# Patient Record
Sex: FEMALE | Race: AMERICAN INDIAN OR ALASKA NATIVE | NOT HISPANIC OR LATINO | Employment: UNEMPLOYED | URBAN - METROPOLITAN AREA
[De-identification: names, ages, dates, MRNs, and addresses within clinical notes are randomized per-mention and may not be internally consistent; named-entity substitution may affect disease eponyms.]

---

## 2017-01-01 ENCOUNTER — HOSPITAL ENCOUNTER (INPATIENT)
Facility: MEDICAL CENTER | Age: 37
LOS: 1 days | DRG: 872 | End: 2017-04-17
Attending: EMERGENCY MEDICINE | Admitting: INTERNAL MEDICINE
Payer: MEDICAID

## 2017-01-01 ENCOUNTER — HOSPITAL ENCOUNTER (EMERGENCY)
Facility: MEDICAL CENTER | Age: 37
End: 2017-01-27
Attending: EMERGENCY MEDICINE
Payer: MEDICAID

## 2017-01-01 ENCOUNTER — HOSPITAL ENCOUNTER (INPATIENT)
Facility: MEDICAL CENTER | Age: 37
LOS: 11 days | DRG: 871 | End: 2017-09-08
Attending: INTERNAL MEDICINE | Admitting: INTERNAL MEDICINE
Payer: MEDICAID

## 2017-01-01 ENCOUNTER — HOSPITAL ENCOUNTER (EMERGENCY)
Facility: MEDICAL CENTER | Age: 37
End: 2017-03-17
Attending: EMERGENCY MEDICINE
Payer: MEDICAID

## 2017-01-01 ENCOUNTER — APPOINTMENT (OUTPATIENT)
Dept: RADIOLOGY | Facility: MEDICAL CENTER | Age: 37
DRG: 418 | End: 2017-01-01
Attending: SURGERY
Payer: MEDICAID

## 2017-01-01 ENCOUNTER — APPOINTMENT (OUTPATIENT)
Dept: RADIOLOGY | Facility: MEDICAL CENTER | Age: 37
DRG: 871 | End: 2017-01-01
Attending: INTERNAL MEDICINE
Payer: MEDICAID

## 2017-01-01 ENCOUNTER — HOSPITAL ENCOUNTER (INPATIENT)
Facility: MEDICAL CENTER | Age: 37
LOS: 5 days | DRG: 418 | End: 2017-03-14
Attending: EMERGENCY MEDICINE | Admitting: HOSPITALIST
Payer: MEDICAID

## 2017-01-01 ENCOUNTER — PATIENT OUTREACH (OUTPATIENT)
Dept: HEALTH INFORMATION MANAGEMENT | Facility: OTHER | Age: 37
End: 2017-01-01

## 2017-01-01 ENCOUNTER — APPOINTMENT (OUTPATIENT)
Dept: RADIOLOGY | Facility: MEDICAL CENTER | Age: 37
DRG: 418 | End: 2017-01-01
Attending: EMERGENCY MEDICINE
Payer: MEDICAID

## 2017-01-01 ENCOUNTER — RESOLUTE PROFESSIONAL BILLING HOSPITAL PROF FEE (OUTPATIENT)
Dept: HOSPITALIST | Facility: MEDICAL CENTER | Age: 37
End: 2017-01-01
Payer: MEDICAID

## 2017-01-01 ENCOUNTER — APPOINTMENT (OUTPATIENT)
Dept: RADIOLOGY | Facility: MEDICAL CENTER | Age: 37
DRG: 872 | End: 2017-01-01
Attending: EMERGENCY MEDICINE
Payer: MEDICAID

## 2017-01-01 ENCOUNTER — HOME CARE VISIT (OUTPATIENT)
Dept: HOSPICE | Facility: HOSPICE | Age: 37
End: 2017-01-01
Payer: MEDICAID

## 2017-01-01 ENCOUNTER — APPOINTMENT (OUTPATIENT)
Dept: RADIOLOGY | Facility: MEDICAL CENTER | Age: 37
DRG: 872 | End: 2017-01-01
Attending: INTERNAL MEDICINE
Payer: MEDICAID

## 2017-01-01 ENCOUNTER — HOSPITAL ENCOUNTER (EMERGENCY)
Facility: MEDICAL CENTER | Age: 37
End: 2017-05-03
Attending: EMERGENCY MEDICINE
Payer: MEDICAID

## 2017-01-01 ENCOUNTER — APPOINTMENT (OUTPATIENT)
Dept: RADIOLOGY | Facility: MEDICAL CENTER | Age: 37
End: 2017-01-01
Attending: EMERGENCY MEDICINE
Payer: MEDICAID

## 2017-01-01 ENCOUNTER — HOSPICE ADMISSION (OUTPATIENT)
Dept: HOSPICE | Facility: HOSPICE | Age: 37
End: 2017-01-01
Payer: MEDICAID

## 2017-01-01 ENCOUNTER — APPOINTMENT (OUTPATIENT)
Dept: RADIOLOGY | Facility: MEDICAL CENTER | Age: 37
DRG: 871 | End: 2017-01-01
Attending: EMERGENCY MEDICINE
Payer: MEDICAID

## 2017-01-01 ENCOUNTER — APPOINTMENT (OUTPATIENT)
Dept: RADIOLOGY | Facility: MEDICAL CENTER | Age: 37
DRG: 872 | End: 2017-01-01
Attending: HOSPITALIST
Payer: MEDICAID

## 2017-01-01 ENCOUNTER — HOSPITAL ENCOUNTER (EMERGENCY)
Facility: MEDICAL CENTER | Age: 37
End: 2017-05-18
Attending: EMERGENCY MEDICINE
Payer: MEDICAID

## 2017-01-01 ENCOUNTER — HOSPITAL ENCOUNTER (INPATIENT)
Facility: MEDICAL CENTER | Age: 37
LOS: 4 days | DRG: 872 | End: 2017-03-23
Attending: EMERGENCY MEDICINE | Admitting: HOSPITALIST
Payer: MEDICAID

## 2017-01-01 VITALS
OXYGEN SATURATION: 97 % | SYSTOLIC BLOOD PRESSURE: 120 MMHG | WEIGHT: 196.43 LBS | TEMPERATURE: 98.6 F | HEART RATE: 102 BPM | BODY MASS INDEX: 30.83 KG/M2 | HEIGHT: 67 IN | DIASTOLIC BLOOD PRESSURE: 65 MMHG | RESPIRATION RATE: 16 BRPM

## 2017-01-01 VITALS
SYSTOLIC BLOOD PRESSURE: 120 MMHG | DIASTOLIC BLOOD PRESSURE: 62 MMHG | HEIGHT: 67 IN | BODY MASS INDEX: 28.2 KG/M2 | RESPIRATION RATE: 16 BRPM | HEART RATE: 104 BPM | OXYGEN SATURATION: 98 % | WEIGHT: 179.68 LBS | TEMPERATURE: 98.5 F

## 2017-01-01 VITALS
WEIGHT: 170 LBS | HEART RATE: 94 BPM | TEMPERATURE: 98.5 F | OXYGEN SATURATION: 100 % | BODY MASS INDEX: 26.68 KG/M2 | HEIGHT: 67 IN | SYSTOLIC BLOOD PRESSURE: 88 MMHG | RESPIRATION RATE: 16 BRPM | DIASTOLIC BLOOD PRESSURE: 48 MMHG

## 2017-01-01 VITALS
HEIGHT: 67 IN | HEART RATE: 100 BPM | SYSTOLIC BLOOD PRESSURE: 119 MMHG | RESPIRATION RATE: 20 BRPM | WEIGHT: 178.57 LBS | DIASTOLIC BLOOD PRESSURE: 56 MMHG | OXYGEN SATURATION: 97 % | TEMPERATURE: 98.7 F | BODY MASS INDEX: 28.03 KG/M2

## 2017-01-01 VITALS
BODY MASS INDEX: 26.68 KG/M2 | OXYGEN SATURATION: 95 % | RESPIRATION RATE: 15 BRPM | DIASTOLIC BLOOD PRESSURE: 97 MMHG | TEMPERATURE: 97.5 F | HEART RATE: 110 BPM | HEIGHT: 67 IN | WEIGHT: 170 LBS | SYSTOLIC BLOOD PRESSURE: 152 MMHG

## 2017-01-01 VITALS
SYSTOLIC BLOOD PRESSURE: 114 MMHG | HEART RATE: 99 BPM | DIASTOLIC BLOOD PRESSURE: 58 MMHG | HEIGHT: 67 IN | BODY MASS INDEX: 27.02 KG/M2 | OXYGEN SATURATION: 97 % | TEMPERATURE: 97.7 F | WEIGHT: 172.18 LBS | RESPIRATION RATE: 16 BRPM

## 2017-01-01 VITALS
HEART RATE: 112 BPM | BODY MASS INDEX: 28.51 KG/M2 | RESPIRATION RATE: 20 BRPM | HEIGHT: 67 IN | OXYGEN SATURATION: 82 % | SYSTOLIC BLOOD PRESSURE: 113 MMHG | DIASTOLIC BLOOD PRESSURE: 54 MMHG | WEIGHT: 181.66 LBS | TEMPERATURE: 98.2 F

## 2017-01-01 VITALS
HEIGHT: 67 IN | SYSTOLIC BLOOD PRESSURE: 118 MMHG | OXYGEN SATURATION: 97 % | BODY MASS INDEX: 28.03 KG/M2 | HEART RATE: 77 BPM | TEMPERATURE: 97.8 F | RESPIRATION RATE: 17 BRPM | DIASTOLIC BLOOD PRESSURE: 64 MMHG | WEIGHT: 178.57 LBS

## 2017-01-01 DIAGNOSIS — E80.6 HYPERBILIRUBINEMIA: ICD-10-CM

## 2017-01-01 DIAGNOSIS — K74.60 HEPATIC CIRRHOSIS, UNSPECIFIED HEPATIC CIRRHOSIS TYPE (HCC): ICD-10-CM

## 2017-01-01 DIAGNOSIS — R74.8 ELEVATED ALKALINE PHOSPHATASE LEVEL: ICD-10-CM

## 2017-01-01 DIAGNOSIS — D53.9 MACROCYTIC ANEMIA: ICD-10-CM

## 2017-01-01 DIAGNOSIS — K70.31 ALCOHOLIC CIRRHOSIS OF LIVER WITH ASCITES (HCC): ICD-10-CM

## 2017-01-01 DIAGNOSIS — G89.29 CHRONIC ABDOMINAL PAIN: ICD-10-CM

## 2017-01-01 DIAGNOSIS — R10.9 CHRONIC ABDOMINAL PAIN: ICD-10-CM

## 2017-01-01 DIAGNOSIS — R41.82 ALTERED MENTAL STATUS, UNSPECIFIED ALTERED MENTAL STATUS TYPE: ICD-10-CM

## 2017-01-01 DIAGNOSIS — A41.9 SEPSIS, DUE TO UNSPECIFIED ORGANISM: ICD-10-CM

## 2017-01-01 DIAGNOSIS — N93.9 VAGINAL BLEEDING: ICD-10-CM

## 2017-01-01 DIAGNOSIS — R93.2 ABNORMAL GALLBLADDER ULTRASOUND: ICD-10-CM

## 2017-01-01 DIAGNOSIS — K70.11 ALCOHOLIC HEPATITIS WITH ASCITES: ICD-10-CM

## 2017-01-01 DIAGNOSIS — F10.929 ALCOHOL INTOXICATION, WITH UNSPECIFIED COMPLICATION (HCC): ICD-10-CM

## 2017-01-01 DIAGNOSIS — R10.11 RIGHT UPPER QUADRANT ABDOMINAL PAIN: ICD-10-CM

## 2017-01-01 DIAGNOSIS — D68.4: ICD-10-CM

## 2017-01-01 DIAGNOSIS — E87.6 HYPOKALEMIA: ICD-10-CM

## 2017-01-01 DIAGNOSIS — F10.920 ACUTE ALCOHOL INTOXICATION, UNCOMPLICATED (HCC): ICD-10-CM

## 2017-01-01 DIAGNOSIS — R10.9 FLANK PAIN: ICD-10-CM

## 2017-01-01 DIAGNOSIS — Z51.5 COMFORT MEASURES ONLY STATUS: ICD-10-CM

## 2017-01-01 DIAGNOSIS — F10.10 ALCOHOL ABUSE: ICD-10-CM

## 2017-01-01 DIAGNOSIS — G89.18 POST-OP PAIN: ICD-10-CM

## 2017-01-01 DIAGNOSIS — T81.40XA POST-OPERATIVE INFECTION: ICD-10-CM

## 2017-01-01 LAB
25(OH)D3 SERPL-MCNC: 9 NG/ML (ref 30–100)
ABO GROUP BLD: NORMAL
ABO GROUP BLD: NORMAL
AFP-TM SERPL-MCNC: 2 NG/ML (ref 0–9)
ALBUMIN SERPL BCP-MCNC: 1.9 G/DL (ref 3.2–4.9)
ALBUMIN SERPL BCP-MCNC: 2 G/DL (ref 3.2–4.9)
ALBUMIN SERPL BCP-MCNC: 2 G/DL (ref 3.2–4.9)
ALBUMIN SERPL BCP-MCNC: 2.1 G/DL (ref 3.2–4.9)
ALBUMIN SERPL BCP-MCNC: 2.1 G/DL (ref 3.2–4.9)
ALBUMIN SERPL BCP-MCNC: 2.2 G/DL (ref 3.2–4.9)
ALBUMIN SERPL BCP-MCNC: 2.4 G/DL (ref 3.2–4.9)
ALBUMIN SERPL BCP-MCNC: 2.5 G/DL (ref 3.2–4.9)
ALBUMIN SERPL BCP-MCNC: 2.6 G/DL (ref 3.2–4.9)
ALBUMIN SERPL BCP-MCNC: 2.6 G/DL (ref 3.2–4.9)
ALBUMIN SERPL BCP-MCNC: 2.7 G/DL (ref 3.2–4.9)
ALBUMIN SERPL BCP-MCNC: 2.7 G/DL (ref 3.2–4.9)
ALBUMIN SERPL BCP-MCNC: 2.9 G/DL (ref 3.2–4.9)
ALBUMIN SERPL BCP-MCNC: 2.9 G/DL (ref 3.2–4.9)
ALBUMIN SERPL BCP-MCNC: 3 G/DL (ref 3.2–4.9)
ALBUMIN SERPL BCP-MCNC: 3.1 G/DL (ref 3.2–4.9)
ALBUMIN SERPL BCP-MCNC: 3.2 G/DL (ref 3.2–4.9)
ALBUMIN/GLOB SERPL: 0.4 G/DL
ALBUMIN/GLOB SERPL: 0.4 G/DL
ALBUMIN/GLOB SERPL: 0.5 G/DL
ALBUMIN/GLOB SERPL: 0.6 G/DL
ALBUMIN/GLOB SERPL: 0.7 G/DL
ALBUMIN/GLOB SERPL: 0.7 G/DL
ALP SERPL-CCNC: 104 U/L (ref 30–99)
ALP SERPL-CCNC: 104 U/L (ref 30–99)
ALP SERPL-CCNC: 110 U/L (ref 30–99)
ALP SERPL-CCNC: 117 U/L (ref 30–99)
ALP SERPL-CCNC: 118 U/L (ref 30–99)
ALP SERPL-CCNC: 122 U/L (ref 30–99)
ALP SERPL-CCNC: 123 U/L (ref 30–99)
ALP SERPL-CCNC: 126 U/L (ref 30–99)
ALP SERPL-CCNC: 128 U/L (ref 30–99)
ALP SERPL-CCNC: 134 U/L (ref 30–99)
ALP SERPL-CCNC: 135 U/L (ref 30–99)
ALP SERPL-CCNC: 135 U/L (ref 30–99)
ALP SERPL-CCNC: 138 U/L (ref 30–99)
ALP SERPL-CCNC: 140 U/L (ref 30–99)
ALP SERPL-CCNC: 147 U/L (ref 30–99)
ALP SERPL-CCNC: 160 U/L (ref 30–99)
ALP SERPL-CCNC: 165 U/L (ref 30–99)
ALP SERPL-CCNC: 58 U/L (ref 30–99)
ALP SERPL-CCNC: 59 U/L (ref 30–99)
ALP SERPL-CCNC: 71 U/L (ref 30–99)
ALP SERPL-CCNC: 77 U/L (ref 30–99)
ALT SERPL-CCNC: 12 U/L (ref 2–50)
ALT SERPL-CCNC: 13 U/L (ref 2–50)
ALT SERPL-CCNC: 14 U/L (ref 2–50)
ALT SERPL-CCNC: 16 U/L (ref 2–50)
ALT SERPL-CCNC: 17 U/L (ref 2–50)
ALT SERPL-CCNC: 19 U/L (ref 2–50)
ALT SERPL-CCNC: 20 U/L (ref 2–50)
ALT SERPL-CCNC: 20 U/L (ref 2–50)
ALT SERPL-CCNC: 22 U/L (ref 2–50)
ALT SERPL-CCNC: 23 U/L (ref 2–50)
ALT SERPL-CCNC: 25 U/L (ref 2–50)
ALT SERPL-CCNC: 25 U/L (ref 2–50)
ALT SERPL-CCNC: 30 U/L (ref 2–50)
ALT SERPL-CCNC: 32 U/L (ref 2–50)
AMMONIA PLAS-SCNC: 32 UMOL/L (ref 11–45)
AMMONIA PLAS-SCNC: 37 UMOL/L (ref 11–45)
AMMONIA PLAS-SCNC: 49 UMOL/L (ref 11–45)
AMMONIA PLAS-SCNC: 61 UMOL/L (ref 11–45)
AMMONIA PLAS-SCNC: 78 UMOL/L (ref 11–45)
AMORPH CRY #/AREA URNS HPF: PRESENT /HPF
ANION GAP SERPL CALC-SCNC: 10 MMOL/L (ref 0–11.9)
ANION GAP SERPL CALC-SCNC: 11 MMOL/L (ref 0–11.9)
ANION GAP SERPL CALC-SCNC: 12 MMOL/L (ref 0–11.9)
ANION GAP SERPL CALC-SCNC: 5 MMOL/L (ref 0–11.9)
ANION GAP SERPL CALC-SCNC: 5 MMOL/L (ref 0–11.9)
ANION GAP SERPL CALC-SCNC: 7 MMOL/L (ref 0–11.9)
ANION GAP SERPL CALC-SCNC: 8 MMOL/L (ref 0–11.9)
ANION GAP SERPL CALC-SCNC: 9 MMOL/L (ref 0–11.9)
ANISOCYTOSIS BLD QL SMEAR: ABNORMAL
ANISOCYTOSIS BLD QL SMEAR: NORMAL
ANISOCYTOSIS BLD QL SMEAR: NORMAL
APAP SERPL-MCNC: <10 UG/ML (ref 10–30)
APPEARANCE FLD: NORMAL
APPEARANCE UR: ABNORMAL
APPEARANCE UR: CLEAR
APTT PPP: 44.7 SEC (ref 24.7–36)
APTT PPP: 49.6 SEC (ref 24.7–36)
APTT PPP: 50.1 SEC (ref 24.7–36)
APTT PPP: 51 SEC (ref 24.7–36)
AST SERPL-CCNC: 121 U/L (ref 12–45)
AST SERPL-CCNC: 29 U/L (ref 12–45)
AST SERPL-CCNC: 29 U/L (ref 12–45)
AST SERPL-CCNC: 31 U/L (ref 12–45)
AST SERPL-CCNC: 35 U/L (ref 12–45)
AST SERPL-CCNC: 35 U/L (ref 12–45)
AST SERPL-CCNC: 38 U/L (ref 12–45)
AST SERPL-CCNC: 39 U/L (ref 12–45)
AST SERPL-CCNC: 40 U/L (ref 12–45)
AST SERPL-CCNC: 42 U/L (ref 12–45)
AST SERPL-CCNC: 42 U/L (ref 12–45)
AST SERPL-CCNC: 43 U/L (ref 12–45)
AST SERPL-CCNC: 48 U/L (ref 12–45)
AST SERPL-CCNC: 49 U/L (ref 12–45)
AST SERPL-CCNC: 50 U/L (ref 12–45)
AST SERPL-CCNC: 52 U/L (ref 12–45)
AST SERPL-CCNC: 53 U/L (ref 12–45)
AST SERPL-CCNC: 57 U/L (ref 12–45)
AST SERPL-CCNC: 57 U/L (ref 12–45)
AST SERPL-CCNC: 58 U/L (ref 12–45)
AST SERPL-CCNC: 62 U/L (ref 12–45)
AST SERPL-CCNC: 74 U/L (ref 12–45)
AST SERPL-CCNC: 90 U/L (ref 12–45)
BACTERIA #/AREA URNS HPF: ABNORMAL /HPF
BACTERIA BLD CULT: NORMAL
BACTERIA BLD CULT: NORMAL
BACTERIA FLD AEROBE CULT: ABNORMAL
BACTERIA FLD AEROBE CULT: ABNORMAL
BACTERIA UR CULT: NORMAL
BARCODED ABORH UBTYP: 5100
BARCODED ABORH UBTYP: 600
BARCODED ABORH UBTYP: 6200
BARCODED ABORH UBTYP: 9500
BARCODED PRD CODE UBPRD: NORMAL
BARCODED UNIT NUM UBUNT: NORMAL
BASE EXCESS BLDV CALC-SCNC: -3 MMOL/L
BASOPHILS # BLD AUTO: 0 % (ref 0–1.8)
BASOPHILS # BLD AUTO: 0 % (ref 0–1.8)
BASOPHILS # BLD AUTO: 0.2 % (ref 0–1.8)
BASOPHILS # BLD AUTO: 0.3 % (ref 0–1.8)
BASOPHILS # BLD AUTO: 0.4 % (ref 0–1.8)
BASOPHILS # BLD AUTO: 0.4 % (ref 0–1.8)
BASOPHILS # BLD AUTO: 0.5 % (ref 0–1.8)
BASOPHILS # BLD AUTO: 0.6 % (ref 0–1.8)
BASOPHILS # BLD AUTO: 0.7 % (ref 0–1.8)
BASOPHILS # BLD AUTO: 0.8 % (ref 0–1.8)
BASOPHILS # BLD AUTO: 0.9 % (ref 0–1.8)
BASOPHILS # BLD AUTO: 0.9 % (ref 0–1.8)
BASOPHILS # BLD AUTO: 1.8 % (ref 0–1.8)
BASOPHILS # BLD AUTO: 3.5 % (ref 0–1.8)
BASOPHILS # BLD: 0 K/UL (ref 0–0.12)
BASOPHILS # BLD: 0 K/UL (ref 0–0.12)
BASOPHILS # BLD: 0.02 K/UL (ref 0–0.12)
BASOPHILS # BLD: 0.02 K/UL (ref 0–0.12)
BASOPHILS # BLD: 0.03 K/UL (ref 0–0.12)
BASOPHILS # BLD: 0.04 K/UL (ref 0–0.12)
BASOPHILS # BLD: 0.04 K/UL (ref 0–0.12)
BASOPHILS # BLD: 0.05 K/UL (ref 0–0.12)
BASOPHILS # BLD: 0.06 K/UL (ref 0–0.12)
BASOPHILS # BLD: 0.06 K/UL (ref 0–0.12)
BASOPHILS # BLD: 0.08 K/UL (ref 0–0.12)
BASOPHILS # BLD: 0.09 K/UL (ref 0–0.12)
BASOPHILS # BLD: 0.11 K/UL (ref 0–0.12)
BASOPHILS # BLD: 0.14 K/UL (ref 0–0.12)
BASOPHILS # BLD: 0.18 K/UL (ref 0–0.12)
BASOPHILS # BLD: 0.24 K/UL (ref 0–0.12)
BILIRUB CONJ SERPL-MCNC: 2.2 MG/DL (ref 0.1–0.5)
BILIRUB CONJ SERPL-MCNC: 2.4 MG/DL (ref 0.1–0.5)
BILIRUB CONJ SERPL-MCNC: 2.4 MG/DL (ref 0.1–0.5)
BILIRUB CONJ SERPL-MCNC: 2.5 MG/DL (ref 0.1–0.5)
BILIRUB CONJ SERPL-MCNC: 2.7 MG/DL (ref 0.1–0.5)
BILIRUB CONJ SERPL-MCNC: 2.8 MG/DL (ref 0.1–0.5)
BILIRUB INDIRECT SERPL-MCNC: 2.6 MG/DL (ref 0–1)
BILIRUB INDIRECT SERPL-MCNC: 2.6 MG/DL (ref 0–1)
BILIRUB INDIRECT SERPL-MCNC: 2.8 MG/DL (ref 0–1)
BILIRUB INDIRECT SERPL-MCNC: 3 MG/DL (ref 0–1)
BILIRUB INDIRECT SERPL-MCNC: 3.2 MG/DL (ref 0–1)
BILIRUB SERPL-MCNC: 15 MG/DL (ref 0.1–1.5)
BILIRUB SERPL-MCNC: 28.5 MG/DL (ref 0.1–1.5)
BILIRUB SERPL-MCNC: 28.8 MG/DL (ref 0.1–1.5)
BILIRUB SERPL-MCNC: 29.4 MG/DL (ref 0.1–1.5)
BILIRUB SERPL-MCNC: 3.5 MG/DL (ref 0.1–1.5)
BILIRUB SERPL-MCNC: 3.8 MG/DL (ref 0.1–1.5)
BILIRUB SERPL-MCNC: 3.9 MG/DL (ref 0.1–1.5)
BILIRUB SERPL-MCNC: 30 MG/DL (ref 0.1–1.5)
BILIRUB SERPL-MCNC: 4.1 MG/DL (ref 0.1–1.5)
BILIRUB SERPL-MCNC: 4.2 MG/DL (ref 0.1–1.5)
BILIRUB SERPL-MCNC: 4.2 MG/DL (ref 0.1–1.5)
BILIRUB SERPL-MCNC: 4.3 MG/DL (ref 0.1–1.5)
BILIRUB SERPL-MCNC: 4.5 MG/DL (ref 0.1–1.5)
BILIRUB SERPL-MCNC: 4.8 MG/DL (ref 0.1–1.5)
BILIRUB SERPL-MCNC: 5 MG/DL (ref 0.1–1.5)
BILIRUB SERPL-MCNC: 5.2 MG/DL (ref 0.1–1.5)
BILIRUB SERPL-MCNC: 5.2 MG/DL (ref 0.1–1.5)
BILIRUB SERPL-MCNC: 5.3 MG/DL (ref 0.1–1.5)
BILIRUB SERPL-MCNC: 5.4 MG/DL (ref 0.1–1.5)
BILIRUB SERPL-MCNC: 5.4 MG/DL (ref 0.1–1.5)
BILIRUB SERPL-MCNC: 5.7 MG/DL (ref 0.1–1.5)
BILIRUB SERPL-MCNC: 6.1 MG/DL (ref 0.1–1.5)
BILIRUB SERPL-MCNC: 8.1 MG/DL (ref 0.1–1.5)
BILIRUB UR QL CFM: POSITIVE
BILIRUB UR QL CFM: POSITIVE
BILIRUB UR QL STRIP.AUTO: ABNORMAL
BILIRUB UR QL STRIP.AUTO: NEGATIVE
BILIRUB UR QL STRIP.AUTO: NEGATIVE
BLD GP AB SCN SERPL QL: NORMAL
BLD GP AB SCN SERPL QL: NORMAL
BNP SERPL-MCNC: 204 PG/ML (ref 0–100)
BODY FLD TYPE: NORMAL
BODY FLD TYPE: NORMAL
BODY TEMPERATURE: ABNORMAL CENTIGRADE
BUN SERPL-MCNC: 16 MG/DL (ref 8–22)
BUN SERPL-MCNC: 18 MG/DL (ref 8–22)
BUN SERPL-MCNC: 2 MG/DL (ref 8–22)
BUN SERPL-MCNC: 21 MG/DL (ref 8–22)
BUN SERPL-MCNC: 23 MG/DL (ref 8–22)
BUN SERPL-MCNC: 27 MG/DL (ref 8–22)
BUN SERPL-MCNC: 3 MG/DL (ref 8–22)
BUN SERPL-MCNC: 31 MG/DL (ref 8–22)
BUN SERPL-MCNC: 33 MG/DL (ref 8–22)
BUN SERPL-MCNC: 36 MG/DL (ref 8–22)
BUN SERPL-MCNC: 4 MG/DL (ref 8–22)
BUN SERPL-MCNC: 4 MG/DL (ref 8–22)
BUN SERPL-MCNC: 5 MG/DL (ref 8–22)
BUN SERPL-MCNC: <2 MG/DL (ref 8–22)
BURR CELLS BLD QL SMEAR: NORMAL
C DIFF DNA SPEC QL NAA+PROBE: NEGATIVE
C DIFF TOX GENS STL QL NAA+PROBE: NEGATIVE
CALCIUM SERPL-MCNC: 7.3 MG/DL (ref 8.5–10.5)
CALCIUM SERPL-MCNC: 7.3 MG/DL (ref 8.5–10.5)
CALCIUM SERPL-MCNC: 7.5 MG/DL (ref 8.5–10.5)
CALCIUM SERPL-MCNC: 7.5 MG/DL (ref 8.5–10.5)
CALCIUM SERPL-MCNC: 7.6 MG/DL (ref 8.5–10.5)
CALCIUM SERPL-MCNC: 7.7 MG/DL (ref 8.5–10.5)
CALCIUM SERPL-MCNC: 7.8 MG/DL (ref 8.5–10.5)
CALCIUM SERPL-MCNC: 7.9 MG/DL (ref 8.5–10.5)
CALCIUM SERPL-MCNC: 7.9 MG/DL (ref 8.5–10.5)
CALCIUM SERPL-MCNC: 8 MG/DL (ref 8.5–10.5)
CALCIUM SERPL-MCNC: 8.3 MG/DL (ref 8.5–10.5)
CALCIUM SERPL-MCNC: 8.5 MG/DL (ref 8.5–10.5)
CALCIUM SERPL-MCNC: 8.6 MG/DL (ref 8.5–10.5)
CALCIUM SERPL-MCNC: 8.7 MG/DL (ref 8.5–10.5)
CALCIUM SERPL-MCNC: 8.8 MG/DL (ref 8.5–10.5)
CFT BLD TEG: 5.8 MIN (ref 5–10)
CHLORIDE SERPL-SCNC: 100 MMOL/L (ref 96–112)
CHLORIDE SERPL-SCNC: 101 MMOL/L (ref 96–112)
CHLORIDE SERPL-SCNC: 102 MMOL/L (ref 96–112)
CHLORIDE SERPL-SCNC: 103 MMOL/L (ref 96–112)
CHLORIDE SERPL-SCNC: 103 MMOL/L (ref 96–112)
CHLORIDE SERPL-SCNC: 105 MMOL/L (ref 96–112)
CHLORIDE SERPL-SCNC: 106 MMOL/L (ref 96–112)
CHLORIDE SERPL-SCNC: 107 MMOL/L (ref 96–112)
CHLORIDE SERPL-SCNC: 108 MMOL/L (ref 96–112)
CHLORIDE SERPL-SCNC: 108 MMOL/L (ref 96–112)
CHLORIDE SERPL-SCNC: 109 MMOL/L (ref 96–112)
CHLORIDE SERPL-SCNC: 96 MMOL/L (ref 96–112)
CHLORIDE SERPL-SCNC: 96 MMOL/L (ref 96–112)
CHLORIDE SERPL-SCNC: 97 MMOL/L (ref 96–112)
CHLORIDE SERPL-SCNC: 98 MMOL/L (ref 96–112)
CHLORIDE SERPL-SCNC: 98 MMOL/L (ref 96–112)
CHLORIDE SERPL-SCNC: 99 MMOL/L (ref 96–112)
CHLORIDE SERPL-SCNC: 99 MMOL/L (ref 96–112)
CLOT ANGLE BLD TEG: 65.2 DEGREES (ref 53–72)
CLOT LYSIS 30M P MA LENFR BLD TEG: 0.6 % (ref 0–8)
CO2 SERPL-SCNC: 18 MMOL/L (ref 20–33)
CO2 SERPL-SCNC: 19 MMOL/L (ref 20–33)
CO2 SERPL-SCNC: 20 MMOL/L (ref 20–33)
CO2 SERPL-SCNC: 21 MMOL/L (ref 20–33)
CO2 SERPL-SCNC: 22 MMOL/L (ref 20–33)
CO2 SERPL-SCNC: 23 MMOL/L (ref 20–33)
CO2 SERPL-SCNC: 23 MMOL/L (ref 20–33)
CO2 SERPL-SCNC: 24 MMOL/L (ref 20–33)
CO2 SERPL-SCNC: 25 MMOL/L (ref 20–33)
COLOR FLD: YELLOW
COLOR UR AUTO: ABNORMAL
COLOR UR AUTO: YELLOW
COLOR UR: ABNORMAL
COLOR UR: ABNORMAL
COLOR UR: YELLOW
COMMENT 1642: NORMAL
COMPONENT FT 8504FT: NORMAL
COMPONENT P 8504P: NORMAL
COMPONENT R 8504R: NORMAL
CREAT SERPL-MCNC: 0.31 MG/DL (ref 0.5–1.4)
CREAT SERPL-MCNC: 0.35 MG/DL (ref 0.5–1.4)
CREAT SERPL-MCNC: 0.44 MG/DL (ref 0.5–1.4)
CREAT SERPL-MCNC: 0.45 MG/DL (ref 0.5–1.4)
CREAT SERPL-MCNC: 0.47 MG/DL (ref 0.5–1.4)
CREAT SERPL-MCNC: 0.49 MG/DL (ref 0.5–1.4)
CREAT SERPL-MCNC: 0.5 MG/DL (ref 0.5–1.4)
CREAT SERPL-MCNC: 0.51 MG/DL (ref 0.5–1.4)
CREAT SERPL-MCNC: 0.52 MG/DL (ref 0.5–1.4)
CREAT SERPL-MCNC: 0.53 MG/DL (ref 0.5–1.4)
CREAT SERPL-MCNC: 0.56 MG/DL (ref 0.5–1.4)
CREAT SERPL-MCNC: 0.56 MG/DL (ref 0.5–1.4)
CREAT SERPL-MCNC: 0.61 MG/DL (ref 0.5–1.4)
CREAT SERPL-MCNC: 0.62 MG/DL (ref 0.5–1.4)
CREAT SERPL-MCNC: 1.12 MG/DL (ref 0.5–1.4)
CREAT SERPL-MCNC: 1.35 MG/DL (ref 0.5–1.4)
CREAT SERPL-MCNC: 1.62 MG/DL (ref 0.5–1.4)
CREAT SERPL-MCNC: 1.68 MG/DL (ref 0.5–1.4)
CREAT SERPL-MCNC: 1.84 MG/DL (ref 0.5–1.4)
CREAT SERPL-MCNC: 1.91 MG/DL (ref 0.5–1.4)
CREAT SERPL-MCNC: 1.96 MG/DL (ref 0.5–1.4)
CREAT SERPL-MCNC: 1.98 MG/DL (ref 0.5–1.4)
CREAT SERPL-MCNC: 2.23 MG/DL (ref 0.5–1.4)
CREAT SERPL-MCNC: 2.25 MG/DL (ref 0.5–1.4)
CREAT UR-MCNC: 235.9 MG/DL
CSF COMMENTS 1658: NORMAL
CT.EXTRINSIC BLD ROTEM: 1.9 MIN (ref 1–3)
CULTURE IF INDICATED INDCX: NO UA CULTURE
CULTURE IF INDICATED INDCX: YES UA CULTURE
CULTURE IF INDICATED INDCX: YES UA CULTURE
DACRYOCYTES BLD QL SMEAR: NORMAL
DACRYOCYTES BLD QL SMEAR: NORMAL
EKG IMPRESSION: NORMAL
EOSINOPHIL # BLD AUTO: 0 K/UL (ref 0–0.51)
EOSINOPHIL # BLD AUTO: 0 K/UL (ref 0–0.51)
EOSINOPHIL # BLD AUTO: 0.09 K/UL (ref 0–0.51)
EOSINOPHIL # BLD AUTO: 0.11 K/UL (ref 0–0.51)
EOSINOPHIL # BLD AUTO: 0.12 K/UL (ref 0–0.51)
EOSINOPHIL # BLD AUTO: 0.13 K/UL (ref 0–0.51)
EOSINOPHIL # BLD AUTO: 0.16 K/UL (ref 0–0.51)
EOSINOPHIL # BLD AUTO: 0.17 K/UL (ref 0–0.51)
EOSINOPHIL # BLD AUTO: 0.24 K/UL (ref 0–0.51)
EOSINOPHIL # BLD AUTO: 0.25 K/UL (ref 0–0.51)
EOSINOPHIL # BLD AUTO: 0.25 K/UL (ref 0–0.51)
EOSINOPHIL # BLD AUTO: 0.29 K/UL (ref 0–0.51)
EOSINOPHIL # BLD AUTO: 0.29 K/UL (ref 0–0.51)
EOSINOPHIL # BLD AUTO: 0.31 K/UL (ref 0–0.51)
EOSINOPHIL # BLD AUTO: 0.33 K/UL (ref 0–0.51)
EOSINOPHIL # BLD AUTO: 0.35 K/UL (ref 0–0.51)
EOSINOPHIL # BLD AUTO: 0.42 K/UL (ref 0–0.51)
EOSINOPHIL # BLD AUTO: 0.46 K/UL (ref 0–0.51)
EOSINOPHIL # BLD AUTO: 0.47 K/UL (ref 0–0.51)
EOSINOPHIL # BLD AUTO: 0.66 K/UL (ref 0–0.51)
EOSINOPHIL NFR BLD: 0 % (ref 0–6.9)
EOSINOPHIL NFR BLD: 0 % (ref 0–6.9)
EOSINOPHIL NFR BLD: 0.8 % (ref 0–6.9)
EOSINOPHIL NFR BLD: 0.9 % (ref 0–6.9)
EOSINOPHIL NFR BLD: 0.9 % (ref 0–6.9)
EOSINOPHIL NFR BLD: 1.4 % (ref 0–6.9)
EOSINOPHIL NFR BLD: 1.7 % (ref 0–6.9)
EOSINOPHIL NFR BLD: 1.7 % (ref 0–6.9)
EOSINOPHIL NFR BLD: 1.8 % (ref 0–6.9)
EOSINOPHIL NFR BLD: 1.9 % (ref 0–6.9)
EOSINOPHIL NFR BLD: 1.9 % (ref 0–6.9)
EOSINOPHIL NFR BLD: 2.1 % (ref 0–6.9)
EOSINOPHIL NFR BLD: 2.1 % (ref 0–6.9)
EOSINOPHIL NFR BLD: 2.4 % (ref 0–6.9)
EOSINOPHIL NFR BLD: 2.7 % (ref 0–6.9)
EOSINOPHIL NFR BLD: 2.8 % (ref 0–6.9)
EOSINOPHIL NFR BLD: 3.1 % (ref 0–6.9)
EOSINOPHIL NFR BLD: 3.2 % (ref 0–6.9)
EOSINOPHIL NFR BLD: 3.6 % (ref 0–6.9)
EOSINOPHIL NFR BLD: 3.6 % (ref 0–6.9)
EOSINOPHIL NFR BLD: 3.9 % (ref 0–6.9)
EOSINOPHIL NFR BLD: 5.1 % (ref 0–6.9)
EPI CELLS #/AREA URNS HPF: ABNORMAL /HPF
ERYTHROCYTE [DISTWIDTH] IN BLOOD BY AUTOMATED COUNT: 49.9 FL (ref 35.9–50)
ERYTHROCYTE [DISTWIDTH] IN BLOOD BY AUTOMATED COUNT: 49.9 FL (ref 35.9–50)
ERYTHROCYTE [DISTWIDTH] IN BLOOD BY AUTOMATED COUNT: 50.1 FL (ref 35.9–50)
ERYTHROCYTE [DISTWIDTH] IN BLOOD BY AUTOMATED COUNT: 59.3 FL (ref 35.9–50)
ERYTHROCYTE [DISTWIDTH] IN BLOOD BY AUTOMATED COUNT: 60.9 FL (ref 35.9–50)
ERYTHROCYTE [DISTWIDTH] IN BLOOD BY AUTOMATED COUNT: 70.4 FL (ref 35.9–50)
ERYTHROCYTE [DISTWIDTH] IN BLOOD BY AUTOMATED COUNT: 70.4 FL (ref 35.9–50)
ERYTHROCYTE [DISTWIDTH] IN BLOOD BY AUTOMATED COUNT: 70.7 FL (ref 35.9–50)
ERYTHROCYTE [DISTWIDTH] IN BLOOD BY AUTOMATED COUNT: 71.2 FL (ref 35.9–50)
ERYTHROCYTE [DISTWIDTH] IN BLOOD BY AUTOMATED COUNT: 72.4 FL (ref 35.9–50)
ERYTHROCYTE [DISTWIDTH] IN BLOOD BY AUTOMATED COUNT: 73.2 FL (ref 35.9–50)
ERYTHROCYTE [DISTWIDTH] IN BLOOD BY AUTOMATED COUNT: 74.4 FL (ref 35.9–50)
ERYTHROCYTE [DISTWIDTH] IN BLOOD BY AUTOMATED COUNT: 74.8 FL (ref 35.9–50)
ERYTHROCYTE [DISTWIDTH] IN BLOOD BY AUTOMATED COUNT: 75.3 FL (ref 35.9–50)
ERYTHROCYTE [DISTWIDTH] IN BLOOD BY AUTOMATED COUNT: 75.5 FL (ref 35.9–50)
ERYTHROCYTE [DISTWIDTH] IN BLOOD BY AUTOMATED COUNT: 79.4 FL (ref 35.9–50)
ERYTHROCYTE [DISTWIDTH] IN BLOOD BY AUTOMATED COUNT: 82.1 FL (ref 35.9–50)
ERYTHROCYTE [DISTWIDTH] IN BLOOD BY AUTOMATED COUNT: 84.2 FL (ref 35.9–50)
ERYTHROCYTE [DISTWIDTH] IN BLOOD BY AUTOMATED COUNT: 84.5 FL (ref 35.9–50)
ERYTHROCYTE [DISTWIDTH] IN BLOOD BY AUTOMATED COUNT: 85.4 FL (ref 35.9–50)
ERYTHROCYTE [DISTWIDTH] IN BLOOD BY AUTOMATED COUNT: 85.7 FL (ref 35.9–50)
ERYTHROCYTE [DISTWIDTH] IN BLOOD BY AUTOMATED COUNT: 86.5 FL (ref 35.9–50)
ERYTHROCYTE [DISTWIDTH] IN BLOOD BY AUTOMATED COUNT: 86.7 FL (ref 35.9–50)
ERYTHROCYTE [DISTWIDTH] IN BLOOD BY AUTOMATED COUNT: 87 FL (ref 35.9–50)
ERYTHROCYTE [DISTWIDTH] IN BLOOD BY AUTOMATED COUNT: 87.1 FL (ref 35.9–50)
EST. AVERAGE GLUCOSE BLD GHB EST-MCNC: 80 MG/DL
ETHANOL BLD-MCNC: 0 G/DL
ETHANOL BLD-MCNC: 0.01 G/DL
ETHANOL BLD-MCNC: 0.39 G/DL
ETHANOL BLD-MCNC: 0.43 G/DL
FERRITIN SERPL-MCNC: 383 NG/ML (ref 10–291)
FERRITIN SERPL-MCNC: 60.1 NG/ML (ref 10–291)
FOLATE SERPL-MCNC: >23.7 NG/ML
GFR SERPL CREATININE-BSD FRML MDRD: 24 ML/MIN/1.73 M 2
GFR SERPL CREATININE-BSD FRML MDRD: 25 ML/MIN/1.73 M 2
GFR SERPL CREATININE-BSD FRML MDRD: 28 ML/MIN/1.73 M 2
GFR SERPL CREATININE-BSD FRML MDRD: 29 ML/MIN/1.73 M 2
GFR SERPL CREATININE-BSD FRML MDRD: 30 ML/MIN/1.73 M 2
GFR SERPL CREATININE-BSD FRML MDRD: 31 ML/MIN/1.73 M 2
GFR SERPL CREATININE-BSD FRML MDRD: 34 ML/MIN/1.73 M 2
GFR SERPL CREATININE-BSD FRML MDRD: 36 ML/MIN/1.73 M 2
GFR SERPL CREATININE-BSD FRML MDRD: 44 ML/MIN/1.73 M 2
GFR SERPL CREATININE-BSD FRML MDRD: 55 ML/MIN/1.73 M 2
GFR SERPL CREATININE-BSD FRML MDRD: >60 ML/MIN/1.73 M 2
GIANT PLATELETS BLD QL SMEAR: NORMAL
GLOBULIN SER CALC-MCNC: 3.9 G/DL (ref 1.9–3.5)
GLOBULIN SER CALC-MCNC: 4 G/DL (ref 1.9–3.5)
GLOBULIN SER CALC-MCNC: 4.1 G/DL (ref 1.9–3.5)
GLOBULIN SER CALC-MCNC: 4.2 G/DL (ref 1.9–3.5)
GLOBULIN SER CALC-MCNC: 4.3 G/DL (ref 1.9–3.5)
GLOBULIN SER CALC-MCNC: 4.3 G/DL (ref 1.9–3.5)
GLOBULIN SER CALC-MCNC: 4.4 G/DL (ref 1.9–3.5)
GLOBULIN SER CALC-MCNC: 4.4 G/DL (ref 1.9–3.5)
GLOBULIN SER CALC-MCNC: 4.5 G/DL (ref 1.9–3.5)
GLOBULIN SER CALC-MCNC: 4.5 G/DL (ref 1.9–3.5)
GLOBULIN SER CALC-MCNC: 4.6 G/DL (ref 1.9–3.5)
GLOBULIN SER CALC-MCNC: 4.7 G/DL (ref 1.9–3.5)
GLOBULIN SER CALC-MCNC: 5.1 G/DL (ref 1.9–3.5)
GLOBULIN SER CALC-MCNC: 5.2 G/DL (ref 1.9–3.5)
GLOBULIN SER CALC-MCNC: 5.2 G/DL (ref 1.9–3.5)
GLOBULIN SER CALC-MCNC: 5.3 G/DL (ref 1.9–3.5)
GLOBULIN SER CALC-MCNC: 5.5 G/DL (ref 1.9–3.5)
GLOBULIN SER CALC-MCNC: 5.5 G/DL (ref 1.9–3.5)
GLOBULIN SER CALC-MCNC: 5.6 G/DL (ref 1.9–3.5)
GLUCOSE SERPL-MCNC: 103 MG/DL (ref 65–99)
GLUCOSE SERPL-MCNC: 104 MG/DL (ref 65–99)
GLUCOSE SERPL-MCNC: 105 MG/DL (ref 65–99)
GLUCOSE SERPL-MCNC: 106 MG/DL (ref 65–99)
GLUCOSE SERPL-MCNC: 109 MG/DL (ref 65–99)
GLUCOSE SERPL-MCNC: 112 MG/DL (ref 65–99)
GLUCOSE SERPL-MCNC: 115 MG/DL (ref 65–99)
GLUCOSE SERPL-MCNC: 138 MG/DL (ref 65–99)
GLUCOSE SERPL-MCNC: 75 MG/DL (ref 65–99)
GLUCOSE SERPL-MCNC: 81 MG/DL (ref 65–99)
GLUCOSE SERPL-MCNC: 81 MG/DL (ref 65–99)
GLUCOSE SERPL-MCNC: 82 MG/DL (ref 65–99)
GLUCOSE SERPL-MCNC: 83 MG/DL (ref 65–99)
GLUCOSE SERPL-MCNC: 83 MG/DL (ref 65–99)
GLUCOSE SERPL-MCNC: 85 MG/DL (ref 65–99)
GLUCOSE SERPL-MCNC: 86 MG/DL (ref 65–99)
GLUCOSE SERPL-MCNC: 89 MG/DL (ref 65–99)
GLUCOSE SERPL-MCNC: 90 MG/DL (ref 65–99)
GLUCOSE SERPL-MCNC: 91 MG/DL (ref 65–99)
GLUCOSE SERPL-MCNC: 95 MG/DL (ref 65–99)
GLUCOSE SERPL-MCNC: 96 MG/DL (ref 65–99)
GLUCOSE SERPL-MCNC: 96 MG/DL (ref 65–99)
GLUCOSE SERPL-MCNC: 98 MG/DL (ref 65–99)
GLUCOSE UR QL STRIP.AUTO: NEGATIVE MG/DL
GLUCOSE UR QL STRIP.AUTO: NEGATIVE MG/DL
GLUCOSE UR STRIP.AUTO-MCNC: NEGATIVE MG/DL
GRAM STN SPEC: ABNORMAL
GRAM STN SPEC: NORMAL
HAPTOGLOB SERPL-MCNC: <10 MG/DL (ref 30–200)
HAV IGM SERPL QL IA: NEGATIVE
HBA1C MFR BLD: 4.4 % (ref 0–5.6)
HBV CORE IGM SER QL: NEGATIVE
HBV SURFACE AG SER QL: NEGATIVE
HCG SERPL QL: NEGATIVE
HCG UR QL: NEGATIVE
HCO3 BLDV-SCNC: 20 MMOL/L (ref 24–28)
HCT VFR BLD AUTO: 14.7 % (ref 37–47)
HCT VFR BLD AUTO: 15.1 % (ref 37–47)
HCT VFR BLD AUTO: 15.5 % (ref 37–47)
HCT VFR BLD AUTO: 16 % (ref 37–47)
HCT VFR BLD AUTO: 16.7 % (ref 37–47)
HCT VFR BLD AUTO: 18.9 % (ref 37–47)
HCT VFR BLD AUTO: 20.2 % (ref 37–47)
HCT VFR BLD AUTO: 20.2 % (ref 37–47)
HCT VFR BLD AUTO: 20.5 % (ref 37–47)
HCT VFR BLD AUTO: 20.7 % (ref 37–47)
HCT VFR BLD AUTO: 21.5 % (ref 37–47)
HCT VFR BLD AUTO: 21.9 % (ref 37–47)
HCT VFR BLD AUTO: 21.9 % (ref 37–47)
HCT VFR BLD AUTO: 22.1 % (ref 37–47)
HCT VFR BLD AUTO: 22.4 % (ref 37–47)
HCT VFR BLD AUTO: 23 % (ref 37–47)
HCT VFR BLD AUTO: 23.9 % (ref 37–47)
HCT VFR BLD AUTO: 24.5 % (ref 37–47)
HCT VFR BLD AUTO: 24.6 % (ref 37–47)
HCT VFR BLD AUTO: 24.9 % (ref 37–47)
HCT VFR BLD AUTO: 25.1 % (ref 37–47)
HCT VFR BLD AUTO: 25.5 % (ref 37–47)
HCT VFR BLD AUTO: 26.1 % (ref 37–47)
HCT VFR BLD AUTO: 26.1 % (ref 37–47)
HCT VFR BLD AUTO: 26.2 % (ref 37–47)
HCT VFR BLD AUTO: 26.4 % (ref 37–47)
HCT VFR BLD AUTO: 26.9 % (ref 37–47)
HCT VFR BLD AUTO: 27.3 % (ref 37–47)
HCT VFR BLD AUTO: 28 % (ref 37–47)
HCT VFR BLD AUTO: 28.2 % (ref 37–47)
HCT VFR BLD AUTO: 28.6 % (ref 37–47)
HCT VFR BLD AUTO: 29.1 % (ref 37–47)
HCT VFR BLD AUTO: 29.7 % (ref 37–47)
HCT VFR BLD AUTO: 31.3 % (ref 37–47)
HCV AB SER QL: NEGATIVE
HEMOCCULT SP1 STL QL: NEGATIVE
HGB BLD-MCNC: 10.1 G/DL (ref 12–16)
HGB BLD-MCNC: 5.1 G/DL (ref 12–16)
HGB BLD-MCNC: 5.3 G/DL (ref 12–16)
HGB BLD-MCNC: 5.5 G/DL (ref 12–16)
HGB BLD-MCNC: 5.5 G/DL (ref 12–16)
HGB BLD-MCNC: 6 G/DL (ref 12–16)
HGB BLD-MCNC: 6.6 G/DL (ref 12–16)
HGB BLD-MCNC: 7 G/DL (ref 12–16)
HGB BLD-MCNC: 7.2 G/DL (ref 12–16)
HGB BLD-MCNC: 7.3 G/DL (ref 12–16)
HGB BLD-MCNC: 7.5 G/DL (ref 12–16)
HGB BLD-MCNC: 7.5 G/DL (ref 12–16)
HGB BLD-MCNC: 7.6 G/DL (ref 12–16)
HGB BLD-MCNC: 7.7 G/DL (ref 12–16)
HGB BLD-MCNC: 7.8 G/DL (ref 12–16)
HGB BLD-MCNC: 8 G/DL (ref 12–16)
HGB BLD-MCNC: 8.1 G/DL (ref 12–16)
HGB BLD-MCNC: 8.1 G/DL (ref 12–16)
HGB BLD-MCNC: 8.2 G/DL (ref 12–16)
HGB BLD-MCNC: 8.4 G/DL (ref 12–16)
HGB BLD-MCNC: 8.5 G/DL (ref 12–16)
HGB BLD-MCNC: 8.5 G/DL (ref 12–16)
HGB BLD-MCNC: 8.6 G/DL (ref 12–16)
HGB BLD-MCNC: 8.8 G/DL (ref 12–16)
HGB BLD-MCNC: 8.8 G/DL (ref 12–16)
HGB BLD-MCNC: 8.9 G/DL (ref 12–16)
HGB BLD-MCNC: 9 G/DL (ref 12–16)
HGB BLD-MCNC: 9.1 G/DL (ref 12–16)
HGB BLD-MCNC: 9.3 G/DL (ref 12–16)
HGB BLD-MCNC: 9.4 G/DL (ref 12–16)
HGB BLD-MCNC: 9.4 G/DL (ref 12–16)
HGB BLD-MCNC: 9.5 G/DL (ref 12–16)
HGB RETIC QN AUTO: 36.8 PG/CELL (ref 29–35)
HGB RETIC QN AUTO: 43.2 PG/CELL (ref 29–35)
HYALINE CASTS #/AREA URNS LPF: ABNORMAL /LPF
HYALINE CASTS #/AREA URNS LPF: ABNORMAL /LPF
HYPOCHROMIA BLD QL SMEAR: ABNORMAL
IGA SERPL-MCNC: 1100 MG/DL (ref 68–408)
IMM GRANULOCYTES # BLD AUTO: 0.02 K/UL (ref 0–0.11)
IMM GRANULOCYTES # BLD AUTO: 0.03 K/UL (ref 0–0.11)
IMM GRANULOCYTES # BLD AUTO: 0.06 K/UL (ref 0–0.11)
IMM GRANULOCYTES # BLD AUTO: 0.07 K/UL (ref 0–0.11)
IMM GRANULOCYTES # BLD AUTO: 0.08 K/UL (ref 0–0.11)
IMM GRANULOCYTES # BLD AUTO: 0.09 K/UL (ref 0–0.11)
IMM GRANULOCYTES # BLD AUTO: 0.09 K/UL (ref 0–0.11)
IMM GRANULOCYTES # BLD AUTO: 0.1 K/UL (ref 0–0.11)
IMM GRANULOCYTES # BLD AUTO: 0.1 K/UL (ref 0–0.11)
IMM GRANULOCYTES # BLD AUTO: 0.11 K/UL (ref 0–0.11)
IMM GRANULOCYTES # BLD AUTO: 0.12 K/UL (ref 0–0.11)
IMM GRANULOCYTES # BLD AUTO: 0.13 K/UL (ref 0–0.11)
IMM GRANULOCYTES # BLD AUTO: 0.18 K/UL (ref 0–0.11)
IMM GRANULOCYTES NFR BLD AUTO: 0.3 % (ref 0–0.9)
IMM GRANULOCYTES NFR BLD AUTO: 0.5 % (ref 0–0.9)
IMM GRANULOCYTES NFR BLD AUTO: 0.5 % (ref 0–0.9)
IMM GRANULOCYTES NFR BLD AUTO: 0.7 % (ref 0–0.9)
IMM GRANULOCYTES NFR BLD AUTO: 0.8 % (ref 0–0.9)
IMM GRANULOCYTES NFR BLD AUTO: 0.8 % (ref 0–0.9)
IMM GRANULOCYTES NFR BLD AUTO: 1 % (ref 0–0.9)
IMM GRANULOCYTES NFR BLD AUTO: 1.2 % (ref 0–0.9)
IMM GRANULOCYTES NFR BLD AUTO: 2 % (ref 0–0.9)
IMM RETICS NFR: 14.1 % (ref 9.3–17.4)
IMM RETICS NFR: 23.2 % (ref 9.3–17.4)
INR PPP: 1.75 (ref 0.87–1.13)
INR PPP: 1.82 (ref 0.87–1.13)
INR PPP: 1.83 (ref 0.87–1.13)
INR PPP: 1.84 (ref 0.87–1.13)
INR PPP: 1.87 (ref 0.87–1.13)
INR PPP: 1.95 (ref 0.87–1.13)
INR PPP: 1.97 (ref 0.87–1.13)
INR PPP: 2.01 (ref 0.87–1.13)
INR PPP: 2.02 (ref 0.87–1.13)
INR PPP: 2.07 (ref 0.87–1.13)
INR PPP: 2.07 (ref 0.87–1.13)
INR PPP: 2.1 (ref 0.87–1.13)
INR PPP: 2.16 (ref 0.87–1.13)
INR PPP: 2.18 (ref 0.87–1.13)
INR PPP: 2.24 (ref 0.87–1.13)
INR PPP: 2.28 (ref 0.87–1.13)
INR PPP: 2.39 (ref 0.87–1.13)
INR PPP: 2.42 (ref 0.87–1.13)
IRON SATN MFR SERPL: 43 % (ref 15–55)
IRON SATN MFR SERPL: 67 % (ref 15–55)
IRON SERPL-MCNC: 62 UG/DL (ref 40–170)
IRON SERPL-MCNC: 85 UG/DL (ref 40–170)
KETONES UR QL STRIP.AUTO: NEGATIVE MG/DL
KETONES UR QL STRIP.AUTO: NEGATIVE MG/DL
KETONES UR STRIP.AUTO-MCNC: NEGATIVE MG/DL
LACTATE BLD-SCNC: 1.4 MMOL/L (ref 0.5–2)
LACTATE BLD-SCNC: 1.5 MMOL/L (ref 0.5–2)
LACTATE BLD-SCNC: 1.7 MMOL/L (ref 0.5–2)
LACTATE BLD-SCNC: 2.1 MMOL/L (ref 0.5–2)
LACTATE BLD-SCNC: 2.3 MMOL/L (ref 0.5–2)
LDH SERPL-CCNC: 195 U/L (ref 107–266)
LEUKOCYTE ESTERASE UR QL STRIP.AUTO: ABNORMAL
LEUKOCYTE ESTERASE UR QL STRIP.AUTO: NEGATIVE
LIPASE SERPL-CCNC: 10 U/L (ref 11–82)
LIPASE SERPL-CCNC: 10 U/L (ref 11–82)
LIPASE SERPL-CCNC: 16 U/L (ref 11–82)
LIPASE SERPL-CCNC: 29 U/L (ref 11–82)
LIPASE SERPL-CCNC: 3 U/L (ref 11–82)
LIPASE SERPL-CCNC: 30 U/L (ref 11–82)
LIPASE SERPL-CCNC: 4 U/L (ref 11–82)
LIPASE SERPL-CCNC: 43 U/L (ref 11–82)
LIPASE SERPL-CCNC: 55 U/L (ref 11–82)
LIPASE SERPL-CCNC: 57 U/L (ref 11–82)
LIPASE SERPL-CCNC: 60 U/L (ref 11–82)
LIPASE SERPL-CCNC: 67 U/L (ref 11–82)
LIPASE SERPL-CCNC: 75 U/L (ref 11–82)
LV EJECT FRACT  99904: 65
LV EJECT FRACT MOD 2C 99903: 74.07
LV EJECT FRACT MOD 4C 99902: 61.8
LV EJECT FRACT MOD BP 99901: 68.83
LYMPHOCYTES # BLD AUTO: 0.59 K/UL (ref 1–4.8)
LYMPHOCYTES # BLD AUTO: 0.64 K/UL (ref 1–4.8)
LYMPHOCYTES # BLD AUTO: 0.67 K/UL (ref 1–4.8)
LYMPHOCYTES # BLD AUTO: 0.67 K/UL (ref 1–4.8)
LYMPHOCYTES # BLD AUTO: 0.7 K/UL (ref 1–4.8)
LYMPHOCYTES # BLD AUTO: 0.71 K/UL (ref 1–4.8)
LYMPHOCYTES # BLD AUTO: 0.75 K/UL (ref 1–4.8)
LYMPHOCYTES # BLD AUTO: 0.79 K/UL (ref 1–4.8)
LYMPHOCYTES # BLD AUTO: 0.86 K/UL (ref 1–4.8)
LYMPHOCYTES # BLD AUTO: 1.17 K/UL (ref 1–4.8)
LYMPHOCYTES # BLD AUTO: 1.2 K/UL (ref 1–4.8)
LYMPHOCYTES # BLD AUTO: 1.22 K/UL (ref 1–4.8)
LYMPHOCYTES # BLD AUTO: 1.24 K/UL (ref 1–4.8)
LYMPHOCYTES # BLD AUTO: 1.25 K/UL (ref 1–4.8)
LYMPHOCYTES # BLD AUTO: 1.27 K/UL (ref 1–4.8)
LYMPHOCYTES # BLD AUTO: 1.27 K/UL (ref 1–4.8)
LYMPHOCYTES # BLD AUTO: 1.42 K/UL (ref 1–4.8)
LYMPHOCYTES # BLD AUTO: 1.51 K/UL (ref 1–4.8)
LYMPHOCYTES # BLD AUTO: 1.74 K/UL (ref 1–4.8)
LYMPHOCYTES # BLD AUTO: 1.86 K/UL (ref 1–4.8)
LYMPHOCYTES # BLD AUTO: 2.39 K/UL (ref 1–4.8)
LYMPHOCYTES # BLD AUTO: 2.48 K/UL (ref 1–4.8)
LYMPHOCYTES NFR BLD: 10 % (ref 22–41)
LYMPHOCYTES NFR BLD: 10.4 % (ref 22–41)
LYMPHOCYTES NFR BLD: 12 % (ref 22–41)
LYMPHOCYTES NFR BLD: 12.7 % (ref 22–41)
LYMPHOCYTES NFR BLD: 20 % (ref 22–41)
LYMPHOCYTES NFR BLD: 20.9 % (ref 22–41)
LYMPHOCYTES NFR BLD: 25.2 % (ref 22–41)
LYMPHOCYTES NFR BLD: 27.4 % (ref 22–41)
LYMPHOCYTES NFR BLD: 32.2 % (ref 22–41)
LYMPHOCYTES NFR BLD: 4.4 % (ref 22–41)
LYMPHOCYTES NFR BLD: 5.1 % (ref 22–41)
LYMPHOCYTES NFR BLD: 5.2 % (ref 22–41)
LYMPHOCYTES NFR BLD: 6.2 % (ref 22–41)
LYMPHOCYTES NFR BLD: 6.4 % (ref 22–41)
LYMPHOCYTES NFR BLD: 6.6 % (ref 22–41)
LYMPHOCYTES NFR BLD: 7.1 % (ref 22–41)
LYMPHOCYTES NFR BLD: 7.9 % (ref 22–41)
LYMPHOCYTES NFR BLD: 7.9 % (ref 22–41)
LYMPHOCYTES NFR BLD: 9.2 % (ref 22–41)
LYMPHOCYTES NFR BLD: 9.4 % (ref 22–41)
LYMPHOCYTES NFR BLD: 9.6 % (ref 22–41)
LYMPHOCYTES NFR BLD: 9.9 % (ref 22–41)
LYMPHOCYTES NFR FLD: 4 %
MACROCYTES BLD QL SMEAR: ABNORMAL
MACROCYTES BLD QL SMEAR: NORMAL
MACROCYTES BLD QL SMEAR: NORMAL
MAGNESIUM SERPL-MCNC: 1.2 MG/DL (ref 1.5–2.5)
MAGNESIUM SERPL-MCNC: 1.6 MG/DL (ref 1.5–2.5)
MAGNESIUM SERPL-MCNC: 1.7 MG/DL (ref 1.5–2.5)
MAGNESIUM SERPL-MCNC: 1.8 MG/DL (ref 1.5–2.5)
MAGNESIUM SERPL-MCNC: 2 MG/DL (ref 1.5–2.5)
MAGNESIUM SERPL-MCNC: 2.2 MG/DL (ref 1.5–2.5)
MAGNESIUM SERPL-MCNC: 2.4 MG/DL (ref 1.5–2.5)
MANUAL DIFF BLD: ABNORMAL
MANUAL DIFF BLD: NORMAL
MCF BLD TEG: 41.3 MM (ref 50–70)
MCH RBC QN AUTO: 29.5 PG (ref 27–33)
MCH RBC QN AUTO: 32.4 PG (ref 27–33)
MCH RBC QN AUTO: 32.7 PG (ref 27–33)
MCH RBC QN AUTO: 33 PG (ref 27–33)
MCH RBC QN AUTO: 33.1 PG (ref 27–33)
MCH RBC QN AUTO: 33.6 PG (ref 27–33)
MCH RBC QN AUTO: 33.8 PG (ref 27–33)
MCH RBC QN AUTO: 33.8 PG (ref 27–33)
MCH RBC QN AUTO: 33.9 PG (ref 27–33)
MCH RBC QN AUTO: 34 PG (ref 27–33)
MCH RBC QN AUTO: 34.2 PG (ref 27–33)
MCH RBC QN AUTO: 34.4 PG (ref 27–33)
MCH RBC QN AUTO: 34.6 PG (ref 27–33)
MCH RBC QN AUTO: 34.7 PG (ref 27–33)
MCH RBC QN AUTO: 34.8 PG (ref 27–33)
MCH RBC QN AUTO: 34.9 PG (ref 27–33)
MCH RBC QN AUTO: 35.1 PG (ref 27–33)
MCH RBC QN AUTO: 35.2 PG (ref 27–33)
MCH RBC QN AUTO: 35.4 PG (ref 27–33)
MCH RBC QN AUTO: 35.5 PG (ref 27–33)
MCH RBC QN AUTO: 35.8 PG (ref 27–33)
MCH RBC QN AUTO: 36.3 PG (ref 27–33)
MCH RBC QN AUTO: 37.4 PG (ref 27–33)
MCH RBC QN AUTO: 39.3 PG (ref 27–33)
MCH RBC QN AUTO: 39.7 PG (ref 27–33)
MCH RBC QN AUTO: 41.3 PG (ref 27–33)
MCHC RBC AUTO-ENTMCNC: 31.1 G/DL (ref 33.6–35)
MCHC RBC AUTO-ENTMCNC: 32 G/DL (ref 33.6–35)
MCHC RBC AUTO-ENTMCNC: 32.2 G/DL (ref 33.6–35)
MCHC RBC AUTO-ENTMCNC: 32.2 G/DL (ref 33.6–35)
MCHC RBC AUTO-ENTMCNC: 32.3 G/DL (ref 33.6–35)
MCHC RBC AUTO-ENTMCNC: 32.3 G/DL (ref 33.6–35)
MCHC RBC AUTO-ENTMCNC: 32.4 G/DL (ref 33.6–35)
MCHC RBC AUTO-ENTMCNC: 32.5 G/DL (ref 33.6–35)
MCHC RBC AUTO-ENTMCNC: 32.6 G/DL (ref 33.6–35)
MCHC RBC AUTO-ENTMCNC: 33.3 G/DL (ref 33.6–35)
MCHC RBC AUTO-ENTMCNC: 33.3 G/DL (ref 33.6–35)
MCHC RBC AUTO-ENTMCNC: 33.5 G/DL (ref 33.6–35)
MCHC RBC AUTO-ENTMCNC: 33.7 G/DL (ref 33.6–35)
MCHC RBC AUTO-ENTMCNC: 33.9 G/DL (ref 33.6–35)
MCHC RBC AUTO-ENTMCNC: 34 G/DL (ref 33.6–35)
MCHC RBC AUTO-ENTMCNC: 34.2 G/DL (ref 33.6–35)
MCHC RBC AUTO-ENTMCNC: 34.4 G/DL (ref 33.6–35)
MCHC RBC AUTO-ENTMCNC: 34.5 G/DL (ref 33.6–35)
MCHC RBC AUTO-ENTMCNC: 34.7 G/DL (ref 33.6–35)
MCHC RBC AUTO-ENTMCNC: 34.7 G/DL (ref 33.6–35)
MCHC RBC AUTO-ENTMCNC: 34.8 G/DL (ref 33.6–35)
MCHC RBC AUTO-ENTMCNC: 34.9 G/DL (ref 33.6–35)
MCHC RBC AUTO-ENTMCNC: 35.2 G/DL (ref 33.6–35)
MCHC RBC AUTO-ENTMCNC: 35.3 G/DL (ref 33.6–35)
MCHC RBC AUTO-ENTMCNC: 35.5 G/DL (ref 33.6–35)
MCHC RBC AUTO-ENTMCNC: 35.6 G/DL (ref 33.6–35)
MCHC RBC AUTO-ENTMCNC: 35.6 G/DL (ref 33.6–35)
MCHC RBC AUTO-ENTMCNC: 35.7 G/DL (ref 33.6–35)
MCHC RBC AUTO-ENTMCNC: 35.9 G/DL (ref 33.6–35)
MCHC RBC AUTO-ENTMCNC: 36.2 G/DL (ref 33.6–35)
MCV RBC AUTO: 100.3 FL (ref 81.4–97.8)
MCV RBC AUTO: 100.7 FL (ref 81.4–97.8)
MCV RBC AUTO: 100.8 FL (ref 81.4–97.8)
MCV RBC AUTO: 100.8 FL (ref 81.4–97.8)
MCV RBC AUTO: 101.1 FL (ref 81.4–97.8)
MCV RBC AUTO: 101.6 FL (ref 81.4–97.8)
MCV RBC AUTO: 101.6 FL (ref 81.4–97.8)
MCV RBC AUTO: 102 FL (ref 81.4–97.8)
MCV RBC AUTO: 102.1 FL (ref 81.4–97.8)
MCV RBC AUTO: 102.1 FL (ref 81.4–97.8)
MCV RBC AUTO: 103.5 FL (ref 81.4–97.8)
MCV RBC AUTO: 103.8 FL (ref 81.4–97.8)
MCV RBC AUTO: 103.8 FL (ref 81.4–97.8)
MCV RBC AUTO: 104.1 FL (ref 81.4–97.8)
MCV RBC AUTO: 104.2 FL (ref 81.4–97.8)
MCV RBC AUTO: 104.4 FL (ref 81.4–97.8)
MCV RBC AUTO: 105.4 FL (ref 81.4–97.8)
MCV RBC AUTO: 109.6 FL (ref 81.4–97.8)
MCV RBC AUTO: 114.3 FL (ref 81.4–97.8)
MCV RBC AUTO: 115.1 FL (ref 81.4–97.8)
MCV RBC AUTO: 121.4 FL (ref 81.4–97.8)
MCV RBC AUTO: 94.9 FL (ref 81.4–97.8)
MCV RBC AUTO: 97.1 FL (ref 81.4–97.8)
MCV RBC AUTO: 97.6 FL (ref 81.4–97.8)
MCV RBC AUTO: 98.1 FL (ref 81.4–97.8)
MCV RBC AUTO: 98.2 FL (ref 81.4–97.8)
MCV RBC AUTO: 98.6 FL (ref 81.4–97.8)
MCV RBC AUTO: 98.7 FL (ref 81.4–97.8)
MCV RBC AUTO: 98.8 FL (ref 81.4–97.8)
MCV RBC AUTO: 99.6 FL (ref 81.4–97.8)
MESOTHL CELL NFR FLD: 1 %
METAMYELOCYTES NFR BLD MANUAL: 0.9 %
MICRO URNS: ABNORMAL
MICROCYTES BLD QL SMEAR: ABNORMAL
MICROCYTES BLD QL SMEAR: NORMAL
MONOCYTES # BLD AUTO: 0.09 K/UL (ref 0–0.85)
MONOCYTES # BLD AUTO: 0.18 K/UL (ref 0–0.85)
MONOCYTES # BLD AUTO: 0.31 K/UL (ref 0–0.85)
MONOCYTES # BLD AUTO: 0.45 K/UL (ref 0–0.85)
MONOCYTES # BLD AUTO: 0.56 K/UL (ref 0–0.85)
MONOCYTES # BLD AUTO: 0.58 K/UL (ref 0–0.85)
MONOCYTES # BLD AUTO: 0.58 K/UL (ref 0–0.85)
MONOCYTES # BLD AUTO: 0.59 K/UL (ref 0–0.85)
MONOCYTES # BLD AUTO: 0.62 K/UL (ref 0–0.85)
MONOCYTES # BLD AUTO: 0.63 K/UL (ref 0–0.85)
MONOCYTES # BLD AUTO: 0.63 K/UL (ref 0–0.85)
MONOCYTES # BLD AUTO: 0.66 K/UL (ref 0–0.85)
MONOCYTES # BLD AUTO: 0.66 K/UL (ref 0–0.85)
MONOCYTES # BLD AUTO: 0.68 K/UL (ref 0–0.85)
MONOCYTES # BLD AUTO: 0.7 K/UL (ref 0–0.85)
MONOCYTES # BLD AUTO: 0.74 K/UL (ref 0–0.85)
MONOCYTES # BLD AUTO: 0.76 K/UL (ref 0–0.85)
MONOCYTES # BLD AUTO: 0.84 K/UL (ref 0–0.85)
MONOCYTES # BLD AUTO: 0.84 K/UL (ref 0–0.85)
MONOCYTES # BLD AUTO: 0.85 K/UL (ref 0–0.85)
MONOCYTES # BLD AUTO: 1.18 K/UL (ref 0–0.85)
MONOCYTES # BLD AUTO: 1.26 K/UL (ref 0–0.85)
MONOCYTES NFR BLD AUTO: 0.9 % (ref 0–13.4)
MONOCYTES NFR BLD AUTO: 1.7 % (ref 0–13.4)
MONOCYTES NFR BLD AUTO: 10.2 % (ref 0–13.4)
MONOCYTES NFR BLD AUTO: 11.2 % (ref 0–13.4)
MONOCYTES NFR BLD AUTO: 14 % (ref 0–13.4)
MONOCYTES NFR BLD AUTO: 3.5 % (ref 0–13.4)
MONOCYTES NFR BLD AUTO: 4.2 % (ref 0–13.4)
MONOCYTES NFR BLD AUTO: 4.4 % (ref 0–13.4)
MONOCYTES NFR BLD AUTO: 4.5 % (ref 0–13.4)
MONOCYTES NFR BLD AUTO: 5 % (ref 0–13.4)
MONOCYTES NFR BLD AUTO: 5.3 % (ref 0–13.4)
MONOCYTES NFR BLD AUTO: 5.5 % (ref 0–13.4)
MONOCYTES NFR BLD AUTO: 5.6 % (ref 0–13.4)
MONOCYTES NFR BLD AUTO: 5.7 % (ref 0–13.4)
MONOCYTES NFR BLD AUTO: 5.8 % (ref 0–13.4)
MONOCYTES NFR BLD AUTO: 5.8 % (ref 0–13.4)
MONOCYTES NFR BLD AUTO: 7 % (ref 0–13.4)
MONOCYTES NFR BLD AUTO: 7.5 % (ref 0–13.4)
MONOCYTES NFR BLD AUTO: 8 % (ref 0–13.4)
MONOCYTES NFR BLD AUTO: 9.6 % (ref 0–13.4)
MONONUC CELLS NFR FLD: 2 %
MORPHOLOGY BLD-IMP: NORMAL
MUCOUS THREADS #/AREA URNS HPF: ABNORMAL /HPF
MUCOUS THREADS #/AREA URNS HPF: ABNORMAL /HPF
NEUTROPHILS # BLD AUTO: 10.24 K/UL (ref 2–7.15)
NEUTROPHILS # BLD AUTO: 10.29 K/UL (ref 2–7.15)
NEUTROPHILS # BLD AUTO: 11.43 K/UL (ref 2–7.15)
NEUTROPHILS # BLD AUTO: 11.46 K/UL (ref 2–7.15)
NEUTROPHILS # BLD AUTO: 11.49 K/UL (ref 2–7.15)
NEUTROPHILS # BLD AUTO: 12.19 K/UL (ref 2–7.15)
NEUTROPHILS # BLD AUTO: 12.38 K/UL (ref 2–7.15)
NEUTROPHILS # BLD AUTO: 12.61 K/UL (ref 2–7.15)
NEUTROPHILS # BLD AUTO: 14.21 K/UL (ref 2–7.15)
NEUTROPHILS # BLD AUTO: 2.73 K/UL (ref 2–7.15)
NEUTROPHILS # BLD AUTO: 4.09 K/UL (ref 2–7.15)
NEUTROPHILS # BLD AUTO: 4.14 K/UL (ref 2–7.15)
NEUTROPHILS # BLD AUTO: 4.15 K/UL (ref 2–7.15)
NEUTROPHILS # BLD AUTO: 5.58 K/UL (ref 2–7.15)
NEUTROPHILS # BLD AUTO: 6.81 K/UL (ref 2–7.15)
NEUTROPHILS # BLD AUTO: 8.41 K/UL (ref 2–7.15)
NEUTROPHILS # BLD AUTO: 8.77 K/UL (ref 2–7.15)
NEUTROPHILS # BLD AUTO: 9.12 K/UL (ref 2–7.15)
NEUTROPHILS # BLD AUTO: 9.37 K/UL (ref 2–7.15)
NEUTROPHILS # BLD AUTO: 9.55 K/UL (ref 2–7.15)
NEUTROPHILS # BLD AUTO: 9.57 K/UL (ref 2–7.15)
NEUTROPHILS # BLD AUTO: 9.8 K/UL (ref 2–7.15)
NEUTROPHILS NFR BLD: 55.1 % (ref 44–72)
NEUTROPHILS NFR BLD: 60 % (ref 44–72)
NEUTROPHILS NFR BLD: 61.4 % (ref 44–72)
NEUTROPHILS NFR BLD: 66.5 % (ref 44–72)
NEUTROPHILS NFR BLD: 67.1 % (ref 44–72)
NEUTROPHILS NFR BLD: 75.7 % (ref 44–72)
NEUTROPHILS NFR BLD: 77.7 % (ref 44–72)
NEUTROPHILS NFR BLD: 78.4 % (ref 44–72)
NEUTROPHILS NFR BLD: 78.7 % (ref 44–72)
NEUTROPHILS NFR BLD: 79.4 % (ref 44–72)
NEUTROPHILS NFR BLD: 80.1 % (ref 44–72)
NEUTROPHILS NFR BLD: 80.7 % (ref 44–72)
NEUTROPHILS NFR BLD: 80.7 % (ref 44–72)
NEUTROPHILS NFR BLD: 81.6 % (ref 44–72)
NEUTROPHILS NFR BLD: 82.9 % (ref 44–72)
NEUTROPHILS NFR BLD: 83.6 % (ref 44–72)
NEUTROPHILS NFR BLD: 84.1 % (ref 44–72)
NEUTROPHILS NFR BLD: 85 % (ref 44–72)
NEUTROPHILS NFR BLD: 87.7 % (ref 44–72)
NEUTROPHILS NFR BLD: 87.7 % (ref 44–72)
NEUTROPHILS NFR BLD: 88.5 % (ref 44–72)
NEUTROPHILS NFR BLD: 89.1 % (ref 44–72)
NEUTROPHILS NFR FLD: 93 %
NEUTS BAND NFR BLD MANUAL: 0.9 % (ref 0–10)
NEUTS BAND NFR BLD MANUAL: 4.4 % (ref 0–10)
NEUTS BAND NFR BLD MANUAL: 6.2 % (ref 0–10)
NITRITE UR QL STRIP.AUTO: NEGATIVE
NRBC # BLD AUTO: 0 K/UL
NRBC # BLD AUTO: 0.02 K/UL
NRBC BLD AUTO-RTO: 0 /100 WBC
NRBC BLD AUTO-RTO: 0.1 /100 WBC
NRBC BLD AUTO-RTO: 0.2 /100 WBC
NRBC BLD AUTO-RTO: 0.3 /100 WBC
NUCLEAR IGG SER QL IA: NORMAL
OVALOCYTES BLD QL SMEAR: NORMAL
PA AA BLD-ACNC: 0 %
PA ADP BLD-ACNC: 20.3 %
PCO2 BLDV: 27.3 MMHG (ref 41–51)
PH BLDV: 7.48 [PH] (ref 7.31–7.45)
PH UR STRIP.AUTO: 5.5 [PH]
PH UR STRIP.AUTO: 6 [PH]
PH UR STRIP.AUTO: 6 [PH]
PH UR STRIP.AUTO: 6.5 [PH]
PH UR STRIP.AUTO: 7 [PH]
PHOSPHATE SERPL-MCNC: 2.3 MG/DL (ref 2.5–4.5)
PHOSPHATE SERPL-MCNC: 3.4 MG/DL (ref 2.5–4.5)
PHOSPHATE SERPL-MCNC: 3.6 MG/DL (ref 2.5–4.5)
PHOSPHATE SERPL-MCNC: 3.9 MG/DL (ref 2.5–4.5)
PHOSPHATE SERPL-MCNC: 4 MG/DL (ref 2.5–4.5)
PHOSPHATE SERPL-MCNC: 4.1 MG/DL (ref 2.5–4.5)
PHOSPHATE SERPL-MCNC: 4.2 MG/DL (ref 2.5–4.5)
PLATELET # BLD AUTO: 106 K/UL (ref 164–446)
PLATELET # BLD AUTO: 109 K/UL (ref 164–446)
PLATELET # BLD AUTO: 112 K/UL (ref 164–446)
PLATELET # BLD AUTO: 115 K/UL (ref 164–446)
PLATELET # BLD AUTO: 119 K/UL (ref 164–446)
PLATELET # BLD AUTO: 126 K/UL (ref 164–446)
PLATELET # BLD AUTO: 126 K/UL (ref 164–446)
PLATELET # BLD AUTO: 128 K/UL (ref 164–446)
PLATELET # BLD AUTO: 167 K/UL (ref 164–446)
PLATELET # BLD AUTO: 52 K/UL (ref 164–446)
PLATELET # BLD AUTO: 59 K/UL (ref 164–446)
PLATELET # BLD AUTO: 62 K/UL (ref 164–446)
PLATELET # BLD AUTO: 63 K/UL (ref 164–446)
PLATELET # BLD AUTO: 63 K/UL (ref 164–446)
PLATELET # BLD AUTO: 64 K/UL (ref 164–446)
PLATELET # BLD AUTO: 65 K/UL (ref 164–446)
PLATELET # BLD AUTO: 66 K/UL (ref 164–446)
PLATELET # BLD AUTO: 67 K/UL (ref 164–446)
PLATELET # BLD AUTO: 68 K/UL (ref 164–446)
PLATELET # BLD AUTO: 68 K/UL (ref 164–446)
PLATELET # BLD AUTO: 71 K/UL (ref 164–446)
PLATELET # BLD AUTO: 71 K/UL (ref 164–446)
PLATELET # BLD AUTO: 73 K/UL (ref 164–446)
PLATELET # BLD AUTO: 74 K/UL (ref 164–446)
PLATELET # BLD AUTO: 76 K/UL (ref 164–446)
PLATELET # BLD AUTO: 80 K/UL (ref 164–446)
PLATELET # BLD AUTO: 82 K/UL (ref 164–446)
PLATELET # BLD AUTO: 85 K/UL (ref 164–446)
PLATELET # BLD AUTO: 87 K/UL (ref 164–446)
PLATELET # BLD AUTO: 91 K/UL (ref 164–446)
PLATELET # BLD AUTO: 92 K/UL (ref 164–446)
PLATELET # BLD AUTO: 95 K/UL (ref 164–446)
PLATELET BLD QL SMEAR: NORMAL
PLATELETS.RETICULATED NFR BLD AUTO: 3.9 K/UL (ref 0.6–13.1)
PLATELETS.RETICULATED NFR BLD AUTO: 6.3 K/UL (ref 0.6–13.1)
PMV BLD AUTO: 10 FL (ref 9–12.9)
PMV BLD AUTO: 10.2 FL (ref 9–12.9)
PMV BLD AUTO: 10.6 FL (ref 9–12.9)
PMV BLD AUTO: 10.6 FL (ref 9–12.9)
PMV BLD AUTO: 10.7 FL (ref 9–12.9)
PMV BLD AUTO: 11 FL (ref 9–12.9)
PMV BLD AUTO: 11 FL (ref 9–12.9)
PMV BLD AUTO: 11.2 FL (ref 9–12.9)
PMV BLD AUTO: 11.7 FL (ref 9–12.9)
PMV BLD AUTO: 11.7 FL (ref 9–12.9)
PMV BLD AUTO: 12 FL (ref 9–12.9)
PMV BLD AUTO: 12.1 FL (ref 9–12.9)
PMV BLD AUTO: 12.2 FL (ref 9–12.9)
PMV BLD AUTO: 12.4 FL (ref 9–12.9)
PMV BLD AUTO: 13 FL (ref 9–12.9)
PMV BLD AUTO: 13.4 FL (ref 9–12.9)
PMV BLD AUTO: 9.2 FL (ref 9–12.9)
PMV BLD AUTO: 9.3 FL (ref 9–12.9)
PMV BLD AUTO: 9.4 FL (ref 9–12.9)
PMV BLD AUTO: 9.6 FL (ref 9–12.9)
PMV BLD AUTO: 9.7 FL (ref 9–12.9)
PMV BLD AUTO: 9.8 FL (ref 9–12.9)
PMV BLD AUTO: 9.9 FL (ref 9–12.9)
PO2 BLDV: 39.2 MMHG (ref 25–40)
POIKILOCYTOSIS BLD QL SMEAR: NORMAL
POLYCHROMASIA BLD QL SMEAR: NORMAL
POTASSIUM SERPL-SCNC: 2.7 MMOL/L (ref 3.6–5.5)
POTASSIUM SERPL-SCNC: 2.7 MMOL/L (ref 3.6–5.5)
POTASSIUM SERPL-SCNC: 2.8 MMOL/L (ref 3.6–5.5)
POTASSIUM SERPL-SCNC: 2.9 MMOL/L (ref 3.6–5.5)
POTASSIUM SERPL-SCNC: 3 MMOL/L (ref 3.6–5.5)
POTASSIUM SERPL-SCNC: 3.1 MMOL/L (ref 3.6–5.5)
POTASSIUM SERPL-SCNC: 3.2 MMOL/L (ref 3.6–5.5)
POTASSIUM SERPL-SCNC: 3.3 MMOL/L (ref 3.6–5.5)
POTASSIUM SERPL-SCNC: 3.4 MMOL/L (ref 3.6–5.5)
POTASSIUM SERPL-SCNC: 3.5 MMOL/L (ref 3.6–5.5)
POTASSIUM SERPL-SCNC: 3.6 MMOL/L (ref 3.6–5.5)
POTASSIUM SERPL-SCNC: 3.6 MMOL/L (ref 3.6–5.5)
POTASSIUM SERPL-SCNC: 3.7 MMOL/L (ref 3.6–5.5)
POTASSIUM SERPL-SCNC: 3.8 MMOL/L (ref 3.6–5.5)
POTASSIUM SERPL-SCNC: 3.9 MMOL/L (ref 3.6–5.5)
POTASSIUM SERPL-SCNC: 4.2 MMOL/L (ref 3.6–5.5)
POTASSIUM SERPL-SCNC: 4.4 MMOL/L (ref 3.6–5.5)
POTASSIUM SERPL-SCNC: 4.8 MMOL/L (ref 3.6–5.5)
POTASSIUM UR-SCNC: 63.6 MMOL/L
PRODUCT TYPE UPROD: NORMAL
PROT FLD-MCNC: <1 G/DL
PROT SERPL-MCNC: 5.8 G/DL (ref 6–8.2)
PROT SERPL-MCNC: 6.2 G/DL (ref 6–8.2)
PROT SERPL-MCNC: 6.3 G/DL (ref 6–8.2)
PROT SERPL-MCNC: 6.4 G/DL (ref 6–8.2)
PROT SERPL-MCNC: 6.4 G/DL (ref 6–8.2)
PROT SERPL-MCNC: 6.5 G/DL (ref 6–8.2)
PROT SERPL-MCNC: 6.6 G/DL (ref 6–8.2)
PROT SERPL-MCNC: 6.7 G/DL (ref 6–8.2)
PROT SERPL-MCNC: 6.9 G/DL (ref 6–8.2)
PROT SERPL-MCNC: 6.9 G/DL (ref 6–8.2)
PROT SERPL-MCNC: 7 G/DL (ref 6–8.2)
PROT SERPL-MCNC: 7.1 G/DL (ref 6–8.2)
PROT SERPL-MCNC: 7.2 G/DL (ref 6–8.2)
PROT SERPL-MCNC: 7.4 G/DL (ref 6–8.2)
PROT SERPL-MCNC: 7.5 G/DL (ref 6–8.2)
PROT SERPL-MCNC: 8 G/DL (ref 6–8.2)
PROT SERPL-MCNC: 8.2 G/DL (ref 6–8.2)
PROT SERPL-MCNC: 8.2 G/DL (ref 6–8.2)
PROT SERPL-MCNC: 8.5 G/DL (ref 6–8.2)
PROT SERPL-MCNC: 8.6 G/DL (ref 6–8.2)
PROT UR QL STRIP: 100 MG/DL
PROT UR QL STRIP: 50 MG/DL
PROT UR QL STRIP: ABNORMAL MG/DL
PROT UR QL STRIP: NEGATIVE MG/DL
PROTHROMBIN TIME: 21 SEC (ref 12–14.6)
PROTHROMBIN TIME: 21.6 SEC (ref 12–14.6)
PROTHROMBIN TIME: 21.7 SEC (ref 12–14.6)
PROTHROMBIN TIME: 21.8 SEC (ref 12–14.6)
PROTHROMBIN TIME: 22.1 SEC (ref 12–14.6)
PROTHROMBIN TIME: 22.8 SEC (ref 12–14.6)
PROTHROMBIN TIME: 23 SEC (ref 12–14.6)
PROTHROMBIN TIME: 23.4 SEC (ref 12–14.6)
PROTHROMBIN TIME: 23.5 SEC (ref 12–14.6)
PROTHROMBIN TIME: 23.9 SEC (ref 12–14.6)
PROTHROMBIN TIME: 23.9 SEC (ref 12–14.6)
PROTHROMBIN TIME: 24.2 SEC (ref 12–14.6)
PROTHROMBIN TIME: 24.8 SEC (ref 12–14.6)
PROTHROMBIN TIME: 24.9 SEC (ref 12–14.6)
PROTHROMBIN TIME: 25.5 SEC (ref 12–14.6)
PROTHROMBIN TIME: 25.8 SEC (ref 12–14.6)
PROTHROMBIN TIME: 26.8 SEC (ref 12–14.6)
PROTHROMBIN TIME: 27.1 SEC (ref 12–14.6)
RBC # BLD AUTO: 1.26 M/UL (ref 4.2–5.4)
RBC # BLD AUTO: 1.26 M/UL (ref 4.2–5.4)
RBC # BLD AUTO: 1.4 M/UL (ref 4.2–5.4)
RBC # BLD AUTO: 1.47 M/UL (ref 4.2–5.4)
RBC # BLD AUTO: 1.69 M/UL (ref 4.2–5.4)
RBC # BLD AUTO: 1.82 M/UL (ref 4.2–5.4)
RBC # BLD AUTO: 2.06 M/UL (ref 4.2–5.4)
RBC # BLD AUTO: 2.08 M/UL (ref 4.2–5.4)
RBC # BLD AUTO: 2.08 M/UL (ref 4.2–5.4)
RBC # BLD AUTO: 2.12 M/UL (ref 4.2–5.4)
RBC # BLD AUTO: 2.18 M/UL (ref 4.2–5.4)
RBC # BLD AUTO: 2.18 M/UL (ref 4.2–5.4)
RBC # BLD AUTO: 2.22 M/UL (ref 4.2–5.4)
RBC # BLD AUTO: 2.23 M/UL (ref 4.2–5.4)
RBC # BLD AUTO: 2.24 M/UL (ref 4.2–5.4)
RBC # BLD AUTO: 2.27 M/UL (ref 4.2–5.4)
RBC # BLD AUTO: 2.33 M/UL (ref 4.2–5.4)
RBC # BLD AUTO: 2.4 M/UL (ref 4.2–5.4)
RBC # BLD AUTO: 2.41 M/UL (ref 4.2–5.4)
RBC # BLD AUTO: 2.47 M/UL (ref 4.2–5.4)
RBC # BLD AUTO: 2.5 M/UL (ref 4.2–5.4)
RBC # BLD AUTO: 2.54 M/UL (ref 4.2–5.4)
RBC # BLD AUTO: 2.57 M/UL (ref 4.2–5.4)
RBC # BLD AUTO: 2.59 M/UL (ref 4.2–5.4)
RBC # BLD AUTO: 2.6 M/UL (ref 4.2–5.4)
RBC # BLD AUTO: 2.62 M/UL (ref 4.2–5.4)
RBC # BLD AUTO: 2.65 M/UL (ref 4.2–5.4)
RBC # BLD AUTO: 2.66 M/UL (ref 4.2–5.4)
RBC # BLD AUTO: 2.71 M/UL (ref 4.2–5.4)
RBC # BLD AUTO: 2.74 M/UL (ref 4.2–5.4)
RBC # BLD AUTO: 2.78 M/UL (ref 4.2–5.4)
RBC # BLD AUTO: 2.85 M/UL (ref 4.2–5.4)
RBC # BLD AUTO: 2.87 M/UL (ref 4.2–5.4)
RBC # BLD AUTO: 3.12 M/UL (ref 4.2–5.4)
RBC # FLD: 4000 CELLS/UL
RBC # URNS HPF: >150 /HPF
RBC # URNS HPF: ABNORMAL /HPF
RBC BLD AUTO: PRESENT
RBC UR QL AUTO: ABNORMAL
RETICS # AUTO: 0.07 M/UL (ref 0.04–0.06)
RETICS # AUTO: 0.09 M/UL (ref 0.04–0.06)
RETICS/RBC NFR: 2.8 % (ref 0.8–2.1)
RETICS/RBC NFR: 5 % (ref 0.8–2.1)
RH BLD: NORMAL
RH BLD: NORMAL
SAO2 % BLDV: 71.5 %
SCHISTOCYTES BLD QL SMEAR: NORMAL
SIGNIFICANT IND 70042: ABNORMAL
SIGNIFICANT IND 70042: NORMAL
SITE SITE: ABNORMAL
SITE SITE: NORMAL
SMA IGG SER-ACNC: 24 UNITS (ref 0–19)
SMOOTH MUSCLE IGG TITR SER: ABNORMAL {TITER}
SODIUM SERPL-SCNC: 128 MMOL/L (ref 135–145)
SODIUM SERPL-SCNC: 129 MMOL/L (ref 135–145)
SODIUM SERPL-SCNC: 130 MMOL/L (ref 135–145)
SODIUM SERPL-SCNC: 131 MMOL/L (ref 135–145)
SODIUM SERPL-SCNC: 132 MMOL/L (ref 135–145)
SODIUM SERPL-SCNC: 132 MMOL/L (ref 135–145)
SODIUM SERPL-SCNC: 133 MMOL/L (ref 135–145)
SODIUM SERPL-SCNC: 135 MMOL/L (ref 135–145)
SODIUM SERPL-SCNC: 136 MMOL/L (ref 135–145)
SODIUM SERPL-SCNC: 139 MMOL/L (ref 135–145)
SODIUM SERPL-SCNC: 139 MMOL/L (ref 135–145)
SODIUM SERPL-SCNC: 140 MMOL/L (ref 135–145)
SODIUM SERPL-SCNC: 142 MMOL/L (ref 135–145)
SODIUM UR-SCNC: <10 MMOL/L
SOURCE SOURCE: ABNORMAL
SOURCE SOURCE: NORMAL
SP GR UR REFRACTOMETRY: 1.01
SP GR UR REFRACTOMETRY: 1.02
SP GR UR STRIP.AUTO: 1
SP GR UR STRIP.AUTO: 1.01
SP GR UR STRIP.AUTO: 1.01
SP GR UR STRIP.AUTO: 1.02
SP GR UR: 1.01
SP GR UR: >=1.03
TARGETS BLD QL SMEAR: NORMAL
TARGETS BLD QL SMEAR: NORMAL
TEG ALGORITHM TGALG: ABNORMAL
TIBC SERPL-MCNC: 126 UG/DL (ref 250–450)
TIBC SERPL-MCNC: 143 UG/DL (ref 250–450)
TRANS CELLS #/AREA URNS HPF: ABNORMAL /HPF
TROPONIN I SERPL-MCNC: <0.01 NG/ML (ref 0–0.04)
TSH SERPL DL<=0.005 MIU/L-ACNC: 1.44 UIU/ML (ref 0.3–3.7)
TTG IGA SER IA-ACNC: 3 U/ML (ref 0–3)
UNIT STATUS USTAT: NORMAL
UROBILINOGEN UR STRIP.AUTO-MCNC: 8 MG/DL
VIT B12 SERPL-MCNC: >1500 PG/ML (ref 211–911)
WBC # BLD AUTO: 10 K/UL (ref 4.8–10.8)
WBC # BLD AUTO: 10.3 K/UL (ref 4.8–10.8)
WBC # BLD AUTO: 10.5 K/UL (ref 4.8–10.8)
WBC # BLD AUTO: 10.6 K/UL (ref 4.8–10.8)
WBC # BLD AUTO: 10.7 K/UL (ref 4.8–10.8)
WBC # BLD AUTO: 11 K/UL (ref 4.8–10.8)
WBC # BLD AUTO: 11.1 K/UL (ref 4.8–10.8)
WBC # BLD AUTO: 11.1 K/UL (ref 4.8–10.8)
WBC # BLD AUTO: 11.3 K/UL (ref 4.8–10.8)
WBC # BLD AUTO: 11.4 K/UL (ref 4.8–10.8)
WBC # BLD AUTO: 11.7 K/UL (ref 4.8–10.8)
WBC # BLD AUTO: 11.8 K/UL (ref 4.8–10.8)
WBC # BLD AUTO: 11.9 K/UL (ref 4.8–10.8)
WBC # BLD AUTO: 11.9 K/UL (ref 4.8–10.8)
WBC # BLD AUTO: 12.4 K/UL (ref 4.8–10.8)
WBC # BLD AUTO: 12.6 K/UL (ref 4.8–10.8)
WBC # BLD AUTO: 12.8 K/UL (ref 4.8–10.8)
WBC # BLD AUTO: 12.9 K/UL (ref 4.8–10.8)
WBC # BLD AUTO: 13 K/UL (ref 4.8–10.8)
WBC # BLD AUTO: 13.8 K/UL (ref 4.8–10.8)
WBC # BLD AUTO: 13.8 K/UL (ref 4.8–10.8)
WBC # BLD AUTO: 13.9 K/UL (ref 4.8–10.8)
WBC # BLD AUTO: 14.7 K/UL (ref 4.8–10.8)
WBC # BLD AUTO: 15.1 K/UL (ref 4.8–10.8)
WBC # BLD AUTO: 15.1 K/UL (ref 4.8–10.8)
WBC # BLD AUTO: 15.9 K/UL (ref 4.8–10.8)
WBC # BLD AUTO: 4.1 K/UL (ref 4.8–10.8)
WBC # BLD AUTO: 6.2 K/UL (ref 4.8–10.8)
WBC # BLD AUTO: 6.5 K/UL (ref 4.8–10.8)
WBC # BLD AUTO: 6.9 K/UL (ref 4.8–10.8)
WBC # BLD AUTO: 7.4 K/UL (ref 4.8–10.8)
WBC # BLD AUTO: 8.8 K/UL (ref 4.8–10.8)
WBC # BLD AUTO: 9 K/UL (ref 4.8–10.8)
WBC # BLD AUTO: 9.1 K/UL (ref 4.8–10.8)
WBC # FLD: 1286 CELLS/UL
WBC #/AREA URNS HPF: >150 /HPF
WBC #/AREA URNS HPF: ABNORMAL /HPF
YEAST #/AREA URNS HPF: ABNORMAL /HPF

## 2017-01-01 PROCEDURE — 700102 HCHG RX REV CODE 250 W/ 637 OVERRIDE(OP): Performed by: INTERNAL MEDICINE

## 2017-01-01 PROCEDURE — 700111 HCHG RX REV CODE 636 W/ 250 OVERRIDE (IP): Performed by: INTERNAL MEDICINE

## 2017-01-01 PROCEDURE — 87086 URINE CULTURE/COLONY COUNT: CPT

## 2017-01-01 PROCEDURE — 700102 HCHG RX REV CODE 250 W/ 637 OVERRIDE(OP): Performed by: HOSPITALIST

## 2017-01-01 PROCEDURE — 70450 CT HEAD/BRAIN W/O DYE: CPT

## 2017-01-01 PROCEDURE — 82248 BILIRUBIN DIRECT: CPT

## 2017-01-01 PROCEDURE — 96365 THER/PROPH/DIAG IV INF INIT: CPT

## 2017-01-01 PROCEDURE — 96361 HYDRATE IV INFUSION ADD-ON: CPT

## 2017-01-01 PROCEDURE — 700111 HCHG RX REV CODE 636 W/ 250 OVERRIDE (IP): Performed by: EMERGENCY MEDICINE

## 2017-01-01 PROCEDURE — 85025 COMPLETE CBC W/AUTO DIFF WBC: CPT

## 2017-01-01 PROCEDURE — 700105 HCHG RX REV CODE 258: Performed by: INTERNAL MEDICINE

## 2017-01-01 PROCEDURE — A9270 NON-COVERED ITEM OR SERVICE: HCPCS | Performed by: INTERNAL MEDICINE

## 2017-01-01 PROCEDURE — 770001 HCHG ROOM/CARE - MED/SURG/GYN PRIV*

## 2017-01-01 PROCEDURE — 160039 HCHG SURGERY MINUTES - EA ADDL 1 MIN LEVEL 3: Performed by: SURGERY

## 2017-01-01 PROCEDURE — 700101 HCHG RX REV CODE 250

## 2017-01-01 PROCEDURE — 85610 PROTHROMBIN TIME: CPT

## 2017-01-01 PROCEDURE — 94760 N-INVAS EAR/PLS OXIMETRY 1: CPT

## 2017-01-01 PROCEDURE — 87070 CULTURE OTHR SPECIMN AEROBIC: CPT

## 2017-01-01 PROCEDURE — 700101 HCHG RX REV CODE 250: Performed by: INTERNAL MEDICINE

## 2017-01-01 PROCEDURE — 770006 HCHG ROOM/CARE - MED/SURG/GYN SEMI*

## 2017-01-01 PROCEDURE — 36415 COLL VENOUS BLD VENIPUNCTURE: CPT

## 2017-01-01 PROCEDURE — 770020 HCHG ROOM/CARE - TELE (206)

## 2017-01-01 PROCEDURE — 96367 TX/PROPH/DG ADDL SEQ IV INF: CPT

## 2017-01-01 PROCEDURE — 83690 ASSAY OF LIPASE: CPT

## 2017-01-01 PROCEDURE — 87493 C DIFF AMPLIFIED PROBE: CPT

## 2017-01-01 PROCEDURE — P9047 ALBUMIN (HUMAN), 25%, 50ML: HCPCS | Performed by: INTERNAL MEDICINE

## 2017-01-01 PROCEDURE — 96375 TX/PRO/DX INJ NEW DRUG ADDON: CPT

## 2017-01-01 PROCEDURE — 700105 HCHG RX REV CODE 258: Performed by: EMERGENCY MEDICINE

## 2017-01-01 PROCEDURE — 93005 ELECTROCARDIOGRAM TRACING: CPT | Performed by: INTERNAL MEDICINE

## 2017-01-01 PROCEDURE — 84100 ASSAY OF PHOSPHORUS: CPT

## 2017-01-01 PROCEDURE — 86923 COMPATIBILITY TEST ELECTRIC: CPT

## 2017-01-01 PROCEDURE — A9270 NON-COVERED ITEM OR SERVICE: HCPCS | Performed by: HOSPITALIST

## 2017-01-01 PROCEDURE — 700105 HCHG RX REV CODE 258: Performed by: HOSPITALIST

## 2017-01-01 PROCEDURE — 80053 COMPREHEN METABOLIC PANEL: CPT

## 2017-01-01 PROCEDURE — 83605 ASSAY OF LACTIC ACID: CPT

## 2017-01-01 PROCEDURE — 700111 HCHG RX REV CODE 636 W/ 250 OVERRIDE (IP)

## 2017-01-01 PROCEDURE — 96366 THER/PROPH/DIAG IV INF ADDON: CPT

## 2017-01-01 PROCEDURE — 76705 ECHO EXAM OF ABDOMEN: CPT

## 2017-01-01 PROCEDURE — 302109 OSTOMY WAFER 4X4: Performed by: INTERNAL MEDICINE

## 2017-01-01 PROCEDURE — 99285 EMERGENCY DEPT VISIT HI MDM: CPT

## 2017-01-01 PROCEDURE — 96376 TX/PRO/DX INJ SAME DRUG ADON: CPT

## 2017-01-01 PROCEDURE — 700102 HCHG RX REV CODE 250 W/ 637 OVERRIDE(OP): Performed by: STUDENT IN AN ORGANIZED HEALTH CARE EDUCATION/TRAINING PROGRAM

## 2017-01-01 PROCEDURE — 81001 URINALYSIS AUTO W/SCOPE: CPT

## 2017-01-01 PROCEDURE — 85007 BL SMEAR W/DIFF WBC COUNT: CPT

## 2017-01-01 PROCEDURE — 700105 HCHG RX REV CODE 258

## 2017-01-01 PROCEDURE — 82803 BLOOD GASES ANY COMBINATION: CPT

## 2017-01-01 PROCEDURE — 82607 VITAMIN B-12: CPT

## 2017-01-01 PROCEDURE — 76937 US GUIDE VASCULAR ACCESS: CPT

## 2017-01-01 PROCEDURE — 87186 SC STD MICRODIL/AGAR DIL: CPT

## 2017-01-01 PROCEDURE — 770022 HCHG ROOM/CARE - ICU (200)

## 2017-01-01 PROCEDURE — 83880 ASSAY OF NATRIURETIC PEPTIDE: CPT

## 2017-01-01 PROCEDURE — 99232 SBSQ HOSP IP/OBS MODERATE 35: CPT | Mod: GC | Performed by: INTERNAL MEDICINE

## 2017-01-01 PROCEDURE — 99239 HOSP IP/OBS DSCHRG MGMT >30: CPT | Performed by: HOSPITALIST

## 2017-01-01 PROCEDURE — 85027 COMPLETE CBC AUTOMATED: CPT | Mod: 91

## 2017-01-01 PROCEDURE — 83550 IRON BINDING TEST: CPT

## 2017-01-01 PROCEDURE — 99232 SBSQ HOSP IP/OBS MODERATE 35: CPT | Performed by: INTERNAL MEDICINE

## 2017-01-01 PROCEDURE — 700101 HCHG RX REV CODE 250: Performed by: EMERGENCY MEDICINE

## 2017-01-01 PROCEDURE — A9270 NON-COVERED ITEM OR SERVICE: HCPCS | Performed by: EMERGENCY MEDICINE

## 2017-01-01 PROCEDURE — 500697 HCHG HEMOCLIP, LARGE (ORANGE): Performed by: SURGERY

## 2017-01-01 PROCEDURE — C9113 INJ PANTOPRAZOLE SODIUM, VIA: HCPCS | Performed by: INTERNAL MEDICINE

## 2017-01-01 PROCEDURE — 99291 CRITICAL CARE FIRST HOUR: CPT

## 2017-01-01 PROCEDURE — P9034 PLATELETS, PHERESIS: HCPCS

## 2017-01-01 PROCEDURE — 700102 HCHG RX REV CODE 250 W/ 637 OVERRIDE(OP)

## 2017-01-01 PROCEDURE — 85027 COMPLETE CBC AUTOMATED: CPT

## 2017-01-01 PROCEDURE — 84703 CHORIONIC GONADOTROPIN ASSAY: CPT

## 2017-01-01 PROCEDURE — 500371 HCHG DRAIN, BLAKE 10MM: Performed by: SURGERY

## 2017-01-01 PROCEDURE — 99239 HOSP IP/OBS DSCHRG MGMT >30: CPT | Mod: GC | Performed by: INTERNAL MEDICINE

## 2017-01-01 PROCEDURE — 83735 ASSAY OF MAGNESIUM: CPT

## 2017-01-01 PROCEDURE — 85730 THROMBOPLASTIN TIME PARTIAL: CPT

## 2017-01-01 PROCEDURE — 700111 HCHG RX REV CODE 636 W/ 250 OVERRIDE (IP): Performed by: HOSPITALIST

## 2017-01-01 PROCEDURE — 82728 ASSAY OF FERRITIN: CPT

## 2017-01-01 PROCEDURE — 307738 PARACENTESIS ABDOMINAL KIT: Performed by: INTERNAL MEDICINE

## 2017-01-01 PROCEDURE — 80307 DRUG TEST PRSMV CHEM ANLYZR: CPT

## 2017-01-01 PROCEDURE — 96374 THER/PROPH/DIAG INJ IV PUSH: CPT

## 2017-01-01 PROCEDURE — 78226 HEPATOBILIARY SYSTEM IMAGING: CPT

## 2017-01-01 PROCEDURE — 700105 HCHG RX REV CODE 258: Performed by: SURGERY

## 2017-01-01 PROCEDURE — 82140 ASSAY OF AMMONIA: CPT

## 2017-01-01 PROCEDURE — 83615 LACTATE (LD) (LDH) ENZYME: CPT

## 2017-01-01 PROCEDURE — 160035 HCHG PACU - 1ST 60 MINS PHASE I: Performed by: SURGERY

## 2017-01-01 PROCEDURE — 84133 ASSAY OF URINE POTASSIUM: CPT

## 2017-01-01 PROCEDURE — 85046 RETICYTE/HGB CONCENTRATE: CPT

## 2017-01-01 PROCEDURE — 96375 TX/PRO/DX INJ NEW DRUG ADDON: CPT | Mod: XU

## 2017-01-01 PROCEDURE — 700101 HCHG RX REV CODE 250: Performed by: HOSPITALIST

## 2017-01-01 PROCEDURE — A9270 NON-COVERED ITEM OR SERVICE: HCPCS | Performed by: STUDENT IN AN ORGANIZED HEALTH CARE EDUCATION/TRAINING PROGRAM

## 2017-01-01 PROCEDURE — 700105 HCHG RX REV CODE 258: Performed by: STUDENT IN AN ORGANIZED HEALTH CARE EDUCATION/TRAINING PROGRAM

## 2017-01-01 PROCEDURE — 500445 HCHG HEMOSTAT, SURGICEL 4X8: Performed by: SURGERY

## 2017-01-01 PROCEDURE — 99223 1ST HOSP IP/OBS HIGH 75: CPT | Performed by: HOSPITALIST

## 2017-01-01 PROCEDURE — 500512 HCHG ENDO PEANUT: Performed by: SURGERY

## 2017-01-01 PROCEDURE — 74181 MRI ABDOMEN W/O CONTRAST: CPT

## 2017-01-01 PROCEDURE — 71010 DX-CHEST-PORTABLE (1 VIEW): CPT

## 2017-01-01 PROCEDURE — 36430 TRANSFUSION BLD/BLD COMPNT: CPT

## 2017-01-01 PROCEDURE — 501838 HCHG SUTURE GENERAL: Performed by: SURGERY

## 2017-01-01 PROCEDURE — 80048 BASIC METABOLIC PNL TOTAL CA: CPT

## 2017-01-01 PROCEDURE — 81001 URINALYSIS AUTO W/SCOPE: CPT | Mod: 59

## 2017-01-01 PROCEDURE — 90791 PSYCH DIAGNOSTIC EVALUATION: CPT

## 2017-01-01 PROCEDURE — 770021 HCHG ROOM/CARE - ISO PRIVATE

## 2017-01-01 PROCEDURE — 88305 TISSUE EXAM BY PATHOLOGIST: CPT

## 2017-01-01 PROCEDURE — P9017 PLASMA 1 DONOR FRZ W/IN 8 HR: HCPCS | Mod: 91

## 2017-01-01 PROCEDURE — 83540 ASSAY OF IRON: CPT

## 2017-01-01 PROCEDURE — C9113 INJ PANTOPRAZOLE SODIUM, VIA: HCPCS | Performed by: EMERGENCY MEDICINE

## 2017-01-01 PROCEDURE — 160009 HCHG ANES TIME/MIN: Performed by: SURGERY

## 2017-01-01 PROCEDURE — 82570 ASSAY OF URINE CREATININE: CPT

## 2017-01-01 PROCEDURE — 82784 ASSAY IGA/IGD/IGG/IGM EACH: CPT

## 2017-01-01 PROCEDURE — 501582 HCHG TROCAR, THRD BLADED: Performed by: SURGERY

## 2017-01-01 PROCEDURE — 83516 IMMUNOASSAY NONANTIBODY: CPT | Mod: 91

## 2017-01-01 PROCEDURE — 30233N1 TRANSFUSION OF NONAUTOLOGOUS RED BLOOD CELLS INTO PERIPHERAL VEIN, PERCUTANEOUS APPROACH: ICD-10-PCS | Performed by: EMERGENCY MEDICINE

## 2017-01-01 PROCEDURE — 84132 ASSAY OF SERUM POTASSIUM: CPT

## 2017-01-01 PROCEDURE — 86850 RBC ANTIBODY SCREEN: CPT

## 2017-01-01 PROCEDURE — 93010 ELECTROCARDIOGRAM REPORT: CPT | Performed by: INTERNAL MEDICINE

## 2017-01-01 PROCEDURE — 99231 SBSQ HOSP IP/OBS SF/LOW 25: CPT | Mod: GC | Performed by: INTERNAL MEDICINE

## 2017-01-01 PROCEDURE — 0W9G3ZX DRAINAGE OF PERITONEAL CAVITY, PERCUTANEOUS APPROACH, DIAGNOSTIC: ICD-10-PCS | Performed by: EMERGENCY MEDICINE

## 2017-01-01 PROCEDURE — 99233 SBSQ HOSP IP/OBS HIGH 50: CPT | Performed by: INTERNAL MEDICINE

## 2017-01-01 PROCEDURE — 99233 SBSQ HOSP IP/OBS HIGH 50: CPT | Mod: GC | Performed by: INTERNAL MEDICINE

## 2017-01-01 PROCEDURE — 86901 BLOOD TYPING SEROLOGIC RH(D): CPT

## 2017-01-01 PROCEDURE — 85576 BLOOD PLATELET AGGREGATION: CPT

## 2017-01-01 PROCEDURE — 86923 COMPATIBILITY TEST ELECTRIC: CPT | Mod: 91

## 2017-01-01 PROCEDURE — 501570 HCHG TROCAR, SEPARATOR: Performed by: SURGERY

## 2017-01-01 PROCEDURE — 83036 HEMOGLOBIN GLYCOSYLATED A1C: CPT

## 2017-01-01 PROCEDURE — 700102 HCHG RX REV CODE 250 W/ 637 OVERRIDE(OP): Performed by: EMERGENCY MEDICINE

## 2017-01-01 PROCEDURE — 93005 ELECTROCARDIOGRAM TRACING: CPT | Performed by: EMERGENCY MEDICINE

## 2017-01-01 PROCEDURE — 72125 CT NECK SPINE W/O DYE: CPT

## 2017-01-01 PROCEDURE — 87205 SMEAR GRAM STAIN: CPT

## 2017-01-01 PROCEDURE — 81002 URINALYSIS NONAUTO W/O SCOPE: CPT

## 2017-01-01 PROCEDURE — A4606 OXYGEN PROBE USED W OXIMETER: HCPCS | Performed by: SURGERY

## 2017-01-01 PROCEDURE — 502627 HCHG HEMOSTAT, SURGICEL 4X4: Performed by: SURGERY

## 2017-01-01 PROCEDURE — 71275 CT ANGIOGRAPHY CHEST: CPT

## 2017-01-01 PROCEDURE — 85347 COAGULATION TIME ACTIVATED: CPT

## 2017-01-01 PROCEDURE — 82040 ASSAY OF SERUM ALBUMIN: CPT

## 2017-01-01 PROCEDURE — 89051 BODY FLUID CELL COUNT: CPT

## 2017-01-01 PROCEDURE — 500002 HCHG ADHESIVE, DERMABOND: Performed by: SURGERY

## 2017-01-01 PROCEDURE — 99233 SBSQ HOSP IP/OBS HIGH 50: CPT | Performed by: HOSPITALIST

## 2017-01-01 PROCEDURE — 700111 HCHG RX REV CODE 636 W/ 250 OVERRIDE (IP): Performed by: NURSE PRACTITIONER

## 2017-01-01 PROCEDURE — 84443 ASSAY THYROID STIM HORMONE: CPT

## 2017-01-01 PROCEDURE — 85384 FIBRINOGEN ACTIVITY: CPT

## 2017-01-01 PROCEDURE — 84300 ASSAY OF URINE SODIUM: CPT

## 2017-01-01 PROCEDURE — 81025 URINE PREGNANCY TEST: CPT

## 2017-01-01 PROCEDURE — 36569 INSJ PICC 5 YR+ W/O IMAGING: CPT

## 2017-01-01 PROCEDURE — 87077 CULTURE AEROBIC IDENTIFY: CPT

## 2017-01-01 PROCEDURE — 93306 TTE W/DOPPLER COMPLETE: CPT | Mod: 26 | Performed by: INTERNAL MEDICINE

## 2017-01-01 PROCEDURE — 86900 BLOOD TYPING SEROLOGIC ABO: CPT

## 2017-01-01 PROCEDURE — 84484 ASSAY OF TROPONIN QUANT: CPT

## 2017-01-01 PROCEDURE — 74177 CT ABD & PELVIS W/CONTRAST: CPT

## 2017-01-01 PROCEDURE — 82270 OCCULT BLOOD FECES: CPT

## 2017-01-01 PROCEDURE — 502240 HCHG MISC OR SUPPLY RC 0272: Performed by: SURGERY

## 2017-01-01 PROCEDURE — 0FT44ZZ RESECTION OF GALLBLADDER, PERCUTANEOUS ENDOSCOPIC APPROACH: ICD-10-PCS | Performed by: SURGERY

## 2017-01-01 PROCEDURE — A9270 NON-COVERED ITEM OR SERVICE: HCPCS

## 2017-01-01 PROCEDURE — 30233K1 TRANSFUSION OF NONAUTOLOGOUS FROZEN PLASMA INTO PERIPHERAL VEIN, PERCUTANEOUS APPROACH: ICD-10-PCS | Performed by: SURGERY

## 2017-01-01 PROCEDURE — 99239 HOSP IP/OBS DSCHRG MGMT >30: CPT | Performed by: INTERNAL MEDICINE

## 2017-01-01 PROCEDURE — 71010 DX-CHEST-LIMITED (1 VIEW): CPT

## 2017-01-01 PROCEDURE — 80074 ACUTE HEPATITIS PANEL: CPT

## 2017-01-01 PROCEDURE — 500516 HCHG ENDOLOOP II 0 VIOLET 18: Performed by: SURGERY

## 2017-01-01 PROCEDURE — 160028 HCHG SURGERY MINUTES - 1ST 30 MINS LEVEL 3: Performed by: SURGERY

## 2017-01-01 PROCEDURE — 501583 HCHG TROCAR, THRD CAN&SEAL 5X100: Performed by: SURGERY

## 2017-01-01 PROCEDURE — 49083 ABD PARACENTESIS W/IMAGING: CPT

## 2017-01-01 PROCEDURE — P9016 RBC LEUKOCYTES REDUCED: HCPCS

## 2017-01-01 PROCEDURE — 96368 THER/DIAG CONCURRENT INF: CPT

## 2017-01-01 PROCEDURE — 700117 HCHG RX CONTRAST REV CODE 255: Performed by: EMERGENCY MEDICINE

## 2017-01-01 PROCEDURE — HZ2ZZZZ DETOXIFICATION SERVICES FOR SUBSTANCE ABUSE TREATMENT: ICD-10-PCS | Performed by: INTERNAL MEDICINE

## 2017-01-01 PROCEDURE — 82746 ASSAY OF FOLIC ACID SERUM: CPT

## 2017-01-01 PROCEDURE — 160002 HCHG RECOVERY MINUTES (STAT): Performed by: SURGERY

## 2017-01-01 PROCEDURE — 160048 HCHG OR STATISTICAL LEVEL 1-5: Performed by: SURGERY

## 2017-01-01 PROCEDURE — 85055 RETICULATED PLATELET ASSAY: CPT

## 2017-01-01 PROCEDURE — 110371 HCHG SHELL REV 272: Performed by: SURGERY

## 2017-01-01 PROCEDURE — 84157 ASSAY OF PROTEIN OTHER: CPT

## 2017-01-01 PROCEDURE — 88304 TISSUE EXAM BY PATHOLOGIST: CPT

## 2017-01-01 PROCEDURE — 110382 HCHG SHELL REV 271: Performed by: SURGERY

## 2017-01-01 PROCEDURE — 83010 ASSAY OF HAPTOGLOBIN QUANT: CPT

## 2017-01-01 PROCEDURE — 502571 HCHG PACK, LAP CHOLE: Performed by: SURGERY

## 2017-01-01 PROCEDURE — 700111 HCHG RX REV CODE 636 W/ 250 OVERRIDE (IP): Performed by: SURGERY

## 2017-01-01 PROCEDURE — 304561 HCHG STAT O2

## 2017-01-01 PROCEDURE — 99284 EMERGENCY DEPT VISIT MOD MDM: CPT

## 2017-01-01 PROCEDURE — 88112 CYTOPATH CELL ENHANCE TECH: CPT

## 2017-01-01 PROCEDURE — 86038 ANTINUCLEAR ANTIBODIES: CPT

## 2017-01-01 PROCEDURE — 96374 THER/PROPH/DIAG INJ IV PUSH: CPT | Mod: XU

## 2017-01-01 PROCEDURE — 74022 RADEX COMPL AQT ABD SERIES: CPT

## 2017-01-01 PROCEDURE — 160036 HCHG PACU - EA ADDL 30 MINS PHASE I: Performed by: SURGERY

## 2017-01-01 PROCEDURE — 93306 TTE W/DOPPLER COMPLETE: CPT

## 2017-01-01 PROCEDURE — 99223 1ST HOSP IP/OBS HIGH 75: CPT | Mod: GC | Performed by: INTERNAL MEDICINE

## 2017-01-01 PROCEDURE — 99223 1ST HOSP IP/OBS HIGH 75: CPT | Performed by: INTERNAL MEDICINE

## 2017-01-01 PROCEDURE — 500868 HCHG NEEDLE, SURGI(VARES): Performed by: SURGERY

## 2017-01-01 PROCEDURE — 30233K1 TRANSFUSION OF NONAUTOLOGOUS FROZEN PLASMA INTO PERIPHERAL VEIN, PERCUTANEOUS APPROACH: ICD-10-PCS | Performed by: INTERNAL MEDICINE

## 2017-01-01 PROCEDURE — 82306 VITAMIN D 25 HYDROXY: CPT

## 2017-01-01 PROCEDURE — 700111 HCHG RX REV CODE 636 W/ 250 OVERRIDE (IP): Performed by: STUDENT IN AN ORGANIZED HEALTH CARE EDUCATION/TRAINING PROGRAM

## 2017-01-01 PROCEDURE — P9016 RBC LEUKOCYTES REDUCED: HCPCS | Mod: 91

## 2017-01-01 PROCEDURE — 87040 BLOOD CULTURE FOR BACTERIA: CPT

## 2017-01-01 PROCEDURE — 501399 HCHG SPECIMAN BAG, ENDO CATC: Performed by: SURGERY

## 2017-01-01 PROCEDURE — 82105 ALPHA-FETOPROTEIN SERUM: CPT

## 2017-01-01 PROCEDURE — P9017 PLASMA 1 DONOR FRZ W/IN 8 HR: HCPCS

## 2017-01-01 PROCEDURE — 30233R1 TRANSFUSION OF NONAUTOLOGOUS PLATELETS INTO PERIPHERAL VEIN, PERCUTANEOUS APPROACH: ICD-10-PCS | Performed by: INTERNAL MEDICINE

## 2017-01-01 PROCEDURE — 500389 HCHG DRAIN, RESERVOIR SUCT JP 100CC: Performed by: SURGERY

## 2017-01-01 PROCEDURE — 0W9G3ZZ DRAINAGE OF PERITONEAL CAVITY, PERCUTANEOUS APPROACH: ICD-10-PCS | Performed by: INTERNAL MEDICINE

## 2017-01-01 RX ORDER — IBUPROFEN 600 MG/1
600 TABLET ORAL ONCE
Status: COMPLETED | OUTPATIENT
Start: 2017-01-01 | End: 2017-01-01

## 2017-01-01 RX ORDER — POTASSIUM CHLORIDE 20 MEQ/1
20 TABLET, EXTENDED RELEASE ORAL DAILY
Status: DISCONTINUED | OUTPATIENT
Start: 2017-01-01 | End: 2017-01-01

## 2017-01-01 RX ORDER — ALBUMIN (HUMAN) 12.5 G/50ML
25 SOLUTION INTRAVENOUS ONCE
Status: COMPLETED | OUTPATIENT
Start: 2017-01-01 | End: 2017-01-01

## 2017-01-01 RX ORDER — LORAZEPAM 2 MG/ML
1.5 INJECTION INTRAMUSCULAR
Status: DISCONTINUED | OUTPATIENT
Start: 2017-01-01 | End: 2017-01-01

## 2017-01-01 RX ORDER — ONDANSETRON 4 MG/1
8 TABLET, ORALLY DISINTEGRATING ORAL EVERY 8 HOURS PRN
Status: DISCONTINUED | OUTPATIENT
Start: 2017-01-01 | End: 2017-01-01 | Stop reason: HOSPADM

## 2017-01-01 RX ORDER — ONDANSETRON 2 MG/ML
4 INJECTION INTRAMUSCULAR; INTRAVENOUS ONCE
Status: COMPLETED | OUTPATIENT
Start: 2017-01-01 | End: 2017-01-01

## 2017-01-01 RX ORDER — PHYTONADIONE 5 MG/1
5 TABLET ORAL ONCE
Status: COMPLETED | OUTPATIENT
Start: 2017-01-01 | End: 2017-01-01

## 2017-01-01 RX ORDER — ALBUMIN (HUMAN) 12.5 G/50ML
25 SOLUTION INTRAVENOUS EVERY 6 HOURS
Status: DISCONTINUED | OUTPATIENT
Start: 2017-01-01 | End: 2017-01-01

## 2017-01-01 RX ORDER — PROMETHAZINE HYDROCHLORIDE 25 MG/1
12.5-25 TABLET ORAL EVERY 4 HOURS PRN
Status: DISCONTINUED | OUTPATIENT
Start: 2017-01-01 | End: 2017-01-01 | Stop reason: HOSPADM

## 2017-01-01 RX ORDER — CHLORDIAZEPOXIDE HYDROCHLORIDE 10 MG/1
20 CAPSULE, GELATIN COATED ORAL 3 TIMES DAILY PRN
Qty: 20 CAP | Refills: 0 | Status: SHIPPED | OUTPATIENT
Start: 2017-01-01 | End: 2017-01-01

## 2017-01-01 RX ORDER — PROMETHAZINE HYDROCHLORIDE 25 MG/1
12.5-25 SUPPOSITORY RECTAL EVERY 4 HOURS PRN
Status: DISCONTINUED | OUTPATIENT
Start: 2017-01-01 | End: 2017-01-01

## 2017-01-01 RX ORDER — BISACODYL 10 MG
10 SUPPOSITORY, RECTAL RECTAL
Status: DISCONTINUED | OUTPATIENT
Start: 2017-01-01 | End: 2017-01-01 | Stop reason: HOSPADM

## 2017-01-01 RX ORDER — MORPHINE SULFATE 4 MG/ML
1-2 INJECTION, SOLUTION INTRAMUSCULAR; INTRAVENOUS EVERY 4 HOURS PRN
Status: DISCONTINUED | OUTPATIENT
Start: 2017-01-01 | End: 2017-01-01

## 2017-01-01 RX ORDER — SCOLOPAMINE TRANSDERMAL SYSTEM 1 MG/1
1 PATCH, EXTENDED RELEASE TRANSDERMAL
Status: DISCONTINUED | OUTPATIENT
Start: 2017-01-01 | End: 2017-01-01 | Stop reason: HOSPADM

## 2017-01-01 RX ORDER — SODIUM CHLORIDE AND POTASSIUM CHLORIDE 150; 900 MG/100ML; MG/100ML
INJECTION, SOLUTION INTRAVENOUS CONTINUOUS
Status: DISCONTINUED | OUTPATIENT
Start: 2017-01-01 | End: 2017-01-01

## 2017-01-01 RX ORDER — OXYCODONE HYDROCHLORIDE 5 MG/1
5 TABLET ORAL ONCE
Status: COMPLETED | OUTPATIENT
Start: 2017-01-01 | End: 2017-01-01

## 2017-01-01 RX ORDER — MORPHINE SULFATE 4 MG/ML
2 INJECTION, SOLUTION INTRAMUSCULAR; INTRAVENOUS EVERY 4 HOURS PRN
Status: COMPLETED | OUTPATIENT
Start: 2017-01-01 | End: 2017-01-01

## 2017-01-01 RX ORDER — OMEPRAZOLE 20 MG/1
20 CAPSULE, DELAYED RELEASE ORAL EVERY 12 HOURS
Qty: 30 CAP | Refills: 0 | Status: SHIPPED | OUTPATIENT
Start: 2017-01-01 | End: 2017-01-01

## 2017-01-01 RX ORDER — ALBUMIN (HUMAN) 12.5 G/50ML
25 SOLUTION INTRAVENOUS DAILY
Status: DISCONTINUED | OUTPATIENT
Start: 2017-01-01 | End: 2017-01-01

## 2017-01-01 RX ORDER — ONDANSETRON 2 MG/ML
4 INJECTION INTRAMUSCULAR; INTRAVENOUS EVERY 4 HOURS PRN
Status: DISCONTINUED | OUTPATIENT
Start: 2017-01-01 | End: 2017-01-01 | Stop reason: HOSPADM

## 2017-01-01 RX ORDER — AMOXICILLIN 250 MG
2 CAPSULE ORAL 2 TIMES DAILY
Status: DISCONTINUED | OUTPATIENT
Start: 2017-01-01 | End: 2017-01-01

## 2017-01-01 RX ORDER — MORPHINE SULFATE 4 MG/ML
3 INJECTION, SOLUTION INTRAMUSCULAR; INTRAVENOUS ONCE
Status: COMPLETED | OUTPATIENT
Start: 2017-01-01 | End: 2017-01-01

## 2017-01-01 RX ORDER — POTASSIUM CHLORIDE 20 MEQ/1
20 TABLET, EXTENDED RELEASE ORAL DAILY
Qty: 60 TAB | Refills: 2 | Status: SHIPPED | OUTPATIENT
Start: 2017-01-01 | End: 2017-01-01

## 2017-01-01 RX ORDER — LACTULOSE 20 G/30ML
10 SOLUTION ORAL
Status: DISCONTINUED | OUTPATIENT
Start: 2017-01-01 | End: 2017-01-01

## 2017-01-01 RX ORDER — FERROUS GLUCONATE 324(38)MG
324 TABLET ORAL 2 TIMES DAILY WITH MEALS
Status: DISCONTINUED | OUTPATIENT
Start: 2017-01-01 | End: 2017-01-01 | Stop reason: HOSPADM

## 2017-01-01 RX ORDER — FAMOTIDINE 20 MG/1
20 TABLET, FILM COATED ORAL DAILY
Status: DISCONTINUED | OUTPATIENT
Start: 2017-01-01 | End: 2017-01-01

## 2017-01-01 RX ORDER — CIPROFLOXACIN 500 MG/1
500 TABLET, FILM COATED ORAL 2 TIMES DAILY
Qty: 10 TAB | Refills: 0 | Status: ON HOLD | OUTPATIENT
Start: 2017-01-01 | End: 2017-01-01

## 2017-01-01 RX ORDER — HYDROCODONE BITARTRATE AND ACETAMINOPHEN 5; 325 MG/1; MG/1
1-2 TABLET ORAL EVERY 4 HOURS PRN
Qty: 15 TAB | Refills: 0 | Status: SHIPPED | OUTPATIENT
Start: 2017-01-01 | End: 2017-01-01

## 2017-01-01 RX ORDER — POTASSIUM CHLORIDE 20 MEQ/1
60 TABLET, EXTENDED RELEASE ORAL ONCE
Status: COMPLETED | OUTPATIENT
Start: 2017-01-01 | End: 2017-01-01

## 2017-01-01 RX ORDER — OMEPRAZOLE 20 MG/1
20 CAPSULE, DELAYED RELEASE ORAL 2 TIMES DAILY
Status: DISCONTINUED | OUTPATIENT
Start: 2017-01-01 | End: 2017-01-01

## 2017-01-01 RX ORDER — OCTREOTIDE ACETATE 100 UG/ML
100 INJECTION, SOLUTION INTRAVENOUS; SUBCUTANEOUS 3 TIMES DAILY
Status: DISCONTINUED | OUTPATIENT
Start: 2017-01-01 | End: 2017-01-01

## 2017-01-01 RX ORDER — PANTOPRAZOLE SODIUM 40 MG/10ML
40 INJECTION, POWDER, LYOPHILIZED, FOR SOLUTION INTRAVENOUS 2 TIMES DAILY
Status: DISCONTINUED | OUTPATIENT
Start: 2017-01-01 | End: 2017-01-01

## 2017-01-01 RX ORDER — MORPHINE SULFATE 4 MG/ML
2-4 INJECTION, SOLUTION INTRAMUSCULAR; INTRAVENOUS
Status: DISCONTINUED | OUTPATIENT
Start: 2017-01-01 | End: 2017-01-01

## 2017-01-01 RX ORDER — PENTOXIFYLLINE 400 MG/1
400 TABLET, EXTENDED RELEASE ORAL
Status: DISCONTINUED | OUTPATIENT
Start: 2017-01-01 | End: 2017-01-01 | Stop reason: HOSPADM

## 2017-01-01 RX ORDER — BISACODYL 10 MG
10 SUPPOSITORY, RECTAL RECTAL
Status: DISCONTINUED | OUTPATIENT
Start: 2017-01-01 | End: 2017-01-01

## 2017-01-01 RX ORDER — LORAZEPAM 1 MG/1
1 TABLET ORAL EVERY 4 HOURS PRN
Status: DISCONTINUED | OUTPATIENT
Start: 2017-01-01 | End: 2017-01-01

## 2017-01-01 RX ORDER — POTASSIUM CHLORIDE 20 MEQ/1
20 TABLET, EXTENDED RELEASE ORAL DAILY
Status: ON HOLD | COMMUNITY
End: 2017-01-01

## 2017-01-01 RX ORDER — PROMETHAZINE HYDROCHLORIDE 25 MG/1
12.5-25 SUPPOSITORY RECTAL EVERY 4 HOURS PRN
Status: DISCONTINUED | OUTPATIENT
Start: 2017-01-01 | End: 2017-01-01 | Stop reason: HOSPADM

## 2017-01-01 RX ORDER — SODIUM CHLORIDE 9 MG/ML
500 INJECTION, SOLUTION INTRAVENOUS
Status: DISCONTINUED | OUTPATIENT
Start: 2017-01-01 | End: 2017-01-01

## 2017-01-01 RX ORDER — ALBUMIN (HUMAN) 12.5 G/50ML
25 SOLUTION INTRAVENOUS ONCE
Status: DISCONTINUED | OUTPATIENT
Start: 2017-01-01 | End: 2017-01-01

## 2017-01-01 RX ORDER — CHLORDIAZEPOXIDE HYDROCHLORIDE 25 MG/1
25 CAPSULE, GELATIN COATED ORAL ONCE
Status: COMPLETED | OUTPATIENT
Start: 2017-01-01 | End: 2017-01-01

## 2017-01-01 RX ORDER — HYDROCODONE BITARTRATE AND ACETAMINOPHEN 5; 325 MG/1; MG/1
1-2 TABLET ORAL EVERY 4 HOURS PRN
Qty: 20 TAB | Refills: 0 | Status: ON HOLD | OUTPATIENT
Start: 2017-01-01 | End: 2017-01-01

## 2017-01-01 RX ORDER — HYDROCODONE BITARTRATE AND ACETAMINOPHEN 5; 325 MG/1; MG/1
1-2 TABLET ORAL EVERY 4 HOURS PRN
Status: DISCONTINUED | OUTPATIENT
Start: 2017-01-01 | End: 2017-01-01

## 2017-01-01 RX ORDER — DIAZEPAM 5 MG/1
5 TABLET ORAL ONCE
Status: COMPLETED | OUTPATIENT
Start: 2017-01-01 | End: 2017-01-01

## 2017-01-01 RX ORDER — AMOXICILLIN 250 MG
2 CAPSULE ORAL 2 TIMES DAILY
Status: DISCONTINUED | OUTPATIENT
Start: 2017-01-01 | End: 2017-01-01 | Stop reason: HOSPADM

## 2017-01-01 RX ORDER — LORAZEPAM 1 MG/1
3 TABLET ORAL
Status: DISCONTINUED | OUTPATIENT
Start: 2017-01-01 | End: 2017-01-01

## 2017-01-01 RX ORDER — SODIUM CHLORIDE 9 MG/ML
1000 INJECTION, SOLUTION INTRAVENOUS ONCE
Status: COMPLETED | OUTPATIENT
Start: 2017-01-01 | End: 2017-01-01

## 2017-01-01 RX ORDER — MEDROXYPROGESTERONE ACETATE 5 MG/1
10 TABLET ORAL DAILY
COMMUNITY
End: 2017-01-01

## 2017-01-01 RX ORDER — ONDANSETRON 2 MG/ML
4 INJECTION INTRAMUSCULAR; INTRAVENOUS
Status: DISCONTINUED | OUTPATIENT
Start: 2017-01-01 | End: 2017-01-01 | Stop reason: HOSPADM

## 2017-01-01 RX ORDER — ONDANSETRON 4 MG/1
4 TABLET, FILM COATED ORAL EVERY 4 HOURS PRN
Qty: 30 TAB | Refills: 0 | Status: SHIPPED | OUTPATIENT
Start: 2017-01-01 | End: 2017-01-01

## 2017-01-01 RX ORDER — POLYETHYLENE GLYCOL 3350 17 G/17G
1 POWDER, FOR SOLUTION ORAL
Status: DISCONTINUED | OUTPATIENT
Start: 2017-01-01 | End: 2017-01-01

## 2017-01-01 RX ORDER — LACTULOSE 20 G/30ML
30 SOLUTION ORAL 2 TIMES DAILY
Status: DISCONTINUED | OUTPATIENT
Start: 2017-01-01 | End: 2017-01-01

## 2017-01-01 RX ORDER — POTASSIUM CHLORIDE 7.45 MG/ML
10 INJECTION INTRAVENOUS ONCE
Status: COMPLETED | OUTPATIENT
Start: 2017-01-01 | End: 2017-01-01

## 2017-01-01 RX ORDER — HYDROMORPHONE HYDROCHLORIDE 2 MG/ML
4 INJECTION, SOLUTION INTRAMUSCULAR; INTRAVENOUS; SUBCUTANEOUS
Status: DISCONTINUED | OUTPATIENT
Start: 2017-01-01 | End: 2017-01-01 | Stop reason: HOSPADM

## 2017-01-01 RX ORDER — MORPHINE SULFATE 4 MG/ML
4 INJECTION, SOLUTION INTRAMUSCULAR; INTRAVENOUS ONCE
Status: COMPLETED | OUTPATIENT
Start: 2017-01-01 | End: 2017-01-01

## 2017-01-01 RX ORDER — CIPROFLOXACIN 500 MG/1
500 TABLET, FILM COATED ORAL 2 TIMES DAILY
Qty: 8 TAB | Refills: 0 | Status: SHIPPED | OUTPATIENT
Start: 2017-01-01 | End: 2017-01-01

## 2017-01-01 RX ORDER — ONDANSETRON 4 MG/1
4 TABLET, ORALLY DISINTEGRATING ORAL EVERY 6 HOURS PRN
Qty: 20 TAB | Refills: 2 | Status: ON HOLD | OUTPATIENT
Start: 2017-01-01 | End: 2017-01-01

## 2017-01-01 RX ORDER — SODIUM CHLORIDE 9 MG/ML
INJECTION, SOLUTION INTRAVENOUS
Status: COMPLETED
Start: 2017-01-01 | End: 2017-01-01

## 2017-01-01 RX ORDER — LORAZEPAM 1 MG/1
4 TABLET ORAL
Status: DISCONTINUED | OUTPATIENT
Start: 2017-01-01 | End: 2017-01-01

## 2017-01-01 RX ORDER — FUROSEMIDE 20 MG/1
20 TABLET ORAL DAILY
COMMUNITY
End: 2017-01-01

## 2017-01-01 RX ORDER — SODIUM CHLORIDE 9 MG/ML
INJECTION, SOLUTION INTRAVENOUS CONTINUOUS
Status: DISCONTINUED | OUTPATIENT
Start: 2017-01-01 | End: 2017-01-01

## 2017-01-01 RX ORDER — FUROSEMIDE 20 MG/1
20 TABLET ORAL DAILY
Status: DISCONTINUED | OUTPATIENT
Start: 2017-01-01 | End: 2017-01-01

## 2017-01-01 RX ORDER — PENTOXIFYLLINE 400 MG/1
400 TABLET, EXTENDED RELEASE ORAL 3 TIMES DAILY
Status: DISCONTINUED | OUTPATIENT
Start: 2017-01-01 | End: 2017-01-01

## 2017-01-01 RX ORDER — MIDODRINE HYDROCHLORIDE 5 MG/1
10 TABLET ORAL
Status: DISCONTINUED | OUTPATIENT
Start: 2017-01-01 | End: 2017-01-01

## 2017-01-01 RX ORDER — CEFTRIAXONE 2 G/1
2 INJECTION, POWDER, FOR SOLUTION INTRAMUSCULAR; INTRAVENOUS ONCE
Status: COMPLETED | OUTPATIENT
Start: 2017-01-01 | End: 2017-01-01

## 2017-01-01 RX ORDER — MAGNESIUM SULFATE HEPTAHYDRATE 40 MG/ML
4 INJECTION, SOLUTION INTRAVENOUS ONCE
Status: COMPLETED | OUTPATIENT
Start: 2017-01-01 | End: 2017-01-01

## 2017-01-01 RX ORDER — OMEPRAZOLE 20 MG/1
20 CAPSULE, DELAYED RELEASE ORAL 2 TIMES DAILY
Status: DISCONTINUED | OUTPATIENT
Start: 2017-01-01 | End: 2017-01-01 | Stop reason: HOSPADM

## 2017-01-01 RX ORDER — LORAZEPAM 1 MG/1
0.5 TABLET ORAL EVERY 4 HOURS PRN
Status: DISCONTINUED | OUTPATIENT
Start: 2017-01-01 | End: 2017-01-01

## 2017-01-01 RX ORDER — SPIRONOLACTONE 50 MG/1
50 TABLET, FILM COATED ORAL DAILY
COMMUNITY
End: 2017-01-01

## 2017-01-01 RX ORDER — SODIUM CHLORIDE 9 MG/ML
INJECTION, SOLUTION INTRAVENOUS CONTINUOUS
Status: DISCONTINUED | OUTPATIENT
Start: 2017-01-01 | End: 2017-01-01 | Stop reason: HOSPADM

## 2017-01-01 RX ORDER — HEPARIN SODIUM 5000 [USP'U]/ML
5000 INJECTION, SOLUTION INTRAVENOUS; SUBCUTANEOUS EVERY 8 HOURS
Status: DISPENSED | OUTPATIENT
Start: 2017-01-01 | End: 2017-01-01

## 2017-01-01 RX ORDER — ERGOCALCIFEROL 1.25 MG/1
50000 CAPSULE ORAL
Status: DISCONTINUED | OUTPATIENT
Start: 2017-01-01 | End: 2017-01-01 | Stop reason: HOSPADM

## 2017-01-01 RX ORDER — MAGNESIUM SULFATE HEPTAHYDRATE 40 MG/ML
2 INJECTION, SOLUTION INTRAVENOUS ONCE
Status: COMPLETED | OUTPATIENT
Start: 2017-01-01 | End: 2017-01-01

## 2017-01-01 RX ORDER — ONDANSETRON 2 MG/ML
INJECTION INTRAMUSCULAR; INTRAVENOUS
Status: COMPLETED
Start: 2017-01-01 | End: 2017-01-01

## 2017-01-01 RX ORDER — LORAZEPAM 2 MG/ML
2 INJECTION INTRAMUSCULAR
Status: DISCONTINUED | OUTPATIENT
Start: 2017-01-01 | End: 2017-01-01

## 2017-01-01 RX ORDER — HEPARIN SODIUM 5000 [USP'U]/ML
5000 INJECTION, SOLUTION INTRAVENOUS; SUBCUTANEOUS EVERY 8 HOURS
Status: DISCONTINUED | OUTPATIENT
Start: 2017-01-01 | End: 2017-01-01 | Stop reason: HOSPADM

## 2017-01-01 RX ORDER — CIPROFLOXACIN 2 MG/ML
400 INJECTION, SOLUTION INTRAVENOUS ONCE
Status: COMPLETED | OUTPATIENT
Start: 2017-01-01 | End: 2017-01-01

## 2017-01-01 RX ORDER — POTASSIUM CHLORIDE 20 MEQ/1
60 TABLET, EXTENDED RELEASE ORAL DAILY
Status: DISCONTINUED | OUTPATIENT
Start: 2017-01-01 | End: 2017-01-01 | Stop reason: HOSPADM

## 2017-01-01 RX ORDER — POLYVINYL ALCOHOL 14 MG/ML
2 SOLUTION/ DROPS OPHTHALMIC EVERY 6 HOURS PRN
Status: DISCONTINUED | OUTPATIENT
Start: 2017-01-01 | End: 2017-01-01 | Stop reason: HOSPADM

## 2017-01-01 RX ORDER — METRONIDAZOLE 500 MG/1
500 TABLET ORAL EVERY 8 HOURS
Qty: 15 TAB | Refills: 0 | Status: ON HOLD | OUTPATIENT
Start: 2017-01-01 | End: 2017-01-01

## 2017-01-01 RX ORDER — POTASSIUM CHLORIDE 20 MEQ/1
20 TABLET, EXTENDED RELEASE ORAL DAILY
Status: DISCONTINUED | OUTPATIENT
Start: 2017-01-01 | End: 2017-01-01 | Stop reason: HOSPADM

## 2017-01-01 RX ORDER — DEXTROSE MONOHYDRATE 25 G/50ML
25 INJECTION, SOLUTION INTRAVENOUS
Status: DISCONTINUED | OUTPATIENT
Start: 2017-01-01 | End: 2017-01-01

## 2017-01-01 RX ORDER — PHYTONADIONE 10 MG/ML
10 INJECTION, EMULSION INTRAMUSCULAR; INTRAVENOUS; SUBCUTANEOUS DAILY
Status: DISPENSED | OUTPATIENT
Start: 2017-01-01 | End: 2017-01-01

## 2017-01-01 RX ORDER — LORAZEPAM 2 MG/ML
0.5 INJECTION INTRAMUSCULAR EVERY 4 HOURS PRN
Status: DISCONTINUED | OUTPATIENT
Start: 2017-01-01 | End: 2017-01-01

## 2017-01-01 RX ORDER — OXYCODONE HYDROCHLORIDE 5 MG/1
5 TABLET ORAL EVERY 4 HOURS PRN
Status: DISCONTINUED | OUTPATIENT
Start: 2017-01-01 | End: 2017-01-01

## 2017-01-01 RX ORDER — POTASSIUM CHLORIDE 1.5 G/1.58G
40 POWDER, FOR SOLUTION ORAL ONCE
Status: COMPLETED | OUTPATIENT
Start: 2017-01-01 | End: 2017-01-01

## 2017-01-01 RX ORDER — METRONIDAZOLE 500 MG/1
500 TABLET ORAL EVERY 8 HOURS
Qty: 12 TAB | Refills: 0 | Status: SHIPPED | OUTPATIENT
Start: 2017-01-01 | End: 2017-01-01

## 2017-01-01 RX ORDER — POTASSIUM CHLORIDE 20 MEQ/1
20 TABLET, EXTENDED RELEASE ORAL 2 TIMES DAILY
Status: DISCONTINUED | OUTPATIENT
Start: 2017-01-01 | End: 2017-01-01 | Stop reason: HOSPADM

## 2017-01-01 RX ORDER — PHYTONADIONE 5 MG/1
5 TABLET ORAL DAILY
Status: DISCONTINUED | OUTPATIENT
Start: 2017-01-01 | End: 2017-01-01 | Stop reason: HOSPADM

## 2017-01-01 RX ORDER — MAGNESIUM SULFATE HEPTAHYDRATE 40 MG/ML
4 INJECTION, SOLUTION INTRAVENOUS ONCE
Status: DISCONTINUED | OUTPATIENT
Start: 2017-01-01 | End: 2017-01-01

## 2017-01-01 RX ORDER — OMEPRAZOLE 20 MG/1
20 CAPSULE, DELAYED RELEASE ORAL
Status: DISCONTINUED | OUTPATIENT
Start: 2017-01-01 | End: 2017-01-01 | Stop reason: HOSPADM

## 2017-01-01 RX ORDER — OXYCODONE HYDROCHLORIDE 5 MG/1
5 TABLET ORAL EVERY 4 HOURS PRN
Status: DISCONTINUED | OUTPATIENT
Start: 2017-01-01 | End: 2017-01-01 | Stop reason: HOSPADM

## 2017-01-01 RX ORDER — TRAMADOL HYDROCHLORIDE 50 MG/1
50 TABLET ORAL EVERY 6 HOURS PRN
Status: DISCONTINUED | OUTPATIENT
Start: 2017-01-01 | End: 2017-01-01 | Stop reason: HOSPADM

## 2017-01-01 RX ORDER — ALBUMIN (HUMAN) 12.5 G/50ML
87.5 SOLUTION INTRAVENOUS ONCE
Status: COMPLETED | OUTPATIENT
Start: 2017-01-01 | End: 2017-01-01

## 2017-01-01 RX ORDER — FERROUS SULFATE 325(65) MG
325 TABLET ORAL
Status: DISCONTINUED | OUTPATIENT
Start: 2017-01-01 | End: 2017-01-01

## 2017-01-01 RX ORDER — ONDANSETRON 2 MG/ML
4 INJECTION INTRAMUSCULAR; INTRAVENOUS EVERY 4 HOURS PRN
Status: DISCONTINUED | OUTPATIENT
Start: 2017-01-01 | End: 2017-01-01

## 2017-01-01 RX ORDER — MEDROXYPROGESTERONE ACETATE 10 MG/1
10 TABLET ORAL
Status: DISCONTINUED | OUTPATIENT
Start: 2017-01-01 | End: 2017-01-01 | Stop reason: HOSPADM

## 2017-01-01 RX ORDER — OXYCODONE HYDROCHLORIDE 5 MG/1
5 TABLET ORAL
Status: DISCONTINUED | OUTPATIENT
Start: 2017-01-01 | End: 2017-01-01 | Stop reason: HOSPADM

## 2017-01-01 RX ORDER — MORPHINE SULFATE 4 MG/ML
1 INJECTION, SOLUTION INTRAMUSCULAR; INTRAVENOUS EVERY 4 HOURS PRN
Status: DISCONTINUED | OUTPATIENT
Start: 2017-01-01 | End: 2017-01-01 | Stop reason: HOSPADM

## 2017-01-01 RX ORDER — PROMETHAZINE HYDROCHLORIDE 25 MG/1
12.5-25 TABLET ORAL EVERY 4 HOURS PRN
Status: DISCONTINUED | OUTPATIENT
Start: 2017-01-01 | End: 2017-01-01

## 2017-01-01 RX ORDER — SODIUM CHLORIDE 9 MG/ML
30 INJECTION, SOLUTION INTRAVENOUS
Status: COMPLETED | OUTPATIENT
Start: 2017-01-01 | End: 2017-01-01

## 2017-01-01 RX ORDER — LORAZEPAM 2 MG/ML
1 CONCENTRATE ORAL
Status: DISCONTINUED | OUTPATIENT
Start: 2017-01-01 | End: 2017-01-01 | Stop reason: HOSPADM

## 2017-01-01 RX ORDER — OXYCODONE HYDROCHLORIDE 10 MG/1
10 TABLET ORAL EVERY 4 HOURS PRN
Status: DISCONTINUED | OUTPATIENT
Start: 2017-01-01 | End: 2017-01-01 | Stop reason: HOSPADM

## 2017-01-01 RX ORDER — MORPHINE SULFATE 4 MG/ML
3 INJECTION, SOLUTION INTRAMUSCULAR; INTRAVENOUS EVERY 4 HOURS PRN
Status: DISCONTINUED | OUTPATIENT
Start: 2017-01-01 | End: 2017-01-01

## 2017-01-01 RX ORDER — PENTOXIFYLLINE 400 MG/1
400 TABLET, EXTENDED RELEASE ORAL
COMMUNITY
End: 2017-01-01

## 2017-01-01 RX ORDER — OXYCODONE HYDROCHLORIDE 10 MG/1
10 TABLET ORAL EVERY 4 HOURS PRN
Status: DISCONTINUED | OUTPATIENT
Start: 2017-01-01 | End: 2017-01-01

## 2017-01-01 RX ORDER — LORAZEPAM 2 MG/ML
1 INJECTION INTRAMUSCULAR
Status: DISCONTINUED | OUTPATIENT
Start: 2017-01-01 | End: 2017-01-01

## 2017-01-01 RX ORDER — MORPHINE SULFATE 4 MG/ML
3 INJECTION, SOLUTION INTRAMUSCULAR; INTRAVENOUS
Status: DISCONTINUED | OUTPATIENT
Start: 2017-01-01 | End: 2017-01-01 | Stop reason: HOSPADM

## 2017-01-01 RX ORDER — FERROUS GLUCONATE 324(38)MG
324 TABLET ORAL
Status: DISCONTINUED | OUTPATIENT
Start: 2017-01-01 | End: 2017-01-01

## 2017-01-01 RX ORDER — CEFTRIAXONE 1 G/1
1 INJECTION, POWDER, FOR SOLUTION INTRAMUSCULAR; INTRAVENOUS ONCE
Status: COMPLETED | OUTPATIENT
Start: 2017-01-01 | End: 2017-01-01

## 2017-01-01 RX ORDER — LORAZEPAM 2 MG/ML
1 INJECTION INTRAMUSCULAR
Status: DISCONTINUED | OUTPATIENT
Start: 2017-01-01 | End: 2017-01-01 | Stop reason: HOSPADM

## 2017-01-01 RX ORDER — SODIUM CHLORIDE 9 MG/ML
INJECTION, SOLUTION INTRAVENOUS
Status: DISCONTINUED
Start: 2017-01-01 | End: 2017-01-01

## 2017-01-01 RX ORDER — METRONIDAZOLE 500 MG/1
500 TABLET ORAL EVERY 8 HOURS
Status: DISCONTINUED | OUTPATIENT
Start: 2017-01-01 | End: 2017-01-01 | Stop reason: HOSPADM

## 2017-01-01 RX ORDER — ATROPINE SULFATE 10 MG/ML
2 SOLUTION/ DROPS OPHTHALMIC EVERY 4 HOURS PRN
Status: DISCONTINUED | OUTPATIENT
Start: 2017-01-01 | End: 2017-01-01 | Stop reason: HOSPADM

## 2017-01-01 RX ORDER — KETOROLAC TROMETHAMINE 30 MG/ML
30 INJECTION, SOLUTION INTRAMUSCULAR; INTRAVENOUS EVERY 6 HOURS PRN
Status: DISCONTINUED | OUTPATIENT
Start: 2017-01-01 | End: 2017-01-01 | Stop reason: HOSPADM

## 2017-01-01 RX ORDER — POTASSIUM CHLORIDE 20 MEQ/1
40 TABLET, EXTENDED RELEASE ORAL 2 TIMES DAILY
Status: DISCONTINUED | OUTPATIENT
Start: 2017-01-01 | End: 2017-01-01

## 2017-01-01 RX ORDER — POTASSIUM CHLORIDE 20 MEQ/1
40 TABLET, EXTENDED RELEASE ORAL ONCE
Status: COMPLETED | OUTPATIENT
Start: 2017-01-01 | End: 2017-01-01

## 2017-01-01 RX ORDER — SPIRONOLACTONE 25 MG/1
50 TABLET ORAL DAILY
Status: DISCONTINUED | OUTPATIENT
Start: 2017-01-01 | End: 2017-01-01 | Stop reason: HOSPADM

## 2017-01-01 RX ORDER — THIAMINE MONONITRATE (VIT B1) 100 MG
100 TABLET ORAL DAILY
Status: COMPLETED | OUTPATIENT
Start: 2017-01-01 | End: 2017-01-01

## 2017-01-01 RX ORDER — HYDROCODONE BITARTRATE AND ACETAMINOPHEN 5; 325 MG/1; MG/1
1 TABLET ORAL EVERY 6 HOURS PRN
Qty: 20 TAB | Refills: 0 | Status: SHIPPED | OUTPATIENT
Start: 2017-01-01 | End: 2017-01-01

## 2017-01-01 RX ORDER — POLYETHYLENE GLYCOL 3350 17 G/17G
1 POWDER, FOR SOLUTION ORAL
Status: DISCONTINUED | OUTPATIENT
Start: 2017-01-01 | End: 2017-01-01 | Stop reason: HOSPADM

## 2017-01-01 RX ORDER — TRAMADOL HYDROCHLORIDE 50 MG/1
50 TABLET ORAL EVERY 4 HOURS PRN
Qty: 20 TAB | Refills: 0 | Status: SHIPPED | OUTPATIENT
Start: 2017-01-01 | End: 2017-01-01

## 2017-01-01 RX ORDER — SODIUM CHLORIDE 9 MG/ML
INJECTION, SOLUTION INTRAVENOUS CONTINUOUS
Status: DISPENSED | OUTPATIENT
Start: 2017-01-01 | End: 2017-01-01

## 2017-01-01 RX ORDER — ONDANSETRON 4 MG/1
4 TABLET, ORALLY DISINTEGRATING ORAL EVERY 4 HOURS PRN
Status: DISCONTINUED | OUTPATIENT
Start: 2017-01-01 | End: 2017-01-01 | Stop reason: HOSPADM

## 2017-01-01 RX ORDER — ONDANSETRON 4 MG/1
4 TABLET, ORALLY DISINTEGRATING ORAL EVERY 4 HOURS PRN
Status: DISCONTINUED | OUTPATIENT
Start: 2017-01-01 | End: 2017-01-01

## 2017-01-01 RX ORDER — OXYCODONE HYDROCHLORIDE 5 MG/1
TABLET ORAL
Status: COMPLETED
Start: 2017-01-01 | End: 2017-01-01

## 2017-01-01 RX ORDER — FOLIC ACID 1 MG/1
1 TABLET ORAL DAILY
Status: COMPLETED | OUTPATIENT
Start: 2017-01-01 | End: 2017-01-01

## 2017-01-01 RX ORDER — LORAZEPAM 1 MG/1
2 TABLET ORAL
Status: DISCONTINUED | OUTPATIENT
Start: 2017-01-01 | End: 2017-01-01

## 2017-01-01 RX ORDER — ACETAMINOPHEN 325 MG/1
650 TABLET ORAL ONCE
Status: COMPLETED | OUTPATIENT
Start: 2017-01-01 | End: 2017-01-01

## 2017-01-01 RX ORDER — ALBUMIN (HUMAN) 12.5 G/50ML
75 SOLUTION INTRAVENOUS DAILY
Status: COMPLETED | OUTPATIENT
Start: 2017-01-01 | End: 2017-01-01

## 2017-01-01 RX ORDER — MORPHINE SULFATE 4 MG/ML
2 INJECTION, SOLUTION INTRAMUSCULAR; INTRAVENOUS EVERY 4 HOURS PRN
Status: DISCONTINUED | OUTPATIENT
Start: 2017-01-01 | End: 2017-01-01

## 2017-01-01 RX ORDER — HALOPERIDOL 5 MG/ML
INJECTION INTRAMUSCULAR
Status: COMPLETED
Start: 2017-01-01 | End: 2017-01-01

## 2017-01-01 RX ORDER — SODIUM CHLORIDE 9 MG/ML
INJECTION, SOLUTION INTRAVENOUS
Status: ACTIVE
Start: 2017-01-01 | End: 2017-01-01

## 2017-01-01 RX ORDER — ONDANSETRON 2 MG/ML
8 INJECTION INTRAMUSCULAR; INTRAVENOUS EVERY 8 HOURS PRN
Status: DISCONTINUED | OUTPATIENT
Start: 2017-01-01 | End: 2017-01-01 | Stop reason: HOSPADM

## 2017-01-01 RX ORDER — BUPIVACAINE HYDROCHLORIDE AND EPINEPHRINE 5; 5 MG/ML; UG/ML
INJECTION, SOLUTION EPIDURAL; INTRACAUDAL; PERINEURAL
Status: DISCONTINUED | OUTPATIENT
Start: 2017-01-01 | End: 2017-01-01 | Stop reason: HOSPADM

## 2017-01-01 RX ORDER — POTASSIUM CHLORIDE 20 MEQ/1
40 TABLET, EXTENDED RELEASE ORAL DAILY
Status: DISCONTINUED | OUTPATIENT
Start: 2017-01-01 | End: 2017-01-01

## 2017-01-01 RX ORDER — OMEPRAZOLE 20 MG/1
20 CAPSULE, DELAYED RELEASE ORAL EVERY 12 HOURS
Status: DISCONTINUED | OUTPATIENT
Start: 2017-01-01 | End: 2017-01-01 | Stop reason: HOSPADM

## 2017-01-01 RX ORDER — SODIUM CHLORIDE 9 MG/ML
30 INJECTION, SOLUTION INTRAVENOUS ONCE
Status: COMPLETED | OUTPATIENT
Start: 2017-01-01 | End: 2017-01-01

## 2017-01-01 RX ADMIN — HYDROMORPHONE HYDROCHLORIDE 4 MG: 2 INJECTION INTRAMUSCULAR; INTRAVENOUS; SUBCUTANEOUS at 18:05

## 2017-01-01 RX ADMIN — THERA TABS 1 TABLET: TAB at 08:43

## 2017-01-01 RX ADMIN — SODIUM CHLORIDE 1000 ML: 9 INJECTION, SOLUTION INTRAVENOUS at 15:22

## 2017-01-01 RX ADMIN — ONDANSETRON 4 MG: 2 INJECTION, SOLUTION INTRAMUSCULAR; INTRAVENOUS at 23:15

## 2017-01-01 RX ADMIN — Medication 100 MG: at 10:18

## 2017-01-01 RX ADMIN — MORPHINE SULFATE 3 MG: 4 INJECTION INTRAVENOUS at 20:29

## 2017-01-01 RX ADMIN — LACTULOSE 30 ML: 20 SOLUTION ORAL at 21:10

## 2017-01-01 RX ADMIN — OMEPRAZOLE 20 MG: 20 CAPSULE, DELAYED RELEASE ORAL at 05:33

## 2017-01-01 RX ADMIN — OXYCODONE HYDROCHLORIDE: 5 TABLET ORAL at 00:45

## 2017-01-01 RX ADMIN — STANDARDIZED SENNA CONCENTRATE AND DOCUSATE SODIUM 2 TABLET: 8.6; 5 TABLET, FILM COATED ORAL at 22:32

## 2017-01-01 RX ADMIN — PROCHLORPERAZINE EDISYLATE 10 MG: 5 INJECTION INTRAMUSCULAR; INTRAVENOUS at 21:45

## 2017-01-01 RX ADMIN — METRONIDAZOLE 500 MG: 500 INJECTION, SOLUTION INTRAVENOUS at 23:03

## 2017-01-01 RX ADMIN — OMEPRAZOLE 20 MG: 20 CAPSULE, DELAYED RELEASE ORAL at 07:35

## 2017-01-01 RX ADMIN — DIAZEPAM 5 MG: 5 TABLET ORAL at 23:15

## 2017-01-01 RX ADMIN — MORPHINE SULFATE 3 MG: 4 INJECTION INTRAVENOUS at 21:09

## 2017-01-01 RX ADMIN — OXYCODONE HYDROCHLORIDE 10 MG: 10 TABLET ORAL at 09:33

## 2017-01-01 RX ADMIN — MIDODRINE HYDROCHLORIDE 10 MG: 5 TABLET ORAL at 10:27

## 2017-01-01 RX ADMIN — OMEPRAZOLE 20 MG: 20 CAPSULE, DELAYED RELEASE ORAL at 12:14

## 2017-01-01 RX ADMIN — OXYCODONE HYDROCHLORIDE 5 MG: 5 TABLET ORAL at 12:13

## 2017-01-01 RX ADMIN — PENTOXIFYLLINE 400 MG: 400 TABLET, FILM COATED, EXTENDED RELEASE ORAL at 12:58

## 2017-01-01 RX ADMIN — LORAZEPAM 1 MG: 2 INJECTION INTRAMUSCULAR; INTRAVENOUS at 05:02

## 2017-01-01 RX ADMIN — STANDARDIZED SENNA CONCENTRATE AND DOCUSATE SODIUM 2 TABLET: 8.6; 5 TABLET, FILM COATED ORAL at 08:16

## 2017-01-01 RX ADMIN — LACTULOSE 30 ML: 20 SOLUTION ORAL at 21:12

## 2017-01-01 RX ADMIN — CEFTRIAXONE 2 G: 2 INJECTION, POWDER, FOR SOLUTION INTRAMUSCULAR; INTRAVENOUS at 10:19

## 2017-01-01 RX ADMIN — OMEPRAZOLE 20 MG: 20 CAPSULE, DELAYED RELEASE ORAL at 08:29

## 2017-01-01 RX ADMIN — Medication 25 G: at 17:29

## 2017-01-01 RX ADMIN — POTASSIUM CHLORIDE 20 MEQ: 1500 TABLET, EXTENDED RELEASE ORAL at 07:56

## 2017-01-01 RX ADMIN — LORAZEPAM 1 MG: 2 INJECTION INTRAMUSCULAR; INTRAVENOUS at 04:02

## 2017-01-01 RX ADMIN — MORPHINE SULFATE 3 MG: 4 INJECTION INTRAVENOUS at 09:08

## 2017-01-01 RX ADMIN — MAGNESIUM SULFATE IN WATER 2 G: 40 INJECTION, SOLUTION INTRAVENOUS at 10:29

## 2017-01-01 RX ADMIN — CEFTRIAXONE 1 G: 1 INJECTION, POWDER, FOR SOLUTION INTRAMUSCULAR; INTRAVENOUS at 12:12

## 2017-01-01 RX ADMIN — OMEPRAZOLE 20 MG: 20 CAPSULE, DELAYED RELEASE ORAL at 21:24

## 2017-01-01 RX ADMIN — HYDROMORPHONE HYDROCHLORIDE 4 MG: 2 INJECTION INTRAMUSCULAR; INTRAVENOUS; SUBCUTANEOUS at 11:12

## 2017-01-01 RX ADMIN — MEDROXYPROGESTERONE ACETATE 10 MG: 10 TABLET ORAL at 21:22

## 2017-01-01 RX ADMIN — POTASSIUM CHLORIDE 20 MEQ: 1500 TABLET, EXTENDED RELEASE ORAL at 08:16

## 2017-01-01 RX ADMIN — TRAMADOL HYDROCHLORIDE 50 MG: 50 TABLET, FILM COATED ORAL at 21:36

## 2017-01-01 RX ADMIN — SODIUM CHLORIDE: 9 INJECTION, SOLUTION INTRAVENOUS at 11:07

## 2017-01-01 RX ADMIN — METRONIDAZOLE 500 MG: 500 INJECTION, SOLUTION INTRAVENOUS at 05:31

## 2017-01-01 RX ADMIN — MORPHINE SULFATE 4 MG: 4 INJECTION INTRAVENOUS at 21:57

## 2017-01-01 RX ADMIN — STANDARDIZED SENNA CONCENTRATE AND DOCUSATE SODIUM 2 TABLET: 8.6; 5 TABLET, FILM COATED ORAL at 09:05

## 2017-01-01 RX ADMIN — SODIUM CHLORIDE 500 ML: 9 INJECTION, SOLUTION INTRAVENOUS at 12:31

## 2017-01-01 RX ADMIN — METRONIDAZOLE 500 MG: 500 INJECTION, SOLUTION INTRAVENOUS at 20:23

## 2017-01-01 RX ADMIN — MORPHINE SULFATE 4 MG: 4 INJECTION INTRAVENOUS at 10:23

## 2017-01-01 RX ADMIN — METRONIDAZOLE 500 MG: 500 INJECTION, SOLUTION INTRAVENOUS at 21:06

## 2017-01-01 RX ADMIN — CHLORDIAZEPOXIDE HYDROCHLORIDE 25 MG: 25 CAPSULE ORAL at 04:14

## 2017-01-01 RX ADMIN — HYDROMORPHONE HYDROCHLORIDE 4 MG: 2 INJECTION INTRAMUSCULAR; INTRAVENOUS; SUBCUTANEOUS at 14:23

## 2017-01-01 RX ADMIN — RIFAXIMIN 550 MG: 550 TABLET ORAL at 10:18

## 2017-01-01 RX ADMIN — POTASSIUM CHLORIDE 40 MEQ: 1.5 POWDER, FOR SOLUTION ORAL at 01:48

## 2017-01-01 RX ADMIN — THERA TABS 1 TABLET: TAB at 10:18

## 2017-01-01 RX ADMIN — MAGNESIUM SULFATE HEPTAHYDRATE 2 G: 40 INJECTION, SOLUTION INTRAVENOUS at 00:01

## 2017-01-01 RX ADMIN — POTASSIUM CHLORIDE 40 MEQ: 1500 TABLET, EXTENDED RELEASE ORAL at 05:24

## 2017-01-01 RX ADMIN — OXYCODONE HYDROCHLORIDE 5 MG: 5 TABLET ORAL at 13:25

## 2017-01-01 RX ADMIN — OXYCODONE HYDROCHLORIDE 5 MG: 5 TABLET ORAL at 11:41

## 2017-01-01 RX ADMIN — SODIUM CHLORIDE: 9 INJECTION, SOLUTION INTRAVENOUS at 05:32

## 2017-01-01 RX ADMIN — PENTOXIFYLLINE 400 MG: 400 TABLET, FILM COATED, EXTENDED RELEASE ORAL at 08:16

## 2017-01-01 RX ADMIN — MIDODRINE HYDROCHLORIDE 10 MG: 5 TABLET ORAL at 19:24

## 2017-01-01 RX ADMIN — OXYCODONE HYDROCHLORIDE 5 MG: 5 TABLET ORAL at 14:30

## 2017-01-01 RX ADMIN — POTASSIUM CHLORIDE 60 MEQ: 1500 TABLET, EXTENDED RELEASE ORAL at 11:30

## 2017-01-01 RX ADMIN — METRONIDAZOLE 500 MG: 500 INJECTION, SOLUTION INTRAVENOUS at 13:53

## 2017-01-01 RX ADMIN — MORPHINE SULFATE 4 MG: 4 INJECTION INTRAVENOUS at 23:28

## 2017-01-01 RX ADMIN — MAGNESIUM GLUCONATE 500 MG ORAL TABLET 400 MG: 500 TABLET ORAL at 14:50

## 2017-01-01 RX ADMIN — CEFTRIAXONE 2 G: 2 INJECTION, POWDER, FOR SOLUTION INTRAMUSCULAR; INTRAVENOUS at 08:00

## 2017-01-01 RX ADMIN — KETOROLAC TROMETHAMINE 30 MG: 30 INJECTION, SOLUTION INTRAMUSCULAR at 05:49

## 2017-01-01 RX ADMIN — ERGOCALCIFEROL 50000 UNITS: 1.25 CAPSULE ORAL at 09:38

## 2017-01-01 RX ADMIN — MAGNESIUM SULFATE IN WATER 4 G: 40 INJECTION, SOLUTION INTRAVENOUS at 13:29

## 2017-01-01 RX ADMIN — PENTOXIFYLLINE 400 MG: 400 TABLET, FILM COATED, EXTENDED RELEASE ORAL at 21:12

## 2017-01-01 RX ADMIN — PENTOXIFYLLINE 400 MG: 400 TABLET, FILM COATED, EXTENDED RELEASE ORAL at 16:52

## 2017-01-01 RX ADMIN — MIDODRINE HYDROCHLORIDE 10 MG: 5 TABLET ORAL at 11:33

## 2017-01-01 RX ADMIN — STANDARDIZED SENNA CONCENTRATE AND DOCUSATE SODIUM 2 TABLET: 8.6; 5 TABLET, FILM COATED ORAL at 20:58

## 2017-01-01 RX ADMIN — MIDODRINE HYDROCHLORIDE 10 MG: 5 TABLET ORAL at 12:43

## 2017-01-01 RX ADMIN — MORPHINE SULFATE 4 MG: 4 INJECTION INTRAVENOUS at 04:12

## 2017-01-01 RX ADMIN — MEDROXYPROGESTERONE ACETATE 10 MG: 10 TABLET ORAL at 20:48

## 2017-01-01 RX ADMIN — METRONIDAZOLE 500 MG: 500 TABLET ORAL at 21:25

## 2017-01-01 RX ADMIN — PENTOXIFYLLINE 400 MG: 400 TABLET, FILM COATED, EXTENDED RELEASE ORAL at 20:32

## 2017-01-01 RX ADMIN — PANTOPRAZOLE SODIUM 40 MG: 40 INJECTION, POWDER, FOR SOLUTION INTRAVENOUS at 01:18

## 2017-01-01 RX ADMIN — PENTOXIFYLLINE 400 MG: 400 TABLET, FILM COATED, EXTENDED RELEASE ORAL at 14:53

## 2017-01-01 RX ADMIN — LORAZEPAM 1 MG: 2 INJECTION INTRAMUSCULAR; INTRAVENOUS at 21:09

## 2017-01-01 RX ADMIN — STANDARDIZED SENNA CONCENTRATE AND DOCUSATE SODIUM 2 TABLET: 8.6; 5 TABLET, FILM COATED ORAL at 07:56

## 2017-01-01 RX ADMIN — PENTOXIFYLLINE 400 MG: 400 TABLET, FILM COATED, EXTENDED RELEASE ORAL at 10:18

## 2017-01-01 RX ADMIN — MORPHINE SULFATE 3 MG: 4 INJECTION INTRAVENOUS at 22:56

## 2017-01-01 RX ADMIN — HYDROMORPHONE HYDROCHLORIDE 4 MG: 2 INJECTION INTRAMUSCULAR; INTRAVENOUS; SUBCUTANEOUS at 11:08

## 2017-01-01 RX ADMIN — SODIUM CHLORIDE 1000 ML: 9 INJECTION, SOLUTION INTRAVENOUS at 19:00

## 2017-01-01 RX ADMIN — LORAZEPAM 1 MG: 2 INJECTION INTRAMUSCULAR; INTRAVENOUS at 04:24

## 2017-01-01 RX ADMIN — CIPROFLOXACIN 400 MG: 2 INJECTION, SOLUTION INTRAVENOUS at 15:47

## 2017-01-01 RX ADMIN — PENTOXIFYLLINE 400 MG: 400 TABLET, FILM COATED, EXTENDED RELEASE ORAL at 07:56

## 2017-01-01 RX ADMIN — KETOROLAC TROMETHAMINE 30 MG: 30 INJECTION, SOLUTION INTRAMUSCULAR at 06:28

## 2017-01-01 RX ADMIN — MORPHINE SULFATE 4 MG: 4 INJECTION INTRAVENOUS at 07:56

## 2017-01-01 RX ADMIN — OXYCODONE HYDROCHLORIDE 10 MG: 10 TABLET ORAL at 13:40

## 2017-01-01 RX ADMIN — FOLIC ACID 1 MG: 1 TABLET ORAL at 09:06

## 2017-01-01 RX ADMIN — ACETAMINOPHEN 650 MG: 325 TABLET, FILM COATED ORAL at 16:13

## 2017-01-01 RX ADMIN — STANDARDIZED SENNA CONCENTRATE AND DOCUSATE SODIUM 2 TABLET: 8.6; 5 TABLET, FILM COATED ORAL at 08:17

## 2017-01-01 RX ADMIN — CEFTRIAXONE 2 G: 2 INJECTION, POWDER, FOR SOLUTION INTRAMUSCULAR; INTRAVENOUS at 09:39

## 2017-01-01 RX ADMIN — RIFAXIMIN 550 MG: 550 TABLET ORAL at 21:12

## 2017-01-01 RX ADMIN — SODIUM CHLORIDE 1000 ML: 9 INJECTION, SOLUTION INTRAVENOUS at 10:28

## 2017-01-01 RX ADMIN — POTASSIUM CHLORIDE 40 MEQ: 1500 TABLET, EXTENDED RELEASE ORAL at 07:29

## 2017-01-01 RX ADMIN — OXYCODONE HYDROCHLORIDE 10 MG: 10 TABLET ORAL at 21:22

## 2017-01-01 RX ADMIN — METRONIDAZOLE 500 MG: 500 TABLET ORAL at 21:36

## 2017-01-01 RX ADMIN — SODIUM CHLORIDE: 9 INJECTION, SOLUTION INTRAVENOUS at 17:37

## 2017-01-01 RX ADMIN — OXYCODONE HYDROCHLORIDE 10 MG: 10 TABLET ORAL at 16:41

## 2017-01-01 RX ADMIN — POTASSIUM CHLORIDE 20 MEQ: 1500 TABLET, EXTENDED RELEASE ORAL at 10:31

## 2017-01-01 RX ADMIN — MORPHINE SULFATE 4 MG: 4 INJECTION INTRAVENOUS at 17:36

## 2017-01-01 RX ADMIN — METRONIDAZOLE 500 MG: 500 INJECTION, SOLUTION INTRAVENOUS at 08:17

## 2017-01-01 RX ADMIN — STANDARDIZED SENNA CONCENTRATE AND DOCUSATE SODIUM 2 TABLET: 8.6; 5 TABLET, FILM COATED ORAL at 21:22

## 2017-01-01 RX ADMIN — OCTREOTIDE ACETATE 100 MCG: 100 INJECTION, SOLUTION INTRAVENOUS; SUBCUTANEOUS at 21:13

## 2017-01-01 RX ADMIN — POTASSIUM CHLORIDE 40 MEQ: 1500 TABLET, EXTENDED RELEASE ORAL at 09:38

## 2017-01-01 RX ADMIN — LORAZEPAM 4 MG: 1 TABLET ORAL at 20:35

## 2017-01-01 RX ADMIN — MEDROXYPROGESTERONE ACETATE 10 MG: 10 TABLET ORAL at 21:27

## 2017-01-01 RX ADMIN — RIFAXIMIN 550 MG: 550 TABLET ORAL at 10:27

## 2017-01-01 RX ADMIN — MORPHINE SULFATE 3 MG: 4 INJECTION INTRAVENOUS at 17:58

## 2017-01-01 RX ADMIN — OMEPRAZOLE 20 MG: 20 CAPSULE, DELAYED RELEASE ORAL at 09:38

## 2017-01-01 RX ADMIN — METRONIDAZOLE 500 MG: 500 INJECTION, SOLUTION INTRAVENOUS at 05:14

## 2017-01-01 RX ADMIN — SODIUM CHLORIDE: 9 INJECTION, SOLUTION INTRAVENOUS at 15:46

## 2017-01-01 RX ADMIN — TRAMADOL HYDROCHLORIDE 50 MG: 50 TABLET, FILM COATED ORAL at 20:06

## 2017-01-01 RX ADMIN — HYDROMORPHONE HYDROCHLORIDE 4 MG: 2 INJECTION INTRAMUSCULAR; INTRAVENOUS; SUBCUTANEOUS at 05:27

## 2017-01-01 RX ADMIN — OXYCODONE HYDROCHLORIDE 5 MG: 5 TABLET ORAL at 17:35

## 2017-01-01 RX ADMIN — THIAMINE HYDROCHLORIDE 1000 ML: 100 INJECTION, SOLUTION INTRAMUSCULAR; INTRAVENOUS at 08:09

## 2017-01-01 RX ADMIN — PHYTONADIONE 5 MG: 5 TABLET ORAL at 14:45

## 2017-01-01 RX ADMIN — CEFTRIAXONE 2 G: 2 INJECTION, POWDER, FOR SOLUTION INTRAMUSCULAR; INTRAVENOUS at 08:50

## 2017-01-01 RX ADMIN — PANTOPRAZOLE SODIUM 40 MG: 40 INJECTION, POWDER, FOR SOLUTION INTRAVENOUS at 10:18

## 2017-01-01 RX ADMIN — PHYTONADIONE 5 MG: 5 TABLET ORAL at 08:31

## 2017-01-01 RX ADMIN — MAGNESIUM GLUCONATE 500 MG ORAL TABLET 400 MG: 500 TABLET ORAL at 08:16

## 2017-01-01 RX ADMIN — MORPHINE SULFATE 3 MG: 4 INJECTION INTRAVENOUS at 15:09

## 2017-01-01 RX ADMIN — HYDROMORPHONE HYDROCHLORIDE 4 MG: 2 INJECTION INTRAMUSCULAR; INTRAVENOUS; SUBCUTANEOUS at 02:10

## 2017-01-01 RX ADMIN — TRAMADOL HYDROCHLORIDE 50 MG: 50 TABLET, FILM COATED ORAL at 18:44

## 2017-01-01 RX ADMIN — OCTREOTIDE ACETATE 100 MCG: 100 INJECTION, SOLUTION INTRAVENOUS; SUBCUTANEOUS at 16:11

## 2017-01-01 RX ADMIN — POTASSIUM CHLORIDE 60 MEQ: 1500 TABLET, EXTENDED RELEASE ORAL at 09:22

## 2017-01-01 RX ADMIN — ONDANSETRON 4 MG: 2 INJECTION INTRAMUSCULAR; INTRAVENOUS at 00:17

## 2017-01-01 RX ADMIN — METRONIDAZOLE 500 MG: 500 TABLET ORAL at 22:32

## 2017-01-01 RX ADMIN — HYDROMORPHONE HYDROCHLORIDE 1 MG: 1 INJECTION, SOLUTION INTRAMUSCULAR; INTRAVENOUS; SUBCUTANEOUS at 00:17

## 2017-01-01 RX ADMIN — MORPHINE SULFATE 3 MG: 4 INJECTION INTRAVENOUS at 01:23

## 2017-01-01 RX ADMIN — PENTOXIFYLLINE 400 MG: 400 TABLET, FILM COATED, EXTENDED RELEASE ORAL at 21:10

## 2017-01-01 RX ADMIN — Medication 25 G: at 01:57

## 2017-01-01 RX ADMIN — CEFTRIAXONE 2 G: 2 INJECTION, POWDER, FOR SOLUTION INTRAMUSCULAR; INTRAVENOUS at 20:13

## 2017-01-01 RX ADMIN — PENTOXIFYLLINE 400 MG: 400 TABLET, FILM COATED, EXTENDED RELEASE ORAL at 18:32

## 2017-01-01 RX ADMIN — ONDANSETRON 4 MG: 2 INJECTION, SOLUTION INTRAMUSCULAR; INTRAVENOUS at 07:37

## 2017-01-01 RX ADMIN — OXYCODONE HYDROCHLORIDE 10 MG: 10 TABLET ORAL at 05:28

## 2017-01-01 RX ADMIN — PANTOPRAZOLE SODIUM 40 MG: 40 INJECTION, POWDER, FOR SOLUTION INTRAVENOUS at 23:46

## 2017-01-01 RX ADMIN — PENTOXIFYLLINE 400 MG: 400 TABLET, FILM COATED, EXTENDED RELEASE ORAL at 12:38

## 2017-01-01 RX ADMIN — STANDARDIZED SENNA CONCENTRATE AND DOCUSATE SODIUM 2 TABLET: 8.6; 5 TABLET, FILM COATED ORAL at 10:27

## 2017-01-01 RX ADMIN — OXYCODONE HYDROCHLORIDE 5 MG: 5 TABLET ORAL at 10:21

## 2017-01-01 RX ADMIN — TRAMADOL HYDROCHLORIDE 50 MG: 50 TABLET, FILM COATED ORAL at 00:49

## 2017-01-01 RX ADMIN — IBUPROFEN 600 MG: 600 TABLET, FILM COATED ORAL at 23:15

## 2017-01-01 RX ADMIN — OCTREOTIDE ACETATE 50 MCG/HR: 200 INJECTION, SOLUTION INTRAVENOUS; SUBCUTANEOUS at 19:37

## 2017-01-01 RX ADMIN — MORPHINE SULFATE 4 MG: 4 INJECTION INTRAVENOUS at 21:23

## 2017-01-01 RX ADMIN — PROCHLORPERAZINE EDISYLATE 10 MG: 5 INJECTION INTRAMUSCULAR; INTRAVENOUS at 15:22

## 2017-01-01 RX ADMIN — POTASSIUM CHLORIDE 40 MEQ: 1500 TABLET, EXTENDED RELEASE ORAL at 07:54

## 2017-01-01 RX ADMIN — LORAZEPAM 1 MG: 2 INJECTION INTRAMUSCULAR; INTRAVENOUS at 19:22

## 2017-01-01 RX ADMIN — MIDODRINE HYDROCHLORIDE 10 MG: 5 TABLET ORAL at 08:30

## 2017-01-01 RX ADMIN — POTASSIUM CHLORIDE 20 MEQ: 1500 TABLET, EXTENDED RELEASE ORAL at 21:27

## 2017-01-01 RX ADMIN — OXYCODONE HYDROCHLORIDE 5 MG: 5 TABLET ORAL at 00:15

## 2017-01-01 RX ADMIN — METRONIDAZOLE 500 MG: 500 TABLET ORAL at 07:33

## 2017-01-01 RX ADMIN — ALBUMIN (HUMAN) 25 G: 12.5 SOLUTION INTRAVENOUS at 13:00

## 2017-01-01 RX ADMIN — MORPHINE SULFATE 3 MG: 4 INJECTION INTRAVENOUS at 05:36

## 2017-01-01 RX ADMIN — MORPHINE SULFATE 2 MG: 4 INJECTION INTRAVENOUS at 02:17

## 2017-01-01 RX ADMIN — SODIUM CHLORIDE: 9 INJECTION, SOLUTION INTRAVENOUS at 02:17

## 2017-01-01 RX ADMIN — PENTOXIFYLLINE 400 MG: 400 TABLET, FILM COATED, EXTENDED RELEASE ORAL at 16:47

## 2017-01-01 RX ADMIN — ATROPINE SULFATE 2 DROP: 10 SOLUTION/ DROPS OPHTHALMIC at 11:19

## 2017-01-01 RX ADMIN — OXYCODONE HYDROCHLORIDE 5 MG: 5 TABLET ORAL at 09:48

## 2017-01-01 RX ADMIN — MORPHINE SULFATE 4 MG: 4 INJECTION INTRAVENOUS at 01:12

## 2017-01-01 RX ADMIN — CEFTRIAXONE 2 G: 2 INJECTION, POWDER, FOR SOLUTION INTRAMUSCULAR; INTRAVENOUS at 09:20

## 2017-01-01 RX ADMIN — MORPHINE SULFATE 4 MG: 4 INJECTION INTRAVENOUS at 10:31

## 2017-01-01 RX ADMIN — MORPHINE SULFATE 4 MG: 4 INJECTION INTRAVENOUS at 01:43

## 2017-01-01 RX ADMIN — MORPHINE SULFATE 3 MG: 4 INJECTION INTRAVENOUS at 23:11

## 2017-01-01 RX ADMIN — SODIUM CHLORIDE: 9 INJECTION, SOLUTION INTRAVENOUS at 00:02

## 2017-01-01 RX ADMIN — POTASSIUM CHLORIDE 40 MEQ: 1500 TABLET, EXTENDED RELEASE ORAL at 04:39

## 2017-01-01 RX ADMIN — HALOPERIDOL LACTATE 1 MG: 5 INJECTION, SOLUTION INTRAMUSCULAR at 15:45

## 2017-01-01 RX ADMIN — CEFTRIAXONE 2 G: 2 INJECTION, POWDER, FOR SOLUTION INTRAMUSCULAR; INTRAVENOUS at 09:06

## 2017-01-01 RX ADMIN — HEPARIN SODIUM 5000 UNITS: 5000 INJECTION, SOLUTION INTRAVENOUS; SUBCUTANEOUS at 09:14

## 2017-01-01 RX ADMIN — SODIUM CHLORIDE 1000 ML: 9 INJECTION, SOLUTION INTRAVENOUS at 01:13

## 2017-01-01 RX ADMIN — LACTULOSE 30 ML: 20 SOLUTION ORAL at 09:06

## 2017-01-01 RX ADMIN — OXYCODONE HYDROCHLORIDE 10 MG: 10 TABLET ORAL at 17:57

## 2017-01-01 RX ADMIN — SODIUM CHLORIDE 1000 ML: 9 INJECTION, SOLUTION INTRAVENOUS at 10:25

## 2017-01-01 RX ADMIN — OXYCODONE HYDROCHLORIDE 5 MG: 5 TABLET ORAL at 09:37

## 2017-01-01 RX ADMIN — POTASSIUM CHLORIDE 20 MEQ: 1500 TABLET, EXTENDED RELEASE ORAL at 17:26

## 2017-01-01 RX ADMIN — METRONIDAZOLE 500 MG: 500 INJECTION, SOLUTION INTRAVENOUS at 05:20

## 2017-01-01 RX ADMIN — METRONIDAZOLE 500 MG: 500 INJECTION, SOLUTION INTRAVENOUS at 20:48

## 2017-01-01 RX ADMIN — METRONIDAZOLE 500 MG: 500 TABLET ORAL at 13:01

## 2017-01-01 RX ADMIN — PENTOXIFYLLINE 400 MG: 400 TABLET, FILM COATED, EXTENDED RELEASE ORAL at 12:36

## 2017-01-01 RX ADMIN — SODIUM CHLORIDE: 9 INJECTION, SOLUTION INTRAVENOUS at 19:15

## 2017-01-01 RX ADMIN — HYDROMORPHONE HYDROCHLORIDE 4 MG: 2 INJECTION INTRAMUSCULAR; INTRAVENOUS; SUBCUTANEOUS at 03:39

## 2017-01-01 RX ADMIN — OMEPRAZOLE 20 MG: 20 CAPSULE, DELAYED RELEASE ORAL at 21:36

## 2017-01-01 RX ADMIN — MORPHINE SULFATE 2 MG: 4 INJECTION INTRAVENOUS at 13:55

## 2017-01-01 RX ADMIN — MORPHINE SULFATE 3 MG: 4 INJECTION INTRAVENOUS at 16:12

## 2017-01-01 RX ADMIN — HYDROMORPHONE HYDROCHLORIDE 4 MG: 2 INJECTION INTRAMUSCULAR; INTRAVENOUS; SUBCUTANEOUS at 22:59

## 2017-01-01 RX ADMIN — SODIUM CHLORIDE: 9 INJECTION, SOLUTION INTRAVENOUS at 21:36

## 2017-01-01 RX ADMIN — HYDROMORPHONE HYDROCHLORIDE 4 MG: 2 INJECTION INTRAMUSCULAR; INTRAVENOUS; SUBCUTANEOUS at 17:27

## 2017-01-01 RX ADMIN — Medication 100 MG: at 19:22

## 2017-01-01 RX ADMIN — OXYCODONE HYDROCHLORIDE 5 MG: 5 TABLET ORAL at 04:50

## 2017-01-01 RX ADMIN — OXYCODONE HYDROCHLORIDE 5 MG: 5 TABLET ORAL at 00:39

## 2017-01-01 RX ADMIN — SODIUM CHLORIDE: 9 INJECTION, SOLUTION INTRAVENOUS at 15:04

## 2017-01-01 RX ADMIN — THERA TABS 1 TABLET: TAB at 09:06

## 2017-01-01 RX ADMIN — POTASSIUM CHLORIDE 20 MEQ: 1500 TABLET, EXTENDED RELEASE ORAL at 22:32

## 2017-01-01 RX ADMIN — THERA TABS 1 TABLET: TAB at 19:23

## 2017-01-01 RX ADMIN — METRONIDAZOLE 500 MG: 500 INJECTION, SOLUTION INTRAVENOUS at 18:24

## 2017-01-01 RX ADMIN — FENTANYL CITRATE 50 MCG: 50 INJECTION, SOLUTION INTRAMUSCULAR; INTRAVENOUS at 15:40

## 2017-01-01 RX ADMIN — LORAZEPAM 1 MG: 2 INJECTION INTRAMUSCULAR; INTRAVENOUS at 19:19

## 2017-01-01 RX ADMIN — ONDANSETRON 4 MG: 2 INJECTION INTRAMUSCULAR; INTRAVENOUS at 06:20

## 2017-01-01 RX ADMIN — IOHEXOL 80 ML: 350 INJECTION, SOLUTION INTRAVENOUS at 00:06

## 2017-01-01 RX ADMIN — MORPHINE SULFATE 3 MG: 4 INJECTION INTRAVENOUS at 04:44

## 2017-01-01 RX ADMIN — MORPHINE SULFATE 3 MG: 4 INJECTION INTRAVENOUS at 15:00

## 2017-01-01 RX ADMIN — OMEPRAZOLE 20 MG: 20 CAPSULE, DELAYED RELEASE ORAL at 21:05

## 2017-01-01 RX ADMIN — PENTOXIFYLLINE 400 MG: 400 TABLET, FILM COATED, EXTENDED RELEASE ORAL at 10:29

## 2017-01-01 RX ADMIN — HYDROMORPHONE HYDROCHLORIDE 4 MG: 2 INJECTION INTRAMUSCULAR; INTRAVENOUS; SUBCUTANEOUS at 18:33

## 2017-01-01 RX ADMIN — PENTOXIFYLLINE 400 MG: 400 TABLET, FILM COATED, EXTENDED RELEASE ORAL at 10:47

## 2017-01-01 RX ADMIN — ALBUMIN (HUMAN) 75 G: 12.5 SOLUTION INTRAVENOUS at 09:05

## 2017-01-01 RX ADMIN — METRONIDAZOLE 500 MG: 500 TABLET ORAL at 14:21

## 2017-01-01 RX ADMIN — MORPHINE SULFATE 4 MG: 4 INJECTION INTRAVENOUS at 13:36

## 2017-01-01 RX ADMIN — ONDANSETRON 4 MG: 2 INJECTION, SOLUTION INTRAMUSCULAR; INTRAVENOUS at 08:56

## 2017-01-01 RX ADMIN — MORPHINE SULFATE 2 MG: 4 INJECTION INTRAVENOUS at 04:39

## 2017-01-01 RX ADMIN — RIFAXIMIN 550 MG: 550 TABLET ORAL at 21:10

## 2017-01-01 RX ADMIN — PHYTONADIONE 10 MG: 10 INJECTION, EMULSION INTRAMUSCULAR; INTRAVENOUS; SUBCUTANEOUS at 21:22

## 2017-01-01 RX ADMIN — SODIUM CHLORIDE 1000 ML: 9 INJECTION, SOLUTION INTRAVENOUS at 08:55

## 2017-01-01 RX ADMIN — ONDANSETRON 4 MG: 2 INJECTION, SOLUTION INTRAMUSCULAR; INTRAVENOUS at 15:22

## 2017-01-01 RX ADMIN — OMEPRAZOLE 20 MG: 20 CAPSULE, DELAYED RELEASE ORAL at 07:33

## 2017-01-01 RX ADMIN — STANDARDIZED SENNA CONCENTRATE AND DOCUSATE SODIUM 2 TABLET: 8.6; 5 TABLET, FILM COATED ORAL at 21:53

## 2017-01-01 RX ADMIN — FENTANYL CITRATE 50 MCG: 50 INJECTION, SOLUTION INTRAMUSCULAR; INTRAVENOUS at 16:00

## 2017-01-01 RX ADMIN — Medication 100 MG: at 09:06

## 2017-01-01 RX ADMIN — POTASSIUM CHLORIDE 40 MEQ: 1500 TABLET, EXTENDED RELEASE ORAL at 19:24

## 2017-01-01 RX ADMIN — METRONIDAZOLE 500 MG: 500 INJECTION, SOLUTION INTRAVENOUS at 23:06

## 2017-01-01 RX ADMIN — OMEPRAZOLE 20 MG: 20 CAPSULE, DELAYED RELEASE ORAL at 05:30

## 2017-01-01 RX ADMIN — FOLIC ACID: 5 INJECTION, SOLUTION INTRAMUSCULAR; INTRAVENOUS; SUBCUTANEOUS at 00:15

## 2017-01-01 RX ADMIN — OXYCODONE HYDROCHLORIDE 5 MG: 5 TABLET ORAL at 18:51

## 2017-01-01 RX ADMIN — FUROSEMIDE 20 MG: 20 TABLET ORAL at 08:17

## 2017-01-01 RX ADMIN — STANDARDIZED SENNA CONCENTRATE AND DOCUSATE SODIUM 2 TABLET: 8.6; 5 TABLET, FILM COATED ORAL at 10:18

## 2017-01-01 RX ADMIN — OMEPRAZOLE 20 MG: 20 CAPSULE, DELAYED RELEASE ORAL at 05:14

## 2017-01-01 RX ADMIN — PANTOPRAZOLE SODIUM 40 MG: 40 INJECTION, POWDER, FOR SOLUTION INTRAVENOUS at 16:11

## 2017-01-01 RX ADMIN — OXYCODONE HYDROCHLORIDE 10 MG: 10 TABLET ORAL at 16:52

## 2017-01-01 RX ADMIN — ONDANSETRON 4 MG: 2 INJECTION INTRAMUSCULAR; INTRAVENOUS at 16:11

## 2017-01-01 RX ADMIN — OXYCODONE HYDROCHLORIDE 10 MG: 10 TABLET ORAL at 11:30

## 2017-01-01 RX ADMIN — OXYCODONE HYDROCHLORIDE 10 MG: 10 TABLET ORAL at 07:33

## 2017-01-01 RX ADMIN — OMEPRAZOLE 20 MG: 20 CAPSULE, DELAYED RELEASE ORAL at 05:28

## 2017-01-01 RX ADMIN — MORPHINE SULFATE 2 MG: 4 INJECTION INTRAVENOUS at 23:10

## 2017-01-01 RX ADMIN — MEDROXYPROGESTERONE ACETATE 10 MG: 10 TABLET ORAL at 22:35

## 2017-01-01 RX ADMIN — SCOLOPAMINE TRANSDERMAL SYSTEM 1 PATCH: 1 PATCH, EXTENDED RELEASE TRANSDERMAL at 12:23

## 2017-01-01 RX ADMIN — STANDARDIZED SENNA CONCENTRATE AND DOCUSATE SODIUM 2 TABLET: 8.6; 5 TABLET, FILM COATED ORAL at 19:23

## 2017-01-01 RX ADMIN — MORPHINE SULFATE 3 MG: 4 INJECTION INTRAVENOUS at 20:24

## 2017-01-01 RX ADMIN — TRAMADOL HYDROCHLORIDE 50 MG: 50 TABLET, FILM COATED ORAL at 11:53

## 2017-01-01 RX ADMIN — MORPHINE SULFATE 4 MG: 4 INJECTION INTRAVENOUS at 14:02

## 2017-01-01 RX ADMIN — IOHEXOL 100 ML: 350 INJECTION, SOLUTION INTRAVENOUS at 17:08

## 2017-01-01 RX ADMIN — HYDROMORPHONE HYDROCHLORIDE 0.5 MG: 1 INJECTION, SOLUTION INTRAMUSCULAR; INTRAVENOUS; SUBCUTANEOUS at 16:45

## 2017-01-01 RX ADMIN — SPIRONOLACTONE 50 MG: 25 TABLET, FILM COATED ORAL at 08:26

## 2017-01-01 RX ADMIN — LACTULOSE 30 ML: 20 SOLUTION ORAL at 08:43

## 2017-01-01 RX ADMIN — METRONIDAZOLE 500 MG: 500 INJECTION, SOLUTION INTRAVENOUS at 15:45

## 2017-01-01 RX ADMIN — ALBUMIN (HUMAN) 75 G: 12.5 SOLUTION INTRAVENOUS at 14:22

## 2017-01-01 RX ADMIN — KETOROLAC TROMETHAMINE 30 MG: 30 INJECTION, SOLUTION INTRAMUSCULAR at 18:32

## 2017-01-01 RX ADMIN — OMEPRAZOLE 20 MG: 20 CAPSULE, DELAYED RELEASE ORAL at 17:57

## 2017-01-01 RX ADMIN — ONDANSETRON 4 MG: 2 INJECTION, SOLUTION INTRAMUSCULAR; INTRAVENOUS at 14:42

## 2017-01-01 RX ADMIN — SODIUM CHLORIDE 1000 ML: 9 INJECTION, SOLUTION INTRAVENOUS at 12:00

## 2017-01-01 RX ADMIN — MORPHINE SULFATE 3 MG: 4 INJECTION INTRAVENOUS at 04:02

## 2017-01-01 RX ADMIN — CEFTRIAXONE 2 G: 2 INJECTION, POWDER, FOR SOLUTION INTRAMUSCULAR; INTRAVENOUS at 07:57

## 2017-01-01 RX ADMIN — SODIUM CHLORIDE: 9 INJECTION, SOLUTION INTRAVENOUS at 13:10

## 2017-01-01 RX ADMIN — POTASSIUM CHLORIDE 40 MEQ: 1500 TABLET, EXTENDED RELEASE ORAL at 20:32

## 2017-01-01 RX ADMIN — MORPHINE SULFATE 2 MG: 4 INJECTION INTRAVENOUS at 03:32

## 2017-01-01 RX ADMIN — CEFTRIAXONE 2 G: 2 INJECTION, POWDER, FOR SOLUTION INTRAMUSCULAR; INTRAVENOUS at 07:47

## 2017-01-01 RX ADMIN — MORPHINE SULFATE 2 MG: 4 INJECTION INTRAVENOUS at 08:00

## 2017-01-01 RX ADMIN — POTASSIUM CHLORIDE 40 MEQ: 1500 TABLET, EXTENDED RELEASE ORAL at 00:29

## 2017-01-01 RX ADMIN — Medication 25 G: at 10:18

## 2017-01-01 RX ADMIN — METRONIDAZOLE 500 MG: 500 INJECTION, SOLUTION INTRAVENOUS at 05:26

## 2017-01-01 RX ADMIN — STANDARDIZED SENNA CONCENTRATE AND DOCUSATE SODIUM 2 TABLET: 8.6; 5 TABLET, FILM COATED ORAL at 09:07

## 2017-01-01 RX ADMIN — POTASSIUM CHLORIDE 20 MEQ: 1500 TABLET, EXTENDED RELEASE ORAL at 08:00

## 2017-01-01 RX ADMIN — HEPARIN SODIUM 5000 UNITS: 5000 INJECTION, SOLUTION INTRAVENOUS; SUBCUTANEOUS at 14:22

## 2017-01-01 RX ADMIN — ONDANSETRON 4 MG: 2 INJECTION INTRAMUSCULAR; INTRAVENOUS at 07:58

## 2017-01-01 RX ADMIN — MORPHINE SULFATE 3 MG: 4 INJECTION INTRAVENOUS at 14:56

## 2017-01-01 RX ADMIN — POTASSIUM CHLORIDE 20 MEQ: 1500 TABLET, EXTENDED RELEASE ORAL at 07:35

## 2017-01-01 RX ADMIN — LORAZEPAM 1 MG: 2 INJECTION INTRAMUSCULAR; INTRAVENOUS at 12:24

## 2017-01-01 RX ADMIN — MORPHINE SULFATE 3 MG: 4 INJECTION INTRAVENOUS at 12:24

## 2017-01-01 RX ADMIN — ONDANSETRON 4 MG: 2 INJECTION INTRAMUSCULAR; INTRAVENOUS at 10:47

## 2017-01-01 RX ADMIN — POTASSIUM CHLORIDE 40 MEQ: 1.5 POWDER, FOR SOLUTION ORAL at 03:09

## 2017-01-01 RX ADMIN — MORPHINE SULFATE 3 MG: 4 INJECTION INTRAVENOUS at 04:24

## 2017-01-01 RX ADMIN — OCTREOTIDE ACETATE 100 MCG: 100 INJECTION, SOLUTION INTRAVENOUS; SUBCUTANEOUS at 14:54

## 2017-01-01 RX ADMIN — SODIUM CHLORIDE 1000 ML: 9 INJECTION, SOLUTION INTRAVENOUS at 12:45

## 2017-01-01 RX ADMIN — MORPHINE SULFATE 3 MG: 4 INJECTION INTRAVENOUS at 08:04

## 2017-01-01 RX ADMIN — ATROPINE SULFATE 2 DROP: 10 SOLUTION/ DROPS OPHTHALMIC at 22:59

## 2017-01-01 RX ADMIN — LORAZEPAM 1 MG: 2 INJECTION INTRAMUSCULAR; INTRAVENOUS at 11:33

## 2017-01-01 RX ADMIN — SODIUM CHLORIDE 2313 ML: 9 INJECTION, SOLUTION INTRAVENOUS at 23:45

## 2017-01-01 RX ADMIN — Medication 25 G: at 02:15

## 2017-01-01 RX ADMIN — OMEPRAZOLE 20 MG: 20 CAPSULE, DELAYED RELEASE ORAL at 19:26

## 2017-01-01 RX ADMIN — SODIUM CHLORIDE 500 ML: 9 INJECTION, SOLUTION INTRAVENOUS at 05:17

## 2017-01-01 RX ADMIN — MORPHINE SULFATE 3 MG: 4 INJECTION INTRAVENOUS at 05:42

## 2017-01-01 RX ADMIN — FAMOTIDINE 20 MG: 20 TABLET, FILM COATED ORAL at 14:02

## 2017-01-01 RX ADMIN — OMEPRAZOLE 20 MG: 20 CAPSULE, DELAYED RELEASE ORAL at 08:00

## 2017-01-01 RX ADMIN — Medication 25 G: at 06:13

## 2017-01-01 RX ADMIN — OXYCODONE HYDROCHLORIDE 10 MG: 10 TABLET ORAL at 01:26

## 2017-01-01 RX ADMIN — MORPHINE SULFATE 3 MG: 4 INJECTION INTRAVENOUS at 08:56

## 2017-01-01 RX ADMIN — SODIUM CHLORIDE: 9 INJECTION, SOLUTION INTRAVENOUS at 05:26

## 2017-01-01 RX ADMIN — CEFTRIAXONE 2 G: 2 INJECTION, POWDER, FOR SOLUTION INTRAMUSCULAR; INTRAVENOUS at 08:43

## 2017-01-01 RX ADMIN — Medication 100 MG: at 08:43

## 2017-01-01 RX ADMIN — LORAZEPAM 1 MG: 2 INJECTION INTRAMUSCULAR; INTRAVENOUS at 08:56

## 2017-01-01 RX ADMIN — PENTOXIFYLLINE 400 MG: 400 TABLET, FILM COATED, EXTENDED RELEASE ORAL at 08:25

## 2017-01-01 RX ADMIN — LACTULOSE 30 ML: 20 SOLUTION ORAL at 10:27

## 2017-01-01 RX ADMIN — CEFTRIAXONE SODIUM 2 G: 2 INJECTION, POWDER, FOR SOLUTION INTRAMUSCULAR; INTRAVENOUS at 08:28

## 2017-01-01 RX ADMIN — LORAZEPAM 1 MG: 2 INJECTION INTRAMUSCULAR; INTRAVENOUS at 05:42

## 2017-01-01 RX ADMIN — METRONIDAZOLE 500 MG: 500 TABLET ORAL at 05:36

## 2017-01-01 RX ADMIN — OXYCODONE HYDROCHLORIDE 10 MG: 10 TABLET ORAL at 12:38

## 2017-01-01 RX ADMIN — LORAZEPAM 1 MG: 2 INJECTION INTRAMUSCULAR; INTRAVENOUS at 17:58

## 2017-01-01 RX ADMIN — OMEPRAZOLE 20 MG: 20 CAPSULE, DELAYED RELEASE ORAL at 08:26

## 2017-01-01 RX ADMIN — Medication 25 G: at 05:36

## 2017-01-01 RX ADMIN — PENTOXIFYLLINE 400 MG: 400 TABLET, FILM COATED, EXTENDED RELEASE ORAL at 09:05

## 2017-01-01 RX ADMIN — OXYCODONE HYDROCHLORIDE 5 MG: 5 TABLET ORAL at 14:36

## 2017-01-01 RX ADMIN — STANDARDIZED SENNA CONCENTRATE AND DOCUSATE SODIUM 2 TABLET: 8.6; 5 TABLET, FILM COATED ORAL at 08:42

## 2017-01-01 RX ADMIN — METRONIDAZOLE 500 MG: 500 TABLET ORAL at 05:28

## 2017-01-01 RX ADMIN — HYDROMORPHONE HYDROCHLORIDE 4 MG: 2 INJECTION INTRAMUSCULAR; INTRAVENOUS; SUBCUTANEOUS at 22:55

## 2017-01-01 RX ADMIN — METRONIDAZOLE 500 MG: 500 INJECTION, SOLUTION INTRAVENOUS at 12:55

## 2017-01-01 RX ADMIN — TRAMADOL HYDROCHLORIDE 50 MG: 50 TABLET, FILM COATED ORAL at 15:26

## 2017-01-01 RX ADMIN — OCTREOTIDE ACETATE 100 MCG: 100 INJECTION, SOLUTION INTRAVENOUS; SUBCUTANEOUS at 22:29

## 2017-01-01 RX ADMIN — OXYCODONE HYDROCHLORIDE 10 MG: 10 TABLET ORAL at 22:32

## 2017-01-01 RX ADMIN — ONDANSETRON 4 MG: 2 INJECTION INTRAMUSCULAR; INTRAVENOUS at 01:40

## 2017-01-01 RX ADMIN — SODIUM CHLORIDE 80 MG: 9 INJECTION, SOLUTION INTRAVENOUS at 11:56

## 2017-01-01 RX ADMIN — SODIUM CHLORIDE: 9 INJECTION, SOLUTION INTRAVENOUS at 19:13

## 2017-01-01 RX ADMIN — CEFTRIAXONE 2 G: 2 INJECTION, POWDER, FOR SOLUTION INTRAMUSCULAR; INTRAVENOUS at 22:24

## 2017-01-01 RX ADMIN — ONDANSETRON 4 MG: 2 INJECTION, SOLUTION INTRAMUSCULAR; INTRAVENOUS at 22:35

## 2017-01-01 RX ADMIN — OXYCODONE HYDROCHLORIDE 5 MG: 5 TABLET ORAL at 05:48

## 2017-01-01 RX ADMIN — METRONIDAZOLE 500 MG: 500 INJECTION, SOLUTION INTRAVENOUS at 14:50

## 2017-01-01 RX ADMIN — PANTOPRAZOLE SODIUM 40 MG: 40 INJECTION, POWDER, FOR SOLUTION INTRAVENOUS at 12:43

## 2017-01-01 RX ADMIN — SODIUM CHLORIDE 8 MG/HR: 9 INJECTION, SOLUTION INTRAVENOUS at 16:15

## 2017-01-01 RX ADMIN — METRONIDAZOLE 500 MG: 500 TABLET ORAL at 21:27

## 2017-01-01 RX ADMIN — OXYCODONE HYDROCHLORIDE 10 MG: 10 TABLET ORAL at 05:31

## 2017-01-01 RX ADMIN — LORAZEPAM 1 MG: 2 INJECTION INTRAMUSCULAR; INTRAVENOUS at 15:09

## 2017-01-01 RX ADMIN — PENTOXIFYLLINE 400 MG: 400 TABLET, FILM COATED, EXTENDED RELEASE ORAL at 08:43

## 2017-01-01 RX ADMIN — STANDARDIZED SENNA CONCENTRATE AND DOCUSATE SODIUM 2 TABLET: 8.6; 5 TABLET, FILM COATED ORAL at 20:48

## 2017-01-01 RX ADMIN — STANDARDIZED SENNA CONCENTRATE AND DOCUSATE SODIUM 2 TABLET: 8.6; 5 TABLET, FILM COATED ORAL at 21:27

## 2017-01-01 RX ADMIN — LORAZEPAM 4 MG: 1 TABLET ORAL at 19:24

## 2017-01-01 RX ADMIN — MORPHINE SULFATE 3 MG: 4 INJECTION INTRAVENOUS at 13:00

## 2017-01-01 RX ADMIN — PHYTONADIONE 10 MG: 10 INJECTION, EMULSION INTRAMUSCULAR; INTRAVENOUS; SUBCUTANEOUS at 07:55

## 2017-01-01 RX ADMIN — OXYCODONE HYDROCHLORIDE 5 MG: 5 TABLET ORAL at 19:26

## 2017-01-01 RX ADMIN — ATROPINE SULFATE 2 DROP: 10 SOLUTION/ DROPS OPHTHALMIC at 04:02

## 2017-01-01 RX ADMIN — MORPHINE SULFATE 3 MG: 4 INJECTION INTRAVENOUS at 10:08

## 2017-01-01 RX ADMIN — HEPARIN SODIUM 5000 UNITS: 5000 INJECTION, SOLUTION INTRAVENOUS; SUBCUTANEOUS at 17:36

## 2017-01-01 RX ADMIN — MORPHINE SULFATE 2 MG: 4 INJECTION INTRAVENOUS at 21:26

## 2017-01-01 RX ADMIN — METRONIDAZOLE 500 MG: 500 INJECTION, SOLUTION INTRAVENOUS at 14:46

## 2017-01-01 RX ADMIN — OMEPRAZOLE 20 MG: 20 CAPSULE, DELAYED RELEASE ORAL at 16:52

## 2017-01-01 RX ADMIN — CEFTRIAXONE 2 G: 2 INJECTION, POWDER, FOR SOLUTION INTRAMUSCULAR; INTRAVENOUS at 10:29

## 2017-01-01 RX ADMIN — FERROUS GLUCONATE 324 MG: 324 TABLET ORAL at 09:14

## 2017-01-01 RX ADMIN — STANDARDIZED SENNA CONCENTRATE AND DOCUSATE SODIUM 2 TABLET: 8.6; 5 TABLET, FILM COATED ORAL at 20:35

## 2017-01-01 RX ADMIN — PANTOPRAZOLE SODIUM 40 MG: 40 INJECTION, POWDER, FOR SOLUTION INTRAVENOUS at 11:19

## 2017-01-01 RX ADMIN — MIDODRINE HYDROCHLORIDE 10 MG: 5 TABLET ORAL at 07:29

## 2017-01-01 RX ADMIN — POTASSIUM CHLORIDE 10 MEQ: 7.46 INJECTION, SOLUTION INTRAVENOUS at 11:54

## 2017-01-01 RX ADMIN — SCOLOPAMINE TRANSDERMAL SYSTEM 1 PATCH: 1 PATCH, EXTENDED RELEASE TRANSDERMAL at 14:16

## 2017-01-01 RX ADMIN — SODIUM CHLORIDE 1000 ML: 9 INJECTION, SOLUTION INTRAVENOUS at 23:15

## 2017-01-01 RX ADMIN — PENTOXIFYLLINE 400 MG: 400 TABLET, FILM COATED, EXTENDED RELEASE ORAL at 16:11

## 2017-01-01 RX ADMIN — CEFTRIAXONE 2 G: 2 INJECTION, POWDER, FOR SOLUTION INTRAMUSCULAR; INTRAVENOUS at 13:11

## 2017-01-01 RX ADMIN — SODIUM CHLORIDE 1000 ML: 9 INJECTION, SOLUTION INTRAVENOUS at 21:27

## 2017-01-01 RX ADMIN — MEDROXYPROGESTERONE ACETATE 10 MG: 10 TABLET ORAL at 21:43

## 2017-01-01 RX ADMIN — SODIUM CHLORIDE 25 ML: 9 INJECTION, SOLUTION INTRAVENOUS at 08:46

## 2017-01-01 RX ADMIN — HYDROMORPHONE HYDROCHLORIDE 1 MG: 1 INJECTION, SOLUTION INTRAMUSCULAR; INTRAVENOUS; SUBCUTANEOUS at 08:56

## 2017-01-01 RX ADMIN — PENTOXIFYLLINE 400 MG: 400 TABLET, FILM COATED, EXTENDED RELEASE ORAL at 11:30

## 2017-01-01 RX ADMIN — LORAZEPAM 1 MG: 2 INJECTION INTRAMUSCULAR; INTRAVENOUS at 09:08

## 2017-01-01 RX ADMIN — MIDODRINE HYDROCHLORIDE 10 MG: 5 TABLET ORAL at 07:46

## 2017-01-01 RX ADMIN — POTASSIUM CHLORIDE 20 MEQ: 1500 TABLET, EXTENDED RELEASE ORAL at 09:00

## 2017-01-01 RX ADMIN — OXYCODONE HYDROCHLORIDE 5 MG: 5 TABLET ORAL at 03:13

## 2017-01-01 RX ADMIN — MORPHINE SULFATE 4 MG: 4 INJECTION INTRAVENOUS at 20:58

## 2017-01-01 RX ADMIN — PENTOXIFYLLINE 400 MG: 400 TABLET, FILM COATED, EXTENDED RELEASE ORAL at 17:40

## 2017-01-01 RX ADMIN — HYDROMORPHONE HYDROCHLORIDE 4 MG: 2 INJECTION INTRAMUSCULAR; INTRAVENOUS; SUBCUTANEOUS at 13:59

## 2017-01-01 RX ADMIN — SODIUM CHLORIDE: 9 INJECTION, SOLUTION INTRAVENOUS at 20:13

## 2017-01-01 RX ADMIN — OXYCODONE HYDROCHLORIDE 10 MG: 10 TABLET ORAL at 12:36

## 2017-01-01 RX ADMIN — MORPHINE SULFATE 2 MG: 4 INJECTION INTRAVENOUS at 08:43

## 2017-01-01 RX ADMIN — MORPHINE SULFATE 3 MG: 4 INJECTION INTRAVENOUS at 19:19

## 2017-01-01 RX ADMIN — SPIRONOLACTONE 50 MG: 25 TABLET, FILM COATED ORAL at 08:16

## 2017-01-01 RX ADMIN — SODIUM CHLORIDE 2313 ML: 9 INJECTION, SOLUTION INTRAVENOUS at 09:37

## 2017-01-01 RX ADMIN — PANTOPRAZOLE SODIUM 40 MG: 40 INJECTION, POWDER, FOR SOLUTION INTRAVENOUS at 00:00

## 2017-01-01 RX ADMIN — OXYCODONE HYDROCHLORIDE 5 MG: 5 TABLET ORAL at 14:19

## 2017-01-01 RX ADMIN — Medication 87.5 G: at 22:00

## 2017-01-01 RX ADMIN — ONDANSETRON 4 MG: 2 INJECTION, SOLUTION INTRAMUSCULAR; INTRAVENOUS at 11:30

## 2017-01-01 RX ADMIN — ONDANSETRON 4 MG: 2 INJECTION INTRAMUSCULAR; INTRAVENOUS at 21:57

## 2017-01-01 RX ADMIN — OXYCODONE HYDROCHLORIDE 5 MG: 5 TABLET ORAL at 11:00

## 2017-01-01 RX ADMIN — MAGNESIUM GLUCONATE 500 MG ORAL TABLET 400 MG: 500 TABLET ORAL at 09:05

## 2017-01-01 RX ADMIN — SODIUM CHLORIDE: 9 INJECTION, SOLUTION INTRAVENOUS at 02:53

## 2017-01-01 RX ADMIN — METRONIDAZOLE 500 MG: 500 INJECTION, SOLUTION INTRAVENOUS at 13:10

## 2017-01-01 RX ADMIN — OMEPRAZOLE 20 MG: 20 CAPSULE, DELAYED RELEASE ORAL at 17:36

## 2017-01-01 RX ADMIN — MORPHINE SULFATE 2 MG: 4 INJECTION INTRAVENOUS at 07:35

## 2017-01-01 RX ADMIN — OCTREOTIDE ACETATE 100 MCG: 100 INJECTION, SOLUTION INTRAVENOUS; SUBCUTANEOUS at 08:43

## 2017-01-01 RX ADMIN — MIDODRINE HYDROCHLORIDE 10 MG: 5 TABLET ORAL at 16:47

## 2017-01-01 RX ADMIN — METRONIDAZOLE 500 MG: 500 INJECTION, SOLUTION INTRAVENOUS at 05:32

## 2017-01-01 RX ADMIN — MORPHINE SULFATE 4 MG: 4 INJECTION INTRAVENOUS at 07:27

## 2017-01-01 RX ADMIN — MORPHINE SULFATE 2 MG: 4 INJECTION INTRAVENOUS at 13:02

## 2017-01-01 RX ADMIN — FAMOTIDINE 20 MG: 10 INJECTION INTRAVENOUS at 10:09

## 2017-01-01 RX ADMIN — OXYCODONE HYDROCHLORIDE 5 MG: 5 TABLET ORAL at 19:23

## 2017-01-01 RX ADMIN — HALOPERIDOL LACTATE 1 MG: 5 INJECTION, SOLUTION INTRAMUSCULAR at 16:40

## 2017-01-01 RX ADMIN — PHYTONADIONE 5 MG: 5 TABLET ORAL at 10:04

## 2017-01-01 RX ADMIN — SODIUM CHLORIDE 500 ML: 9 INJECTION, SOLUTION INTRAVENOUS at 06:08

## 2017-01-01 RX ADMIN — MORPHINE SULFATE 2 MG: 4 INJECTION INTRAVENOUS at 23:41

## 2017-01-01 RX ADMIN — MORPHINE SULFATE 3 MG: 4 INJECTION INTRAVENOUS at 10:28

## 2017-01-01 RX ADMIN — OXYCODONE HYDROCHLORIDE 5 MG: 5 TABLET ORAL at 09:14

## 2017-01-01 RX ADMIN — OMEPRAZOLE 20 MG: 20 CAPSULE, DELAYED RELEASE ORAL at 17:40

## 2017-01-01 RX ADMIN — OXYCODONE HYDROCHLORIDE 5 MG: 5 TABLET ORAL at 12:58

## 2017-01-01 RX ADMIN — METRONIDAZOLE 500 MG: 500 INJECTION, SOLUTION INTRAVENOUS at 23:04

## 2017-01-01 RX ADMIN — METRONIDAZOLE 500 MG: 500 TABLET ORAL at 13:25

## 2017-01-01 RX ADMIN — ONDANSETRON 4 MG: 2 INJECTION, SOLUTION INTRAMUSCULAR; INTRAVENOUS at 18:04

## 2017-01-01 RX ADMIN — PHYTONADIONE 10 MG: 10 INJECTION, EMULSION INTRAMUSCULAR; INTRAVENOUS; SUBCUTANEOUS at 09:06

## 2017-01-01 RX ADMIN — LACTULOSE 30 ML: 20 SOLUTION ORAL at 20:35

## 2017-01-01 RX ADMIN — LORAZEPAM 1 MG: 2 INJECTION INTRAMUSCULAR; INTRAVENOUS at 05:36

## 2017-01-01 RX ADMIN — OXYCODONE HYDROCHLORIDE 5 MG: 5 TABLET ORAL at 16:55

## 2017-01-01 RX ADMIN — OXYCODONE HYDROCHLORIDE 10 MG: 10 TABLET ORAL at 20:52

## 2017-01-01 RX ADMIN — FOLIC ACID 1 MG: 1 TABLET ORAL at 19:24

## 2017-01-01 RX ADMIN — OXYCODONE HYDROCHLORIDE 5 MG: 5 TABLET ORAL at 13:10

## 2017-01-01 RX ADMIN — MIDODRINE HYDROCHLORIDE 10 MG: 5 TABLET ORAL at 17:52

## 2017-01-01 RX ADMIN — OMEPRAZOLE 20 MG: 20 CAPSULE, DELAYED RELEASE ORAL at 20:07

## 2017-01-01 RX ADMIN — ONDANSETRON 4 MG: 2 INJECTION, SOLUTION INTRAMUSCULAR; INTRAVENOUS at 13:36

## 2017-01-01 RX ADMIN — POTASSIUM CHLORIDE 20 MEQ: 1500 TABLET, EXTENDED RELEASE ORAL at 09:05

## 2017-01-01 RX ADMIN — MORPHINE SULFATE 4 MG: 4 INJECTION INTRAVENOUS at 05:33

## 2017-01-01 RX ADMIN — OXYCODONE HYDROCHLORIDE 10 MG: 10 TABLET ORAL at 01:31

## 2017-01-01 RX ADMIN — DIAZEPAM 5 MG: 5 TABLET ORAL at 03:20

## 2017-01-01 RX ADMIN — CEFTRIAXONE 2 G: 2 INJECTION, POWDER, FOR SOLUTION INTRAMUSCULAR; INTRAVENOUS at 08:15

## 2017-01-01 RX ADMIN — MIDODRINE HYDROCHLORIDE 10 MG: 5 TABLET ORAL at 13:00

## 2017-01-01 RX ADMIN — FOLIC ACID 1 MG: 1 TABLET ORAL at 10:18

## 2017-01-01 RX ADMIN — PENTOXIFYLLINE 400 MG: 400 TABLET, FILM COATED, EXTENDED RELEASE ORAL at 19:26

## 2017-01-01 RX ADMIN — POTASSIUM CHLORIDE 60 MEQ: 1500 TABLET, EXTENDED RELEASE ORAL at 20:07

## 2017-01-01 RX ADMIN — PANTOPRAZOLE SODIUM 40 MG: 40 INJECTION, POWDER, FOR SOLUTION INTRAVENOUS at 23:09

## 2017-01-01 RX ADMIN — ONDANSETRON 4 MG: 2 INJECTION, SOLUTION INTRAMUSCULAR; INTRAVENOUS at 16:00

## 2017-01-01 RX ADMIN — ONDANSETRON 4 MG: 2 INJECTION INTRAMUSCULAR; INTRAVENOUS at 06:28

## 2017-01-01 RX ADMIN — FOLIC ACID 1 MG: 1 TABLET ORAL at 08:45

## 2017-01-01 RX ADMIN — POTASSIUM CHLORIDE 20 MEQ: 1500 TABLET, EXTENDED RELEASE ORAL at 08:17

## 2017-01-01 RX ADMIN — OCTREOTIDE ACETATE 100 MCG: 100 INJECTION, SOLUTION INTRAVENOUS; SUBCUTANEOUS at 21:33

## 2017-01-01 RX ADMIN — OCTREOTIDE ACETATE 100 MCG: 100 INJECTION, SOLUTION INTRAVENOUS; SUBCUTANEOUS at 11:07

## 2017-01-01 RX ADMIN — MORPHINE SULFATE 4 MG: 4 INJECTION INTRAVENOUS at 14:42

## 2017-01-01 RX ADMIN — OXYCODONE HYDROCHLORIDE 5 MG: 5 TABLET ORAL at 01:14

## 2017-01-01 RX ADMIN — METRONIDAZOLE 500 MG: 500 TABLET ORAL at 06:09

## 2017-01-01 RX ADMIN — OMEPRAZOLE 20 MG: 20 CAPSULE, DELAYED RELEASE ORAL at 20:23

## 2017-01-01 RX ADMIN — CEFTRIAXONE SODIUM 2 G: 2 INJECTION, POWDER, FOR SOLUTION INTRAMUSCULAR; INTRAVENOUS at 10:05

## 2017-01-01 RX ADMIN — OXYCODONE HYDROCHLORIDE 5 MG: 5 TABLET ORAL at 21:34

## 2017-01-01 RX ADMIN — MORPHINE SULFATE 3 MG: 4 INJECTION INTRAVENOUS at 14:59

## 2017-01-01 RX ADMIN — LORAZEPAM 1 MG: 2 INJECTION INTRAMUSCULAR; INTRAVENOUS at 14:59

## 2017-01-01 RX ADMIN — PENTOXIFYLLINE 400 MG: 400 TABLET, FILM COATED, EXTENDED RELEASE ORAL at 08:42

## 2017-01-01 RX ADMIN — MORPHINE SULFATE 2 MG: 4 INJECTION INTRAVENOUS at 17:09

## 2017-01-01 RX ADMIN — METRONIDAZOLE 500 MG: 500 INJECTION, SOLUTION INTRAVENOUS at 05:45

## 2017-01-01 RX ADMIN — OXYCODONE HYDROCHLORIDE 5 MG: 5 TABLET ORAL at 09:58

## 2017-01-01 RX ADMIN — MIDODRINE HYDROCHLORIDE 10 MG: 5 TABLET ORAL at 17:21

## 2017-01-01 RX ADMIN — Medication 25 G: at 08:44

## 2017-01-01 RX ADMIN — LORAZEPAM 1 MG: 2 INJECTION INTRAMUSCULAR; INTRAVENOUS at 18:24

## 2017-01-01 RX ADMIN — CEFTRIAXONE 2 G: 2 INJECTION, POWDER, FOR SOLUTION INTRAMUSCULAR; INTRAVENOUS at 10:00

## 2017-01-01 RX ADMIN — PANTOPRAZOLE SODIUM 40 MG: 40 INJECTION, POWDER, FOR SOLUTION INTRAVENOUS at 23:41

## 2017-01-01 RX ADMIN — OCTREOTIDE ACETATE 100 MCG: 100 INJECTION, SOLUTION INTRAVENOUS; SUBCUTANEOUS at 16:00

## 2017-01-01 RX ADMIN — LACTULOSE 30 ML: 20 SOLUTION ORAL at 10:18

## 2017-01-01 RX ADMIN — MORPHINE SULFATE 4 MG: 4 INJECTION INTRAVENOUS at 15:22

## 2017-01-01 RX ADMIN — Medication 25 G: at 12:27

## 2017-01-01 RX ADMIN — SODIUM CHLORIDE: 9 INJECTION, SOLUTION INTRAVENOUS at 08:41

## 2017-01-01 RX ADMIN — OXYCODONE HYDROCHLORIDE 10 MG: 10 TABLET ORAL at 03:18

## 2017-01-01 RX ADMIN — SODIUM CHLORIDE: 9 INJECTION, SOLUTION INTRAVENOUS at 15:47

## 2017-01-01 RX ADMIN — CEFTRIAXONE 2 G: 2 INJECTION, POWDER, FOR SOLUTION INTRAMUSCULAR; INTRAVENOUS at 08:30

## 2017-01-01 RX ADMIN — FAMOTIDINE 20 MG: 10 INJECTION INTRAVENOUS at 08:04

## 2017-01-01 RX ADMIN — PENTOXIFYLLINE 400 MG: 400 TABLET, FILM COATED, EXTENDED RELEASE ORAL at 17:58

## 2017-01-01 RX ADMIN — POTASSIUM CHLORIDE 40 MEQ: 1500 TABLET, EXTENDED RELEASE ORAL at 21:25

## 2017-01-01 RX ADMIN — OXYCODONE HYDROCHLORIDE 5 MG: 5 TABLET ORAL at 16:17

## 2017-01-01 RX ADMIN — METRONIDAZOLE 500 MG: 500 INJECTION, SOLUTION INTRAVENOUS at 21:05

## 2017-01-01 RX ADMIN — TRAMADOL HYDROCHLORIDE 50 MG: 50 TABLET, FILM COATED ORAL at 06:09

## 2017-01-01 RX ADMIN — OXYCODONE HYDROCHLORIDE 5 MG: 5 TABLET ORAL at 16:47

## 2017-01-01 RX ADMIN — Medication 25 G: at 17:55

## 2017-01-01 RX ADMIN — POTASSIUM CHLORIDE 20 MEQ: 1500 TABLET, EXTENDED RELEASE ORAL at 20:48

## 2017-01-01 RX ADMIN — TRAMADOL HYDROCHLORIDE 50 MG: 50 TABLET, FILM COATED ORAL at 12:28

## 2017-01-01 ASSESSMENT — ENCOUNTER SYMPTOMS
CHILLS: 0
PALPITATIONS: 0
SPEECH CHANGE: 0
MEMORY LOSS: 0
WEAKNESS: 1
BACK PAIN: 0
HEADACHES: 0
COUGH: 0
DOUBLE VISION: 0
FOCAL WEAKNESS: 0
DIARRHEA: 0
SEIZURES: 0
FOCAL WEAKNESS: 0
WEAKNESS: 1
DIAPHORESIS: 1
HEADACHES: 0
FEVER: 0
FLANK PAIN: 0
DIARRHEA: 0
HEARTBURN: 0
HEMOPTYSIS: 0
BLURRED VISION: 0
DIAPHORESIS: 0
LOSS OF CONSCIOUSNESS: 0
ABDOMINAL PAIN: 0
DEPRESSION: 1
CHILLS: 0
FEVER: 0
WEAKNESS: 1
NERVOUS/ANXIOUS: 1
CLAUDICATION: 0
BLURRED VISION: 0
MYALGIAS: 0
NAUSEA: 1
BLOOD IN STOOL: 0
DEPRESSION: 0
SENSORY CHANGE: 0
FOCAL WEAKNESS: 0
PALPITATIONS: 0
HEARTBURN: 0
NAUSEA: 1
HEADACHES: 0
FEVER: 0
ABDOMINAL PAIN: 1
SHORTNESS OF BREATH: 0
NAUSEA: 1
FEVER: 1
SHORTNESS OF BREATH: 0
STRIDOR: 0
COUGH: 0
COUGH: 0
TINGLING: 0
DIAPHORESIS: 0
SHORTNESS OF BREATH: 0
BRUISES/BLEEDS EASILY: 0
LOSS OF CONSCIOUSNESS: 0
WHEEZING: 0
CHILLS: 0
WEAKNESS: 1
VOMITING: 1
CONSTIPATION: 0
PALPITATIONS: 0
DIAPHORESIS: 1
PALPITATIONS: 0
ORTHOPNEA: 0
MYALGIAS: 1
ABDOMINAL PAIN: 1
NAUSEA: 0
EYE DISCHARGE: 0
DEPRESSION: 0
DOUBLE VISION: 0
WEAKNESS: 1
DOUBLE VISION: 0
ABDOMINAL PAIN: 1
SENSORY CHANGE: 0
DEPRESSION: 0
BRUISES/BLEEDS EASILY: 0
CHILLS: 0
SEIZURES: 0
CHILLS: 0
PALPITATIONS: 1
HEARTBURN: 0
BACK PAIN: 0
SEIZURES: 0
VOMITING: 0
HEMOPTYSIS: 0
RESPIRATORY NEGATIVE: 1
DIZZINESS: 1
CLAUDICATION: 0
MYALGIAS: 0
HEADACHES: 0
PALPITATIONS: 1
HEMOPTYSIS: 0
NAUSEA: 1
ABDOMINAL PAIN: 1
SHORTNESS OF BREATH: 0
SENSORY CHANGE: 0
DIARRHEA: 0
NAUSEA: 0
DIAPHORESIS: 0
WHEEZING: 0
NAUSEA: 1
DIARRHEA: 0
SHORTNESS OF BREATH: 1
FLANK PAIN: 0
VOMITING: 0
VOMITING: 0
PALPITATIONS: 0
DEPRESSION: 1
NERVOUS/ANXIOUS: 0
PHOTOPHOBIA: 0
BACK PAIN: 0
NAUSEA: 1
DIZZINESS: 0
NERVOUS/ANXIOUS: 0
CONSTIPATION: 0
DIZZINESS: 0
DOUBLE VISION: 0
CONSTIPATION: 0
HEADACHES: 0
COUGH: 0
BACK PAIN: 0
WEAKNESS: 1
COUGH: 0
HEADACHES: 0
PALPITATIONS: 0
FOCAL WEAKNESS: 0
FOCAL WEAKNESS: 0
DEPRESSION: 0
BLOOD IN STOOL: 0
ABDOMINAL PAIN: 1
DIZZINESS: 1
CHILLS: 0
MEMORY LOSS: 0
ABDOMINAL PAIN: 1
DIAPHORESIS: 0
VOMITING: 1
TREMORS: 0
MYALGIAS: 0
SENSORY CHANGE: 0
HEMOPTYSIS: 0
WEAKNESS: 1
BRUISES/BLEEDS EASILY: 0
CONSTIPATION: 0
BLOOD IN STOOL: 0
SHORTNESS OF BREATH: 0
CHILLS: 0
WEAKNESS: 1
ABDOMINAL PAIN: 1
DIARRHEA: 0
FEVER: 0
BACK PAIN: 1
DIAPHORESIS: 0
SHORTNESS OF BREATH: 0
VOMITING: 0
SPUTUM PRODUCTION: 0
LOSS OF CONSCIOUSNESS: 0
WEAKNESS: 1
FEVER: 0
NAUSEA: 1
LOSS OF CONSCIOUSNESS: 0
TINGLING: 0
BLOOD IN STOOL: 0
COUGH: 0
ABDOMINAL PAIN: 1
MEMORY LOSS: 0
MYALGIAS: 1
WHEEZING: 0
FLANK PAIN: 0
FLANK PAIN: 0
VOMITING: 0
MYALGIAS: 0
MYALGIAS: 0
SEIZURES: 0
HALLUCINATIONS: 0
SEIZURES: 0
INSOMNIA: 1
FEVER: 0
SPUTUM PRODUCTION: 0
TREMORS: 0
EYES NEGATIVE: 1
SHORTNESS OF BREATH: 0
NAUSEA: 0
NAUSEA: 1
ABDOMINAL PAIN: 1
LOSS OF CONSCIOUSNESS: 0
BLOOD IN STOOL: 0
PALPITATIONS: 0
DIZZINESS: 0
DOUBLE VISION: 0
HEARTBURN: 0
BLURRED VISION: 0
MYALGIAS: 0
FOCAL WEAKNESS: 0
BRUISES/BLEEDS EASILY: 0
DIZZINESS: 0
BRUISES/BLEEDS EASILY: 0
WEAKNESS: 1
HEADACHES: 0
DEPRESSION: 0
HALLUCINATIONS: 0
NERVOUS/ANXIOUS: 0
SPEECH CHANGE: 0
ABDOMINAL PAIN: 1
BLURRED VISION: 0
DIAPHORESIS: 0
SPEECH CHANGE: 0
CHILLS: 0
HEMOPTYSIS: 0
PALPITATIONS: 0
BLOOD IN STOOL: 0
FEVER: 0
BLOOD IN STOOL: 0
MUSCULOSKELETAL NEGATIVE: 1
NERVOUS/ANXIOUS: 1
FEVER: 0
NECK PAIN: 0
VOMITING: 1
SEIZURES: 0
BLOOD IN STOOL: 0
TREMORS: 0
TREMORS: 0
CONSTIPATION: 0
COUGH: 0
TREMORS: 0
FEVER: 0
FOCAL WEAKNESS: 0
CHILLS: 0
MYALGIAS: 0
FEVER: 0
WHEEZING: 0
HEADACHES: 0
DIARRHEA: 1
TINGLING: 0
FOCAL WEAKNESS: 0
LOSS OF CONSCIOUSNESS: 0
VOMITING: 0
SPEECH CHANGE: 0
WHEEZING: 0
COUGH: 0
VOMITING: 0
HEARTBURN: 0
NERVOUS/ANXIOUS: 0
CHILLS: 0
CHILLS: 0
COUGH: 0
HEARTBURN: 1
INSOMNIA: 0
FEVER: 0
NERVOUS/ANXIOUS: 0
EYE REDNESS: 0
VOMITING: 0
DIZZINESS: 0
SENSORY CHANGE: 0
DIZZINESS: 0
COUGH: 0
FOCAL WEAKNESS: 0
INSOMNIA: 1
HEARTBURN: 0
EYE DISCHARGE: 0
MEMORY LOSS: 0
ABDOMINAL PAIN: 1
DEPRESSION: 0
WEAKNESS: 1
BACK PAIN: 0
COUGH: 0
NECK PAIN: 0

## 2017-01-01 ASSESSMENT — PAIN SCALES - GENERAL
PAINLEVEL_OUTOF10: 10
PAINLEVEL_OUTOF10: 7
PAINLEVEL_OUTOF10: ASSUMED PAIN PRESENT
PAINLEVEL_OUTOF10: 10
PAINLEVEL_OUTOF10: 7
PAINLEVEL_OUTOF10: 8
PAINLEVEL_OUTOF10: 7
PAINLEVEL_OUTOF10: 8
PAINLEVEL_OUTOF10: 5
PAINLEVEL_OUTOF10: 3
PAINLEVEL_OUTOF10: 7
PAINLEVEL_OUTOF10: 3
PAINLEVEL_OUTOF10: 9
PAINLEVEL_OUTOF10: 10
PAINLEVEL_OUTOF10: 7
PAINLEVEL_OUTOF10: ASSUMED PAIN PRESENT
PAINLEVEL_OUTOF10: 3
PAINLEVEL_OUTOF10: 6
PAINLEVEL_OUTOF10: 3
PAINLEVEL_OUTOF10: 10
PAINLEVEL_OUTOF10: 7
PAINLEVEL_OUTOF10: 5
PAINLEVEL_OUTOF10: 5
PAINLEVEL_OUTOF10: 0
PAINLEVEL_OUTOF10: ASSUMED PAIN PRESENT
PAINLEVEL_OUTOF10: 4
PAINLEVEL_OUTOF10: 5
PAINLEVEL_OUTOF10: 5
PAINLEVEL_OUTOF10: 10
PAINLEVEL_OUTOF10: 0
PAINLEVEL_OUTOF10: 4
PAINLEVEL_OUTOF10: 7
PAINLEVEL_OUTOF10: 10
PAINLEVEL_OUTOF10: 4
PAINLEVEL_OUTOF10: 5
PAINLEVEL_OUTOF10: 4
PAINLEVEL_OUTOF10: 8
PAINLEVEL_OUTOF10: 10
PAINLEVEL_OUTOF10: 10
PAINLEVEL_OUTOF10: 8
PAINLEVEL_OUTOF10: 7
PAINLEVEL_OUTOF10: 8
PAINLEVEL_OUTOF10: 3
PAINLEVEL_OUTOF10: 6
PAINLEVEL_OUTOF10: 7
PAINLEVEL_OUTOF10: 5
PAINLEVEL_OUTOF10: 5
PAINLEVEL_OUTOF10: 7
PAINLEVEL_OUTOF10: 8
PAINLEVEL_OUTOF10: 5
PAINLEVEL_OUTOF10: 5
PAINLEVEL_OUTOF10: 9
PAINLEVEL_OUTOF10: 10
PAINLEVEL_OUTOF10: 6
PAINLEVEL_OUTOF10: ASSUMED PAIN PRESENT
PAINLEVEL_OUTOF10: 5
PAINLEVEL_OUTOF10: 8
PAINLEVEL_OUTOF10: 1
PAINLEVEL_OUTOF10: 2
PAINLEVEL_OUTOF10: 10
PAINLEVEL_OUTOF10: 10
PAINLEVEL_OUTOF10: 9
PAINLEVEL_OUTOF10: 7
PAINLEVEL_OUTOF10: 5
PAINLEVEL_OUTOF10: 9
PAINLEVEL_OUTOF10: 10
PAINLEVEL_OUTOF10: 7
PAINLEVEL_OUTOF10: 0
PAINLEVEL_OUTOF10: 9
PAINLEVEL_OUTOF10: 10
PAINLEVEL_OUTOF10: 9
PAINLEVEL_OUTOF10: 8
PAINLEVEL_OUTOF10: 7
PAINLEVEL_OUTOF10: 9
PAINLEVEL_OUTOF10: ASSUMED PAIN PRESENT
PAINLEVEL_OUTOF10: 10
PAINLEVEL_OUTOF10: 8
PAINLEVEL_OUTOF10: 8
PAINLEVEL_OUTOF10: 10
PAINLEVEL_OUTOF10: 8
PAINLEVEL_OUTOF10: 5
PAINLEVEL_OUTOF10: 7
PAINLEVEL_OUTOF10: 5
PAINLEVEL_OUTOF10: ASSUMED PAIN PRESENT
PAINLEVEL_OUTOF10: 4
PAINLEVEL_OUTOF10: 5
PAINLEVEL_OUTOF10: 3
PAINLEVEL_OUTOF10: 0
PAINLEVEL_OUTOF10: 8
PAINLEVEL_OUTOF10: 10
PAINLEVEL_OUTOF10: 5
PAINLEVEL_OUTOF10: 7
PAINLEVEL_OUTOF10: ASSUMED PAIN PRESENT
PAINLEVEL_OUTOF10: 5
PAINLEVEL_OUTOF10: 9
PAINLEVEL_OUTOF10: 3
PAINLEVEL_OUTOF10: 5
PAINLEVEL_OUTOF10: 4
PAINLEVEL_OUTOF10: 7
PAINLEVEL_OUTOF10: 7
PAINLEVEL_OUTOF10: 8
PAINLEVEL_OUTOF10: 5
PAINLEVEL_OUTOF10: 9
PAINLEVEL_OUTOF10: 10
PAINLEVEL_OUTOF10: 6
PAINLEVEL_OUTOF10: 5
PAINLEVEL_OUTOF10: 6
PAINLEVEL_OUTOF10: 0
PAINLEVEL_OUTOF10: 5
PAINLEVEL_OUTOF10: 8
PAINLEVEL_OUTOF10: 5
PAINLEVEL_OUTOF10: 0
PAINLEVEL_OUTOF10: 7
PAINLEVEL_OUTOF10: 9
PAINLEVEL_OUTOF10: 8
PAINLEVEL_OUTOF10: ASSUMED PAIN PRESENT
PAINLEVEL_OUTOF10: 5
PAINLEVEL_OUTOF10: ASSUMED PAIN PRESENT
PAINLEVEL_OUTOF10: ASSUMED PAIN PRESENT
PAINLEVEL_OUTOF10: 8
PAINLEVEL_OUTOF10: 4
PAINLEVEL_OUTOF10: 7
PAINLEVEL_OUTOF10: 3
PAINLEVEL_OUTOF10: 7
PAINLEVEL_OUTOF10: ASSUMED PAIN PRESENT
PAINLEVEL_OUTOF10: 8
PAINLEVEL_OUTOF10: 8
PAINLEVEL_OUTOF10: 5
PAINLEVEL_OUTOF10: 4
PAINLEVEL_OUTOF10: 0
PAINLEVEL_OUTOF10: 0
PAINLEVEL_OUTOF10: 5
PAINLEVEL_OUTOF10: 0
PAINLEVEL_OUTOF10: 8
PAINLEVEL_OUTOF10: ASSUMED PAIN PRESENT
PAINLEVEL_OUTOF10: 10
PAINLEVEL_OUTOF10: 10
PAINLEVEL_OUTOF10: 8
PAINLEVEL_OUTOF10: 6
PAINLEVEL_OUTOF10: 7
PAINLEVEL_OUTOF10: 0
PAINLEVEL_OUTOF10: 7
PAINLEVEL_OUTOF10: 7
PAINLEVEL_OUTOF10: 5
PAINLEVEL_OUTOF10: 5
PAINLEVEL_OUTOF10: 8
PAINLEVEL_OUTOF10: 8
PAINLEVEL_OUTOF10: 10
PAINLEVEL_OUTOF10: 4
PAINLEVEL_OUTOF10: 4
PAINLEVEL_OUTOF10: 6
PAINLEVEL_OUTOF10: 6
PAINLEVEL_OUTOF10: 8
PAINLEVEL_OUTOF10: 0
PAINLEVEL_OUTOF10: ASSUMED PAIN PRESENT
PAINLEVEL_OUTOF10: 1
PAINLEVEL_OUTOF10: 0
PAINLEVEL_OUTOF10: 2
PAINLEVEL_OUTOF10: 6
PAINLEVEL_OUTOF10: 8
PAINLEVEL_OUTOF10: 2
PAINLEVEL_OUTOF10: 6
PAINLEVEL_OUTOF10: 8
PAINLEVEL_OUTOF10: 10
PAINLEVEL_OUTOF10: 7
PAINLEVEL_OUTOF10: 8
PAINLEVEL_OUTOF10: ASSUMED PAIN PRESENT
PAINLEVEL_OUTOF10: 7
PAINLEVEL_OUTOF10: 9

## 2017-01-01 ASSESSMENT — LIFESTYLE VARIABLES
HAVE YOU EVER FELT YOU SHOULD CUT DOWN ON YOUR DRINKING: NO
NAUSEA AND VOMITING: NO NAUSEA AND NO VOMITING
ANXIETY: MILDLY ANXIOUS
NAUSEA AND VOMITING: NO NAUSEA AND NO VOMITING
DOES PATIENT WANT TO TALK TO SOMEONE ABOUT QUITTING: YES
TREMOR: NO TREMOR
ANXIETY: MODERATELY ANXIOUS OR GUARDED, SO ANXIETY IS INFERRED
EVER HAD A DRINK FIRST THING IN THE MORNING TO STEADY YOUR NERVES TO GET RID OF A HANGOVER: YES
TREMOR: NO TREMOR
TOTAL SCORE: 2
SUBSTANCE_ABUSE: 0
ANXIETY: NO ANXIETY (AT EASE)
EVER_SMOKED: YES
ORIENTATION AND CLOUDING OF SENSORIUM: CANNOT DO SERIAL ADDITIONS OR IS UNCERTAIN ABOUT DATE
ANXIETY: NO ANXIETY (AT EASE)
AGITATION: NORMAL ACTIVITY
AGITATION: NORMAL ACTIVITY
AUDITORY DISTURBANCES: NOT PRESENT
ORIENTATION AND CLOUDING OF SENSORIUM: CANNOT DO SERIAL ADDITIONS OR IS UNCERTAIN ABOUT DATE
PAROXYSMAL SWEATS: NO SWEAT VISIBLE
TREMOR: NO TREMOR
TOTAL SCORE: 4
HAVE PEOPLE ANNOYED YOU BY CRITICIZING YOUR DRINKING: YES
AGITATION: NORMAL ACTIVITY
AGITATION: SOMEWHAT MORE THAN NORMAL ACTIVITY
AGITATION: NORMAL ACTIVITY
ORIENTATION AND CLOUDING OF SENSORIUM: CANNOT DO SERIAL ADDITIONS OR IS UNCERTAIN ABOUT DATE
AGITATION: NORMAL ACTIVITY
AUDITORY DISTURBANCES: NOT PRESENT
CONSUMPTION TOTAL: INCOMPLETE
HEADACHE, FULLNESS IN HEAD: NOT PRESENT
TOTAL SCORE: 4
TOTAL SCORE: 4
ORIENTATION AND CLOUDING OF SENSORIUM: CANNOT DO SERIAL ADDITIONS OR IS UNCERTAIN ABOUT DATE
NAUSEA AND VOMITING: NO NAUSEA AND NO VOMITING
SUBSTANCE_ABUSE: 1
HEADACHE, FULLNESS IN HEAD: NOT PRESENT
HEADACHE, FULLNESS IN HEAD: NOT PRESENT
AVERAGE NUMBER OF DAYS PER WEEK YOU HAVE A DRINK CONTAINING ALCOHOL: 5
HAVE PEOPLE ANNOYED YOU BY CRITICIZING YOUR DRINKING: NO
HEADACHE, FULLNESS IN HEAD: NOT PRESENT
NAUSEA AND VOMITING: NO NAUSEA AND NO VOMITING
ORIENTATION AND CLOUDING OF SENSORIUM: CANNOT DO SERIAL ADDITIONS OR IS UNCERTAIN ABOUT DATE
CONSUMPTION TOTAL: POSITIVE
TREMOR: NO TREMOR
ANXIETY: NO ANXIETY (AT EASE)
NAUSEA AND VOMITING: NO NAUSEA AND NO VOMITING
NAUSEA AND VOMITING: NO NAUSEA AND NO VOMITING
DO YOU DRINK ALCOHOL: YES
ALCOHOL_USE: NO
HAVE PEOPLE ANNOYED YOU BY CRITICIZING YOUR DRINKING: YES
TOTAL SCORE: 1
PAROXYSMAL SWEATS: BARELY PERCEPTIBLE SWEATING. PALMS MOIST
PAROXYSMAL SWEATS: BARELY PERCEPTIBLE SWEATING. PALMS MOIST
HEADACHE, FULLNESS IN HEAD: NOT PRESENT
VISUAL DISTURBANCES: NOT PRESENT
ANXIETY: NO ANXIETY (AT EASE)
ORIENTATION AND CLOUDING OF SENSORIUM: CANNOT DO SERIAL ADDITIONS OR IS UNCERTAIN ABOUT DATE
PAROXYSMAL SWEATS: NO SWEAT VISIBLE
AUDITORY DISTURBANCES: NOT PRESENT
TREMOR: NO TREMOR
AGITATION: NORMAL ACTIVITY
AGITATION: NORMAL ACTIVITY
ANXIETY: MILDLY ANXIOUS
NAUSEA AND VOMITING: NO NAUSEA AND NO VOMITING
AGITATION: NORMAL ACTIVITY
HOW MANY TIMES IN THE PAST YEAR HAVE YOU HAD 5 OR MORE DRINKS IN A DAY: 0
PAROXYSMAL SWEATS: *
ORIENTATION AND CLOUDING OF SENSORIUM: CANNOT DO SERIAL ADDITIONS OR IS UNCERTAIN ABOUT DATE
EVER HAD A DRINK FIRST THING IN THE MORNING TO STEADY YOUR NERVES TO GET RID OF A HANGOVER: NO
ORIENTATION AND CLOUDING OF SENSORIUM: CANNOT DO SERIAL ADDITIONS OR IS UNCERTAIN ABOUT DATE
NAUSEA AND VOMITING: NO NAUSEA AND NO VOMITING
PAROXYSMAL SWEATS: NO SWEAT VISIBLE
NAUSEA AND VOMITING: NO NAUSEA AND NO VOMITING
ANXIETY: MILDLY ANXIOUS
PAROXYSMAL SWEATS: BARELY PERCEPTIBLE SWEATING. PALMS MOIST
AUDITORY DISTURBANCES: NOT PRESENT
PAROXYSMAL SWEATS: NO SWEAT VISIBLE
NAUSEA AND VOMITING: NO NAUSEA AND NO VOMITING
AUDITORY DISTURBANCES: NOT PRESENT
HEADACHE, FULLNESS IN HEAD: NOT PRESENT
TOTAL SCORE: 1
NAUSEA AND VOMITING: NO NAUSEA AND NO VOMITING
AGITATION: MODERATELY FIDGETY AND RESTLESS
DOES PATIENT WANT TO STOP DRINKING: YES
AVERAGE NUMBER OF DAYS PER WEEK YOU HAVE A DRINK CONTAINING ALCOHOL: 7
VISUAL DISTURBANCES: NOT PRESENT
VISUAL DISTURBANCES: NOT PRESENT
NAUSEA AND VOMITING: MILD NAUSEA WITH NO VOMITING
TOTAL SCORE: 2
AUDITORY DISTURBANCES: NOT PRESENT
ANXIETY: NO ANXIETY (AT EASE)
PAROXYSMAL SWEATS: NO SWEAT VISIBLE
HEADACHE, FULLNESS IN HEAD: NOT PRESENT
HOW MANY TIMES IN THE PAST YEAR HAVE YOU HAD 5 OR MORE DRINKS IN A DAY: 300
PAROXYSMAL SWEATS: *
TREMOR: NO TREMOR
TOTAL SCORE: 0
AGITATION: NORMAL ACTIVITY
PAROXYSMAL SWEATS: NO SWEAT VISIBLE
TOTAL SCORE: 3
AUDITORY DISTURBANCES: NOT PRESENT
PAROXYSMAL SWEATS: NO SWEAT VISIBLE
PAROXYSMAL SWEATS: BARELY PERCEPTIBLE SWEATING. PALMS MOIST
HEADACHE, FULLNESS IN HEAD: NOT PRESENT
TOTAL SCORE: 1
TREMOR: NO TREMOR
DOES PATIENT WANT TO STOP DRINKING: YES
NAUSEA AND VOMITING: NO NAUSEA AND NO VOMITING
TOTAL SCORE: 2
AGITATION: NORMAL ACTIVITY
TREMOR: NO TREMOR
HEADACHE, FULLNESS IN HEAD: NOT PRESENT
TOTAL SCORE: 4
NAUSEA AND VOMITING: NO NAUSEA AND NO VOMITING
TOTAL SCORE: 1
AUDITORY DISTURBANCES: NOT PRESENT
HEADACHE, FULLNESS IN HEAD: NOT PRESENT
AUDITORY DISTURBANCES: NOT PRESENT
AGITATION: NORMAL ACTIVITY
NAUSEA AND VOMITING: NO NAUSEA AND NO VOMITING
AUDITORY DISTURBANCES: NOT PRESENT
PAROXYSMAL SWEATS: NO SWEAT VISIBLE
ORIENTATION AND CLOUDING OF SENSORIUM: CANNOT DO SERIAL ADDITIONS OR IS UNCERTAIN ABOUT DATE
TREMOR: NO TREMOR
HEADACHE, FULLNESS IN HEAD: NOT PRESENT
AUDITORY DISTURBANCES: NOT PRESENT
ANXIETY: NO ANXIETY (AT EASE)
ANXIETY: MILDLY ANXIOUS
TOTAL SCORE: 3
NAUSEA AND VOMITING: NO NAUSEA AND NO VOMITING
TREMOR: NO TREMOR
ORIENTATION AND CLOUDING OF SENSORIUM: CANNOT DO SERIAL ADDITIONS OR IS UNCERTAIN ABOUT DATE
TOTAL SCORE: 1
AGITATION: NORMAL ACTIVITY
TOTAL SCORE: 1
VISUAL DISTURBANCES: NOT PRESENT
HEADACHE, FULLNESS IN HEAD: NOT PRESENT
EVER_SMOKED: YES
VISUAL DISTURBANCES: NOT PRESENT
HOW MANY TIMES IN THE PAST YEAR HAVE YOU HAD 5 OR MORE DRINKS IN A DAY: 5
AGITATION: NORMAL ACTIVITY
CONSUMPTION TOTAL: NEGATIVE
HEADACHE, FULLNESS IN HEAD: NOT PRESENT
ORIENTATION AND CLOUDING OF SENSORIUM: CANNOT DO SERIAL ADDITIONS OR IS UNCERTAIN ABOUT DATE
HEADACHE, FULLNESS IN HEAD: NOT PRESENT
AGITATION: NORMAL ACTIVITY
ANXIETY: NO ANXIETY (AT EASE)
NAUSEA AND VOMITING: NO NAUSEA AND NO VOMITING
TREMOR: NO TREMOR
TREMOR: NO TREMOR
ANXIETY: NO ANXIETY (AT EASE)
ORIENTATION AND CLOUDING OF SENSORIUM: CANNOT DO SERIAL ADDITIONS OR IS UNCERTAIN ABOUT DATE
EVER FELT BAD OR GUILTY ABOUT YOUR DRINKING: NO
ANXIETY: NO ANXIETY (AT EASE)
AUDITORY DISTURBANCES: NOT PRESENT
HAVE YOU EVER FELT YOU SHOULD CUT DOWN ON YOUR DRINKING: YES
TOTAL SCORE: 1
DO YOU DRINK ALCOHOL: YES
DOES PATIENT WANT TO TALK TO SOMEONE ABOUT QUITTING: YES
AGITATION: NORMAL ACTIVITY
NAUSEA AND VOMITING: NO NAUSEA AND NO VOMITING
PAROXYSMAL SWEATS: NO SWEAT VISIBLE
PAROXYSMAL SWEATS: *
TREMOR: NO TREMOR
TOTAL SCORE: 4
PAROXYSMAL SWEATS: *
ON A TYPICAL DAY WHEN YOU DRINK ALCOHOL HOW MANY DRINKS DO YOU HAVE: 4
VISUAL DISTURBANCES: NOT PRESENT
ON A TYPICAL DAY WHEN YOU DRINK ALCOHOL HOW MANY DRINKS DO YOU HAVE: 0
AUDITORY DISTURBANCES: NOT PRESENT
NAUSEA AND VOMITING: NO NAUSEA AND NO VOMITING
NAUSEA AND VOMITING: NO NAUSEA AND NO VOMITING
HEADACHE, FULLNESS IN HEAD: NOT PRESENT
AGITATION: NORMAL ACTIVITY
ANXIETY: MILDLY ANXIOUS
ANXIETY: NO ANXIETY (AT EASE)
HEADACHE, FULLNESS IN HEAD: NOT PRESENT
EVER HAD A DRINK FIRST THING IN THE MORNING TO STEADY YOUR NERVES TO GET RID OF A HANGOVER: YES
HEADACHE, FULLNESS IN HEAD: NOT PRESENT
TOTAL SCORE: 2
VISUAL DISTURBANCES: NOT PRESENT
AUDITORY DISTURBANCES: NOT PRESENT
SUBSTANCE_ABUSE: 1
AGITATION: NORMAL ACTIVITY
TOTAL SCORE: 1
TREMOR: NO TREMOR
VISUAL DISTURBANCES: NOT PRESENT
NAUSEA AND VOMITING: NO NAUSEA AND NO VOMITING
TOTAL SCORE: 0
VISUAL DISTURBANCES: NOT PRESENT
ORIENTATION AND CLOUDING OF SENSORIUM: CANNOT DO SERIAL ADDITIONS OR IS UNCERTAIN ABOUT DATE
NAUSEA AND VOMITING: NO NAUSEA AND NO VOMITING
VISUAL DISTURBANCES: NOT PRESENT
ON A TYPICAL DAY WHEN YOU DRINK ALCOHOL HOW MANY DRINKS DO YOU HAVE: 3
EVER FELT BAD OR GUILTY ABOUT YOUR DRINKING: YES
VISUAL DISTURBANCES: NOT PRESENT
TREMOR: NO TREMOR
TOTAL SCORE: 4
NAUSEA AND VOMITING: NO NAUSEA AND NO VOMITING
TOTAL SCORE: 4
VISUAL DISTURBANCES: NOT PRESENT
AGITATION: NORMAL ACTIVITY
EVER HAD A DRINK FIRST THING IN THE MORNING TO STEADY YOUR NERVES TO GET RID OF A HANGOVER: YES
HEADACHE, FULLNESS IN HEAD: NOT PRESENT
TOTAL SCORE: 4
TREMOR: NO TREMOR
ANXIETY: NO ANXIETY (AT EASE)
TREMOR: NO TREMOR
ORIENTATION AND CLOUDING OF SENSORIUM: CANNOT DO SERIAL ADDITIONS OR IS UNCERTAIN ABOUT DATE
ORIENTATION AND CLOUDING OF SENSORIUM: CANNOT DO SERIAL ADDITIONS OR IS UNCERTAIN ABOUT DATE
AUDITORY DISTURBANCES: NOT PRESENT
NAUSEA AND VOMITING: NO NAUSEA AND NO VOMITING
NAUSEA AND VOMITING: NO NAUSEA AND NO VOMITING
CONSUMPTION TOTAL: POSITIVE
TREMOR: NO TREMOR
PAROXYSMAL SWEATS: BARELY PERCEPTIBLE SWEATING. PALMS MOIST
VISUAL DISTURBANCES: NOT PRESENT
AGITATION: NORMAL ACTIVITY
ORIENTATION AND CLOUDING OF SENSORIUM: ORIENTED AND CAN DO SERIAL ADDITIONS
ANXIETY: MILDLY ANXIOUS
ORIENTATION AND CLOUDING OF SENSORIUM: CANNOT DO SERIAL ADDITIONS OR IS UNCERTAIN ABOUT DATE
PAROXYSMAL SWEATS: NO SWEAT VISIBLE
AUDITORY DISTURBANCES: NOT PRESENT
EVER_SMOKED: YES
VISUAL DISTURBANCES: NOT PRESENT
TREMOR: NO TREMOR
ANXIETY: NO ANXIETY (AT EASE)
AUDITORY DISTURBANCES: NOT PRESENT
ORIENTATION AND CLOUDING OF SENSORIUM: CANNOT DO SERIAL ADDITIONS OR IS UNCERTAIN ABOUT DATE
DO YOU DRINK ALCOHOL: YES
AGITATION: NORMAL ACTIVITY
ANXIETY: NO ANXIETY (AT EASE)
TOTAL SCORE: 9
ANXIETY: MILDLY ANXIOUS
HAVE YOU EVER FELT YOU SHOULD CUT DOWN ON YOUR DRINKING: NO
HOW MANY TIMES IN THE PAST YEAR HAVE YOU HAD 5 OR MORE DRINKS IN A DAY: 100
PAROXYSMAL SWEATS: *
VISUAL DISTURBANCES: NOT PRESENT
AVERAGE NUMBER OF DAYS PER WEEK YOU HAVE A DRINK CONTAINING ALCOHOL: 0
VISUAL DISTURBANCES: NOT PRESENT
AUDITORY DISTURBANCES: NOT PRESENT
TOTAL SCORE: 5
EVER FELT BAD OR GUILTY ABOUT YOUR DRINKING: YES
DOES PATIENT WANT TO STOP DRINKING: NO
TREMOR: NO TREMOR
AUDITORY DISTURBANCES: NOT PRESENT
HAVE YOU EVER FELT YOU SHOULD CUT DOWN ON YOUR DRINKING: YES
EVER_SMOKED: YES
VISUAL DISTURBANCES: NOT PRESENT
PAROXYSMAL SWEATS: NO SWEAT VISIBLE
ORIENTATION AND CLOUDING OF SENSORIUM: CANNOT DO SERIAL ADDITIONS OR IS UNCERTAIN ABOUT DATE
ANXIETY: NO ANXIETY (AT EASE)
HEADACHE, FULLNESS IN HEAD: NOT PRESENT
AUDITORY DISTURBANCES: NOT PRESENT
SUBSTANCE_ABUSE: 1
TREMOR: NO TREMOR
HEADACHE, FULLNESS IN HEAD: NOT PRESENT
VISUAL DISTURBANCES: NOT PRESENT
TOTAL SCORE: 4
TOTAL SCORE: 4
AGITATION: NORMAL ACTIVITY
EVER_SMOKED: YES
TOTAL SCORE: 1
PAROXYSMAL SWEATS: NO SWEAT VISIBLE
HEADACHE, FULLNESS IN HEAD: NOT PRESENT
TOTAL SCORE: 1
AUDITORY DISTURBANCES: NOT PRESENT
ANXIETY: MILDLY ANXIOUS
AUDITORY DISTURBANCES: NOT PRESENT
PAROXYSMAL SWEATS: NO SWEAT VISIBLE
AGITATION: NORMAL ACTIVITY
ORIENTATION AND CLOUDING OF SENSORIUM: ORIENTED AND CAN DO SERIAL ADDITIONS
VISUAL DISTURBANCES: NOT PRESENT
VISUAL DISTURBANCES: NOT PRESENT
HEADACHE, FULLNESS IN HEAD: NOT PRESENT
VISUAL DISTURBANCES: NOT PRESENT
TREMOR: NO TREMOR
TOTAL SCORE: 4
TOTAL SCORE: 3
AUDITORY DISTURBANCES: NOT PRESENT
TOTAL SCORE: 0
DO YOU DRINK ALCOHOL: NO
AGITATION: MODERATELY FIDGETY AND RESTLESS
ORIENTATION AND CLOUDING OF SENSORIUM: ORIENTED AND CAN DO SERIAL ADDITIONS
HEADACHE, FULLNESS IN HEAD: NOT PRESENT
AUDITORY DISTURBANCES: NOT PRESENT
DO YOU DRINK ALCOHOL: NO
TREMOR: NO TREMOR
PAROXYSMAL SWEATS: *
ANXIETY: NO ANXIETY (AT EASE)
TOTAL SCORE: 3
SUBSTANCE_ABUSE: 1
VISUAL DISTURBANCES: NOT PRESENT
TOTAL SCORE: 3
PAROXYSMAL SWEATS: *
ANXIETY: NO ANXIETY (AT EASE)
VISUAL DISTURBANCES: NOT PRESENT
ORIENTATION AND CLOUDING OF SENSORIUM: CANNOT DO SERIAL ADDITIONS OR IS UNCERTAIN ABOUT DATE
EVER FELT BAD OR GUILTY ABOUT YOUR DRINKING: YES
TOTAL SCORE: 1
AUDITORY DISTURBANCES: NOT PRESENT
ORIENTATION AND CLOUDING OF SENSORIUM: CANNOT DO SERIAL ADDITIONS OR IS UNCERTAIN ABOUT DATE
ON A TYPICAL DAY WHEN YOU DRINK ALCOHOL HOW MANY DRINKS DO YOU HAVE: 10
HAVE PEOPLE ANNOYED YOU BY CRITICIZING YOUR DRINKING: NO
ORIENTATION AND CLOUDING OF SENSORIUM: CANNOT DO SERIAL ADDITIONS OR IS UNCERTAIN ABOUT DATE
ORIENTATION AND CLOUDING OF SENSORIUM: CANNOT DO SERIAL ADDITIONS OR IS UNCERTAIN ABOUT DATE
DO YOU DRINK ALCOHOL: YES
TOTAL SCORE: 8
VISUAL DISTURBANCES: NOT PRESENT
TREMOR: NO TREMOR
TREMOR: NO TREMOR
VISUAL DISTURBANCES: NOT PRESENT

## 2017-01-01 ASSESSMENT — PATIENT HEALTH QUESTIONNAIRE - PHQ9
9. THOUGHTS THAT YOU WOULD BE BETTER OFF DEAD, OR OF HURTING YOURSELF: NOT AT ALL
SUM OF ALL RESPONSES TO PHQ QUESTIONS 1-9: 10
2. FEELING DOWN, DEPRESSED, IRRITABLE, OR HOPELESS: MORE THAN HALF THE DAYS
8. MOVING OR SPEAKING SO SLOWLY THAT OTHER PEOPLE COULD HAVE NOTICED. OR THE OPPOSITE, BEING SO FIGETY OR RESTLESS THAT YOU HAVE BEEN MOVING AROUND A LOT MORE THAN USUAL: NOT AT ALL
1. LITTLE INTEREST OR PLEASURE IN DOING THINGS: MORE THAN HALF THE DAYS
4. FEELING TIRED OR HAVING LITTLE ENERGY: MORE THAN HALF THE DAYS
5. POOR APPETITE OR OVEREATING: MORE THAN HALF THE DAYS
7. TROUBLE CONCENTRATING ON THINGS, SUCH AS READING THE NEWSPAPER OR WATCHING TELEVISION: MORE THAN HALF THE DAYS
6. FEELING BAD ABOUT YOURSELF - OR THAT YOU ARE A FAILURE OR HAVE LET YOURSELF OR YOUR FAMILY DOWN: NOT AL ALL
3. TROUBLE FALLING OR STAYING ASLEEP OR SLEEPING TOO MUCH: NOT AT ALL
SUM OF ALL RESPONSES TO PHQ9 QUESTIONS 1 AND 2: 4

## 2017-01-01 ASSESSMENT — COPD QUESTIONNAIRES
HAVE YOU SMOKED AT LEAST 100 CIGARETTES IN YOUR ENTIRE LIFE: NO/DON'T KNOW
COPD SCREENING SCORE: 0
DO YOU EVER COUGH UP ANY MUCUS OR PHLEGM?: NO/ONLY WITH OCCASIONAL COLDS OR INFECTIONS
DURING THE PAST 4 WEEKS HOW MUCH DID YOU FEEL SHORT OF BREATH: NONE/LITTLE OF THE TIME
DO YOU EVER COUGH UP ANY MUCUS OR PHLEGM?: NO/ONLY WITH OCCASIONAL COLDS OR INFECTIONS
COPD SCREENING SCORE: 2
HAVE YOU SMOKED AT LEAST 100 CIGARETTES IN YOUR ENTIRE LIFE: YES
DURING THE PAST 4 WEEKS HOW MUCH DID YOU FEEL SHORT OF BREATH: NONE/LITTLE OF THE TIME

## 2017-01-26 NOTE — ED AVS SNAPSHOT
After Visit Summary                                                                                                                Nury Thornton   MRN: [id number]    Department:  [facility], Emergency Dept   Date of Visit:  1/26/2017            [facility], Emergency Dept    [address] [facility] [address]    Phone:  [phone]      You were seen by     Corinne Roche M.D.      Your Diagnosis Was     Hypokalemia     E87.6       These are the medications you received during your hospitalization from 01/26/2017 1820 to 01/27/2017 0418     Date/Time Order Dose Route Action    01/26/2017 2315 diazepam (VALIUM) tablet 5 mg 5 mg Oral Given    01/26/2017 2315 NS infusion 1,000 mL 1,000 mL Intravenous New Bag    01/26/2017 2315 ibuprofen (MOTRIN) tablet 600 mg 600 mg Oral Given    01/26/2017 2315 ondansetron (ZOFRAN) syringe/vial injection 4 mg 4 mg Intravenous Given    01/27/2017 0029 potassium chloride SA (Kdur) tablet 40 mEq 40 mEq Oral Given    01/27/2017 0148 potassium chloride (KLOR-CON) 20 MEQ packet 40 mEq 40 mEq Oral Given    01/27/2017 0309 potassium chloride (KLOR-CON) 20 MEQ packet 40 mEq 40 mEq Oral Given    01/27/2017 0113 NS infusion 1,000 mL 1,000 mL Intravenous New Bag    01/27/2017 0320 diazepam (VALIUM) tablet 5 mg 5 mg Oral Given    01/27/2017 0414 chlordiazepoxide (LIBRIUM) capsule 25 mg 25 mg Oral Given      Follow-up Information     1. Schedule an appointment as soon as possible for a visit with Harika Enriquez M.D..    Specialty:  Gynecology    Why:  please call tomorrow morning to schedule a follow-up appointment    Contact information    [address] [place] [facility] [address] [phone]          2. Go to [facility], Emergency Dept.    Specialty:  Emergency Medicine    Why:  If symptoms worsen or do not continue to improve    Contact information    [address] [facility]  [place] [address] [phone]        3. Follow up  with [facility]URIE.    Why:  please call tomorrow morning to schedule a follow-up appointment for general health maintenance and calcium recheck    Contact information    [address]  [place] [address] [phone]      Medication Information     Review all of your home medications and newly ordered medications with your primary doctor and/or pharmacist as soon as possible. Follow medication instructions as directed by your doctor and/or pharmacist.     Please keep your complete medication list with you and share with your physician. Update the information when medications are discontinued, doses are changed, or new medications (including over-the-counter products) are added; and carry medication information at all times in the event of emergency situations.               Medication List      START taking these medications        Instructions    chlordiazepoxide 10 MG capsule   Commonly known as:  LIBRIUM    Take 2 Caps by mouth 3 times a day as needed (agitation. Change to BID PRN after 2 days. Then daily after 2 days.) for up to 4 days.   Dose:  20 mg         ASK your doctor about these medications        Instructions    albuterol 108 (90 BASE) MCG/ACT Aers inhalation aerosol    Inhale 2 Puffs by mouth every four hours as needed.   Dose:  2 Puff       ferrous gluconate 324 (38 FE) MG Tabs   Commonly known as:  FERGON    Take 324 mg by mouth 3 times a day, with meals.   Dose:  324 mg       gabapentin 100 MG Caps   Commonly known as:  NEURONTIN    Take 3 Caps by mouth 3 times a day for 90 days.   Dose:  300 mg       naltrexone 50 MG Tabs   Commonly known as:  DEPADE    Take 1 Tab by mouth every day.   Dose:  50 mg       nortriptyline 10 MG Caps   Commonly known as:  PAMELOR    Take 2 Caps by mouth every evening.   Dose:  20 mg       omeprazole 20 MG delayed-release capsule   Commonly known as:  PRILOSEC    Take 1 Cap by mouth 2 times a day.   Dose:  20 mg       thiamine 100 MG tablet   Commonly  known as:  THIAMINE    Take 1 Tab by mouth every day.   Dose:  100 mg       tramadol 50 MG Tabs   Commonly known as:  ULTRAM    Take 1 Tab by mouth every 8 hours as needed for Moderate Pain.   Dose:  50 mg               Procedures and tests performed during your visit     Procedure/Test Number of Times Performed    BASIC METABOLIC PANEL 1    CBC WITH DIFFERENTIAL 1    COMP METABOLIC PANEL 1    CT-CSPINE WITHOUT PLUS RECONS 1    CT-HEAD W/O 1    DIAGNOSTIC ALCOHOL 1    DIFFERENTIAL COMMENT 1    EKG (NOW) 1    ESTIMATED GFR 2    HCG QUAL SERUM 1    IMMATURE PLT FRACTION 1    IV Saline Lock 1    LIPASE 1    PELVIC EXAM 1    PERIPHERAL SMEAR REVIEW 1    PROTHROMBIN TIME (INR) 1    Pulse Ox 1    UR BILI ICTOTEST 1    URINALYSIS CULTURE, IF INDICATED 1    URINE CULTURE(NEW) 1    URINE MICROSCOPIC (W/UA) 1        Discharge Instructions       Please take the medication provided to avoid symptoms of withdrawal. It is possible that this medication may not be strong enough, return immediately to the emergency department if you become anxious, shaky or tremulous, if you have any hallucinations, itching, headaches, nausea or vomiting, or any further concerns. Please call the gynecologist listed below for complete recheck. Please return to the emergency department if he develops any new or worsening symptoms. Please take over-the-counter calcium supplements and have your calcium rechecked by her primary care physician within the next week.      Alcohol Abuse and Nutrition  Alcohol abuse is any pattern of alcohol consumption that harms your health, relationships, or work. Alcohol abuse can affect how your body breaks down and absorbs nutrients from food by causing your liver to work abnormally. Additionally, many people who abuse alcohol do not eat enough carbohydrates, protein, fat, vitamins, and minerals. This can cause poor nutrition (malnutrition) and a lack of nutrients (nutrient deficiencies), which can lead to further  complications.  Nutrients that are commonly lacking (deficient) among people who abuse alcohol include:  · Vitamins.  ¨ Vitamin A. This is stored in your liver. It is important for your vision, metabolism, and ability to fight off infections (immunity).  ¨ B vitamins. These include vitamins such as folate, thiamin, and niacin. These are important in new cell growth and maintenance.  ¨ Vitamin C. This plays an important role in iron absorption, wound healing, and immunity.  ¨ Vitamin D. This is produced by your liver, but you can also get vitamin D from food. Vitamin D is necessary for your body to absorb and use calcium.  · Minerals.  ¨ Calcium. This is important for your bones and your heart and blood vessel (cardiovascular) function.  ¨ Iron. This is important for blood, muscle, and nervous system functioning.  ¨ Magnesium. This plays an important role in muscle and nerve function, and it helps to control blood sugar and blood pressure.  ¨ Zinc. This is important for the normal function of your nervous system and digestive system (gastrointestinal tract).  Nutrition is an essential component of therapy for alcohol abuse. Your health care provider or dietitian will work with you to design a plan that can help restore nutrients to your body and prevent potential complications.  WHAT IS MY PLAN?  Your dietitian may develop a specific diet plan that is based on your condition and any other complications you may have. A diet plan will commonly include:  · A balanced diet.  ¨ Grains: 6-8 oz per day.  ¨ Vegetables: 2-3 cups per day.  ¨ Fruits: 1-2 cups per day.  ¨ Meat and other protein: 5-6 oz per day.  ¨ Dairy: 2-3 cups per day.  · Vitamin and mineral supplements.  WHAT DO I NEED TO KNOW ABOUT ALCOHOL AND NUTRITION?  · Consume foods that are high in antioxidants, such as grapes, berries, nuts, green tea, and dark green and orange vegetables. This can help to counteract some of the stress that is placed on your liver by  consuming alcohol.  · Avoid food and drinks that are high in fat and sugar. Foods such as sugared soft drinks, salty snack foods, and candy contain empty calories. This means that they lack important nutrients such as protein, fiber, and vitamins.  · Eat frequent meals and snacks. Try to eat 5-6 small meals each day.  · Eat a variety of fresh fruits and vegetables each day. This will help you get plenty of water, fiber, and vitamins in your diet.  · Drink plenty of water and other clear fluids. Try to drink at least 48-64 oz (1.5-2 L) of water per day.  · If you are a vegetarian, eat a variety of protein-rich foods. Pair whole grains with plant-based proteins at meals and snacks to obtain the greatest nutrient benefit from your food. For example, eat rice with beans, put peanut butter on whole-grain toast, or eat oatmeal with sunflower seeds.  · Soak beans and whole grains overnight before cooking. This can help your body to absorb the nutrients more easily.  · Include foods fortified with vitamins and minerals in your diet. Commonly fortified foods include milk, orange juice, cereal, and bread.  · If you are malnourished, your dietitian may recommend a high-protein, high-calorie diet. This may include:  ¨ 2,000-3,000 calories (kilocalories) per day.  ¨  grams of protein per day.  · Your health care provider may recommend a complete nutritional supplement beverage. This can help to restore calories, protein, and vitamins to your body. Depending on your condition, you may be advised to consume this instead of or in addition to meals.  · Limit your intake of caffeine. Replace drinks like coffee and black tea with decaffeinated coffee and herbal tea.  · Eat a variety of foods that are high in omega fatty acids. These include fish, nuts and seeds, and soybeans. These foods may help your liver to recover and may also stabilize your mood.  · Certain medicines may cause changes in your appetite, taste, and weight.  Work with your health care provider and dietitian to make any adjustments to your medicines and diet plan.  · Include other healthy lifestyle choices in your daily routine.  ¨ Be physically active.  ¨ Get enough sleep.  ¨ Spend time doing activities that you enjoy.  · If you are unable to take in enough food and calories by mouth, your health care provider may recommend a feeding tube. This is a tube that passes through your nose and throat, directly into your stomach. Nutritional supplement beverages can be given to you through the feeding tube to help you get the nutrients you need.  · Take vitamin or mineral supplements as recommended by your health care provider.  WHAT FOODS CAN I EAT?  Grains  Enriched pasta. Enriched rice. Fortified whole-grain bread. Fortified whole-grain cereal. Barley. Brown rice. Quinoa. Millet.  Vegetables  All fresh, frozen, and canned vegetables. Spinach. Kale. Artichoke. Carrots. Winter squash and pumpkin. Sweet potatoes. Broccoli. Cabbage. Cucumbers. Tomatoes. Sweet peppers. Green beans. Peas. Corn.  Fruits  All fresh and frozen fruits. Berries. Grapes. DISH. Papaya. Guava. Cherries. Apples. Bananas. Peaches. Plums. Pineapple. Watermelon. Cantaloupe. Oranges. Avocado.  Meats and Other Protein Sources  Beef liver. Lean beef. Pork. Fresh and canned chicken. Fresh fish. Oysters. Sardines. Canned tuna. Shrimp. Eggs with yolks. Nuts and seeds. Peanut butter. Beans and lentils. Soybeans. Tofu.  Dairy  Whole, low-fat, and nonfat milk. Whole, low-fat, and nonfat yogurt. Cottage cheese. Sour cream. Hard and soft cheeses.  Beverages  Water. Herbal tea. Decaffeinated coffee. Decaffeinated green tea. 100% fruit juice. 100% vegetable juice. Instant breakfast shakes.  Condiments  Ketchup. Mayonnaise. Mustard. Salad dressing. Barbecue sauce.  Sweets and Desserts  Sugar-free ice cream. Sugar-free pudding. Sugar-free gelatin.  Fats and Oils  Butter. Vegetable oil, flaxseed oil, olive oil, and walnut  oil.  Other  Complete nutrition shakes. Protein bars. Sugar-free gum.  The items listed above may not be a complete list of recommended foods or beverages. Contact your dietitian for more options.  WHAT FOODS ARE NOT RECOMMENDED?  Grains  Sugar-sweetened breakfast cereals. Flavored instant oatmeal. Fried breads.  Vegetables  Breaded or deep-fried vegetables.  Fruits  Dried fruit with added sugar. Candied fruit. Canned fruit in syrup.  Meats and Other Protein Sources  Breaded or deep-fried meats.  Dairy  Flavored milks. Fried cheese curds or fried cheese sticks.  Beverages  Alcohol. Sugar-sweetened soft drinks. Sugar-sweetened tea. Caffeinated coffee and tea.  Condiments  Sugar. Honey. Agave nectar. Molasses.  Sweets and Desserts  Chocolate. Cake. Cookies. Candy.  Other  Potato chips. Pretzels. Salted nuts. Candied nuts.  The items listed above may not be a complete list of foods and beverages to avoid. Contact your dietitian for more information.     This information is not intended to replace advice given to you by your health care provider. Make sure you discuss any questions you have with your health care provider.     Document Released: 10/12/2006 Document Revised: 01/08/2016 Document Reviewed: 07/21/2015  [employer] Patient Education ©2016 [employer]            Patient Information     Patient Information    Following emergency treatment: all patient requiring follow-up care must return either to a private physician or a clinic if your condition worsens before you are able to obtain further medical attention, please return to the emergency room.     Billing Information    At [facility], we work to make the billing process streamlined for our patients.  Our Representatives are here to answer any questions you may have regarding your hospital bill.  If you have insurance coverage and have supplied your insurance information to us, we will submit a claim to your insurer on your behalf.  Should you  have any questions regarding your bill, we can be reached online or by phone as follows:  Online: You are able pay your bills online or live chat with our representatives about any billing questions you may have. We are here to help Monday - Friday from 8:00am to 7:30pm and 9:00am - 12:00pm on Saturdays.  Please visit [email or web address]  for more information.   Phone:  [phone] or [phone]    Please note that your emergency physician, surgeon, pathologist, radiologist, anesthesiologist, and other specialists are not employed by [facility] and will therefore bill separately for their services.  Please contact them directly for any questions concerning their bills at the numbers below:     Emergency Physician Services:  [phone]  [facility]:  [phone]  [facility]:  [phone]  [facility]:  [phone]    1. Your final bill may vary from the amount quoted upon discharge if all procedures are not complete at that time, or if your doctor has additional procedures of which we are not aware. You will receive an additional bill if you return to the Emergency Department at [facility] for suture removal regardless of the facility of which the sutures were placed.     2. Please arrange for settlement of this account at the emergency registration.    3. All self-pay accounts are due in full at the time of treatment.  If you are unable to meet this obligation then payment is expected within 4-5 days.     4. If you have had radiology studies (CT, X-ray, Ultrasound, MRI), you have received a preliminary result during your emergency department visit. Please contact the radiology department [phone] to receive a copy of your final result. Please discuss the Final result with your primary physician or with the follow up physician provided.     Crisis Hotline:  National Crisis Hotline:  2-308-WNEFRIT or  1-508.276.1461  [place] Crisis Hotline:    1-851.674.4339 or 782-103-7770         ED Discharge Follow Up Questions    1. In order to provide you with very good care, we would like to follow up with a phone call in the next few days.  May we have your permission to contact you?     YES /  NO    2. What is the best phone number to call you? (       )_____-__________    3. What is the best time to call you?      Morning  /  Afternoon  /  Evening                   Patient Signature:  ____________________________________________________________    Date:  ____________________________________________________________

## 2017-01-26 NOTE — ED AVS SNAPSHOT
1/27/2017          Nury Thornton  [address] [place] [address]    Dear Nury:    [facility] wants to ensure your discharge home is safe and you or your loved ones have had all your questions answered regarding your care after you leave the hospital.    You may receive a telephone call within two days of your discharge.  This call is to make certain you understand your discharge instructions as well as ensure we provided you with the best care possible during your stay with us.     The call will only last approximately 3-5 minutes and will be done by a nurse.    Once again, we want to ensure your discharge home is safe and that you have a clear understanding of any next steps in your care.  If you have any questions or concerns, please do not hesitate to contact us, we are here for you.  Thank you for choosing [facility] for your healthcare needs.    Sincerely,    Ramesh Rojas    [facility]

## 2017-01-26 NOTE — ED AVS SNAPSHOT
[employer] Access Code: [email or web address]  Expires: 2/26/2017  4:18 AM    Your email address is not on file at [facility].  Email Addresses are required for you to sign up for [employer], please contact [phone] to verify your personal information and to provide your email address prior to attempting to register for [employer].    Nury Thornton  [address] [place] [address]    [employer]  A secure, online tool to manage your health information     [facility]’s [employer]® is a secure, online tool that connects you to your personalized health information from the privacy of your home -- day or night - making it very easy for you to manage your healthcare. Once the activation process is completed, you can even access your medical information using the [employer] jeanette, which is available for free in the Apple Jeanette store or Google Play store.     To learn more about [employer], visit [email or web address]    There are two levels of access available (as shown below):   My Chart Features  [facility] Primary Care Doctor [facility]  Specialists [facility]  Urgent  Care Non-[facility] Primary Care Doctor   Email your healthcare team securely and privately 24/7 X X X    Manage appointments: schedule your next appointment; view details of past/upcoming appointments X      Request prescription refills. X      View recent personal medical records, including lab and immunizations X X X X   View health record, including health history, allergies, medications X X X X   Read reports about your outpatient visits, procedures, consult and ER notes X X X X   See your discharge summary, which is a recap of your hospital and/or ER visit that includes your diagnosis, lab results, and care plan X X  X     How to register for [employer]:  Once your e-mail address has been verified, follow the following steps to sign up for [employer].     1. Go to  [email or web address]  2. Click on the Sign Up Now box, which takes you to the New Member Sign Up page. You will need to  provide the following information:  a. Enter your [email or web address] Access Code exactly as it appears at the top of this page. (You will not need to use this code after you’ve completed the sign-up process. If you do not sign up before the expiration date, you must request a new code.)   b. Enter your date of birth.   c. Enter your home email address.   d. Click Submit, and follow the next screen’s instructions.  3. Create a imo.im ID. This will be your imo.im login ID and cannot be changed, so think of one that is secure and easy to remember.  4. Create a imo.im password. You can change your password at any time.  5. Enter your Password Reset Question and Answer. This can be used at a later time if you forget your password.   6. Enter your e-mail address. This allows you to receive e-mail notifications when new information is available in imo.[email or web address].  7. Click Sign Up. You can now view your health information.    For assistance activating your [email or web address] account, call [phone]

## 2017-01-27 NOTE — DISCHARGE INSTRUCTIONS
Please take the medication provided to avoid symptoms of withdrawal. It is possible that this medication may not be strong enough, return immediately to the emergency department if you become anxious, shaky or tremulous, if you have any hallucinations, itching, headaches, nausea or vomiting, or any further concerns. Please call the gynecologist listed below for complete recheck. Please return to the emergency department if he develops any new or worsening symptoms. Please take over-the-counter calcium supplements and have your calcium rechecked by her primary care physician within the next week.      Alcohol Abuse and Nutrition  Alcohol abuse is any pattern of alcohol consumption that harms your health, relationships, or work. Alcohol abuse can affect how your body breaks down and absorbs nutrients from food by causing your liver to work abnormally. Additionally, many people who abuse alcohol do not eat enough carbohydrates, protein, fat, vitamins, and minerals. This can cause poor nutrition (malnutrition) and a lack of nutrients (nutrient deficiencies), which can lead to further complications.  Nutrients that are commonly lacking (deficient) among people who abuse alcohol include:  · Vitamins.  ¨ Vitamin A. This is stored in your liver. It is important for your vision, metabolism, and ability to fight off infections (immunity).  ¨ B vitamins. These include vitamins such as folate, thiamin, and niacin. These are important in new cell growth and maintenance.  ¨ Vitamin C. This plays an important role in iron absorption, wound healing, and immunity.  ¨ Vitamin D. This is produced by your liver, but you can also get vitamin D from food. Vitamin D is necessary for your body to absorb and use calcium.  · Minerals.  ¨ Calcium. This is important for your bones and your heart and blood vessel (cardiovascular) function.  ¨ Iron. This is important for blood, muscle, and nervous system functioning.  ¨ Magnesium. This plays an  important role in muscle and nerve function, and it helps to control blood sugar and blood pressure.  ¨ Zinc. This is important for the normal function of your nervous system and digestive system (gastrointestinal tract).  Nutrition is an essential component of therapy for alcohol abuse. Your health care provider or dietitian will work with you to design a plan that can help restore nutrients to your body and prevent potential complications.  WHAT IS MY PLAN?  Your dietitian may develop a specific diet plan that is based on your condition and any other complications you may have. A diet plan will commonly include:  · A balanced diet.  ¨ Grains: 6-8 oz per day.  ¨ Vegetables: 2-3 cups per day.  ¨ Fruits: 1-2 cups per day.  ¨ Meat and other protein: 5-6 oz per day.  ¨ Dairy: 2-3 cups per day.  · Vitamin and mineral supplements.  WHAT DO I NEED TO KNOW ABOUT ALCOHOL AND NUTRITION?  · Consume foods that are high in antioxidants, such as grapes, berries, nuts, green tea, and dark green and orange vegetables. This can help to counteract some of the stress that is placed on your liver by consuming alcohol.  · Avoid food and drinks that are high in fat and sugar. Foods such as sugared soft drinks, salty snack foods, and candy contain empty calories. This means that they lack important nutrients such as protein, fiber, and vitamins.  · Eat frequent meals and snacks. Try to eat 5-6 small meals each day.  · Eat a variety of fresh fruits and vegetables each day. This will help you get plenty of water, fiber, and vitamins in your diet.  · Drink plenty of water and other clear fluids. Try to drink at least 48-64 oz (1.5-2 L) of water per day.  · If you are a vegetarian, eat a variety of protein-rich foods. Pair whole grains with plant-based proteins at meals and snacks to obtain the greatest nutrient benefit from your food. For example, eat rice with beans, put peanut butter on whole-grain toast, or eat oatmeal with sunflower  seeds.  · Soak beans and whole grains overnight before cooking. This can help your body to absorb the nutrients more easily.  · Include foods fortified with vitamins and minerals in your diet. Commonly fortified foods include milk, orange juice, cereal, and bread.  · If you are malnourished, your dietitian may recommend a high-protein, high-calorie diet. This may include:  ¨ 2,000-3,000 calories (kilocalories) per day.  ¨  grams of protein per day.  · Your health care provider may recommend a complete nutritional supplement beverage. This can help to restore calories, protein, and vitamins to your body. Depending on your condition, you may be advised to consume this instead of or in addition to meals.  · Limit your intake of caffeine. Replace drinks like coffee and black tea with decaffeinated coffee and herbal tea.  · Eat a variety of foods that are high in omega fatty acids. These include fish, nuts and seeds, and soybeans. These foods may help your liver to recover and may also stabilize your mood.  · Certain medicines may cause changes in your appetite, taste, and weight. Work with your health care provider and dietitian to make any adjustments to your medicines and diet plan.  · Include other healthy lifestyle choices in your daily routine.  ¨ Be physically active.  ¨ Get enough sleep.  ¨ Spend time doing activities that you enjoy.  · If you are unable to take in enough food and calories by mouth, your health care provider may recommend a feeding tube. This is a tube that passes through your nose and throat, directly into your stomach. Nutritional supplement beverages can be given to you through the feeding tube to help you get the nutrients you need.  · Take vitamin or mineral supplements as recommended by your health care provider.  WHAT FOODS CAN I EAT?  Grains  Enriched pasta. Enriched rice. Fortified whole-grain bread. Fortified whole-grain cereal. Barley. Brown rice. Quinoa. Millet.  Vegetables  All  fresh, frozen, and canned vegetables. Spinach. Kale. Artichoke. Carrots. Winter squash and pumpkin. Sweet potatoes. Broccoli. Cabbage. Cucumbers. Tomatoes. Sweet peppers. Green beans. Peas. Corn.  Fruits  All fresh and frozen fruits. Berries. Grapes. [place]. Papaya. Guava. Cherries. Apples. Bananas. Peaches. Plums. Pineapple. Watermelon. Cantaloupe. Oranges. Avocado.  Meats and Other Protein Sources  Beef liver. Lean beef. Pork. Fresh and canned chicken. Fresh fish. Oysters. Sardines. Canned tuna. Shrimp. Eggs with yolks. Nuts and seeds. Peanut butter. Beans and lentils. Soybeans. Tofu.  Dairy  Whole, low-fat, and nonfat milk. Whole, low-fat, and nonfat yogurt. Cottage cheese. Sour cream. Hard and soft cheeses.  Beverages  Water. Herbal tea. Decaffeinated coffee. Decaffeinated green tea. 100% fruit juice. 100% vegetable juice. Instant breakfast shakes.  Condiments  Ketchup. Mayonnaise. Mustard. Salad dressing. Barbecue sauce.  Sweets and Desserts  Sugar-free ice cream. Sugar-free pudding. Sugar-free gelatin.  Fats and Oils  Butter. Vegetable oil, flaxseed oil, olive oil, and walnut oil.  Other  Complete nutrition shakes. Protein bars. Sugar-free gum.  The items listed above may not be a complete list of recommended foods or beverages. Contact your dietitian for more options.  WHAT FOODS ARE NOT RECOMMENDED?  Grains  Sugar-sweetened breakfast cereals. Flavored instant oatmeal. Fried breads.  Vegetables  Breaded or deep-fried vegetables.  Fruits  Dried fruit with added sugar. Candied fruit. Canned fruit in syrup.  Meats and Other Protein Sources  Breaded or deep-fried meats.  Dairy  Flavored milks. Fried cheese curds or fried cheese sticks.  Beverages  Alcohol. Sugar-sweetened soft drinks. Sugar-sweetened tea. Caffeinated coffee and tea.  Condiments  Sugar. Honey. Agave nectar. Molasses.  Sweets and Desserts  Chocolate. Cake. Cookies. Candy.  Other  Potato chips. Pretzels. Salted nuts. Candied nuts.  The items listed  above may not be a complete list of foods and beverages to avoid. Contact your dietitian for more information.     This information is not intended to replace advice given to you by your health care provider. Make sure you discuss any questions you have with your health care provider.     Document Released: 10/12/2006 Document Revised: 01/08/2016 Document Reviewed: 07/21/2015  [employer] Patient Education ©2016 [employer]

## 2017-01-27 NOTE — ED PROVIDER NOTES
ED Provider Note    Scribed for Corinne Roche M.D. by Annia Quinonez. 1/26/2017, 11:00 PM.    Primary care provider: Pcp Pt States None  Means of arrival: walk-in  History obtained from: Patient  History limited by: None    CHIEF COMPLAINT  Chief Complaint   Patient presents with   • Irregular Menses     heavy bleeding x 7 days   • Abdominal Pain   • T-5000 FALL     lastnight, head neck pain   • Lightheadedness     HPI  Nury Thornton is a 36 y.o. female with a history of anemia who presents to the Emergency Department with vaginal bleeding onset 7 days ago. She states that this started like a normal period for her but has been persistent with large blood clots and bright, red blood whereas her normal menses only lasts 5-7 days and lightens at the end. The patient also endorses a headache secondary to a mechanical ground level fall yesterday. Per patient she fell after slipping on ice while being intoxicated. Patient notes that she typically drinks every day and she reports her last drink being yesterday. She is experiencing generalized diffuse pain throughout her body at this time. Patient adds that she has been experiencing rapid weight loss recently.    REVIEW OF SYSTEMS   Pertinent positives include vaginal bleeding, headache, generalized diffuse pain, generalized weakness, neck pain, weight loss, alcohol abuse, falls, head trauma, and headache.   Pertinent negatives include no fever, vomiting, chest pain, or cough.    All other systems reviewed and negative.    PAST MEDICAL HISTORY   has a past medical history of Depression; Psychiatric disorder; Stroke (CMS-HCC); ASTHMA; Pneumonia (11/18/2016); and Sepsis (CMS-HCC) (11/18/2016).    SURGICAL HISTORY   has past surgical history that includes tubal ligation; other orthopedic surgery; other; ankle orif (10/4/2009); and gyn surgery.    SOCIAL HISTORY  Social History   Substance Use Topics   • Smoking status: Current Every Day Smoker -- 0.25 packs/day     Types:  "Cigarettes   • Alcohol Use: Yes   Last drink yesterday     History   Drug Use No     Comment: Hx of meth and marijuana     FAMILY HISTORY  Family History   Problem Relation Age of Onset   • Heart Disease Father    • Diabetes Mother      CURRENT MEDICATIONS    No current facility-administered medications on file prior to encounter.     Current Outpatient Prescriptions on File Prior to Encounter   Medication Sig Dispense Refill   • gabapentin (NEURONTIN) 100 MG Cap Take 3 Caps by mouth 3 times a day for 90 days. 90 Cap 0   • nortriptyline (PAMELOR) 10 MG Cap Take 2 Caps by mouth every evening. 60 Cap 0   • omeprazole (PRILOSEC) 20 MG delayed-release capsule Take 1 Cap by mouth 2 times a day. 30 Cap 0   • thiamine (THIAMINE) 100 MG tablet Take 1 Tab by mouth every day. 30 Tab 0   • naltrexone (DEPADE) 50 MG Tab Take 1 Tab by mouth every day. 30 Tab 0   • tramadol (ULTRAM) 50 MG Tab Take 1 Tab by mouth every 8 hours as needed for Moderate Pain. 10 Tab 0   • ferrous gluconate (FERGON) 324 (38 FE) MG Tab Take 324 mg by mouth 3 times a day, with meals.     • albuterol 108 (90 BASE) MCG/ACT Aero Soln inhalation aerosol Inhale 2 Puffs by mouth every four hours as needed. 8.5 g 0         ALLERGIES  Allergies   Allergen Reactions   • Nkda [No Known Drug Allergy]        PHYSICAL EXAM  Vital Signs: /62 mmHg  Pulse 121  Temp(Src) 36.9 °C (98.5 °F)  Resp 15  Ht 1.702 m (5' 7\")  Wt 81.5 kg (179 lb 10.8 oz)  BMI 28.13 kg/m2  SpO2 99%  Constitutional: Alert, no acute distress  HENT: Normocephalic, atraumatic, moist mucus membranes  Eyes: Pupils equal and reactive, normal conjunctiva, scleral icteric  Neck: Supple, normal range of motion, no stridor  Cardiovascular: Tachycardic, Normal peripheral perfusion, no cyanosis, Normal cardiac auscultation  Pulmonary: No respiratory distress, normal work of breathing, no accessory muscle usage, Clear to auscultation bilaterally  Abdomen: Soft, non tender, no peritoneal signs, " bowel sounds are present.   Skin: Warm, dry, no rashes or lesions  Back: No pain with active range of motion  Genitourinary: Cervical os closed, scant dark blood in vaginal vault, scant amount of blood from behind cervical os, no cervical motion tenderness  Musculoskeletal: Normal range of motion in all extremities, no swelling or deformity noted  Neurologic: Alert, oriented, normal motor function, no speech deficits  Psychiatric: Normal and appropriate mood and affect    DIAGNOSTIC STUDIES/PROCEDURES:    LABS  Labs Reviewed   CBC WITH DIFFERENTIAL - Abnormal; Notable for the following:     RBC 2.54 (*)     Hemoglobin 7.6 (*)     Hematocrit 24.5 (*)     MCHC 31.1 (*)     RDW 60.9 (*)     Platelet Count 71 (*)     MPV 13.4 (*)     Lymphocytes 20.00 (*)     All other components within normal limits    Narrative:     Indicate which anticoagulants the patient is on:->UNKNOWN   COMP METABOLIC PANEL - Abnormal; Notable for the following:     Sodium 131 (*)     Potassium 2.7 (*)     Bun 5 (*)     Calcium 8.3 (*)     AST(SGOT) 90 (*)     Alkaline Phosphatase 160 (*)     Total Bilirubin 4.2 (*)     Albumin 2.9 (*)     Globulin 5.1 (*)     All other components within normal limits    Narrative:     Indicate which anticoagulants the patient is on:->UNKNOWN   URINALYSIS,CULTURE IF INDICATED - Abnormal; Notable for the following:     Protein 50 (*)     Occult Blood Moderate (*)     All other components within normal limits   PROTHROMBIN TIME - Abnormal; Notable for the following:     PT 21.7 (*)     INR 1.83 (*)     All other components within normal limits    Narrative:     Indicate which anticoagulants the patient is on:->UNKNOWN   URINE MICROSCOPIC (W/UA) - Abnormal; Notable for the following:     WBC >150 (*)     RBC >150 (*)     All other components within normal limits   BASIC METABOLIC PANEL - Abnormal; Notable for the following:     Glucose 109 (*)     Bun 4 (*)     Calcium 7.6 (*)     All other components within normal  limits   LIPASE    Narrative:     Indicate which anticoagulants the patient is on:->UNKNOWN   HCG QUAL SERUM    Narrative:     Indicate which anticoagulants the patient is on:->UNKNOWN   DIAGNOSTIC ALCOHOL   UR BILI ICTOTEST   URINE CULTURE(NEW)   ESTIMATED GFR    Narrative:     Indicate which anticoagulants the patient is on:->UNKNOWN   PERIPHERAL SMEAR REVIEW    Narrative:     Indicate which anticoagulants the patient is on:->UNKNOWN   IMMATURE PLT FRACTION    Narrative:     Indicate which anticoagulants the patient is on:->UNKNOWN   DIFFERENTIAL COMMENT    Narrative:     Indicate which anticoagulants the patient is on:->UNKNOWN   ESTIMATED GFR     All labs reviewed by me.    Radiology  CT-CSPINE WITHOUT PLUS RECONS   Final Result         1. No acute fracture from C1 through T2 is visualized.         CT-HEAD W/O   Final Result         1. No acute intracranial abnormality. No evidence of acute hemorrhage.      2. Paranasal sinus disease.        EKG  12 Lead EKG interpreted by me to show:  Weight 102, sinus tachycardia, no ST elevation or depression, no delta waves, no T-wave inversions, no significant change as compared to 12/4/16, rate at that time is 99.    COURSE & MEDICAL DECISION MAKING  Pertinent Labs & Imaging studies reviewed. (See chart for details)    Review of old medical records for continuity of care. Hemoglobin on 12/4/16 was 8.7. Patient admitted to this facility 12/3/16, discharged 12/5/16 for chronic abdominal pain. Has had recent workup including endoscopy, colonoscopy, HIDA scan and MRCP which did not reveal significant findings. Possible alcohol-induced liver disease with elevated enzymes on laboratory. Seen and evaluated by Dr. Guo, indeterminate risk of surgery for cholelithiasis outweighs benefits.    Differential diagnoses include but are not limited to: Symptomatic anemia, chronic abdominal pain, alcohol withdrawal, electrolyte abnormalities, malnutrition, hypoglycemia    11:05 PM -  Patient seen and examined at bedside. Patient will be treated with Valium 5mg PO, IV fluids, Motrin 600mg PO, Zofran 4mg IV. Ordered CBC, CMP, Lipase, Urinalysis, HCG qual serum PTT, EKG to evaluate her symptoms.     1:05 AM On my reassessment after a liter of fluids her heart rate has improved to 109, but she remains tachycardic. Additionally, she continues to complain of headache. On physical exam pain seems to be associated with area of soft tissue swelling in the occipital region, however with elevated INR and decreased platelet CT head and cervical spine were ordered for evaluation to verify no intracranial injury.     3:05 AM- I conducted a pelvic exam as detailed above in physical.     4:16 AM I rechecked with the patient at bedside and informed her of further plan of care including discharge. The patient understands and verbalizes agreement.    Decision Making:  This is a 36 y.o. year old female who presents with history and physical exam as described above. Her primary concern is for excessive vaginal bleeding causing symptomatic anemia.    Laboratory evaluation white blood count is normal. Hemoglobin is 7.6 which is slightly decreased from 12/4/16. Platelet count is low at 71. Potassium is 2.7, this was replaced orally in the emergency department. Bilirubin elevated at 4.2, elevated from 1.9 previously. Lipase is normal at 60 without evidence of pancreatitis. AST mildly elevated at 90, ALT normal, alkaline phosphatase 160. HCG is negative. INR is 1.83. Diagnostic alcohol is negative.    She did have multiple electrolyte abnormalities, most concerning hyponatremia and hypokalemia. Suspect this is because of her excessive alcohol use without good dietary intake. She was treated with fluids and oral potassium in the emergency department with resolution of hyponatremia and hypokalemia. She remains with hypocalcemia which was present on her previous discharge. Instructed to take calcium supplements and follow  up with her primary care provider for recheck.    She was treated with Librium in the emergency department for alcohol withdrawal, discharged with a prescription for Librium as she states she would like to quit drinking. She is also instructed to present to a detox facility for medical care. I explained that she may still have symptoms despite the Librium and if this occurred to return to our emergency department for recheck to verify that she is not having life-threatening withdrawal symptoms.    With regard to her vaginal bleeding is very scant on physical exam no evidence of brisk hemorrhage. She did have a mild decrease in her hemoglobin, not requiring transfusion, and his symptomatic anemia. She does have chronic anemia that is not significantly changed at this time. She'll follow up with gynecology for definitive management.    The patient will return for new or worsening symptoms and is stable at the time of discharge.    The patient is referred to a primary physician for blood pressure management, diabetic screening, and for all other preventative health concerns.    DISPOSITION:  Patient will be discharged home in stable condition.    FOLLOW UP:  Harika Enriquez M.D.  [address] [facility] [address]  [phone]    Schedule an appointment as soon as possible for a visit  please call tomorrow morning to schedule a follow-up appointment    [facility], Emergency Dept  [address] [facility] [address] [phone]  Go to  If symptoms worsen or do not continue to improve    [address] [address] [phone]    please call tomorrow morning to schedule a follow-up appointment for general health maintenance and calcium recheck      OUTPATIENT MEDICATIONS:  Discharge Medication List as of 1/27/2017  4:18 AM      START taking these medications    Details   chlordiazepoxide (LIBRIUM) 10 MG capsule Take 2 Caps by mouth 3 times a day  as needed (agitation. Change to BID PRN after 2 days. Then daily after 2 days.) for up to 4 days., Disp-20 Cap, R-0, Print Rx Paper             FINAL IMPRESSION  1. Hypokalemia    2. Vaginal bleeding    3. Alcohol abuse          Annia DODD (Scribe), am scribing for, and in the presence of, Corinne Roche M.D..    Electronically signed by: Annia Quinonez (Scribe), 1/26/2017    ICorinne M.D. personally performed the services described in this documentation, as scribed by Annia Quinnoez in my presence, and it is both accurate and complete.    The note accurately reflects work and decisions made by me.  Corinne Roche  1/27/2017  7:03 AM

## 2017-01-27 NOTE — ED NOTES
.  Chief Complaint   Patient presents with   • Irregular Menses     heavy bleeding x 7 days   • Abdominal Pain   • T-5000 FALL     lastnight, head neck pain   • Lightheadedness   ambulated to triage with steady gait. Above c/c. Pt reports hx anemia.   Informed of triage process. Awaiting room in triage lobby. Asked to return to triage desk with any questions or concerns.

## 2017-03-09 PROBLEM — K81.9 CHOLECYSTITIS: Status: ACTIVE | Noted: 2017-01-01

## 2017-03-09 NOTE — IP AVS SNAPSHOT
3/14/2017          Nury Thornton  [address] [place] [address]    Dear Nury:    [facility] wants to ensure your discharge home is safe and you or your loved ones have had all your questions answered regarding your care after you leave the hospital.    You may receive a telephone call within two days of your discharge.  This call is to make certain you understand your discharge instructions as well as ensure we provided you with the best care possible during your stay with us.     The call will only last approximately 3-5 minutes and will be done by a nurse.    Once again, we want to ensure your discharge home is safe and that you have a clear understanding of any next steps in your care.  If you have any questions or concerns, please do not hesitate to contact us, we are here for you.  Thank you for choosing [facility] for your healthcare needs.    Sincerely,    Ramesh Rojas    [facility]

## 2017-03-09 NOTE — ED NOTES
"Pt arrives to triage via wheelchair with friend with c/c abd pain x5 months, worse recently.  Pt reports \"a lot of liver issues since November.\" A&ox4.  Pt & friend to lobby & advised to inform RN of any changes.  "

## 2017-03-09 NOTE — IP AVS SNAPSHOT
" <p align=\"LEFT\"><IMG SRC=\"[email or web address]\" alt=\"Image\" WIDTH=\"50%\" HEIGHT=\"200\" BORDER=\"\"></p>                   Name:Nury Thornton  Medical Record Number:[id number]  CSN: [id number]    YOB: 1980   Age: 36 y.o.  Sex: female  HT:1.702 m (5' 7\") WT: 78.1 kg (172 lb 2.9 oz)          Admit Date: 3/9/2017     Discharge Date:   Today's Date: 3/14/2017  Attending Doctor:  Jimbo Ruiz M.D.                  Allergies:  Nkda          Follow-up Information     1. Follow up with Mejia Colunga M.D. In 2 weeks.    Specialty:  Surgery    Contact information    [address] [address] [phone]           Medication List      Take these Medications        Instructions    ciprofloxacin 500 MG Tabs   Commonly known as:  CIPRO    Take 1 Tab by mouth 2 times a day for 5 days.   Dose:  500 mg       ferrous gluconate 324 (38 FE) MG Tabs   Commonly known as:  FERGON    Take 324 mg by mouth 3 times a day, with meals.   Dose:  324 mg       furosemide 20 MG Tabs   Commonly known as:  LASIX    Take 20 mg by mouth every day.   Dose:  20 mg       * hydrocodone-acetaminophen 5-325 MG Tabs per tablet   Commonly known as:  NORCO    Take 1-2 Tabs by mouth every four hours as needed.   Dose:  1-2 Tab       * hydrocodone-acetaminophen 5-325 MG Tabs per tablet   Commonly known as:  NORCO    Doctor's comments:  No driving while on pain medications   Take 1-2 Tabs by mouth every four hours as needed (as needed for pain).   Dose:  1-2 Tab       medroxyPROGESTERone 5 MG Tabs   Commonly known as:  PROVERA    Take 10 mg by mouth every day.   Dose:  10 mg       metronidazole 500 MG Tabs   Commonly known as:  FLAGYL    Take 1 Tab by mouth every 8 hours for 5 days.   Dose:  500 mg       omeprazole 20 MG delayed-release capsule   Commonly known as:  PRILOSEC    Take 1 Cap by mouth 2 times a day.   Dose:  20 mg       ondansetron 4 MG Tabs tablet   Commonly known as:  ZOFRAN    Take 1 Tab by mouth every " four hours as needed for Nausea/Vomiting.   Dose:  4 mg       ondansetron 4 MG Tbdp   Commonly known as:  ZOFRAN ODT    Doctor's comments:  Do not need to fill this prescription unless you have nausea/vomitting. If you have persistent nausea/vomitting seek medical evaluation.   Take 1 Tab by mouth every 6 hours as needed for Nausea/Vomiting.   Dose:  4 mg       pentoxifylline  MG CR tablet   Commonly known as:  TRENTAL    Take 400 mg by mouth 3 times a day, with meals.   Dose:  400 mg       potassium chloride SA 20 MEQ Tbcr   Commonly known as:  Kdur    Take 20 mEq by mouth every day.   Dose:  20 mEq       spironolactone 50 MG Tabs   Commonly known as:  ALDACTONE    Take 50 mg by mouth every day.   Dose:  50 mg       * Notice:  This list has 2 medication(s) that are the same as other medications prescribed for you. Read the directions carefully, and ask your doctor or other care provider to review them with you.

## 2017-03-09 NOTE — IP AVS SNAPSHOT
" Home Care Instructions                                                                                                                  Name:Nury Thornton  Medical Record Number:[id number]  CSN: [id number]    YOB: 1980   Age: 36 y.o.  Sex: female  HT:1.702 m (5' 7\") WT: 78.1 kg (172 lb 2.9 oz)          Admit Date: 3/9/2017     Discharge Date:   Today's Date: 3/14/2017  Attending Doctor:  Jimbo Ruiz M.D.                  Allergies:  Nkda            Discharge Instructions       Discharge Instructions    Discharged to home by car with relative. Discharged via wheelchair, hospital escort: Yes.  Special equipment needed: Not Applicable    Be sure to schedule a follow-up appointment with your primary care doctor or any specialists as instructed.     Discharge Plan:   Diet Plan: Discussed  Activity Level: Discussed  Confirmed Follow up Appointment: Patient to Call and Schedule Appointment  Confirmed Symptoms Management: Discussed  Medication Reconciliation Updated: Yes  Influenza Vaccine Indication: Not indicated: Previously immunized this influenza season and > 8 years of age    I understand that a diet low in cholesterol, fat, and sodium is recommended for good health. Unless I have been given specific instructions below for another diet, I accept this instruction as my diet prescription.   Other diet: Low Fat Hepatic    Special Instructions: None    · Is patient discharged on Warfarin / Coumadin?   No     · Is patient Post Blood Transfusion?  No    Depression / Suicide Risk    As you are discharged from this [facility] facility, it is important to learn how to keep safe from harming yourself.    Recognize the warning signs:  · Abrupt changes in personality, positive or negative- including increase in energy   · Giving away possessions  · Change in eating patterns- significant weight changes-  positive or negative  · Change in sleeping patterns- unable to sleep or sleeping all the time   · Unwillingness " or inability to communicate  · Depression  · Unusual sadness, discouragement and loneliness  · Talk of wanting to die  · Neglect of personal appearance   · Rebelliousness- reckless behavior  · Withdrawal from people/activities they love  · Confusion- inability to concentrate     If you or a loved one observes any of these behaviors or has concerns about self-harm, here's what you can do:  · Talk about it- your feelings and reasons for harming yourself  · Remove any means that you might use to hurt yourself (examples: pills, rope, extension cords, firearm)  · Get professional help from the community (Mental Health, Substance Abuse, psychological counseling)  · Do not be alone:Call your Safe Contact- someone whom you trust who will be there for you.  · Call your local CRISIS HOTLINE 333-4764 or 918-173-0418  · Call your local Children's Mobile Crisis Response Team [place] (439) 467-4591 or www.Cellfire  · Call the toll free National Suicide Prevention Hotlines   · National Suicide Prevention Lifeline 931-665-UTZG (9216)  · [facility] [facility] 800-SUICIDE (016-4622)    Laparoscopic Cholecystectomy, Care After  Refer to this sheet in the next few weeks. These instructions provide you with information on caring for yourself after your procedure. Your health care provider may also give you more specific instructions. Your treatment has been planned according to current medical practices, but problems sometimes occur. Call your health care provider if you have any problems or questions after your procedure.  WHAT TO EXPECT AFTER THE PROCEDURE  After your procedure, it is typical to have the following:  · Pain at your incision sites. You will be given pain medicines to control the pain.  · Mild nausea or vomiting. This should improve after the first 24 hours.  · Bloating and possibly shoulder pain from the gas used during the procedure. This will improve after the first 24 hours.  HOME CARE INSTRUCTIONS    · Change bandages (dressings) as directed by your health care provider.  · Keep the wound dry and clean. You may wash the wound gently with soap and water. Gently blot or dab the area dry.  · Do not take baths or use swimming pools or hot tubs for 2 weeks or until your health care provider approves.  · Only take over-the-counter or prescription medicines as directed by your health care provider.  · Continue your normal diet as directed by your health care provider.  · Do not lift anything heavier than 10 pounds (4.5 kg) until your health care provider approves.  · Do not play contact sports for 1 week or until your health care provider approves.  SEEK MEDICAL CARE IF:   · You have redness, swelling, or increasing pain in the wound.  · You notice yellowish-white fluid (pus) coming from the wound.  · You have drainage from the wound that lasts longer than 1 day.  · You notice a bad smell coming from the wound or dressing.  · Your surgical cuts (incisions) break open.  SEEK IMMEDIATE MEDICAL CARE IF:   · You develop a rash.  · You have difficulty breathing.  · You have chest pain.  · You have a fever.  · You have increasing pain in the shoulders (shoulder strap areas).  · You have dizzy episodes or faint while standing.  · You have severe abdominal pain.  · You feel sick to your stomach (nauseous) or throw up (vomit) and this lasts for more than 1 day.     This information is not intended to replace advice given to you by your health care provider. Make sure you discuss any questions you have with your health care provider.    Low-Fat Diet for Pancreatitis or Gallbladder Conditions  A low-fat diet can be helpful if you have pancreatitis or a gallbladder condition. With these conditions, your pancreas and gallbladder have trouble digesting fats. A healthy eating plan with less fat will help rest your pancreas and gallbladder and reduce your symptoms.  WHAT DO I NEED TO KNOW ABOUT THIS DIET?  · Eat a low-fat  "diet.  · Reduce your fat intake to less than 20-30% of your total daily calories. This is less than 50-60 g of fat per day.  · Remember that you need some fat in your diet. Ask your dietician what your daily goal should be.  · Choose nonfat and low-fat healthy foods. Look for the words \"nonfat,\" \"low fat,\" or \"fat free.\"  · As a guide, look on the label and choose foods with less than 3 g of fat per serving. Eat only one serving.  · Avoid alcohol.  · Do not smoke. If you need help quitting, talk with your health care provider.  · Eat small frequent meals instead of three large heavy meals.  WHAT FOODS CAN I EAT?  Grains  Include healthy grains and starches such as potatoes, wheat bread, fiber-rich cereal, and brown rice. Choose whole grain options whenever possible. In adults, whole grains should account for 45-65% of your daily calories.   Fruits and Vegetables  Eat plenty of fruits and vegetables. Fresh fruits and vegetables add fiber to your diet.  Meats and Other Protein Sources  Eat lean meat such as chicken and pork. Trim any fat off of meat before cooking it. Eggs, fish, and beans are other sources of protein. In adults, these foods should account for 10-35% of your daily calories.  Dairy  Choose low-fat milk and dairy options. Dairy includes fat and protein, as well as calcium.   Fats and Oils  Limit high-fat foods such as fried foods, sweets, baked goods, sugary drinks.   Other  Creamy sauces and condiments, such as mayonnaise, can add extra fat. Think about whether or not you need to use them, or use smaller amounts or low fat options.  WHAT FOODS ARE NOT RECOMMENDED?  · High fat foods, such as:  · Baked goods.  · Ice cream.  · [place]ic toast.  · Sweet rolls.  · Pizza.  · Cheese bread.  · Foods covered with batter, butter, creamy sauces, or cheese.  · Fried foods.  · Sugary drinks and desserts.  · Foods that cause gas or bloating     This information is not intended to replace advice given to you by your " health care provider. Make sure you discuss any questions you have with your health care provider.     Cirrhosis  Cirrhosis is long-term (chronic) liver injury. The liver is your largest internal organ, and it performs many functions. The liver converts food into energy, removes toxic material from your blood, makes important proteins, and absorbs necessary vitamins from your diet.  If you have cirrhosis, it means many of your healthy liver cells have been replaced by scar tissue. This prevents blood from flowing through your liver, which makes it difficult for your liver to function. This scarring is not reversible, but treatment can prevent it from getting worse.   CAUSES   Hepatitis C and long-term alcohol abuse are the most common causes of cirrhosis. Other causes include:  · Nonalcoholic fatty liver disease.  · Hepatitis B infection.  · Autoimmune hepatitis.  · Diseases that cause blockage of ducts inside the liver.  · Inherited liver diseases.  · Reactions to certain long-term medicines.  · Parasitic infections.  · Long-term exposure to certain toxins.  RISK FACTORS  You may have a higher risk of cirrhosis if you:  · Have certain hepatitis viruses.  · Abuse alcohol, especially if you are female.  · Are overweight.  · Share needles.  · Have unprotected sex with someone who has hepatitis.  SYMPTOMS   You may not have any signs and symptoms at first. Symptoms may not develop until the damage to your liver starts to get worse. Signs and symptoms of cirrhosis may include:   · Tenderness in the right-upper part of your abdomen.  · Weakness and tiredness (fatigue).  · Loss of appetite.  · Nausea.  · Weight loss and muscle loss.  · Itchiness.  · Yellow skin and eyes (jaundice).  · Buildup of fluid in the abdomen (ascites).  · Swelling of the feet and ankles (edema).  · Appearance of tiny blood vessels under the skin.  · Mental confusion.  · Easy bruising and bleeding.  DIAGNOSIS   Your health care provider may suspect  cirrhosis based on your symptoms and medical history, especially if you have other medical conditions or a history of alcohol abuse. Your health care provider will do a physical exam to feel your liver and check for signs of cirrhosis. Your health care provider may perform other tests, including:   · Blood tests to check:    ¨ Whether you have hepatitis B or C.    ¨ Kidney function.  ¨ Liver function.  · Imaging tests such as:  ¨ MRI or CT scan to look for changes seen in advanced cirrhosis.  ¨ Ultrasound to see if normal liver tissue is being replaced by scar tissue.  · A procedure using a long needle to take a sample of liver tissue (biopsy) for examination under a microscope. Liver biopsy can confirm the diagnosis of cirrhosis.    TREATMENT   Treatment depends on how damaged your liver is and what caused the damage. Treatment may include treating cirrhosis symptoms or treating the underlying causes of the condition to try to slow the progression of the damage. Treatment may include:  · Making lifestyle changes, such as:    ¨ Eating a healthy diet.  ¨ Restricting salt intake.   ¨ Maintaining a healthy weight.    ¨ Not abusing drugs or alcohol.  · Taking medicines to:  ¨ Treat liver infections or other infections.  ¨ Control itching.  ¨ Reduce fluid buildup.  ¨ Reduce certain blood toxins.  ¨ Reduce risk of bleeding from enlarged blood vessels in the stomach or esophagus (varices).  · If varices are causing bleeding problems, you may need treatment with a procedure that ties up the vessels causing them to fall off (band ligation).  · If cirrhosis is causing your liver to fail, your health care provider may recommend a liver transplant.  · Other treatments may be recommended depending on any complications of cirrhosis, such as liver-related kidney failure (hepatorenal syndrome).  HOME CARE INSTRUCTIONS   · Take medicines only as directed by your health care provider. Do not use drugs that are toxic to your liver.  Ask your health care provider before taking any new medicines, including over-the-counter medicines.    · Rest as needed.  · Eat a well-balanced diet. Ask your health care provider or dietitian for more information.    · You may have to follow a low-salt diet or restrict your water intake as directed.  · Do not drink alcohol. This is especially important if you are taking acetaminophen.  · Keep all follow-up visits as directed by your health care provider. This is important.  SEEK MEDICAL CARE IF:  · You have fatigue or weakness that is getting worse.  · You develop swelling of the hands, feet, legs, or face.  · You have a fever.  · You develop loss of appetite.  · You have nausea or vomiting.  · You develop jaundice.  · You develop easy bruising or bleeding.  SEEK IMMEDIATE MEDICAL CARE IF:  · You vomit bright red blood or a material that looks like coffee grounds.  · You have blood in your stools.  · Your stools appear black and tarry.  · You become confused.  · You have chest pain or trouble breathing.     This information is not intended to replace advice given to you by your health care provider. Make sure you discuss any questions you have with your health care provider.            Follow-up Information     1. Follow up with Mejia Colunga M.D. In 2 weeks.    Specialty:  Surgery    Contact information    [address] [place] [address] [phone]           Discharge Medication Instructions:    Below are the medications your physician expects you to take upon discharge:    Review all your home medications and newly ordered medications with your doctor and/or pharmacist. Follow medication instructions as directed by your doctor and/or pharmacist.    Please keep your medication list with you and share with your physician.               Medication List      START taking these medications        Instructions    ciprofloxacin 500 MG Tabs   Commonly known as:  CIPRO    Take 1 Tab by mouth 2  times a day for 5 days.   Dose:  500 mg       * hydrocodone-acetaminophen 5-325 MG Tabs per tablet   Commonly known as:  NORCO    Take 1-2 Tabs by mouth every four hours as needed.   Dose:  1-2 Tab       * hydrocodone-acetaminophen 5-325 MG Tabs per tablet   Commonly known as:  NORCO    Doctor's comments:  No driving while on pain medications   Take 1-2 Tabs by mouth every four hours as needed (as needed for pain).   Dose:  1-2 Tab       metronidazole 500 MG Tabs   Last time this was given:  500 mg on 3/14/2017  7:33 AM   Commonly known as:  FLAGYL    Take 1 Tab by mouth every 8 hours for 5 days.   Dose:  500 mg       ondansetron 4 MG Tabs tablet   Commonly known as:  ZOFRAN    Take 1 Tab by mouth every four hours as needed for Nausea/Vomiting.   Dose:  4 mg       ondansetron 4 MG Tbdp   Commonly known as:  ZOFRAN ODT    Doctor's comments:  Do not need to fill this prescription unless you have nausea/vomitting. If you have persistent nausea/vomitting seek medical evaluation.   Take 1 Tab by mouth every 6 hours as needed for Nausea/Vomiting.   Dose:  4 mg       * Notice:  This list has 2 medication(s) that are the same as other medications prescribed for you. Read the directions carefully, and ask your doctor or other care provider to review them with you.      CONTINUE taking these medications        Instructions    ferrous gluconate 324 (38 FE) MG Tabs   Commonly known as:  FERGON    Take 324 mg by mouth 3 times a day, with meals.   Dose:  324 mg       furosemide 20 MG Tabs   Commonly known as:  LASIX    Take 20 mg by mouth every day.   Dose:  20 mg       medroxyPROGESTERone 5 MG Tabs   Last time this was given:  10 mg on 3/13/2017 10:35 PM   Commonly known as:  PROVERA    Take 10 mg by mouth every day.   Dose:  10 mg       omeprazole 20 MG delayed-release capsule   Last time this was given:  20 mg on 3/14/2017  7:33 AM   Commonly known as:  PRILOSEC    Take 1 Cap by mouth 2 times a day.   Dose:  20 mg        pentoxifylline  MG CR tablet   Last time this was given:  400 mg on 3/14/2017 11:30 AM   Commonly known as:  TRENTAL    Take 400 mg by mouth 3 times a day, with meals.   Dose:  400 mg       potassium chloride SA 20 MEQ Tbcr   Last time this was given:  20 mEq on 3/14/2017  8:16 AM   Commonly known as:  Kdur    Take 20 mEq by mouth every day.   Dose:  20 mEq       spironolactone 50 MG Tabs   Commonly known as:  ALDACTONE    Take 50 mg by mouth every day.   Dose:  50 mg               Instructions           Diet / Nutrition:    Follow any diet instructions given to you by your doctor or the dietician, including how much salt (sodium) you are allowed each day.    If you are overweight, talk to your doctor about a weight reduction plan.    Activity:    Remain physically active following your doctor's instructions about exercise and activity.    Rest often.     Any time you become even a little tired or short of breath, SIT DOWN and rest.    Worsening Symptoms:    Report any of the following signs and symptoms to the doctor's office immediately:    *Pain of jaw, arm, or neck  *Chest pain not relieved by medication                               *Dizziness or loss of consciousness  *Difficulty breathing even when at rest   *More tired than usual                                       *Bleeding drainage or swelling of surgical site  *Swelling of feet, ankles, legs or stomach                 *Fever (>100ºF)  *Pink or blood tinged sputum  *Weight gain (3lbs/day or 5lbs /week)           *Shock from internal defibrillator (if applicable)  *Palpitations or irregular heartbeats                *Cool and/or numb extremities    Stroke Awareness    Common Risk Factors for Stroke include:    Age  Atrial Fibrillation  Carotid Artery Stenosis  Diabetes Mellitus  Excessive alcohol consumption  High blood pressure  Overweight   Physical inactivity  Smoking    Warning signs and symptoms of a stroke include:    *Sudden numbness or  weakness of the face, arm or leg (especially on one side of the body).  *Sudden confusion, trouble speaking or understanding.  *Sudden trouble seeing in one or both eyes.  *Sudden trouble walking, dizziness, loss of balance or coordination.Sudden severe headache with no known cause.    It is very important to get treatment quickly when a stroke occurs. If you experience any of the above warning signs, call 911 immediately.                   Disclaimer         Quit Smoking / Tobacco Use:    I understand the use of any tobacco products increases my chance of suffering from future heart disease or stroke and could cause other illnesses which may shorten my life. Quitting the use of tobacco products is the single most important thing I can do to improve my health. For further information on smoking / tobacco cessation call a Toll Free Quit Line at 1-826.521.7342 (*[employer]) or 1-322.186.6652 (American Lung Association) or you can access the web based program at www.lungusa.org.    [place] Tobacco Users Help Line:  (310) 845-4289       Toll Free: 1-202.734.9591  Quit Tobacco Program [facility] (831)395-2591    Crisis Hotline:    [place] Crisis Hotline:  3-568-HQHSVIO or 1-384.785.6713    [place] Crisis Hotline:    1-488.749.6263 or 050-695-8211    Discharge Survey:   Thank you for choosing [facility]. We hope we did everything we could to make your hospital stay a pleasant one. You may be receiving a phone survey and we would appreciate your time and participation in answering the questions. Your input is very valuable to us in our efforts to improve our service to our patients and their families.        My signature on this form indicates that:    1. I have reviewed and understand the above information.  2. My questions regarding this information have been answered to my satisfaction.  3. I have formulated a plan with my discharge nurse to obtain my prescribed medications for  home.                  Disclaimer         __________________________________                     __________       ________                       Patient Signature                                                 Date                    Time

## 2017-03-09 NOTE — ED NOTES
Pt medicated for nausea and pain per MAR. Pt resting in bed at this time waiting for room assignment.

## 2017-03-09 NOTE — PROGRESS NOTES
General Surgery    Notified of abdominal pain, WBC 15.9 and GB wall thickening.  However, the patient has underlying liver disease and TBili of 6.1.    Recommend coverage with broad spectrum antibiotics and check direct bilirubin.    If direct bilirubin is high then will need CBD evaluation and trended labs, and possible MRCP.  If the direct bilirubin is normal then the bilirubinemia is likely unrelated to the gallbladder and I would consider this acute cholecystitis and proceed with cholecystectomy.  Recommend medicine admit.  Maintain NPO except meds for now.    Mejia Colunga MD  General and Vascular Surgery  [facility]  Cell: [phone]

## 2017-03-09 NOTE — H&P
PRIMARY CARE PROVIDER:  [facility].    CHIEF COMPLAINT:  Abdominal pain.    HISTORY OF PRESENT ILLNESS:  A 36-year-old female.  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  The patient also has a known   history of cholelithiasis.  The patient presents to the emergency room today   with several days of upper abdominal pain.  She also complains of pain on both   sides of her abdomen; however, it is more severe on the right.  She has not   been able to eat much.  She has had multiple episodes of nonbloody emesis.    She has not had diarrhea or constipation.  She feels better after receiving IV   antiemetics here in the emergency room.  The patient did have similar   symptoms in the past.  She was evaluated; however, it was felt despite   cholelithiasis, she did have cholecystitis.  Due to high risk procedure,   cholecystectomy was not performed.  She endorses subjective fever with chills   and sweats sensations.  She has not measured her temperature at home.  No sick   contacts.    REVIEW OF SYSTEMS:  A comprehensive review of systems was performed.  All   pertinent positives and negatives as described in HPI.  All other systems   reviewed and are negative.    PAST MEDICAL HISTORY:  1.  Cirrhosis.  2.  Hepatitis C.  3.  Elevated bilirubin.  4.  Coagulopathy.  5.  Abnormal uterine bleeding, status post recent OB-GYN procedure, probably endometrial ablation    SOCIAL HISTORY:  She does not smoke cigarettes.  She quit drinking alcohol a   month ago.  [facility] the shelter.    FAMILY HISTORY:  No family history of liver disease to her knowledge.    ALLERGIES:  No known drug allergies.    MEDICATIONS:  1.  Iron supplements.  2.  Lasix 20 mg daily.  3.  Provera 10 mg daily.  4.  Omeprazole 20 mg daily.  5.  Pentoxifylline 400 mg 3 times a day.  6.  Potassium chloride 20 mEq daily.  7.  Spirolactone 50 mg daily.    PHYSICAL EXAMINATION:  VITAL SIGNS:  Temperature is 36.6, blood pressure 124/70,  pulse 82,   respirations 20, saturating 97% on room air.  GENERAL:  The patient is well developed, well-nourished, in no apparent   distress.  HEENT:  Pupils are equal, round and reactive.  Extraocular movements intact.    Positive scleral icterus.  NECK:  Supple, no lymphadenopathy, no thyromegaly.  CARDIOVASCULAR:  Regular rate and rhythm.  No murmurs, rubs or gallops.  PMI   is nondisplaced.  RESPIRATORY:  Clear to auscultation bilaterally, no wheezing, no crackles.  ABDOMEN:  Slightly distended.  She is tender to palpation in the right upper   quadrant.  There is no rebound or guarding.  EXTREMITIES:  No clubbing, no cyanosis, no edema.  NEUROLOGICAL:  Cranial nerves II-XII are intact.  She has 5/5 strength in   upper and lower extremities bilaterally.    LABORATORY DATA:  White blood cell count 15.9, hemoglobin 10.1, platelets 167.    Sodium 133, potassium 3.5, BUN 3, creatinine 0.56, lipase is 43, total   bilirubin 6.1, albumin 2.6.    IMAGING STUDIES:  Abdominal ultrasound, gallbladder sludge with gallbladder   wall thickening and trace amount of pericholecystic fluid, acute cholecystitis   is of concern.    ASSESSMENT AND PLAN:  A 36-year-old female with history of cirrhosis, presents   with possible cholecystitis and elevated bilirubin.  1.  Cholecystitis.  Dr. Colunga will consult on the patient.  We will check a   direct bilirubin.  Consider magnetic resonance cholangiopancreatography.    Broad spectrum antibiotics for now.  2.  Jaundice.  The patient's bilirubin was 4.2 in January of this year.    Evaluate as above.  3.  Cirrhosis.  The patient has stopped drinking.  She needs outpatient   gastroenterology followup.  4.  Prophylaxis.  Heparin and a bowel regimen.  5.  Full code.    Case discussed with emergency room physician, Dr. Hood.  I expect the   patient to remain in the hospital for greater than 2 midnights.       ____________________________________     JUSTIN CHRISTIE MD    AEF / NTS    DD:   03/09/2017 12:33:53  DT:  03/09/2017 14:38:50    D#:  [id number]  Job#:  [id number]

## 2017-03-09 NOTE — ED PROVIDER NOTES
"ED Provider Note    Scribed for Santo Hood M.D. by Madalyn Delgadillo. 3/9/2017  8:31 AM    Primary care provider: Pcp Pt States None  Means of arrival: ambulance   History obtained from: patient   History limited by: none     CHIEF COMPLAINT  Chief Complaint   Patient presents with   • Abdominal Pain     \"my liver hurts\" x5 months, worse recently       HPI  Nury Thornton is a 36 y.o. female who presents to the Emergency Department via ambulance due to abdominal pain that has been ongoing and intermittent for 5 months with this episode onset at 3AM this morning. Her pain is located in her upper abdomen, worst in the middle, and exacerbated with eating. The patient also has nausea and vomiting, as well as generalized weakness and dark colored urine. She reports history of liver problems since November 2016. Denies fevers. The patient states that she has stopped drinking alcohol 1 month ago, and denies history of appendectomy or cholecystectomy.     REVIEW OF SYSTEMS  Pertinent positives include abdominal pain, nausea, vomiting, generalized weakness, dark colored urine.   Pertinent negatives include no fevers.    All other systems reviewed and negative.    C    PAST MEDICAL HISTORY   has a past medical history of Depression; Psychiatric disorder; Stroke (CMS-[facility]); ASTHMA; Pneumonia (11/18/2016); and Sepsis (CMS-[facility]) (11/18/2016).    SURGICAL HISTORY   has past surgical history that includes tubal ligation; other orthopedic surgery; other; ankle orif (10/4/2009); and gyn surgery.    SOCIAL HISTORY  Social History   Substance Use Topics   • Smoking status: Current Every Day Smoker -- 0.25 packs/day     Types: Cigarettes   • Alcohol Use: Yes      History   Drug Use No     Comment: Hx of meth and marijuana       FAMILY HISTORY  Family History   Problem Relation Age of Onset   • Heart Disease Father    • Diabetes Mother        CURRENT MEDICATIONS  No current facility-administered medications on file prior to encounter. " "    Current Outpatient Prescriptions on File Prior to Encounter   Medication Sig Dispense Refill   • nortriptyline (PAMELOR) 10 MG Cap Take 2 Caps by mouth every evening. 60 Cap 0   • omeprazole (PRILOSEC) 20 MG delayed-release capsule Take 1 Cap by mouth 2 times a day. 30 Cap 0   • thiamine (THIAMINE) 100 MG tablet Take 1 Tab by mouth every day. 30 Tab 0   • naltrexone (DEPADE) 50 MG Tab Take 1 Tab by mouth every day. 30 Tab 0   • tramadol (ULTRAM) 50 MG Tab Take 1 Tab by mouth every 8 hours as needed for Moderate Pain. 10 Tab 0   • ferrous gluconate (FERGON) 324 (38 FE) MG Tab Take 324 mg by mouth 3 times a day, with meals.     • albuterol 108 (90 BASE) MCG/ACT Aero Soln inhalation aerosol Inhale 2 Puffs by mouth every four hours as needed. 8.5 g 0        ALLERGIES  Allergies   Allergen Reactions   • Nkda [No Known Drug Allergy]        PHYSICAL EXAM  VITAL SIGNS: /70 mmHg  Pulse 82  Temp(Src) 36.6 °C (97.9 °F)  Resp 20  Ht 1.702 m (5' 7\")  Wt 77.111 kg (170 lb)  BMI 26.62 kg/m2  SpO2 97%    Nursing note and vitals reviewed.  Constitutional: Well-developed, chronically ill-appearing female. Tearful, moderate distress secondary to pain.   HENT: Head is normocephalic and atraumatic. Oropharynx is clear and moist without exudate or erythema.   Eyes: Pupils are equal, round, and reactive to light. Conjunctiva are normal.   Cardiovascular: Normal rate and regular rhythm. 2/6 systolic ejection murmur. Normal radial pulses.  Pulmonary/Chest: Breath sounds normal. No wheezes or rales.   Abdominal: Soft. No distention.  mild tenderness in the epigastrium and right upper quadrant.   Musculoskeletal: Extremities exhibit normal range of motion without edema or tenderness.   Neurological: Awake, alert and oriented to person, place, and time. No focal deficits noted.  Skin: Skin is warm and dry. No rash.   Psychiatric: Normal mood and affect. Appropriate for clinical situation     DIAGNOSTIC STUDIES / " PROCEDURES    LABS  Results for orders placed or performed during the hospital encounter of 03/09/17   CBC WITH DIFFERENTIAL   Result Value Ref Range    WBC 15.9 (H) 4.8 - 10.8 K/uL    RBC 3.12 (L) 4.20 - 5.40 M/uL    Hemoglobin 10.1 (L) 12.0 - 16.0 g/dL    Hematocrit 31.3 (L) 37.0 - 47.0 %    .3 (H) 81.4 - 97.8 fL    MCH 32.4 27.0 - 33.0 pg    MCHC 32.3 (L) 33.6 - 35.0 g/dL    RDW 84.5 (H) 35.9 - 50.0 fL    Platelet Count 167 164 - 446 K/uL    MPV 10.6 9.0 - 12.9 fL    Nucleated RBC 0.00 /100 WBC    NRBC (Absolute) 0.00 K/uL    Neutrophils-Polys 85.00 (H) 44.00 - 72.00 %    Lymphocytes 4.40 (L) 22.00 - 41.00 %    Monocytes 3.50 0.00 - 13.40 %    Eosinophils 1.80 0.00 - 6.90 %    Basophils 0.90 0.00 - 1.80 %    Neutrophils (Absolute) 14.21 (H) 2.00 - 7.15 K/uL    Lymphs (Absolute) 0.70 (L) 1.00 - 4.80 K/uL    Monos (Absolute) 0.56 0.00 - 0.85 K/uL    Eos (Absolute) 0.29 0.00 - 0.51 K/uL    Baso (Absolute) 0.14 (H) 0.00 - 0.12 K/uL    Anisocytosis 2+     Macrocytosis 1+    COMP METABOLIC PANEL   Result Value Ref Range    Sodium 133 (L) 135 - 145 mmol/L    Potassium 3.5 (L) 3.6 - 5.5 mmol/L    Chloride 100 96 - 112 mmol/L    Co2 22 20 - 33 mmol/L    Anion Gap 11.0 0.0 - 11.9    Glucose 89 65 - 99 mg/dL    Bun 3 (L) 8 - 22 mg/dL    Creatinine 0.56 0.50 - 1.40 mg/dL    Calcium 8.5 8.5 - 10.5 mg/dL    AST(SGOT) 57 (H) 12 - 45 U/L    ALT(SGPT) 23 2 - 50 U/L    Alkaline Phosphatase 165 (H) 30 - 99 U/L    Total Bilirubin 6.1 (H) 0.1 - 1.5 mg/dL    Albumin 2.6 (L) 3.2 - 4.9 g/dL    Total Protein 8.2 6.0 - 8.2 g/dL    Globulin 5.6 (H) 1.9 - 3.5 g/dL    A-G Ratio 0.5 g/dL   LIPASE   Result Value Ref Range    Lipase 43 11 - 82 U/L   ESTIMATED GFR   Result Value Ref Range    GFR If African American >60 >60 mL/min/1.73 m 2    GFR If Non African American >60 >60 mL/min/1.73 m 2   DIFFERENTIAL MANUAL   Result Value Ref Range    Bands-Stabs 4.40 0.00 - 10.00 %    Manual Diff Status PERFORMED    PERIPHERAL SMEAR REVIEW    Result Value Ref Range    Peripheral Smear Review see below    PLATELET ESTIMATE   Result Value Ref Range    Plt Estimation Normal    MORPHOLOGY   Result Value Ref Range    RBC Morphology Present     Polychromia 1+     Poikilocytosis 2+     Ovalocytes 1+     Schistocytes 1+     Target Cells 1+     Echinocytes 1+       All labs reviewed by me.    RADIOLOGY  US-GALLBLADDER   Final Result         1. Gallbladder sludge with gallbladder wall thickening and trace amount of pericholecystic fluid. Acute cholecystitis is of concern. Alternatively, this could relate to hepatitis and reactive thickening of the gallbladder. Correlate with HIDA.      2. Heterogeneous echogenic liver, could relate to hepatic steatosis or hepatitis. Trace perihepatic free fluid.        The radiologist's interpretation of all radiological studies have been reviewed by me.    COURSE & MEDICAL DECISION MAKING  Nursing notes, VS, PMSFHx reviewed in chart.     Review of past medical records shows the patient was last seen in in the ED in January 2017.     8:33 AM  Patient seen and examined at bedside. Patient will be treated with IV fluids, 4mg Zofran, 1mg Dilaudid. Ordered ultrasound gallbladder, lipase, CMP, CBC with differential to evaluate her symptoms. The differential diagnoses include but are not limited to: urinary tract infection, cholelithiasis, pancreatitis, gastritis, peptic ulcer disease   I told the patient that we will treat her pain and run diagnostic studies to evaluate her pain. The patient verbalizes understanding and agreement at this time to my plan of care.      11:59 AM laboratory studies are remarkable for an elevated total bilirubin, elevated white blood cell count. Ultrasound is remarkable for findings of gallbladder wall thickening, minimal pericholecystic fluid and gallbladder sludge. Common bile duct is normal. I have added on a direct bilirubin. This is pending. The patient has been treated with ceftriaxone and Flagyl  for presumed cholecystitis.    I spoke with the on-call general surgeon. He requests hospitalist admission. He will consult.    DISPOSITION:  Patient will be admitted to Dr. Chisholm in stable condition.      FINAL IMPRESSION  1. Right upper quadrant abdominal pain    2. Hyperbilirubinemia    3. Elevated alkaline phosphatase level    4. Abnormal gallbladder ultrasound          Madalyn DODD (Scribe), am scribing for, and in the presence of, Santo Hood M.D..    Electronically signed by: Madalyn Delgadillo (Eliud), 3/9/2017    ISanto M.D. personally performed the services described in this documentation, as scribed by Madalyn Delgadillo in my presence, and it is both accurate and complete.    The note accurately reflects work and decisions made by me.  Santo Hood  3/9/2017  11:58 AM

## 2017-03-09 NOTE — IP AVS SNAPSHOT
Hua [employer] Access Code: [email or web address]  Expires: 4/13/2017 12:59 PM    Your email address is not on file at [facility].  Email Addresses are required for you to sign up for Hua Kang, please contact [phone] to verify your personal information and to provide your email address prior to attempting to register for Hua Kang.    Nury Thornton  [address] [place] [address]    [employer]  A secure, online tool to manage your health information     [facility]’s Hua Kang® is a secure, online tool that connects you to your personalized health information from the privacy of your home -- day or night - making it very easy for you to manage your healthcare. Once the activation process is completed, you can even access your medical information using the Hua Kang jeanette, which is available for free in the Apple Jeanette store or Google Play store.     To learn more about Hua Kang, visit [email or web address]    There are two levels of access available (as shown below):   My Chart Features  [facility] Primary Care Doctor [facility]  Specialists [facility]  Urgent  Care Non-[facility] Primary Care Doctor   Email your healthcare team securely and privately 24/7 X X X    Manage appointments: schedule your next appointment; view details of past/upcoming appointments X      Request prescription refills. X      View recent personal medical records, including lab and immunizations X X X X   View health record, including health history, allergies, medications X X X X   Read reports about your outpatient visits, procedures, consult and ER notes X X X X   See your discharge summary, which is a recap of your hospital and/or ER visit that includes your diagnosis, lab results, and care plan X X  X     How to register for [employer]:  Once your e-mail address has been verified, follow the following steps to sign up for [employer].     1. Go to  [email or web address]  2. Click on the Sign Up Now box, which takes you to the New Member Sign Up page. You will need to  provide the following information:  a. Enter your [facility] [employer] Access Code exactly as it appears at the top of this page. (You will not need to use this code after you’ve completed the sign-up process. If you do not sign up before the expiration date, you must request a new code.)   b. Enter your date of birth.   c. Enter your home email address.   d. Click Submit, and follow the next screen’s instructions.  3. Create a [facility] ID. This will be your [facility] login ID and cannot be changed, so think of one that is secure and easy to remember.  4. Create a [facility] password. You can change your password at any time.  5. Enter your Password Reset Question and Answer. This can be used at a later time if you forget your password.   6. Enter your e-mail address. This allows you to receive e-mail notifications when new information is available in [employer].  7. Click Sign Up. You can now view your health information.    For assistance activating your [employer] account, call [phone]

## 2017-03-10 NOTE — PROGRESS NOTES
Hospital Medicine Progress Note, Adult, Complex               Author: Berta Dallas Date & Time created: 3/10/2017  3:04 PM     Interval History:  36F with h/o etoh/tobaco abuse, with ALD and coagulopathy with abd pain, ? Cholecystitis    3/10  abd pain controlled, denies n/v, neg tremors    Review of Systems:  Review of Systems   Constitutional: Negative for fever and chills.   HENT: Negative for congestion and hearing loss.    Respiratory: Negative for cough and shortness of breath.    Cardiovascular: Negative for chest pain, palpitations and leg swelling.   Gastrointestinal: Positive for nausea and abdominal pain. Negative for heartburn and vomiting.   Genitourinary: Negative for dysuria and flank pain.   Musculoskeletal: Negative for myalgias, back pain and joint pain.   Skin: Negative for itching and rash.   Neurological: Positive for weakness. Negative for sensory change, speech change, focal weakness and headaches.   Psychiatric/Behavioral: Negative for depression and memory loss. The patient is not nervous/anxious.        Physical Exam:  Physical Exam   Constitutional: She is oriented to person, place, and time. She appears well-nourished. No distress.   HENT:   Head: Normocephalic and atraumatic.   Nose: Nose normal.   Eyes: EOM are normal. Pupils are equal, round, and reactive to light. Right eye exhibits no discharge. Left eye exhibits no discharge. Scleral icterus is present.   Neck: Neck supple. No JVD present. No thyromegaly present.   Cardiovascular: Normal rate and intact distal pulses.    No murmur heard.  Pulmonary/Chest: Breath sounds normal. No respiratory distress. She has no wheezes.   Abdominal: Soft. Bowel sounds are normal. She exhibits no distension. There is tenderness.   Musculoskeletal: She exhibits no edema or tenderness.   Neurological: She is alert and oriented to person, place, and time. No cranial nerve deficit. Coordination normal.   Skin: Skin is warm and dry. No rash noted. She is  not diaphoretic. No erythema.   jaundice   Psychiatric: She has a normal mood and affect. Her behavior is normal. Judgment and thought content normal.   Nursing note and vitals reviewed.      Labs:        Invalid input(s): [id number]      Recent Labs      03/09/17   0855  03/10/17   0437   SODIUM  133*  132*   POTASSIUM  3.5*  3.4*   CHLORIDE  100  105   CO2  22  20   BUN  3*  2*   CREATININE  0.56  0.45*   MAGNESIUM   --   1.7   PHOSPHORUS   --   3.6   CALCIUM  8.5  7.7*     Recent Labs      03/09/17   0855  03/09/17   1841  03/10/17   0437   ALTSGPT  23   --   20   ASTSGOT  57*   --   49*   ALKPHOSPHAT  165*   --   140*   TBILIRUBIN  6.1*   --   5.2*   DBILIRUBIN   --   2.8*  2.4*   LIPASE  43   --   16   GLUCOSE  89   --   82     Recent Labs      03/09/17   0855  03/09/17   1841  03/10/17   0437   RBC  3.12*   --   2.60*   HEMOGLOBIN  10.1*   --   8.5*   HEMATOCRIT  31.3*   --   26.2*   PLATELETCT  167   --   119*   PROTHROMBTM   --   21.0*  22.8*   INR   --   1.75*  1.95*     Recent Labs      03/09/17   0855  03/10/17   0437   WBC  15.9*  13.0*   NEUTSPOLYS  85.00*  78.70*   LYMPHOCYTES  4.40*  9.60*   MONOCYTES  3.50  5.80   EOSINOPHILS  1.80  5.10   BASOPHILS  0.90  0.30   ASTSGOT  57*  49*   ALTSGPT  23  20   ALKPHOSPHAT  165*  140*   TBILIRUBIN  6.1*  5.2*           Hemodynamics:  Temp (24hrs), Av.7 °C (98.1 °F), Min:36.3 °C (97.3 °F), Max:37.1 °C (98.8 °F)  Temperature: 36.8 °C (98.2 °F)  Pulse  Av.9  Min: 82  Max: 105   Blood Pressure: 117/59 mmHg, NIBP: 100/53 mmHg     Respiratory:    Respiration: 18, Pulse Oximetry: 99 %           Fluids:    Intake/Output Summary (Last 24 hours) at 03/10/17 1504  Last data filed at 03/10/17 1053   Gross per 24 hour   Intake   1383 ml   Output      0 ml   Net   1383 ml        GI/Nutrition:  Orders Placed This Encounter   Procedures   • DIET NPO     Standing Status: Standing      Number of Occurrences: 9      Standing Expiration Date:      Order Specific  Question:  Restrict to:     Answer:  Sips with Medications [3]     Medical Decision Making, by Problem:  Active Hospital Problems    Diagnosis   • *Abdominal pain [R10.9]  Surgery - Dr. Colunga following  Diet per surgery  2nd to ? Acute cholecystitis  MRCP thickened GB    No MRCP evidence of choledocholithiasis, biliary dilatation or cholelithiasis    Stable small ascites. There is moderate gallbladder wall thickening which most likely is secondary to hepatocellular disease/anasarca    Splenomegaly has slightly worsened and indicates portal hypertension    Possible partial thrombosis of the portal vein at the confluence of the splenic and superior mesenteric veins. Alternatively this heterogeneous signal may just be due to turbulent flow   • Cholecystitis [K81.9]   • Leukocytosis [D72.829]on abx   • Tobacco abuse [Z72.0]advise cessation, quit x 1 month   • GERD (gastroesophageal reflux disease) [K21.9]ppi   • Anxiety [F41.9]   • PTSD (post-traumatic stress disorder) [F43.10]   • Anemia [D64.9]monitor   • Asthma [J45.909]   • Coagulopathy (CMS-HCC) [D68.9]2nd ALD, s/p ffp, f/u inr   • Alcoholism (CMS-HCC) [F10.20]h/o with jaundice, monitor for withdrawal, no drink x 1 month   • Colitis [K52.9]     F/u am labs    Dipso- pending    Labs reviewed, Medications reviewed and Radiology images reviewed          DVT prophylaxis - mechanical: SCDs  Ulcer prophylaxis: Yes  Antibiotics: Treating active infection/contamination beyond 24 hours perioperative coverage  Assessed for rehab: Patient was assess for and/or received rehabilitation services during this hospitalization

## 2017-03-10 NOTE — DIETARY
"Nutrition Services: Poor PO intake and weight loss, pta. This is a 36 y.o female with admit dx: Cholecystitis.  Ht: 5'7\"  Wt: 77.1 kg  BMI: 26.62 kg/m2    Patient with history of cirrhosis related to alcohol and hepatitis C.  The patient also has a known history of cholelithiasis.  The patient presents to the emergency room today with several days of upper abdominal pain per MD note.  The patient is strict NPO for surgery today. Suspected poor po intake and unplanned weight loss d/t current medical condition  No skin breakdown is noted per chart    Plan/Recommendations: Advance the diet when medically feasible, fluids per MD, Replete electrolytes prn  RD monitoring     "

## 2017-03-10 NOTE — PROGRESS NOTES
Pt is A&Ox4.   Reports pain to abdomen, medicated per MAR.   OLIVA, CMS intact, pt is ambulatory and mobilizes independently; denies n/t.   On RA, denies SOB, denies chest pain.   Normoactive BS x 4. Tolerating clear liquids. Denies n/v. Aware of NPO status after midnight. + flatus, last BM PTA. Pt is voiding q shift.   Generalized jaundice noted; otherwise skin is intact.   PIV patent, ivf running.   Updated on POC. Belongings and call light within reach. All needs met at this time.     Consent for surgery signed and in paper chart.

## 2017-03-10 NOTE — OP REPORT
General Surgery Operative Report      Date of Service: 3/10/2017    Patient Name: Nury Thornton    Patient MRN:  [id number]    --------------------------------------------------------------------------------    Preoperative Diagnosis:  - Acute cholecystitis  - asthma  - Alcoholic liver cirrhosis    Postoperative Diagnosis:  Same    Procedure Performed:  1) Laparoscopic cholecystectomy    --------------------------------------------------------------------------------    Surgeon:  Mejia Colunga MD    Assistant:  Maciel PRASAD    Anesthesia:  GETA, and local anesthetic    IVF:   Per anesthesia report    UOP:   No olmos    Est. Blood Loss: 300cc    Drains:  10 flat LACY    Specimens:  gallbladder for permanent pathology    Findings:  Severe inflammatory adhesions in cystic triangle    Complications: none    Disposition:  PACU in stable condition    --------------------------------------------------------------------------------    Indications:      Nury Thornton is a 36 y.o. female with abdominal pain, likely attributed to acute cholecystitis.  An extensive PARQ conference was held with the patient in regard to laparoscopic cholecystectomy.  The patient was made aware of the alternatives, including operative and non-operative management. The risks of bleeding, infection, damage to surrounding structures, need for reoperation, bile duct injury, stroke, MI, and death were discussed with the patient. The patient was given a chance to ask questions, and all questions were answered to their satisfaction. The patient demonstrated adequate understanding, seemed pleased with the plan, and wished to proceed.     Procedure in detail:  The patient was brought to the operating suite, placed supine on the operating table and general anesthesia was administered.  The patient's abdomen was prepped and draped in the sterile fashion using ChloraPrep.  A surgical time-out was called to identify the correct patient, procedure and  equipment and everyone was in an agreement.    Procedure began with infiltration of local anesthetic at the umbilicus and then a small incision was made.  The subcutaneous tissues were divided until  the fascia was encountered and then this was grasped with a Kocher clamp and a Veress needle was inserted.  Aspiration and saline drop test was normal and then the abdomen was insufflated to a pressure of 15.  The Veress needle was removed and a 5-mm trocar was inserted at the umbilicus.  The patient was positioned in a reverse Trendelenburg right side up position and the 5 mm camera was inserted to inspect the abdomen.  There was no evidence of trauma from insertion of the Veress needle or trocar. There was no evidence of a diffuse inflammatory process.  There were no masses and no abnormalities identified.  The gallbladder appeared inflamed at the infundibulum and in the cystic triangle.    An 11mm trocar was inserted in the midline superior epigastric region, and two 5mm ports were inserted in the right upper quadrant, all under direct visualization.    The cystic  triangle was then carefully dissected using electrocautery to score the peritoneal veil and then the cystic duct was circumferentially dissected.  At this point, a clip applier was used to place 3 clips on the proximal cystic duct and one on the distal and then the duct was transected.  Further dissection was carried out until the cystic artery was identified.  It was dissected circumferentially, clipped twice proximally, once distally, and then transected.  Further dissection was carried out and no additional critical structures were identified and therefore the gallbladder was removed from the undersurface of the liver using electrocautery.  There was moderate blood loss and spillage of bile during this part of the procedure.    The gallbladder was placed in an endocatch bag and brought out through the epigastric port site.  The epigastric port site  fascia was closed with an 0-vicryl suture.  A 10 flat LACY drain was placed under the liver and exited the RUQ port site.  All of the other ports were removed under direct visualization and no bleeding was seen.  Skin at all port sites was closed with subcuticular absorbable suture and then the incisions were covered with skin glue.     The patient tolerated the procedure well.  All counts were correct.  She was extubated and sent to recovery in stable condition.    Mejia Colunga MD  [facility]  [phone]  Cell: [phone]

## 2017-03-10 NOTE — CARE PLAN
Problem: Venous Thromboembolism (VTW)/Deep Vein Thrombosis (DVT) Prevention:  Goal: Patient will participate in Venous Thrombosis (VTE)/Deep Vein Thrombosis (DVT)Prevention Measures  Outcome: PROGRESSING AS EXPECTED  Refused SCDs; aware of risks, pt ambulates frequently     Problem: Pain Management  Goal: Pain level will decrease to patient’s comfort goal  Outcome: PROGRESSING AS EXPECTED  Pain managed with Morphine PRN

## 2017-03-10 NOTE — CONSULTS
Surgery Consult Note  -------------------------------------------------------------------------------------------------  Date: 3/9/2017    [place] [facility]    Consulting Physician: Mejia Colunga M.D. [facility]  -------------------------------------------------------------------------------------------------    Reason for consultation: abdominal pain    HPI: This is a 36 y.o. female who is presenting With a one-month history of unrelenting abdominal pain.  She reports the abdominal pain is worse with eating greasy or spicy food however recently it has gotten so bad that eating just about anything will trigger some degree of an attack.  She reports that the pain does not come on instantly with eating but rather is delayed by a short period of time after eating before the pain actually arises.  She reports a history of cirrhosis due to alcohol use.    Past Medical History   Diagnosis Date   • Depression    • Psychiatric disorder    • Stroke (CMS-HCC)    • ASTHMA    • Pneumonia 2016   • Sepsis (CMS-HCC) 2016       Past Surgical History   Procedure Laterality Date   • Tubal ligation     • Other orthopedic surgery           • Other       LT ANKLE   • Ankle orif  10/4/2009     Performed by MAZIN HUNT at [facility]S   • Gyn surgery        x3        Current Facility-Administered Medications   Medication Dose Route Frequency Provider Last Rate Last Dose   • medroxyPROGESTERone (PROVERA) tablet 10 mg  10 mg Oral QHS Yon Chisholm M.D.       • omeprazole (PRILOSEC) capsule 20 mg  20 mg Oral BID  Yon Chisholm M.D.   20 mg at 17 1736   • pentoxifylline CR (TRENTAL) tablet 400 mg  400 mg Oral TID WITH MEALS Yon Chisholm M.D.   400 mg at 17 1730   • [START ON 3/10/2017] cefTRIAXone (ROCEPHIN) 2 g in  mL IVPB  2 g Intravenous Q24HRS Yon Chisholm M.D.       • metronidazole (FLAGYL) IVPB 500 mg  500 mg Intravenous Q8HRS Yon Chisholm M.D.   "     • morphine (pf) 4 mg/ml injection 2-4 mg  2-4 mg Intravenous Q3HRS PRN Yon Chisholm M.D.   4 mg at 03/09/17 1736   • senna-docusate (PERICOLACE or SENOKOT S) 8.6-50 MG per tablet 2 Tab  2 Tab Oral BID Yon Chisholm M.D.   2 Tab at 03/09/17 1245    And   • polyethylene glycol/lytes (MIRALAX) PACKET 1 Packet  1 Packet Oral QDAY PRN Yon Chisholm M.D.        And   • magnesium hydroxide (MILK OF MAGNESIA) suspension 30 mL  30 mL Oral QDAY PRN Yon Chisholm M.D.        And   • bisacodyl (DULCOLAX) suppository 10 mg  10 mg Rectal QDAY PRN Yon Chisholm M.D.       • NS infusion   Intravenous Continuous Yon Chisholm M.D. 75 mL/hr at 03/09/17 1737     • heparin injection 5,000 Units  5,000 Units Subcutaneous Q8HRS Yon Chisholm M.D.   5,000 Units at 03/09/17 1736   • ondansetron (ZOFRAN) syringe/vial injection 4 mg  4 mg Intravenous Q4HRS PRPHUONG Chisholm M.D.   4 mg at 03/09/17 1336       Social History     Social History   • Marital Status: Single     Spouse Name: N/A   • Number of Children: N/A   • Years of Education: N/A     Occupational History   • Not on file.     Social History Main Topics   • Smoking status: Current Every Day Smoker -- 0.25 packs/day     Types: Cigarettes   • Smokeless tobacco: Not on file   • Alcohol Use: Yes   • Drug Use: No      Comment: Hx of meth and marijuana   • Sexual Activity: Not on file     Other Topics Concern   • Not on file     Social History Narrative       Family History   Problem Relation Age of Onset   • Heart Disease Father    • Diabetes Mother        Allergies:  Nkda    Review of Systems:  Noncontributory    Physical Exam:  Blood pressure 93/58, pulse 83, temperature 36.5 °C (97.7 °F), resp. rate 18, height 1.702 m (5' 7\"), weight 77.111 kg (170 lb), last menstrual period 02/09/2017, SpO2 99 %, not currently breastfeeding.    Constitutional: Alert, oriented, no acute distress  HEENT:  Normocephalic and atraumatic, EOMI  Neck:   Supple, no JVD,   Cardiovascular: Regular " rate and rhythm,   Pulmonary:  Good air entry bilaterally,    Abdominal:  Soft,  Mildly tender, non-distended      no peritoneal signs     Aortic impulse not widened  Musculoskeletal: No edema, no tenderness  Neurological:  CN II-XII grossly intact, no focal deficits  Skin:   Skin is warm and dry. No rash noted.  Psychiatric:  Normal mood and affect.    Labs:  Recent Labs      03/09/17   0855   WBC  15.9*   RBC  3.12*   HEMOGLOBIN  10.1*   HEMATOCRIT  31.3*   MCV  100.3*   MCH  32.4   MCHC  32.3*   RDW  84.5*   PLATELETCT  167   MPV  10.6     Recent Labs      03/09/17   0855   SODIUM  133*   POTASSIUM  3.5*   CHLORIDE  100   CO2  22   GLUCOSE  89   BUN  3*   CREATININE  0.56   CALCIUM  8.5         Recent Labs      03/09/17   0855   ASTSGOT  57*   ALTSGPT  23   TBILIRUBIN  6.1*   ALKPHOSPHAT  165*   GLOBULIN  5.6*       Radiology:   ultrasound: Gallbladder sludge with gallbladder wall thickening and trace amount of pericholecystic fluid. Acute cholecystitis is of concern. Alternatively, this could relate to hepatitis and reactive thickening of the gallbladder. Correlate with HIDA.    Assessment: This is a 36 y.o.  Female with abdominal pain, a known history of liver disease and now demonstrating findings of cholecystitis.    She does not appear toxic on examination and she is not septic based on her criteria.  She is also having found to have elevated bilirubin level including both total and direct bilirubins.    Plan:     I believe Mrs. Thornton will benefit from a laparoscopic cholecystectomy.  However her   Bilirubin elevations could indicate a common duct obstruction,  Given the fact that there is a direct bilirubin elevation.  However it is also possible her bilirubin levels are from intrinsic liver disease.  I do not believe there is an urgency to removing her gallbladder emergently.  I have ordered repeat laboratory assessment from the morning to determine the trend in bilirubin levels.  An upward trend may  indicate common duct obstruction and prompt further workup, while a downward trend would rule against common duct obstruction and I would be able to take her for cholecystectomy tomorrow.    Therefore the repeat laboratory assessment is vital in order to determine the timing of cholecystectomy.   I will provide additional recommendations once I had seen the morning labs.  I did hold a spot on the operating room schedule for tomorrow morning in case we can proceed safely with cholecystectomy.    I explained the details of the operation, alternatives, and potential risks, including but not limited to bleeding, infection, injury to vessels or nerves, possible open incision, and risks of anesthesia.  All questions were answered. Patient understands and agrees to proceed.    Thank you very much for this consultation.    Mejia Colunga M.D.  [facility]  [phone]  Cell: [phone]

## 2017-03-10 NOTE — PROGRESS NOTES
Surgery    Bilirubin remains high  INR actually went up    I have rescheduled her operation to 1300 this afternoon.  In the meantime patient will gt 2 units of FFP and [facility]    Mejia Colunga MD  General and Vascular Surgery  [facility]  Cell: [phone]

## 2017-03-11 NOTE — PROGRESS NOTES
"Pt groggy but \"thirsty and hungry\", abd lap stabs intact, cameron with heavy drainage, poc reviewed. Pt instructed on use of IS.  "

## 2017-03-11 NOTE — PROGRESS NOTES
BS report received. Patient in bed, alert and oriented x4. Pt c/o ABD pain 10/10, medicated per MAR. Denies nausea or vomiting. ABD lap stab sites intact, LACY with large serosanguinous drainage. BP 97/52 at 1930, rechecked 102/58 at 2100. Encouraged pt to drink fluids and ROM while in bed. Encouraged pt to utilize IS. POC discussed, verbalized understanding. Bed low and locked. Pt ambulates x 1 assist. Call light and belonging within reach and demonstrated use of call light. Fall precaution in effect. Hourly rounding in place.

## 2017-03-11 NOTE — PROGRESS NOTES
Hospital Medicine Progress Note, Adult, Complex               Author: Berta Dallas Date & Time created: 3/11/2017  1:39 PM     Interval History:  36F with h/o etoh/tobaco abuse, with ALD and coagulopathy with abd pain, ? Cholecystitis    3/10  abd pain controlled, denies n/v, neg tremors  3/11  Lethargy, abdominal pain controlled, neg n/v    Review of Systems:  Review of Systems   Constitutional: Positive for malaise/fatigue. Negative for fever, chills and diaphoresis.   HENT: Negative for congestion and hearing loss.    Respiratory: Negative for shortness of breath.    Cardiovascular: Negative for palpitations and leg swelling.   Gastrointestinal: Positive for nausea and abdominal pain. Negative for vomiting.   Genitourinary: Negative for dysuria, frequency and flank pain.   Musculoskeletal: Negative for myalgias and back pain.   Neurological: Positive for weakness. Negative for dizziness, tingling, tremors and focal weakness.   Psychiatric/Behavioral: Negative for memory loss. The patient is not nervous/anxious.        Physical Exam:  Physical Exam   Constitutional: She is oriented to person, place, and time. She appears well-nourished. No distress.   HENT:   Head: Normocephalic and atraumatic.   Nose: Nose normal.   Eyes: EOM are normal. Pupils are equal, round, and reactive to light. Scleral icterus is present.   Neck: Normal range of motion. Neck supple. No thyromegaly present.   Cardiovascular: Normal rate and intact distal pulses.    Pulmonary/Chest: Breath sounds normal. No respiratory distress.   Abdominal: Soft. Bowel sounds are normal. She exhibits no distension. There is tenderness.   Musculoskeletal: She exhibits no edema.   Neurological: She is oriented to person, place, and time. No cranial nerve deficit. She exhibits normal muscle tone.   Skin: Skin is warm and dry.   jaundice   Psychiatric: She has a normal mood and affect. Her behavior is normal. Judgment and thought content normal.   Nursing note  and vitals reviewed.      Labs:        Invalid input(s): [id number]      Recent Labs      03/09/17   0855  03/10/17   0437  03/11/17   023   SODIUM  133*  132*  133*   POTASSIUM  3.5*  3.4*  3.2*   CHLORIDE  100  105  106   CO2  22  20  20   BUN  3*  2*  3*   CREATININE  0.56  0.45*  0.51   MAGNESIUM   --   1.7  1.7   PHOSPHORUS   --   3.6   --    CALCIUM  8.5  7.7*  7.8*     Recent Labs      03/09/17   0855  03/09/17   1841  03/10/17   0437  03/11/17   0235   ALTSGPT  23   --   20  19   ASTSGOT  57*   --   49*  53*   ALKPHOSPHAT  165*   --   140*  123*   TBILIRUBIN  6.1*   --   5.2*  5.3*   DBILIRUBIN   --   2.8*  2.4*  2.7*   LIPASE  43   --   16  10*   GLUCOSE  89   --   82  115*     Recent Labs      03/09/17   0855  03/09/17   1841  03/10/17   0437  03/11/17   0235   RBC  3.12*   --   2.60*  2.57*   HEMOGLOBIN  10.1*   --   8.5*  8.4*   HEMATOCRIT  31.3*   --   26.2*  26.1*   PLATELETCT  167   --   119*  115*   PROTHROMBTM   --   21.0*  22.8*  21.8*   INR   --   1.75*  1.95*  1.84*     Recent Labs      03/09/17   0855  03/10/17   0437  03/11/17   0235   WBC  15.9*  13.0*  13.8*   NEUTSPOLYS  85.00*  78.70*  82.90*   LYMPHOCYTES  4.40*  9.60*  9.20*   MONOCYTES  3.50  5.80  4.50   EOSINOPHILS  1.80  5.10  2.40   BASOPHILS  0.90  0.30  0.30   ASTSGOT  57*  49*  53*   ALTSGPT  23  20  19   ALKPHOSPHAT  165*  140*  123*   TBILIRUBIN  6.1*  5.2*  5.3*           Hemodynamics:  Temp (24hrs), Av.8 °C (98.2 °F), Min:36.5 °C (97.7 °F), Max:37.5 °C (99.5 °F)  Temperature: 36.7 °C (98.1 °F)  Pulse  Av.5  Min: 82  Max: 115Heart Rate (Monitored): (!) 110  Blood Pressure: 103/61 mmHg, NIBP: 125/66 mmHg  CVP (mm Hg): 3 MM HG  Respiratory:    Respiration: 16, Pulse Oximetry: 98 %        RUL Breath Sounds: Clear, RML Breath Sounds: Clear, RLL Breath Sounds: Diminished, NEO Breath Sounds: Clear, LLL Breath Sounds: Diminished  Fluids:    Intake/Output Summary (Last 24 hours) at 17 5687  Last data filed at  03/11/17 1252   Gross per 24 hour   Intake   3360 ml   Output   2760 ml   Net    600 ml        GI/Nutrition:  Orders Placed This Encounter   Procedures   • DIET ORDER     Standing Status: Standing      Number of Occurrences: 1      Standing Expiration Date:      Order Specific Question:  Diet:     Answer:  Clear Liquid [10]     Medical Decision Making, by Problem:  Active Hospital Problems    Diagnosis   • *Abdominal pain [R10.9]  Surgery - Dr. Colunga following  Diet per surgery  2nd to ? Acute cholecystitis - s/p lap kaitlynn, POD #1  MRCP thickened GB    No MRCP evidence of choledocholithiasis, biliary dilatation or cholelithiasis    Stable small ascites. There is moderate gallbladder wall thickening which most likely is secondary to hepatocellular disease/anasarca    Splenomegaly has slightly worsened and indicates portal hypertension    Possible partial thrombosis of the portal vein at the confluence of the splenic and superior mesenteric veins. Alternatively this heterogeneous signal may just be due to turbulent flow   • Cholecystitis [K81.9]   • Leukocytosis [D72.829]on abx   • Tobacco abuse [Z72.0]advise cessation, quit x 1 month   • GERD (gastroesophageal reflux disease) [K21.9]ppi   • Anxiety [F41.9]   • PTSD (post-traumatic stress disorder) [F43.10]   • Anemia [D64.9]monitor   • Asthma [J45.909]   • Coagulopathy (CMS-HCC) [D68.9]2nd ALD, s/p ffp, f/u inr   • Alcoholism (CMS-HCC) [F10.20]h/o with jaundice, monitor for withdrawal, no drink x 1 month   • Colitis [K52.9]     F/u am labs    Dipso- pending    Labs reviewed, Medications reviewed and Radiology images reviewed          DVT prophylaxis - mechanical: SCDs  Ulcer prophylaxis: Yes  Antibiotics: Treating active infection/contamination beyond 24 hours perioperative coverage  Assessed for rehab: Patient was assess for and/or received rehabilitation services during this hospitalization

## 2017-03-11 NOTE — CARE PLAN
Problem: Bowel/Gastric:  Goal: Normal bowel function is maintained or improved  Outcome: PROGRESSING SLOWER THAN EXPECTED  Patient tolerating clear liquid diet without nausea or vomiting.  Hypoactive bowel sounds x4 quadrants.  Patient encouraged to ambulate frequently to assist with return of bowel function.      Problem: Pain Management  Goal: Pain level will decrease to patient’s comfort goal  Outcome: PROGRESSING AS EXPECTED  Patient having complaints of pain 10, 10 in abdomen.  Medicated per MAR.

## 2017-03-11 NOTE — OR NURSING
Pt draining 800ml serosanurinous fluid per cameron. Vss. Dr Colunga phone notified.  3745 Dr Colunga at bedside to evaluate pt. States pt okay to go to [facility].

## 2017-03-11 NOTE — PROGRESS NOTES
"General Surgery Progress Note    Consulting Physician: Mejia Colunga M.D. [facility]  -------------------------------------------------------------------------------------------------    Subjective:    No acute events overnight, moderate pain this morning, no nausea no vomiting      Vitals  Blood pressure 103/59, pulse 98, temperature 36.9 °C (98.4 °F), resp. rate 17, height 1.702 m (5' 7\"), weight 77.111 kg (170 lb), last menstrual period 02/09/2017, SpO2 99 %, not currently breastfeeding.    Physical Exam:  Constitutional: Alert, oriented, no acute distress  Cardiovascular: Regular rate and rhythm,   Pulmonary:  Good air entry bilaterally,    Abdominal:  Soft,  Mildly tender, non-distended,  Incisions clean dry and intact      LACY drain with serous output  Extremity:  Warm, well perfused      Current Facility-Administered Medications   Medication Dose Route Frequency Provider Last Rate Last Dose   • medroxyPROGESTERone (PROVERA) tablet 10 mg  10 mg Oral QHS Yon Chisholm M.D.   10 mg at 03/10/17 2143   • omeprazole (PRILOSEC) capsule 20 mg  20 mg Oral BID AC Yon Chisholm M.D.   20 mg at 03/11/17 0514   • pentoxifylline CR (TRENTAL) tablet 400 mg  400 mg Oral TID WITH MEALS Yon Chishlom M.D.   400 mg at 03/10/17 1926   • cefTRIAXone (ROCEPHIN) 2 g in  mL IVPB  2 g Intravenous Q24HRS Yon Chisholm M.D.   Stopped at 03/10/17 0817   • metronidazole (FLAGYL) IVPB 500 mg  500 mg Intravenous Q8HRS Yon Chisholm M.D. 100 mL/hr at 03/11/17 0514 500 mg at 03/11/17 0514   • morphine (pf) 4 mg/ml injection 2-4 mg  2-4 mg Intravenous Q3HRS PRN LYNNE Cardenas.D.   4 mg at 03/11/17 0412   • senna-docusate (PERICOLACE or SENOKOT S) 8.6-50 MG per tablet 2 Tab  2 Tab Oral BID Yon Chisholm M.D.   2 Tab at 03/10/17 2058    And   • polyethylene glycol/lytes (MIRALAX) PACKET 1 Packet  1 Packet Oral QDAY PRN Yon Chisholm M.D.        And   • magnesium hydroxide (MILK OF MAGNESIA) suspension 30 mL  30 mL Oral QDAY " PRN Yon Chisholm M.D.        And   • bisacodyl (DULCOLAX) suppository 10 mg  10 mg Rectal QDAY PRPHUONG Chisholm M.D.       • ondansetron (ZOFRAN) syringe/vial injection 4 mg  4 mg Intravenous Q4HRS PRN Yon Chisholm M.D.   4 mg at 03/09/17 1336         Labs:  Recent Labs      03/09/17   0855  03/10/17   0437  03/11/17   0235   WBC  15.9*  13.0*  13.8*   RBC  3.12*  2.60*  2.57*   HEMOGLOBIN  10.1*  8.5*  8.4*   HEMATOCRIT  31.3*  26.2*  26.1*   MCV  100.3*  100.8*  101.6*   MCH  32.4  32.7  32.7   MCHC  32.3*  32.4*  32.2*   RDW  84.5*  82.1*  84.2*   PLATELETCT  167  119*  115*   MPV  10.6  10.7  11.7     Recent Labs      03/09/17   0855  03/10/17   0437  03/11/17   0235   SODIUM  133*  132*  133*   POTASSIUM  3.5*  3.4*  3.2*   CHLORIDE  100  105  106   CO2  22  20  20   GLUCOSE  89  82  115*   BUN  3*  2*  3*   CREATININE  0.56  0.45*  0.51   CALCIUM  8.5  7.7*  7.8*     Recent Labs      03/09/17   1841  03/10/17   0437  03/11/17   0235   INR  1.75*  1.95*  1.84*     Recent Labs      03/09/17   0855  03/09/17   1841  03/10/17   0437  03/11/17   0235   ASTSGOT  57*   --   49*  53*   ALTSGPT  23   --   20  19   TBILIRUBIN  6.1*   --   5.2*  5.3*   IBILIRUBIN   --    --   2.8*  2.6*   DBILIRUBIN   --   2.8*  2.4*  2.7*   ALKPHOSPHAT  165*   --   140*  123*   GLOBULIN  5.6*   --   4.5*  4.5*   INR   --   1.75*  1.95*  1.84*         Assessment:   POD 1 : lap kaitlynn  Doing well    Plan:    clear liquid diet today, advance as tolerated   continue LACY drain for now   monitor daily lab work      Mejia Colunga M.D.  [facility]  [phone]  Cell: [phone]

## 2017-03-11 NOTE — CARE PLAN
Problem: Communication  Goal: The ability to communicate needs accurately and effectively will improve  Patient communicates needs appropriately to nursing staff. Patient calls appropriately.    Problem: Safety  Goal: Will remain free from falls  Room/floor clear. Non skid socks on. Proper signs up. Bed low and locked. Call light and belonging within reach. Patient calls appropriately. Hourly rounding in place.

## 2017-03-11 NOTE — PROGRESS NOTES
Received report from evening RN.  Assumed care of patient.  Patient A&Ox4.  C/O pain 10/10, medicated per MAR.  Lap stabs on abdomen, approximated with dermabond.  No redness or drainage noted.  LACY drain to RLQ with serosanguinous output.  Tachycardic at 100 BPM.  No complaints of numbness/tingling.  No nausea or vomiting.  Tolerating clear liquid diet.  Bowel sounds hypoactive [id number].  Patient encouraged to ambulate frequently.  Plan of care discussed.  Call light within reach.  Bed in lowest position.

## 2017-03-12 NOTE — PROGRESS NOTES
BS report received. Patient resting in bed, alert and oriented x4. Pt c/o pain 10/10, medicated per MAR. Denies nausea or vomiting. Tolerating clear liquid diet. ABD lap stab site intact with dermabond. LACY to RLQ continue to leaking, guaze dressing changed PRN. PIV SL. Pt on RA, sating >90%. Encouraged to use IS, pulled 1600ml. POC discussed, verbalized understanding. Bed low and locked. Call light and belonging within reach and pt calls appropriately. Fall precaution in effect. Hourly rounding in place.

## 2017-03-12 NOTE — PROGRESS NOTES
"Pt AA&Ox4. Pain rating at 9. Medicated per MAR. Has no c/o n/v this am. Tolerating clear liquid diet. Pt would like to eat more than clear liquid this am. Lap sites x 2, belinda and dermabond. No signs of infection noted. cameron drain to the RLQ with large serosang output. Dressing intact. Plan for pt to ambulate in hallway three times a day. Abdomen soft and tender. Pt states she had a BM last night, +flatus, +BS. Call light within reach. Plan of care discussed. Hourly rounding in place. Blood pressure 106/61, pulse 100, temperature 36.6 °C (97.9 °F), resp. rate 17, height 1.702 m (5' 7\"), weight 77.111 kg (170 lb), last menstrual period 02/09/2017, SpO2 97 %, not currently breastfeeding.    "

## 2017-03-12 NOTE — CARE PLAN
Problem: Bowel/Gastric:  Goal: Normal bowel function is maintained or improved  +Flatus +BM, + BS. Last BM 3/11 per pt.     Problem: Respiratory:  Goal: Respiratory status will improve  Patient on RA, sating high 90s. Lung sounds are clear to diminished. Encouraged patient to utilize IS, pt pulled between 1300~1600ml.

## 2017-03-12 NOTE — CARE PLAN
Problem: Safety  Goal: Will remain free from falls  Intervention: Assess risk factors for falls  Pt up with standby assist. Call light within reach. Calls for assistance as needed.       Problem: Venous Thromboembolism (VTW)/Deep Vein Thrombosis (DVT) Prevention:  Goal: Patient will participate in Venous Thrombosis (VTE)/Deep Vein Thrombosis (DVT)Prevention Measures  SCDs in place.     Problem: Knowledge Deficit  Goal: Knowledge of disease process/condition, treatment plan, diagnostic tests, and medications will improve  Pt instructed on pain management, ambulating, IS and call light    Problem: Pain Management  Goal: Pain level will decrease to patient’s comfort goal  Pain controlled with po and iv pain meds

## 2017-03-12 NOTE — PROGRESS NOTES
Hospital Medicine Progress Note, Adult, Complex               Author: Berta Dallas Date & Time created: 3/12/2017  1:00 PM     Interval History:  36F with h/o etoh/tobaco abuse, with ALD and coagulopathy with abd pain, ? Cholecystitis    3/10  abd pain controlled, denies n/v, neg tremors  3/11  Lethargy, abdominal pain controlled, neg n/v  3/12  Feels better, tolerating diet, + cameron output, still significant, watery + abd ttp, neg n/v    Review of Systems:  Review of Systems   Constitutional: Negative for fever, chills, malaise/fatigue and diaphoresis.   HENT: Negative for congestion and hearing loss.    Respiratory: Negative for cough and shortness of breath.    Cardiovascular: Negative for chest pain and palpitations.   Gastrointestinal: Positive for abdominal pain. Negative for heartburn, nausea and vomiting.   Genitourinary: Negative for dysuria, frequency and flank pain.   Musculoskeletal: Negative for myalgias and back pain.   Neurological: Positive for weakness. Negative for tingling, tremors, focal weakness and headaches.   Psychiatric/Behavioral: Negative for depression and memory loss. The patient is not nervous/anxious.        Physical Exam:  Physical Exam   Constitutional: She is oriented to person, place, and time. No distress.   HENT:   Head: Normocephalic and atraumatic.   Mouth/Throat: No oropharyngeal exudate.   Eyes: EOM are normal. Pupils are equal, round, and reactive to light. Scleral icterus is present.   Neck: Normal range of motion. Neck supple. No JVD present.   Cardiovascular: Normal rate and intact distal pulses.    Pulmonary/Chest: Effort normal and breath sounds normal. No respiratory distress. She has no wheezes.   Abdominal: Soft. Bowel sounds are normal. She exhibits no distension. There is tenderness.   + cameron   Musculoskeletal: She exhibits no edema or tenderness.   Neurological: She is oriented to person, place, and time. No cranial nerve deficit. Coordination normal.   Skin: Skin is  warm and dry. She is not diaphoretic. No erythema.   jaundice   Psychiatric: She has a normal mood and affect. Her behavior is normal. Judgment and thought content normal.   Nursing note and vitals reviewed.      Labs:        Invalid input(s): [id number]      Recent Labs      03/10/17   0437  03/11/17   0235  03/12/17   0318   SODIUM  132*  133*  130*   POTASSIUM  3.4*  3.2*  3.2*   CHLORIDE  105  106  101   CO2  20  20  21   BUN  2*  3*  <2*   CREATININE  0.45*  0.51  0.45*   MAGNESIUM  1.7  1.7  1.6   PHOSPHORUS  3.6   --    --    CALCIUM  7.7*  7.8*  7.7*     Recent Labs      03/10/17   0437  03/11/17   0235  03/12/17   0318   ALTSGPT     ASTSGOT  49*  53*  42   ALKPHOSPHAT  140*  123*  126*   TBILIRUBIN  5.2*  5.3*  5.7*   DBILIRUBIN  2.4*  2.7*  2.5*   LIPASE  16  10*  4*   GLUCOSE  82  115*  81     Recent Labs      03/10/17   0437  03/11/17   0235  03/12/17   0318   RBC  2.60*  2.57*  2.78*   HEMOGLOBIN  8.5*  8.4*  9.1*   HEMATOCRIT  26.2*  26.1*  28.0*   PLATELETCT  119*  115*  91*   PROTHROMBTM  22.8*  21.8*  23.5*   INR  1.95*  1.84*  2.02*     Recent Labs      03/10/17   0437  03/11/17   0235  03/12/17   0318   WBC  13.0*  13.8*  15.1*   NEUTSPOLYS  78.70*  82.90*  80.70*   LYMPHOCYTES  9.60*  9.20*  9.40*   MONOCYTES  5.80  4.50  5.60   EOSINOPHILS  5.10  2.40  3.10   BASOPHILS  0.30  0.30  0.50   ASTSGOT  49*  53*  42   ALTSGPT  20    19   ALKPHOSPHAT  140*  123*  126*   TBILIRUBIN  5.2*  5.3*  5.7*           Hemodynamics:  Temp (24hrs), Av.6 °C (97.9 °F), Min:36.1 °C (96.9 °F), Max:36.9 °C (98.5 °F)  Temperature: 36.1 °C (96.9 °F)  Pulse  Av  Min: 82  Max: 115  Blood Pressure: 110/72 mmHg     Respiratory:    Respiration: 16, Pulse Oximetry: 95 %        RUL Breath Sounds: Clear, RML Breath Sounds: Clear, RLL Breath Sounds: Diminished, NEO Breath Sounds: Clear, LLL Breath Sounds: Diminished  Fluids:    Intake/Output Summary (Last 24 hours) at 17 1300  Last data filed at  03/12/17 1214   Gross per 24 hour   Intake   1820 ml   Output   1780 ml   Net     40 ml        GI/Nutrition:  Orders Placed This Encounter   Procedures   • DIET ORDER     Standing Status: Standing      Number of Occurrences: 1      Standing Expiration Date:      Order Specific Question:  Diet:     Answer:  Hepatic [9]     Medical Decision Making, by Problem:  Active Hospital Problems    Diagnosis   • *Abdominal pain [R10.9]  Surgery - Dr. Colunga following  Diet per surgery  2nd to ? Acute cholecystitis - s/p lap kaitlynn, POD #2  MRCP thickened GB    No MRCP evidence of choledocholithiasis, biliary dilatation or cholelithiasis    Stable small ascites. There is moderate gallbladder wall thickening which most likely is secondary to hepatocellular disease/anasarca    Splenomegaly has slightly worsened and indicates portal hypertension    Possible partial thrombosis of the portal vein at the confluence of the splenic and superior mesenteric veins. Alternatively this heterogeneous signal may just be due to turbulent flow   • Cholecystitis [K81.9]   • Leukocytosis [D72.829]on abx   • Tobacco abuse [Z72.0]advise cessation, quit x 1 month   • GERD (gastroesophageal reflux disease) [K21.9]ppi   • Anxiety [F41.9]   • PTSD (post-traumatic stress disorder) [F43.10]   • Anemia [D64.9]monitor   • Asthma [J45.909]   • Coagulopathy (CMS-HCC) [D68.9]2nd ALD, s/p ffp, f/u inr   • Alcoholism (CMS-HCC) [F10.20]h/o with jaundice, monitor for withdrawal, no drink x 1 month   • Colitis [K52.9]   HTN- now controlled  Hypokalemia- with hypomag, start po repletion  transaminits ongoing- 2nd to ALD, monitor    F/u am labs    Dipso- pending    Labs reviewed, Medications reviewed and Radiology images reviewed          DVT prophylaxis - mechanical: SCDs  Ulcer prophylaxis: Yes  Antibiotics: Treating active infection/contamination beyond 24 hours perioperative coverage  Assessed for rehab: Patient was assess for and/or received rehabilitation  services during this hospitalization

## 2017-03-12 NOTE — PROGRESS NOTES
"General Surgery Progress Note    Consulting Physician: Mejia Colunga M.D. [facility]  -------------------------------------------------------------------------------------------------    Subjective:    No acute events overnight, less pain today, more mobility      Vitals  Blood pressure 104/59, pulse 113, temperature 36.8 °C (98.2 °F), resp. rate 18, height 1.702 m (5' 7\"), weight 77.111 kg (170 lb), last menstrual period 02/09/2017, SpO2 98 %, not currently breastfeeding.    Physical Exam:  Constitutional: Alert, oriented, no acute distress  Cardiovascular: Regular rate and rhythm,   Pulmonary:  Good air entry bilaterally,    Abdominal:  Soft,  Mildly tender, non-distended,  Incisions clean dry and intact      LACY drain with serous output  Extremity:  Warm, well perfused      Current Facility-Administered Medications   Medication Dose Route Frequency Provider Last Rate Last Dose   • morphine (pf) 4 mg/ml injection 1 mg  1 mg Intravenous Q4HRS PRN Berta Dallas D.O.       • potassium chloride SA (Kdur) tablet 20 mEq  20 mEq Oral BID LARRY Rodriguez.OElana   20 mEq at 03/12/17 0756   • oxycodone immediate-release (ROXICODONE) tablet 5 mg  5 mg Oral Q4HRS PRN Berta Dallas D.O.        Or   • oxycodone immediate release (ROXICODONE) tablet 10 mg  10 mg Oral Q4HRS PRN NAT RodriguezO.   10 mg at 03/12/17 0531   • medroxyPROGESTERone (PROVERA) tablet 10 mg  10 mg Oral QHS Yon Chisholm M.D.   10 mg at 03/11/17 2048   • omeprazole (PRILOSEC) capsule 20 mg  20 mg Oral BID  Yon Chisholm M.D.   20 mg at 03/12/17 0530   • pentoxifylline CR (TRENTAL) tablet 400 mg  400 mg Oral TID WITH MEALS Yon Chisholm M.D.   400 mg at 03/12/17 0756   • cefTRIAXone (ROCEPHIN) 2 g in  mL IVPB  2 g Intravenous Q24HRS Yon Chisholm M.D.   Stopped at 03/12/17 0827   • metronidazole (FLAGYL) IVPB 500 mg  500 mg Intravenous Q8HRS Yon Chisholm M.D.   Stopped at 03/12/17 0631   • senna-docusate (PERICOLACE or SENOKOT S) 8.6-50 " MG per tablet 2 Tab  2 Tab Oral BID Yon Chisholm M.D.   2 Tab at 03/12/17 0756    And   • polyethylene glycol/lytes (MIRALAX) PACKET 1 Packet  1 Packet Oral QDAY PRPHUONG Chisholm M.D.        And   • magnesium hydroxide (MILK OF MAGNESIA) suspension 30 mL  30 mL Oral QDAY PRPHUONG Chisholm M.D.        And   • bisacodyl (DULCOLAX) suppository 10 mg  10 mg Rectal QDAY PRPHUONG Chisholm M.D.       • ondansetron (ZOFRAN) syringe/vial injection 4 mg  4 mg Intravenous Q4HRS PRPHUONG Chisholm M.D.   4 mg at 03/09/17 1336         Labs:  Recent Labs      03/10/17   0437  03/11/17   0235  03/12/17   0318   WBC  13.0*  13.8*  15.1*   RBC  2.60*  2.57*  2.78*   HEMOGLOBIN  8.5*  8.4*  9.1*   HEMATOCRIT  26.2*  26.1*  28.0*   MCV  100.8*  101.6*  100.7*   MCH  32.7  32.7  32.7   MCHC  32.4*  32.2*  32.5*   RDW  82.1*  84.2*  79.4*   PLATELETCT  119*  115*  91*   MPV  10.7  11.7  13.0*     Recent Labs      03/10/17   0437  03/11/17   0235  03/12/17   0318   SODIUM  132*  133*  130*   POTASSIUM  3.4*  3.2*  3.2*   CHLORIDE  105  106  101   CO2  20  20  21   GLUCOSE  82  115*  81   BUN  2*  3*  <2*   CREATININE  0.45*  0.51  0.45*   CALCIUM  7.7*  7.8*  7.7*     Recent Labs      03/10/17   0437  03/11/17   0235  03/12/17   0318   INR  1.95*  1.84*  2.02*     Recent Labs      03/10/17   0437  03/11/17   0235  03/12/17   0318   ASTSGOT  49*  53*  42   ALTSGPT  20  19  19   TBILIRUBIN  5.2*  5.3*  5.7*   IBILIRUBIN  2.8*  2.6*  3.2*   DBILIRUBIN  2.4*  2.7*  2.5*   ALKPHOSPHAT  140*  123*  126*   GLOBULIN  4.5*  4.5*  4.2*   INR  1.95*  1.84*  2.02*         Assessment:   POD 2 : lap kaitlynn   doing well   unclear to me why the white blood cell count is rising, the drain output does not look infectious and the patient's abdominal exam is improving   patient does have a persistent bilirubinemia however the direct bilirubin is stable or slightly improved which indicates to me there is little chance for a common bile duct injury    Plan:     diet as tolerated  Activity as tolerated  Monitor daily lab work  Will likely DC LACY drain tomorrow  Continue antibiotics for now as the white blood cell count has not normalized  yet      Mejia Colunga M.D.  [facility]  [phone]  Cell: [phone]

## 2017-03-12 NOTE — PROGRESS NOTES
DATE OF SERVICE:  03/11/2017    DATE OF SERVICE:  03/11/2017.    SUBJECTIVE:  Patient is postoperative day 1 from a laparoscopic   cholecystectomy.  There were no acute changes overnight.  Patient is reporting   moderate pain this morning.  No nausea, no vomiting.    OBJECTIVE:  VITAL SIGNS:  Afebrile with stable vital signs.  GENERAL:  She is alert and oriented, in no acute distress.  ABDOMEN:  Soft, mildly tender, nondistended.  There is no rebound, no   guarding.  There is a right upper quadrant LACY drain, which is putting out   serous fluid.  The rate of LACY drain output is decreasing.  The incisions are   clean, dry and intact with Dermabond coverage.    LABORATORY DATA:  White blood cell count this morning is 13.8 up from 13   yesterday; hemoglobin is stable at 8.4 today, it was 8.5 preoperatively.  The   electrolytes are unremarkable.  LFTs show an AST of 53 which is stable from 49   yesterday, ALT of 19 which is stable from 20 preoperatively, total bilirubin   5.3 and it was 5.2 preoperatively, direct bilirubin 2.7 and it was 2.4   preoperatively, alkaline phosphatase 123 and it was 140 preoperatively.    ASSESSMENT AND PLAN:  Postoperative day 1 from laparoscopic cholecystectomy.    Patient is doing well overall.  There is no evidence of ongoing bleeding and   there is no evidence of bile leak or common duct injury despite being a   complicated laparoscopic cholecystectomy with severe inflammation around the   ductal structures.  Patient can start clear liquid diet and be advanced as   tolerated from my standpoint.  We will monitor the LACY drain for 1 more day and   if the output is not concerning, I will remove it.  Patient will probably be   able to be discharged from the hospital in 1-2 days depending on her clinical   grade of improvement.       ____________________________________     MD TOVA Higuera / RACHELL    DD:  03/11/2017 15:10:45  DT:  03/11/2017 18:55:37    D#:  [id number]  Job#:  [id number]

## 2017-03-13 NOTE — CARE PLAN
Problem: Safety  Goal: Will remain free from falls  Outcome: PROGRESSING AS EXPECTED  Intervention: Assess risk factors for falls    03/13/17 0900   OTHER   Fall Risk High Risk to Fall - 2 or more points    Risk for Injury-Any positive answers results in the pt being at high risk for fall related injury Surgery: Thoracic or Abdominal Surgery or Lower Limb Amputation   Mobility Status Assessment 1-1 Healthcare Provider Required for Assistance with Ambulation & Transfer   History of fall 2   Pt Calls for Assistance Yes       Intervention: Implement fall precautions    03/13/17 0900   OTHER   Environmental Precautions Treaded Slipper Socks on Patient;Personal Belongings, Wastebasket, Call Bell etc. in Easy Reach;Report Given to Other Health Care Providers Regarding Fall Risk;Bed in Low Position;Communication Sign for Patients & Families;Mobility Assessed & Appropriate Sign Placed   IV Pole on Same Side of Bed as Bathroom Yes   Bedrails Bedrails Closest to Bathroom Down           Problem: Bowel/Gastric:  Goal: Will not experience complications related to bowel motility  Outcome: PROGRESSING AS EXPECTED    03/13/17 0900   OTHER   Number of Times Stooled 0         Problem: Respiratory:  Goal: Respiratory status will improve  Outcome: PROGRESSING AS EXPECTED  Intervention: Educate and encourage incentive spirometry usage  Pt pulling 1500 on IS. Strong effort.

## 2017-03-13 NOTE — PROGRESS NOTES
"Pt A&O x4.    Vitals: /60 mmHg  Pulse 89  Temp(Src) 37.4 °C (99.3 °F)  Resp 16  Ht 1.702 m (5' 7\")  Wt 78.1 kg (172 lb 2.9 oz)  BMI 26.96 kg/m2  SpO2 96%  LMP 02/09/2017  Breastfeeding? No     Mildly tachycardic, low 100's.    Neuro: OLIVA. Denies new onset of numbness/ tingling.    Cardiac: Denies new onset of chest pain.    Vascular: Pulses 2+ BUE, BLE. No edema noted.    Respiratory: Lungs sound diminished in bases. Pulling 1500 on IS, effective, self-motivated. On RA.  on, satting in high 90's. Denies SOB.    GI: Abdomen rounded, tender, soft. + bowel sounds, + flatus, + BM yesterday. On regular, hepatic diet, tolerating well. - nausea/ vomiting.    : Pt voiding adequately.      MSK: Pt up to bathroom SBA, tolerating well.    Integumentary: Abdominal lap sites CDI, approximated, NIRMALA, Dermabond. RLQ LACY drain patent, dressing, CDI, changed this morning, large/ yellow/ clear output, compressed to self suction.    Labs noted.    Fall precautions in place: Bed locked in lowest position, Upper bed rails up, treaded socks in place, personal belongings within reach, call light within reach, appropriate mobility signs in place, - bed alarm. Pt calls appropriately.     Pt updated on POC.   "

## 2017-03-13 NOTE — PROGRESS NOTES
BS report received. Patient in bed, alert and oriented x4. Pt c/o pain, medicated per MAR. Denies nausea or vomiting. Tolerating current diet. Abdomen lap site belinda smith. PIV TKO. Pt on RA, sating >90%. RLQ X1JP with large sarosang output, dressing changed PRN. Pt ambulated hallway x2 SBA with steady gait.  and SCDs in place. POC discussed, verbalized understanding. Bed low and locked. Call light and belonging within reach and pt calls appropriately. Hourly rounding in place.

## 2017-03-13 NOTE — CARE PLAN
Problem: Nutritional:  Goal: Achieve adequate nutritional intake  Diet to advance and Patient will consume 50% of meals   Outcome: PROGRESSING AS EXPECTED

## 2017-03-13 NOTE — PROGRESS NOTES
Hospital Medicine Progress Note, Adult, Complex               Author: Berta Dallas Date & Time created: 3/13/2017  4:16 PM     Interval History:  36F with h/o etoh/tobaco abuse, with ALD and coagulopathy with abd pain, ? Cholecystitis    3/10  abd pain controlled, denies n/v, neg tremors  3/11  Lethargy, abdominal pain controlled, neg n/v  3/12  Feels better, tolerating diet, + cameron output, still significant, watery + abd ttp, neg n/v  3/13  cameron removed, min abd discomfort, tolerating diet    Review of Systems:  Review of Systems   Constitutional: Negative for fever and chills.   HENT: Negative for congestion and hearing loss.    Respiratory: Negative for cough and shortness of breath.    Cardiovascular: Negative for palpitations and leg swelling.   Gastrointestinal: Positive for abdominal pain. Negative for nausea.   Genitourinary: Negative for frequency and flank pain.   Musculoskeletal: Negative for myalgias and back pain.   Neurological: Positive for weakness. Negative for dizziness and tremors.   Psychiatric/Behavioral: Negative for memory loss. The patient is not nervous/anxious.        Physical Exam:  Physical Exam   Constitutional: She is oriented to person, place, and time. No distress.   HENT:   Head: Normocephalic and atraumatic.   Eyes: EOM are normal. Pupils are equal, round, and reactive to light. Scleral icterus is present.   Neck: Normal range of motion. Neck supple.   Cardiovascular: Normal rate and intact distal pulses.    Pulmonary/Chest: Effort normal and breath sounds normal. No respiratory distress.   Abdominal: Soft. Bowel sounds are normal. She exhibits no distension. There is tenderness.   + cameron   Musculoskeletal: She exhibits no edema.   Neurological: She is oriented to person, place, and time. No cranial nerve deficit.   Skin: Skin is warm and dry.   jaundice   Psychiatric: She has a normal mood and affect. Her behavior is normal. Judgment and thought content normal.   Nursing note and vitals  reviewed.      Labs:        Invalid input(s): [id number]      Recent Labs      17   0300   SODIUM  133*  130*  128*   POTASSIUM  3.2*  3.2*  3.3*   CHLORIDE  106  101  100   CO2  20  21  23   BUN  3*  <2*  <2*   CREATININE  0.51  0.45*  0.35*   MAGNESIUM  1.7  1.6  1.7   CALCIUM  7.8*  7.7*  7.6*     Recent Labs      17   030   ALTSGPT  19  19  16   ASTSGOT  53*  42  35   ALKPHOSPHAT  123*  126*  126*   TBILIRUBIN  5.3*  5.7*  5.4*   DBILIRUBIN  2.7*  2.5*  2.4*   LIPASE  10*  4*  3*   GLUCOSE  115*  81  89     Recent Labs      17   0300   RBC  2.57*  2.78*  2.65*   HEMOGLOBIN  8.4*  9.1*  8.9*   HEMATOCRIT  26.1*  28.0*  26.4*   PLATELETCT  115*  91*  126*   PROTHROMBTM  21.8*  23.5*  23.9*   INR  1.84*  2.02*  2.07*     Recent Labs      17   0300   WBC  13.8*  15.1*  12.8*   NEUTSPOLYS  82.90*  80.70*  80.70*   LYMPHOCYTES  9.20*  9.40*  9.90*   MONOCYTES  4.50  5.60  5.80   EOSINOPHILS  2.40  3.10  2.70   BASOPHILS  0.30  0.50  0.40   ASTSGOT  53*  42  35   ALTSGPT  19  19  16   ALKPHOSPHAT  123*  126*  126*   TBILIRUBIN  5.3*  5.7*  5.4*           Hemodynamics:  Temp (24hrs), Av.3 °C (99.2 °F), Min:37.3 °C (99.1 °F), Max:37.4 °C (99.3 °F)  Temperature: 37.4 °C (99.3 °F)  Pulse  Av.2  Min: 82  Max: 115  Blood Pressure: 100/60 mmHg     Respiratory:    Respiration: 16, Pulse Oximetry: 96 %        RUL Breath Sounds: Clear, RML Breath Sounds: Clear, RLL Breath Sounds: Diminished, NEO Breath Sounds: Clear, LLL Breath Sounds: Diminished  Fluids:    Intake/Output Summary (Last 24 hours) at 17 1616  Last data filed at 17 1358   Gross per 24 hour   Intake    960 ml   Output    810 ml   Net    150 ml     Weight: 78.1 kg (172 lb 2.9 oz)  GI/Nutrition:  Orders Placed This Encounter   Procedures   • DIET ORDER     Standing Status: Standing       Number of Occurrences: 1      Standing Expiration Date:      Order Specific Question:  Diet:     Answer:  Hepatic [9]     Medical Decision Making, by Problem:  Active Hospital Problems    Diagnosis   • *Abdominal pain [R10.9]  Surgery - Dr. Colunga following  Diet per surgery  2nd to ? Acute cholecystitis - s/p lap kaitlynn, POD #3  - LACY removed  - f/u am labs      MRCP thickened GB    No MRCP evidence of choledocholithiasis, biliary dilatation or cholelithiasis    Stable small ascites. There is moderate gallbladder wall thickening which most likely is secondary to hepatocellular disease/anasarca    Splenomegaly has slightly worsened and indicates portal hypertension    Possible partial thrombosis of the portal vein at the confluence of the splenic and superior mesenteric veins. Alternatively this heterogeneous signal may just be due to turbulent flow   • Cholecystitis [K81.9]   • Leukocytosis [D72.829]on abx- improving   • Tobacco abuse [Z72.0]advise cessation, quit x 1 month   • GERD (gastroesophageal reflux disease) [K21.9]ppi   • Anxiety [F41.9]   • PTSD (post-traumatic stress disorder) [F43.10]   • Anemia [D64.9]monitor   • Asthma [J45.909]   • Coagulopathy (CMS-HCC) [D68.9]2nd ALD, s/p ffp, f/u inr   • Alcoholism (CMS-HCC) [F10.20]h/o with jaundice, monitor for withdrawal, no drink x 1 month   • Colitis [K52.9]   HTN- now controlled  Hypokalemia-  Improving, with hypomag, start po repletion  transaminits ongoing- 2nd to ALD, monitor    F/u am labs    Dipso- to home in 1-2 days with clincal improvement  Need to arrange PCP f/u for chronic ALD      Labs reviewed, Medications reviewed and Radiology images reviewed          DVT prophylaxis - mechanical: SCDs  Ulcer prophylaxis: Yes  Antibiotics: Treating active infection/contamination beyond 24 hours perioperative coverage  Assessed for rehab: Patient was assess for and/or received rehabilitation services during this hospitalization

## 2017-03-14 PROBLEM — K81.9 CHOLECYSTITIS: Status: RESOLVED | Noted: 2017-01-01 | Resolved: 2017-01-01

## 2017-03-14 PROBLEM — I10 HTN (HYPERTENSION): Status: ACTIVE | Noted: 2017-01-01

## 2017-03-14 PROBLEM — R74.01 TRANSAMINITIS: Status: ACTIVE | Noted: 2017-01-01

## 2017-03-14 NOTE — DISCHARGE SUMMARY
HOSPITAL MEDICINE DISCHARGE SUMMARY    Name: Nury Thornton  MRN: [id number]  : 1980  Admit Date: 3/9/2017  Discharge Date: 3/14/2017  Attending Provider: Jimbo Ruiz M.D.  Primary Care Physician: Pcp Pt States None    DISCHARGE DIAGNOSES:   Principal Problem (Resolved):    Abdominal pain due to cholecystitis POA: Yes  Active Problems:    Hypokalemia POA: Yes    HTN (hypertension) POA: Yes    Transaminitis due to alcohol liver disease POA: Yes    Alcoholism (CMS-HCC) POA: Yes    Anemia POA: Yes    Asthma POA: Yes    Coagulopathy (CMS-HCC) POA: Yes    Leukocytosis POA: Yes    Tobacco abuse POA: Yes    GERD (gastroesophageal reflux disease) POA: Yes    Anxiety POA: Yes    PTSD (post-traumatic stress disorder) POA: Yes  Resolved Problems:    Colitis POA: Yes    Cholecystitis POA: Yes        SUMMARY OF EVENTS LEADING TO ADMISSION:   This is a 36 y.o. Female with chornic cirrhosis, and alcoholic liver disease, admitted for abdominal pain.      For further details of history of present illness, past medical/social/family histories, allergies and medication, please refer to copy of admission note by Dr. Chisholm on 3/9/17.     HOSPITAL COURSE:   The patient was admitted to the hospitalist service after being initially evaluated in the ED where She had leukocytosis on initial labs. GB ultrasound showed gallbladder sludge with wall thickening and trace amount of pericholecystic fluid. He was felt to have acute cholecystitis. She was started on antibiotics. Surgery was consulted, and she subsequently underwent laparoscopic cholecystectomy.     Her symptoms improved. Her leukocytosis trended down. She tolerated advancing her diet, and she tolerated removal of the LACY drain. With her clinical improvement, she was deemed ready to be discharged from the hospital as she did not have any further inpatient needs. Patient felt comfortable going home. The discharge plan was discussed with the patient, and she  was agreeable to it.     The patient was subsequently sent home in improved and stable condition.     FOLLOW-UP ISSUES:   1. Acute cholecystitis - she will complete 10 days course of antibiotics with oral ciprofloxacin and flagyl. Continue PRN norco for post-op pain, and PRN zofran for nausea. Follow-up with Dr. Colunga in 1 week.      2. Cirrhosis, alcohol liver disease - follow-up with PCP.     CHANGES IN MANAGEMENT OF CHRONIC CONDITION: As above.     PENDING TESTS: none    FOLLOW-UP TESTS ORDERED POST DISCHARGE: none    DISCHARGE MEDICATIONS:   Medication Sig   metronidazole (FLAGYL) 500 MG Tab Take 1 Tab by mouth every 8 hours for 5 days.   ciprofloxacin (CIPRO) 500 MG Tab Take 1 Tab by mouth 2 times a day for 5 days.   hydrocodone-acetaminophen (NORCO) 5-325 MG Tab per tablet Take 1-2 Tabs by mouth every four hours as needed.   ondansetron (ZOFRAN) 4 MG Tab tablet Take 1 Tab by mouth every four hours as needed for Nausea/Vomiting.   pentoxifylline CR (TRENTAL) 400 MG CR tablet Take 400 mg by mouth 3 times a day, with meals.   furosemide (LASIX) 20 MG Tab Take 20 mg by mouth every day.   spironolactone (ALDACTONE) 50 MG Tab Take 50 mg by mouth every day.   potassium chloride SA (KDUR) 20 MEQ Tab CR Take 20 mEq by mouth every day.   medroxyPROGESTERone (PROVERA) 5 MG Tab Take 10 mg by mouth every day.   omeprazole (PRILOSEC) 20 MG delayed-release capsule Take 1 Cap by mouth 2 times a day.   ferrous gluconate (FERGON) 324 (38 FE) MG Tab Take 324 mg by mouth 3 times a day, with meals.          FOLLOW-UP APPOINTMENTS:   1. Dr. colunga in 1 week.       For further details on discharge medications, patient education, diet, and activity, please refer to electronic copy of discharge instructions.       TIME SPENT: 41 minutes, with greater than 50% of the time spent on face-to-face encounter, addressing medical issues, coordination of care, counseling, discharge planning, medication reconciliation, and  documentation.

## 2017-03-14 NOTE — DISCHARGE INSTRUCTIONS
Discharge Instructions    Discharged to home by car with relative. Discharged via wheelchair, hospital escort: Yes.  Special equipment needed: Not Applicable    Be sure to schedule a follow-up appointment with your primary care doctor or any specialists as instructed.     Discharge Plan:   Diet Plan: Discussed  Activity Level: Discussed  Confirmed Follow up Appointment: Patient to Call and Schedule Appointment  Confirmed Symptoms Management: Discussed  Medication Reconciliation Updated: Yes  Influenza Vaccine Indication: Not indicated: Previously immunized this influenza season and > 8 years of age    I understand that a diet low in cholesterol, fat, and sodium is recommended for good health. Unless I have been given specific instructions below for another diet, I accept this instruction as my diet prescription.   Other diet: Low Fat Hepatic    Special Instructions: None    · Is patient discharged on Warfarin / Coumadin?   No     · Is patient Post Blood Transfusion?  No    Depression / Suicide Risk    As you are discharged from this [facility] facility, it is important to learn how to keep safe from harming yourself.    Recognize the warning signs:  · Abrupt changes in personality, positive or negative- including increase in energy   · Giving away possessions  · Change in eating patterns- significant weight changes-  positive or negative  · Change in sleeping patterns- unable to sleep or sleeping all the time   · Unwillingness or inability to communicate  · Depression  · Unusual sadness, discouragement and loneliness  · Talk of wanting to die  · Neglect of personal appearance   · Rebelliousness- reckless behavior  · Withdrawal from people/activities they love  · Confusion- inability to concentrate     If you or a loved one observes any of these behaviors or has concerns about self-harm, here's what you can do:  · Talk about it- your feelings and reasons for harming yourself  · Remove any means that you might use  to hurt yourself (examples: pills, rope, extension cords, firearm)  · Get professional help from the community (Mental Health, Substance Abuse, psychological counseling)  · Do not be alone:Call your Safe Contact- someone whom you trust who will be there for you.  · Call your local CRISIS HOTLINE 420-8307 or 130-244-8276  · Call your local Children's Mobile Crisis Response Team [place] (323) 865-9992 or www.[employer] V[employer]  · Call the toll free National Suicide Prevention Hotlines   · National Suicide Prevention Lifeline 111-694-ASKX (1958)  · [place] Hope Line Network 800-SUICIDE (573-3844)    Laparoscopic Cholecystectomy, Care After  Refer to this sheet in the next few weeks. These instructions provide you with information on caring for yourself after your procedure. Your health care provider may also give you more specific instructions. Your treatment has been planned according to current medical practices, but problems sometimes occur. Call your health care provider if you have any problems or questions after your procedure.  WHAT TO EXPECT AFTER THE PROCEDURE  After your procedure, it is typical to have the following:  · Pain at your incision sites. You will be given pain medicines to control the pain.  · Mild nausea or vomiting. This should improve after the first 24 hours.  · Bloating and possibly shoulder pain from the gas used during the procedure. This will improve after the first 24 hours.  HOME CARE INSTRUCTIONS   · Change bandages (dressings) as directed by your health care provider.  · Keep the wound dry and clean. You may wash the wound gently with soap and water. Gently blot or dab the area dry.  · Do not take baths or use swimming pools or hot tubs for 2 weeks or until your health care provider approves.  · Only take over-the-counter or prescription medicines as directed by your health care provider.  · Continue your normal diet as directed by your health care provider.  · Do not lift  "anything heavier than 10 pounds (4.5 kg) until your health care provider approves.  · Do not play contact sports for 1 week or until your health care provider approves.  SEEK MEDICAL CARE IF:   · You have redness, swelling, or increasing pain in the wound.  · You notice yellowish-white fluid (pus) coming from the wound.  · You have drainage from the wound that lasts longer than 1 day.  · You notice a bad smell coming from the wound or dressing.  · Your surgical cuts (incisions) break open.  SEEK IMMEDIATE MEDICAL CARE IF:   · You develop a rash.  · You have difficulty breathing.  · You have chest pain.  · You have a fever.  · You have increasing pain in the shoulders (shoulder strap areas).  · You have dizzy episodes or faint while standing.  · You have severe abdominal pain.  · You feel sick to your stomach (nauseous) or throw up (vomit) and this lasts for more than 1 day.     This information is not intended to replace advice given to you by your health care provider. Make sure you discuss any questions you have with your health care provider.    Low-Fat Diet for Pancreatitis or Gallbladder Conditions  A low-fat diet can be helpful if you have pancreatitis or a gallbladder condition. With these conditions, your pancreas and gallbladder have trouble digesting fats. A healthy eating plan with less fat will help rest your pancreas and gallbladder and reduce your symptoms.  WHAT DO I NEED TO KNOW ABOUT THIS DIET?  · Eat a low-fat diet.  · Reduce your fat intake to less than 20-30% of your total daily calories. This is less than 50-60 g of fat per day.  · Remember that you need some fat in your diet. Ask your dietician what your daily goal should be.  · Choose nonfat and low-fat healthy foods. Look for the words \"nonfat,\" \"low fat,\" or \"fat free.\"  · As a guide, look on the label and choose foods with less than 3 g of fat per serving. Eat only one serving.  · Avoid alcohol.  · Do not smoke. If you need help quitting, " talk with your health care provider.  · Eat small frequent meals instead of three large heavy meals.  WHAT FOODS CAN I EAT?  Grains  Include healthy grains and starches such as potatoes, wheat bread, fiber-rich cereal, and brown rice. Choose whole grain options whenever possible. In adults, whole grains should account for 45-65% of your daily calories.   Fruits and Vegetables  Eat plenty of fruits and vegetables. Fresh fruits and vegetables add fiber to your diet.  Meats and Other Protein Sources  Eat lean meat such as chicken and pork. Trim any fat off of meat before cooking it. Eggs, fish, and beans are other sources of protein. In adults, these foods should account for 10-35% of your daily calories.  Dairy  Choose low-fat milk and dairy options. Dairy includes fat and protein, as well as calcium.   Fats and Oils  Limit high-fat foods such as fried foods, sweets, baked goods, sugary drinks.   Other  Creamy sauces and condiments, such as mayonnaise, can add extra fat. Think about whether or not you need to use them, or use smaller amounts or low fat options.  WHAT FOODS ARE NOT RECOMMENDED?  · High fat foods, such as:  · Baked goods.  · Ice cream.  · [place] toast.  · Sweet rolls.  · Pizza.  · Cheese bread.  · Foods covered with batter, butter, creamy sauces, or cheese.  · Fried foods.  · Sugary drinks and desserts.  · Foods that cause gas or bloating     This information is not intended to replace advice given to you by your health care provider. Make sure you discuss any questions you have with your health care provider.     Cirrhosis  Cirrhosis is long-term (chronic) liver injury. The liver is your largest internal organ, and it performs many functions. The liver converts food into energy, removes toxic material from your blood, makes important proteins, and absorbs necessary vitamins from your diet.  If you have cirrhosis, it means many of your healthy liver cells have been replaced by scar tissue. This prevents  blood from flowing through your liver, which makes it difficult for your liver to function. This scarring is not reversible, but treatment can prevent it from getting worse.   CAUSES   Hepatitis C and long-term alcohol abuse are the most common causes of cirrhosis. Other causes include:  · Nonalcoholic fatty liver disease.  · Hepatitis B infection.  · Autoimmune hepatitis.  · Diseases that cause blockage of ducts inside the liver.  · Inherited liver diseases.  · Reactions to certain long-term medicines.  · Parasitic infections.  · Long-term exposure to certain toxins.  RISK FACTORS  You may have a higher risk of cirrhosis if you:  · Have certain hepatitis viruses.  · Abuse alcohol, especially if you are female.  · Are overweight.  · Share needles.  · Have unprotected sex with someone who has hepatitis.  SYMPTOMS   You may not have any signs and symptoms at first. Symptoms may not develop until the damage to your liver starts to get worse. Signs and symptoms of cirrhosis may include:   · Tenderness in the right-upper part of your abdomen.  · Weakness and tiredness (fatigue).  · Loss of appetite.  · Nausea.  · Weight loss and muscle loss.  · Itchiness.  · Yellow skin and eyes (jaundice).  · Buildup of fluid in the abdomen (ascites).  · Swelling of the feet and ankles (edema).  · Appearance of tiny blood vessels under the skin.  · Mental confusion.  · Easy bruising and bleeding.  DIAGNOSIS   Your health care provider may suspect cirrhosis based on your symptoms and medical history, especially if you have other medical conditions or a history of alcohol abuse. Your health care provider will do a physical exam to feel your liver and check for signs of cirrhosis. Your health care provider may perform other tests, including:   · Blood tests to check:    ¨ Whether you have hepatitis B or C.    ¨ Kidney function.  ¨ Liver function.  · Imaging tests such as:  ¨ MRI or CT scan to look for changes seen in advanced  cirrhosis.  ¨ Ultrasound to see if normal liver tissue is being replaced by scar tissue.  · A procedure using a long needle to take a sample of liver tissue (biopsy) for examination under a microscope. Liver biopsy can confirm the diagnosis of cirrhosis.    TREATMENT   Treatment depends on how damaged your liver is and what caused the damage. Treatment may include treating cirrhosis symptoms or treating the underlying causes of the condition to try to slow the progression of the damage. Treatment may include:  · Making lifestyle changes, such as:    ¨ Eating a healthy diet.  ¨ Restricting salt intake.   ¨ Maintaining a healthy weight.    ¨ Not abusing drugs or alcohol.  · Taking medicines to:  ¨ Treat liver infections or other infections.  ¨ Control itching.  ¨ Reduce fluid buildup.  ¨ Reduce certain blood toxins.  ¨ Reduce risk of bleeding from enlarged blood vessels in the stomach or esophagus (varices).  · If varices are causing bleeding problems, you may need treatment with a procedure that ties up the vessels causing them to fall off (band ligation).  · If cirrhosis is causing your liver to fail, your health care provider may recommend a liver transplant.  · Other treatments may be recommended depending on any complications of cirrhosis, such as liver-related kidney failure (hepatorenal syndrome).  HOME CARE INSTRUCTIONS   · Take medicines only as directed by your health care provider. Do not use drugs that are toxic to your liver. Ask your health care provider before taking any new medicines, including over-the-counter medicines.    · Rest as needed.  · Eat a well-balanced diet. Ask your health care provider or dietitian for more information.    · You may have to follow a low-salt diet or restrict your water intake as directed.  · Do not drink alcohol. This is especially important if you are taking acetaminophen.  · Keep all follow-up visits as directed by your health care provider. This is important.  SEEK  MEDICAL CARE IF:  · You have fatigue or weakness that is getting worse.  · You develop swelling of the hands, feet, legs, or face.  · You have a fever.  · You develop loss of appetite.  · You have nausea or vomiting.  · You develop jaundice.  · You develop easy bruising or bleeding.  SEEK IMMEDIATE MEDICAL CARE IF:  · You vomit bright red blood or a material that looks like coffee grounds.  · You have blood in your stools.  · Your stools appear black and tarry.  · You become confused.  · You have chest pain or trouble breathing.     This information is not intended to replace advice given to you by your health care provider. Make sure you discuss any questions you have with your health care provider.

## 2017-03-14 NOTE — PROGRESS NOTES
AOx4, up self, nausea but no vomiting, medicated w/ IV Zofran. +flatus, normoactive bs, voiding, +BM, reporting abd pain that is 7/10, medicated per MAR. Previous LACY site dressing CDI. Patient tolerating hepatic diet. Call light in place, personal belongings available, patient educated on importance of calling for assistance, hourly rounding in place.

## 2017-03-14 NOTE — PROGRESS NOTES
"General Surgery Progress Note    Consulting Physician: Mejia Colunga M.D. [facility]  -------------------------------------------------------------------------------------------------    Subjective:    No acute events overnight, less pain today, more mobility      Vitals  Blood pressure 115/68, pulse 100, temperature 36.6 °C (97.9 °F), resp. rate 17, height 1.702 m (5' 7\"), weight 78.1 kg (172 lb 2.9 oz), last menstrual period 02/09/2017, SpO2 98 %, not currently breastfeeding.    Physical Exam:  Constitutional: Alert, oriented, no acute distress  Cardiovascular: Regular rate and rhythm,   Pulmonary:  Good air entry bilaterally,    Abdominal:  Soft,  Mildly tender, non-distended,  Incisions clean dry and intact      LACY drain with serous output  Extremity:  Warm, well perfused      Current Facility-Administered Medications   Medication Dose Route Frequency Provider Last Rate Last Dose   • morphine (pf) 4 mg/ml injection 1 mg  1 mg Intravenous Q4HRS PRN Berta Dallas D.O.       • magnesium oxide (MAG-OX) tablet 400 mg  400 mg Oral DAILY NAT RodriguezOElana   400 mg at 03/13/17 0905   • metronidazole (FLAGYL) tablet 500 mg  500 mg Oral Q8HRS Judy Cuellar PHARMD   500 mg at 03/13/17 1301   • potassium chloride SA (Kdur) tablet 20 mEq  20 mEq Oral BID LARRY Rodriguez.OElana   20 mEq at 03/13/17 0905   • oxycodone immediate-release (ROXICODONE) tablet 5 mg  5 mg Oral Q4HRS PRN NAT RodriguezOElana        Or   • oxycodone immediate release (ROXICODONE) tablet 10 mg  10 mg Oral Q4HRS PRN NAT RodriguezOElana   10 mg at 03/13/17 2867   • medroxyPROGESTERone (PROVERA) tablet 10 mg  10 mg Oral QHS Yon Chisholm M.D.   10 mg at 03/12/17 2127   • omeprazole (PRILOSEC) capsule 20 mg  20 mg Oral BID AC Yon Chisholm M.D.   20 mg at 03/13/17 1757   • pentoxifylline CR (TRENTAL) tablet 400 mg  400 mg Oral TID WITH MEALS Yon Chisholm M.D.   400 mg at 03/13/17 1758   • cefTRIAXone (ROCEPHIN) 2 g in  mL IVPB  2 g " Intravenous Q24HRS Berta Dallas D.O.   Stopped at 03/13/17 0936   • senna-docusate (PERICOLACE or SENOKOT S) 8.6-50 MG per tablet 2 Tab  2 Tab Oral BID Yon Chisholm M.D.   2 Tab at 03/13/17 0905    And   • polyethylene glycol/lytes (MIRALAX) PACKET 1 Packet  1 Packet Oral QDAY PRN Yon Chisholm M.D.        And   • magnesium hydroxide (MILK OF MAGNESIA) suspension 30 mL  30 mL Oral QDAY PRN Yon Chisholm M.D.        And   • bisacodyl (DULCOLAX) suppository 10 mg  10 mg Rectal QDAY PRPHUONG Chisholm M.D.       • ondansetron (ZOFRAN) syringe/vial injection 4 mg  4 mg Intravenous Q4HRS PRPHUONG Chisholm M.D.   4 mg at 03/13/17 1804         Labs:  Recent Labs      03/11/17 0235 03/12/17 0318 03/13/17   0300   WBC  13.8*  15.1*  12.8*   RBC  2.57*  2.78*  2.65*   HEMOGLOBIN  8.4*  9.1*  8.9*   HEMATOCRIT  26.1*  28.0*  26.4*   MCV  101.6*  100.7*  99.6*   MCH  32.7  32.7  33.6*   MCHC  32.2*  32.5*  33.7   RDW  84.2*  79.4*  74.8*   PLATELETCT  115*  91*  126*   MPV  11.7  13.0*  10.2     Recent Labs      03/11/17 0235 03/12/17 0318  03/13/17   0300   SODIUM  133*  130*  128*   POTASSIUM  3.2*  3.2*  3.3*   CHLORIDE  106  101  100   CO2  20  21  23   GLUCOSE  115*  81  89   BUN  3*  <2*  <2*   CREATININE  0.51  0.45*  0.35*   CALCIUM  7.8*  7.7*  7.6*     Recent Labs      03/11/17 0235 03/12/17   0318  03/13/17   0300   INR  1.84*  2.02*  2.07*     Recent Labs      03/11/17 0235 03/12/17 0318  03/13/17   0300   ASTSGOT  53*  42  35   ALTSGPT  19  19  16   TBILIRUBIN  5.3*  5.7*  5.4*   IBILIRUBIN  2.6*  3.2*  3.0*   DBILIRUBIN  2.7*  2.5*  2.4*   ALKPHOSPHAT  123*  126*  126*   GLOBULIN  4.5*  4.2*  4.3*   INR  1.84*  2.02*  2.07*         Assessment:   POD 3 : lap kaitlynn   doing well   LACY drain removed today   patient does have a persistent bilirubinemia however the direct bilirubin is stable or slightly improved which indicates to me there is little chance for a common bile duct injury    Plan:     diet as tolerated  Activity as tolerated  Monitor daily lab work  Continue antibiotics for now as the white blood cell count has not normalized  Yet   if patient demonstrates further clinical improvement tomorrow then will discharge home      Mejia oClunga M.D.  [facility]  [phone]  Cell: [phone]

## 2017-03-14 NOTE — PROGRESS NOTES
"General Surgery Progress Note    Consulting Physician: Mejia Colunga M.D. [facility]  -------------------------------------------------------------------------------------------------    Subjective:    No acute events overnight, less pain today, more mobility      Vitals  Blood pressure 114/58, pulse 99, temperature 36.5 °C (97.7 °F), resp. rate 16, height 1.702 m (5' 7\"), weight 78.1 kg (172 lb 2.9 oz), last menstrual period 02/09/2017, SpO2 97 %, not currently breastfeeding.    Physical Exam:  Constitutional: Alert, oriented, no acute distress  Cardiovascular: Regular rate and rhythm,   Pulmonary:  Good air entry bilaterally,    Abdominal:  Soft,  Non-tender, non-distended,  Incisions clean dry and intact      LACY drain site is CDI  Extremity:  Warm, well perfused      Current Facility-Administered Medications   Medication Dose Route Frequency Provider Last Rate Last Dose   • morphine (pf) 4 mg/ml injection 1 mg  1 mg Intravenous Q4HRS PRN Berta Dallas D.O.       • magnesium oxide (MAG-OX) tablet 400 mg  400 mg Oral DAILY NAT RodriguezOElana   400 mg at 03/14/17 0816   • metronidazole (FLAGYL) tablet 500 mg  500 mg Oral Q8HRS Judy Cuellar PHARMD   500 mg at 03/14/17 0733   • potassium chloride SA (Kdur) tablet 20 mEq  20 mEq Oral BID NAT RodriguezO.   20 mEq at 03/14/17 0816   • oxycodone immediate-release (ROXICODONE) tablet 5 mg  5 mg Oral Q4HRS PRN NAT RodriguezO.        Or   • oxycodone immediate release (ROXICODONE) tablet 10 mg  10 mg Oral Q4HRS PRN NAT RodriguezOElana   10 mg at 03/14/17 1130   • medroxyPROGESTERone (PROVERA) tablet 10 mg  10 mg Oral QHS Yon E Kathrine, M.D.   10 mg at 03/13/17 2235   • omeprazole (PRILOSEC) capsule 20 mg  20 mg Oral BID AC Yon Chisholm M.D.   20 mg at 03/14/17 0733   • pentoxifylline CR (TRENTAL) tablet 400 mg  400 mg Oral TID WITH MEALS Yon Chisholm M.D.   400 mg at 03/14/17 1130   • cefTRIAXone (ROCEPHIN) 2 g in  mL IVPB  2 g Intravenous Q24HRS " Berta Dallas D.O.   Stopped at 03/14/17 0845   • senna-docusate (PERICOLACE or SENOKOT S) 8.6-50 MG per tablet 2 Tab  2 Tab Oral BID Yon Chisholm M.D.   2 Tab at 03/14/17 0816    And   • polyethylene glycol/lytes (MIRALAX) PACKET 1 Packet  1 Packet Oral QDAY PRPHUONG Chisholm M.D.        And   • magnesium hydroxide (MILK OF MAGNESIA) suspension 30 mL  30 mL Oral QDAY PRPHUONG Chisholm M.D.        And   • bisacodyl (DULCOLAX) suppository 10 mg  10 mg Rectal QDAY PRPHUONG Chisholm M.D.       • ondansetron (ZOFRAN) syringe/vial injection 4 mg  4 mg Intravenous Q4HRS PRPHUONG Chisholm M.D.   4 mg at 03/14/17 1130         Labs:  Recent Labs      03/12/17 0318 03/13/17   0300  03/14/17   0410   WBC  15.1*  12.8*  11.8*   RBC  2.78*  2.65*  2.59*   HEMOGLOBIN  9.1*  8.9*  8.8*   HEMATOCRIT  28.0*  26.4*  26.1*   MCV  100.7*  99.6*  100.8*   MCH  32.7  33.6*  34.0*   MCHC  32.5*  33.7  33.7   RDW  79.4*  74.8*  74.4*   PLATELETCT  91*  126*  112*   MPV  13.0*  10.2  11.7     Recent Labs      03/12/17 0318 03/13/17   0300  03/14/17   0410   SODIUM  130*  128*  131*   POTASSIUM  3.2*  3.3*  3.5*   CHLORIDE  101  100  102   CO2  21  23  24   GLUCOSE  81  89  83   BUN  <2*  <2*  2*   CREATININE  0.45*  0.35*  0.31*   CALCIUM  7.7*  7.6*  7.8*     Recent Labs      03/12/17 0318 03/13/17   0300  03/14/17   0410   INR  2.02*  2.07*  2.10*     Recent Labs      03/12/17 0318 03/13/17   0300  03/14/17   0410   ASTSGOT  42  35  35   ALTSGPT  19  16  16   TBILIRUBIN  5.7*  5.4*  4.8*   IBILIRUBIN  3.2*  3.0*  2.6*   DBILIRUBIN  2.5*  2.4*  2.2*   ALKPHOSPHAT  126*  126*  122*   GLOBULIN  4.2*  4.3*  4.1*   INR  2.02*  2.07*  2.10*         Assessment:   POD 4: lap kaitlynn   doing well   LACY drain removed  And site is clean and dry   patient does have a persistent bilirubinemia however the direct bilirubin is stable or slightly improved which indicates to me there is little chance for a common bile duct injury    Plan:    diet  as tolerated  Activity as tolerated  Monitor daily lab work  Discharge today  Follow up with me in 2 weeks [phone] please call to schedule  Note and prescriptions provided.    Mejia Colunga M.D.  [facility]  [phone]  Cell: [phone]

## 2017-03-14 NOTE — PROGRESS NOTES
Received bedside report from WHITLEY Ann RN. Pt AOx4, up with out assistance, calls appropriately, family at bedside. VSS on room air. Pt tolerating a hepatic diet, per NOC RN, pt had some Nausea this AM, controlled with PRN Antiemetic. Pain controlled with PRN PO Pain medications. +Void, +BM 3/13. Pt plans to DC home today, requesting a taxi voucher for DC.

## 2017-03-14 NOTE — PROGRESS NOTES
[facility]    Work Excuse after Surgery  ____________________________    3/14/2017      To Whom it May Concern:     Nury Thornton underwent surgery at [facility] on 3/14/2017.    She is restricted to light activity for 2 weeks from the date of surgery.  During that time, she cannot lift over 15lbs, and must avoid strenuous activity, running, repetitive bending or twisting, or similar activities.  After this initial recovery period, she can return to full range of activities including heavy lifting.    If there are any questions or concerns, please contact my office at [phone].    Thank you.      Mejia Colunga MD  General and Vascular Surgery  [facility]  [phone]

## 2017-03-14 NOTE — PROGRESS NOTES
Pt being discharged with SO, Taxi voucher in hand, education given regarding incision care, diet, pain medications and antibiotics, pt will follow up with MD in 2 weeks, scripts in hand, light duty note with pt. Discharge instructions with pt. betsy Diaz RN down to  pts scripts in pharmacy.  Pt showered prior to DC. Pain controlled, VSS.  Pt being discharged via WC

## 2017-03-17 NOTE — ED AVS SNAPSHOT
Home Care Instructions                                                                                                                Nury Thornton   MRN: [id number]    Department:  [facility], Emergency Dept   Date of Visit:  3/17/2017            [facility], Emergency Dept    [address]    [place] [address]    Phone:  [phone]      You were seen by     Alexys Kim M.D.      Your Diagnosis Was     Post-op pain     G89.18       These are the medications you received during your hospitalization from 03/17/2017 1318 to 03/17/2017 1807     Date/Time Order Dose Route Action    03/17/2017 1522 NS infusion 1,000 mL 1,000 mL Intravenous New Bag    03/17/2017 1522 morphine (pf) 4 mg/ml injection 4 mg 4 mg Intravenous Given    03/17/2017 1522 ondansetron (ZOFRAN) syringe/vial injection 4 mg 4 mg Intravenous Given    03/17/2017 1708 iohexol (OMNIPAQUE) 350 mg/mL 100 mL Intravenous Given      Follow-up Information     1. Call Mejia Colunga M.D..    Specialty:  Surgery    Why:  call the office 1st thing Monday morning and arrange follow-up as soon as possible during the week    Contact information    [address] [facility]  [address]  [place] [id number] [phone]        Medication Information     Review all of your home medications and newly ordered medications with your primary doctor and/or pharmacist as soon as possible. Follow medication instructions as directed by your doctor and/or pharmacist.     Please keep your complete medication list with you and share with your physician. Update the information when medications are discontinued, doses are changed, or new medications (including over-the-counter products) are added; and carry medication information at all times in the event of emergency situations.               Medication List      ASK your doctor about these medications        Instructions    Morning Afternoon Evening Bedtime    ciprofloxacin 500 MG Tabs   Commonly  known as:  CIPRO        Take 1 Tab by mouth 2 times a day for 5 days.   Dose:  500 mg                        ferrous gluconate 324 (38 FE) MG Tabs   Commonly known as:  FERGON        Take 324 mg by mouth 3 times a day, with meals.   Dose:  324 mg                        furosemide 20 MG Tabs   Commonly known as:  LASIX        Take 20 mg by mouth every day.   Dose:  20 mg                        * hydrocodone-acetaminophen 5-325 MG Tabs per tablet   What changed:  Another medication with the same name was added. Make sure you understand how and when to take each.   Commonly known as:  NORCO   Ask about: Which instructions should I use?        Take 1-2 Tabs by mouth every four hours as needed.   Dose:  1-2 Tab                        * hydrocodone-acetaminophen 5-325 MG Tabs per tablet   What changed:  Another medication with the same name was added. Make sure you understand how and when to take each.   Commonly known as:  NORCO   Ask about: Which instructions should I use?        Doctor's comments:  No driving while on pain medications   Take 1-2 Tabs by mouth every four hours as needed (as needed for pain).   Dose:  1-2 Tab                        * hydrocodone-acetaminophen 5-325 MG Tabs per tablet   What changed:  You were already taking a medication with the same name, and this prescription was added. Make sure you understand how and when to take each.   Commonly known as:  NORCO   Ask about: Which instructions should I use?        Take 1 Tab by mouth every 6 hours as needed (for pain). Do not drive or drink alcohol or engaged in potentially hazardous activity while taking this medication   Dose:  1 Tab                        medroxyPROGESTERone 5 MG Tabs   Commonly known as:  PROVERA        Take 10 mg by mouth every day.   Dose:  10 mg                        metronidazole 500 MG Tabs   Commonly known as:  FLAGYL        Take 1 Tab by mouth every 8 hours for 5 days.   Dose:  500 mg                        omeprazole 20  MG delayed-release capsule   Commonly known as:  PRILOSEC        Take 1 Cap by mouth 2 times a day.   Dose:  20 mg                        ondansetron 4 MG Tabs tablet   Commonly known as:  ZOFRAN        Take 1 Tab by mouth every four hours as needed for Nausea/Vomiting.   Dose:  4 mg                        ondansetron 4 MG Tbdp   Commonly known as:  ZOFRAN ODT        Doctor's comments:  Do not need to fill this prescription unless you have nausea/vomitting. If you have persistent nausea/vomitting seek medical evaluation.   Take 1 Tab by mouth every 6 hours as needed for Nausea/Vomiting.   Dose:  4 mg                        pentoxifylline  MG CR tablet   Commonly known as:  TRENTAL        Take 400 mg by mouth 3 times a day, with meals.   Dose:  400 mg                        potassium chloride SA 20 MEQ Tbcr   Commonly known as:  Kdur        Take 20 mEq by mouth every day.   Dose:  20 mEq                        spironolactone 50 MG Tabs   Commonly known as:  ALDACTONE        Take 50 mg by mouth every day.   Dose:  50 mg                        * Notice:  This list has 3 medication(s) that are the same as other medications prescribed for you. Read the directions carefully, and ask your doctor or other care provider to review them with you.         Where to Get Your Medications      You can get these medications from any pharmacy     Bring a paper prescription for each of these medications    - hydrocodone-acetaminophen 5-325 MG Tabs per tablet            Procedures and tests performed during your visit     CARDIAC MONITORING    CBC WITH DIFFERENTIAL    COMP METABOLIC PANEL    CONSENT FOR CONTRAST INJECTION    CT-ABDOMEN-PELVIS WITH    CT-CTA CHEST PULMONARY ARTERY W/ RECONS    Cardiac Monitoring    DIFFERENTIAL COMMENT    EKG (NOW)    ESTIMATED GFR    HCG Qual Serum    IV Saline Lock    LIPASE    MORPHOLOGY    O2 Protocol    Obtain Old EKG    PERIPHERAL SMEAR REVIEW    PLATELET ESTIMATE    POC UA    POC URINE  PREGNANCY    SALINE LOCK    TROPONIN        Discharge Instructions       Call your surgeon 1st thing Monday morning and arrange office recheck during the week. Drink lots of healthy fluids to maintain hydration. Return here if you have new persisting symptoms          Patient Information     Patient Information    Following emergency treatment: all patient requiring follow-up care must return either to a private physician or a clinic if your condition worsens before you are able to obtain further medical attention, please return to the emergency room.     Billing Information    At [facility], we work to make the billing process streamlined for our patients.  Our Representatives are here to answer any questions you may have regarding your hospital bill.  If you have insurance coverage and have supplied your insurance information to us, we will submit a claim to your insurer on your behalf.  Should you have any questions regarding your bill, we can be reached online or by phone as follows:  Online: You are able pay your bills online or live chat with our representatives about any billing questions you may have. We are here to help Monday - Friday from 8:00am to 7:30pm and 9:00am - 12:00pm on Saturdays.  Please visit [email or web address]  for more information.   Phone:  [phone] or [phone]    Please note that your emergency physician, surgeon, pathologist, radiologist, anesthesiologist, and other specialists are not employed by [facility] and will therefore bill separately for their services.  Please contact them directly for any questions concerning their bills at the numbers below:     Emergency Physician Services:  [phone]  [facility]:  [phone]  [facility]:  [phone]  [facility]:  [phone]    1. Your final bill may vary from the amount quoted upon discharge if all procedures are not complete at that time,  or if your doctor has additional procedures of which we are not aware. You will receive an additional bill if you return to the Emergency Department at [facility] for suture removal regardless of the facility of which the sutures were placed.     2. Please arrange for settlement of this account at the emergency registration.    3. All self-pay accounts are due in full at the time of treatment.  If you are unable to meet this obligation then payment is expected within 4-5 days.     4. If you have had radiology studies (CT, X-ray, Ultrasound, MRI), you have received a preliminary result during your emergency department visit. Please contact the radiology department [phone] to receive a copy of your final result. Please discuss the Final result with your primary physician or with the follow up physician provided.     Crisis Hotline:  [place] Crisis Hotline:  8-521-QASTSII or 1-904.300.9943  [place] Crisis Hotline:    1-696.119.2193 or 785-524-9666         ED Discharge Follow Up Questions    1. In order to provide you with very good care, we would like to follow up with a phone call in the next few days.  May we have your permission to contact you?     YES /  NO    2. What is the best phone number to call you? (       )_____-__________    3. What is the best time to call you?      Morning  /  Afternoon  /  Evening                   Patient Signature:  ____________________________________________________________    Date:  ____________________________________________________________

## 2017-03-17 NOTE — ED NOTES
IV established.  Pt on cardiac monitor.  Pt medicated per MD's orders.  IVF infusing.  Friend arrives to bedside

## 2017-03-17 NOTE — ED AVS SNAPSHOT
Enbridge Access Code: [email or web address]  Expires: 4/13/2017 12:59 PM    Your email address is not on file at [facility].  Email Addresses are required for you to sign up for Enbridge, please contact [phone] to verify your personal information and to provide your email address prior to attempting to register for Enbridge.    Nury Thornton  [address] [place] [address]    [employer]  A secure, online tool to manage your health information     [facility]’s Enbridge® is a secure, online tool that connects you to your personalized health information from the privacy of your home -- day or night - making it very easy for you to manage your healthcare. Once the activation process is completed, you can even access your medical information using the Enbridge jeanette, which is available for free in the Apple Jeanette store or Google Play store.     To learn more about Enbridge, visit [email or web address]    There are two levels of access available (as shown below):   My Chart Features  [facility] Primary Care Doctor [facility]  Specialists [facility]  Urgent  Care Non-[facility] Primary Care Doctor   Email your healthcare team securely and privately 24/7 X X X    Manage appointments: schedule your next appointment; view details of past/upcoming appointments X      Request prescription refills. X      View recent personal medical records, including lab and immunizations X X X X   View health record, including health history, allergies, medications X X X X   Read reports about your outpatient visits, procedures, consult and ER notes X X X X   See your discharge summary, which is a recap of your hospital and/or ER visit that includes your diagnosis, lab results, and care plan X X  X     How to register for [employer]:  Once your e-mail address has been verified, follow the following steps to sign up for [employer].     1. Go to  [email or web address]  2. Click on the Sign Up Now box, which takes you to the New Member Sign Up page. You will need to  provide the following information:  a. Enter your [employer] Access Code exactly as it appears at the top of this page. (You will not need to use this code after you’ve completed the sign-up process. If you do not sign up before the expiration date, you must request a new code.)   b. Enter your date of birth.   c. Enter your home email address.   d. Click Submit, and follow the next screen’s instructions.  3. Create a [employer] ID. This will be your [employer] login ID and cannot be changed, so think of one that is secure and easy to remember.  4. Create a [employer] password. You can change your password at any time.  5. Enter your Password Reset Question and Answer. This can be used at a later time if you forget your password.   6. Enter your e-mail address. This allows you to receive e-mail notifications when new information is available in [employer].  7. Click Sign Up. You can now view your health information.    For assistance activating your [employer] account, call [phone]

## 2017-03-17 NOTE — ED NOTES
37 y/o female to triage with family   Chief Complaint   Patient presents with   • Abdominal Pain     started yesterday    • Post-Op Complications     last saturday, gallbladder removal      Pt states she is out of her pain medications

## 2017-03-17 NOTE — ED AVS SNAPSHOT
3/17/2017          Nury Thornton  [address] [place] [address]    Dear Nury:    [facility] wants to ensure your discharge home is safe and you or your loved ones have had all your questions answered regarding your care after you leave the hospital.    You may receive a telephone call within two days of your discharge.  This call is to make certain you understand your discharge instructions as well as ensure we provided you with the best care possible during your stay with us.     The call will only last approximately 3-5 minutes and will be done by a nurse.    Once again, we want to ensure your discharge home is safe and that you have a clear understanding of any next steps in your care.  If you have any questions or concerns, please do not hesitate to contact us, we are here for you.  Thank you for choosing [facility] for your healthcare needs.    Sincerely,    Ramesh Rojas    [facility]

## 2017-03-18 NOTE — ED PROVIDER NOTES
ED Provider Note    CHIEF COMPLAINT  Chief Complaint   Patient presents with   • Abdominal Pain     started yesterday    • Post-Op Complications     last saturday, gallbladder removal        VARUN Thornton is a 36 y.o. female who presents to the emergency department complaining of abdominal pain after cholecystectomy. The patient says she was discharged Tuesday after having her gallbladder removed. She has been taking Cipro and Flagyl and also Zofran and Norco and states that she ran out of Downs yesterday. She says that she is having worsening upper abdominal and chest pain since running out of Downs. She does not know whether or not she has had fever. She says that she is not sure when her follow-up appointment with her surgeon is. She does not recognize any other specific exacerbating or alleviating factors or precipitating events     REVIEW OF SYSTEMS no fever no hemoptysis no black or bloody stool or emesis no nausea or vomiting. All other systems negative    PAST MEDICAL HISTORY  Past Medical History   Diagnosis Date   • Depression    • Psychiatric disorder    • Stroke (CMS-HCC)    • ASTHMA    • Pneumonia 11/18/2016   • Sepsis (CMS-HCC) 11/18/2016   • HTN (hypertension) 3/14/2017       FAMILY HISTORY  Family History   Problem Relation Age of Onset   • Heart Disease Father    • Diabetes Mother        SOCIAL HISTORY  Social History     Social History   • Marital Status: Single     Spouse Name: N/A   • Number of Children: N/A   • Years of Education: N/A     Social History Main Topics   • Smoking status: Current Every Day Smoker -- 0.25 packs/day     Types: Cigarettes   • Smokeless tobacco: Not on file   • Alcohol Use: Yes   • Drug Use: No      Comment: Hx of meth and marijuana   • Sexual Activity: Not on file     Other Topics Concern   • Not on file     Social History Narrative       SURGICAL HISTORY  Past Surgical History   Procedure Laterality Date   • Tubal ligation     • Other orthopedic surgery       2009  "   • Other       LT ANKLE   • Ankle orif  10/4/2009     Performed by MAZIN HUNT at SURGERY [facility]   • Gyn surgery        x3    • Krzysztof by laparoscopy N/A 3/10/2017     Procedure: KRZYSZTOF BY LAPAROSCOPY POSS OPEN;  Surgeon: Mejia Colunga M.D.;  Location: SURGERY [facility];  Service:        CURRENT MEDICATIONS  Home Medications     **Home medications have not yet been reviewed for this encounter**          ALLERGIES  Allergies   Allergen Reactions   • Nkda [No Known Drug Allergy]        PHYSICAL EXAM  VITAL SIGNS: /56 mmHg  Pulse 96  Temp(Src) 37.1 °C (98.7 °F)  Resp 20  Ht 1.702 m (5' 7\")  Wt 81 kg (178 lb 9.2 oz)  BMI 27.96 kg/m2  SpO2 97%  LMP 2017   Oxygen saturation is interpreted as adequate  Constitutional: Awake and verbal and nontoxic appearing  HENT: Mucous membranes are slightly dry  Eyes: Sclera are slightly jaundiced  Neck: Trachea midline no JVD  Cardiovascular: Regular rate and rhythm  Lungs: Clear and equal bilaterally with no apparent difficulty breathing  Abdomen/Back: Soft and non-distended with mild tenderness but no localizable tenderness or peritoneal findings and her abdominal wounds appear to be healing I don't see any erythema or pus or discharge  Skin: Warm and dry  Musculoskeletal: No leg edema or calf tenderness  Neurologic: Awake to verbal and moving all extremities    Laboratory  A CBC shows a slightly elevated white blood count of 14.7 and 4 comparison the value was 11.8 on . Hemoglobin has improved to 9.4 from a value of 8.8 on . Basic metabolic panel is unremarkable liver functions are slightly elevated with AST of 50 alk phos 147 and total bilirubin of 4.5 and these values have been elevated in the past as well. Lipase is normal at 67. Troponin is normal point of care urinalysis showed moderate blood, nitrate and leukocyte esterase are negative.    Radiology  CT-ABDOMEN-PELVIS WITH   Final Result         1. " Diffuse anasarca with mild abdominal and pelvic ascites.      2. Hepatomegaly with ill-defined enhancement. Splenomegaly.      3. Status post cholecystectomy. Postsurgical fluid and gas within the gallbladder fossa, could be sterile or infected. No drainable fluid collection identified.      4. Mild wall thickening and stranding of the first and second portion duodenum likely reactive change to adjacent gallbladder fossa.      CT-CTA CHEST PULMONARY ARTERY W/ RECONS   Final Result      1.  No evidence pulmonary emboli.   2.  Left lower lobe atelectasis. Pneumonitis not excluded. No pleural effusion.   3.  Hepatocellular disease. Hepatosplenomegaly.   4.  Serpiginous and nodular arterial enhancement within the liver likely related to hepatocellular disease/cirrhosis. Underlying neoplasm not excluded.   5.  Surgical absence gallbladder. Fluid and air within the gallbladder fossa consistent with postoperative change.   6.  Upper abdomen ascites.                 MEDICAL DECISION MAKING and DISPOSITION  In the emergency department an IV was established the patient was given intravenous fluids as well as morphine and Zofran. She is feeling much much better at this point in time. I reviewed all the findings with the patient and her family and I have reviewed the case with her surgeon Dr. Colunga and he will see her in the office early in the week for recheck. We are going to have her continue her Cipro and Flagyl and I have written a refill prescription for Norco. The patient is instructed to drink lots of fluids maintain hydration and she is to return here at once if she has high fever or new or worsening symptoms and otherwise she will see her surgeon during the week    IMPRESSION  1. Postoperative abdominal pain         Electronically signed by: Alexys Kim, 3/17/2017 6:06 PM

## 2017-03-18 NOTE — ED NOTES
Discharge instructions given, pt verbalized understanding.  Prescription instructions given, pt verbalized understanding.  Understands NOT to drive or drink alcohol while taking narcotics.  A&ox4.  VSS.  Ambulates out of ER with friend.  Friend to drive pt home.

## 2017-03-18 NOTE — DISCHARGE INSTRUCTIONS
Call your surgeon 1st thing Monday morning and arrange office recheck during the week. Drink lots of healthy fluids to maintain hydration. Return here if you have new persisting symptoms

## 2017-03-19 NOTE — ED PROVIDER NOTES
ED Provider Note    Scribed for Jessica Birmingham M.D. by Adriana Perez. 3/19/2017, 2:17 PM.    Primary care provider: [facility]  Means of arrival: Ambulance  History obtained from: Patient  History limited by: None    CHIEF COMPLAINT  Chief Complaint   Patient presents with   • Dizziness     feels like she cant walk or she will pass out   • Lightheadedness   • Nausea   • Abdominal Pain     harded skin area LLQ, dark brown in color, painful to touch       HPI  Nury Thornton is a 36 y.o. female who presents to the Emergency Department by ambulance complaining of dizziness onset 8 days ago.  Per patient, she had her gallbladder removed and has been really light headed since, which she believes is due to all the medications she is currently on after her surgery.  She states she can hardly eat anything and her stomach is constantly bloated.  Patient was supposed to follow up care with her surgeon two weeks after the surgery.  She reports calling her surgeon to get in sooner when this light headedness began but he was unavailable at that time.  Patient is supposed to call the office tomorrow and make an appointment this week with the surgeon but since her symptoms have continued to worsen she came to the ED today for evaluation. Patient has associated night sweats, nausea, and states she has been peeing more than normal.  She denies dysuria and fevers.  Patient has been able to pass gas and her bowel movements are normal. .  Patient has a history of cirrhosis secondary to her past alcohol consumption.  She reports she quit drinking since her diagnosis.      REVIEW OF SYSTEMS  Pertinent positives include light headedness, nausea, increase urination frequency, night sweats. Pertinent negatives include no dysuria or fevers. As above, all other systems reviewed and are negative.   See HPI for further details.     PAST MEDICAL HISTORY  Past Medical History   Diagnosis Date   • Depression    • Psychiatric disorder     • Stroke (CMS-[facility])    • ASTHMA    • Pneumonia 2016   • Sepsis (CMS-[facility]) 2016   • HTN (hypertension) 3/14/2017       SURGICAL HISTORY  Past Surgical History   Procedure Laterality Date   • Tubal ligation     • Other orthopedic surgery           • Other       LT ANKLE   • Ankle orif  10/4/2009     Performed by MAZIN HUNT at SURGERY [facility]   • Gyn surgery        x3 2005   • Krzysztof by laparoscopy N/A 3/10/2017     Procedure: KRZYSZTOF BY LAPAROSCOPY POSS OPEN;  Surgeon: Mejia Colunga M.D.;  Location: SURGERY [facility] [place] [facility];  Service:        SOCIAL HISTORY  Social History   Substance Use Topics   • Smoking status: Current Every Day Smoker -- 0.25 packs/day     Types: Cigarettes   • Alcohol Use: Yes      History   Drug Use No     Comment: Hx of meth and marijuana       FAMILY HISTORY  Family History   Problem Relation Age of Onset   • Heart Disease Father    • Diabetes Mother        CURRENT MEDICATIONS  Home Medications     Reviewed by Lyla Calderon R.N. (Registered Nurse) on 17 at 1358  Med List Status: <None>    Medication Last Dose Status    ciprofloxacin (CIPRO) 500 MG Tab  Active    ferrous gluconate (FERGON) 324 (38 FE) MG Tab 3/8/2017 Active    furosemide (LASIX) 20 MG Tab 3/8/2017 Active    hydrocodone-acetaminophen (NORCO) 5-325 MG Tab per tablet  Active    hydrocodone-acetaminophen (NORCO) 5-325 MG Tab per tablet  Active    hydrocodone-acetaminophen (NORCO) 5-325 MG Tab per tablet  Active    medroxyPROGESTERone (PROVERA) 5 MG Tab 3/8/2017 Active    metronidazole (FLAGYL) 500 MG Tab  Active    omeprazole (PRILOSEC) 20 MG delayed-release capsule 3/8/2017 Active    ondansetron (ZOFRAN ODT) 4 MG TABLET DISPERSIBLE  Active    ondansetron (ZOFRAN) 4 MG Tab tablet  Active    pentoxifylline CR (TRENTAL) 400 MG CR tablet 3/8/2017 Active    potassium chloride SA (KDUR) 20 MEQ Tab CR 3/8/2017 Active    spironolactone (ALDACTONE) 50 MG Tab 3/8/2017 Active           "      ALLERGIES  Allergies   Allergen Reactions   • Nkda [No Known Drug Allergy]        PHYSICAL EXAM  VITAL SIGNS: /64 mmHg  Pulse 101  Temp(Src) 36.3 °C (97.3 °F)  Resp 12  Ht 1.702 m (5' 7\")  Wt 77.111 kg (170 lb)  BMI 26.62 kg/m2  LMP 02/09/2017  Vitals reviewed.    Consitutional: Well-developed, well-nourished. Negative for: distress.  HENT: Normocephalic, right external ear normal, left external ear normal, oropharynx clear and dry.  Eyes: Conjunctivae normal, extraocular movements normal. Negative for: discharge in right and left eye, icterus.  Neck: Range of motion normal, supple. Negative for cervical adenopathy.  Cardiovascular: Tachycardic, regular rhythm, heart sounds normal, intact distal pulses. Negative for: murmur, rub, gallop.  Pulmonary/Chest Wall: Effort normal, breath sounds normal. Negative for: respiratory distress, wheezes, rales, rhonchi.   Abdominal: Soft, bowel sounds decreased. Palpation of the left lower quadrant causes pain to the right upper quadrant which is exquisitely tender to palpation. Negative for: distention, rebound, guarding.  Musculoskeletal: Normal range of motion. Negative for edema.  Neurological: Alert and oriented x3. No focal deficits.  Skin: Warm, dry. Dependent skin changes to the bottom of the pannus with an area of induration measuring approximately 1.5 cm in diameter with no fluctuance or heat indicating infection  Psych: Mood/affect anxious but flat, behavior normal, judgment questionable      DIAGNOSTIC STUDIES / PROCEDURES    LABS  Results for orders placed or performed during the hospital encounter of 03/19/17   CBC WITH DIFFERENTIAL   Result Value Ref Range    WBC 15.1 (H) 4.8 - 10.8 K/uL    RBC 2.87 (L) 4.20 - 5.40 M/uL    Hemoglobin 9.5 (L) 12.0 - 16.0 g/dL    Hematocrit 29.7 (L) 37.0 - 47.0 %    .5 (H) 81.4 - 97.8 fL    MCH 33.1 (H) 27.0 - 33.0 pg    MCHC 32.0 (L) 33.6 - 35.0 g/dL    RDW 75.5 (H) 35.9 - 50.0 fL    Platelet Count 87 (L) " 164 - 446 K/uL    MPV 12.4 9.0 - 12.9 fL    Neutrophils-Polys 83.60 (H) 44.00 - 72.00 %    Lymphocytes 7.90 (L) 22.00 - 41.00 %    Monocytes 5.60 0.00 - 13.40 %    Eosinophils 1.90 0.00 - 6.90 %    Basophils 0.30 0.00 - 1.80 %    Immature Granulocytes 0.70 0.00 - 0.90 %    Nucleated RBC 0.00 /100 WBC    Neutrophils (Absolute) 12.61 (H) 2.00 - 7.15 K/uL    Lymphs (Absolute) 1.20 1.00 - 4.80 K/uL    Monos (Absolute) 0.85 0.00 - 0.85 K/uL    Eos (Absolute) 0.29 0.00 - 0.51 K/uL    Baso (Absolute) 0.05 0.00 - 0.12 K/uL    Immature Granulocytes (abs) 0.10 0.00 - 0.11 K/uL    NRBC (Absolute) 0.00 K/uL   COMP METABOLIC PANEL   Result Value Ref Range    Sodium 132 (L) 135 - 145 mmol/L    Potassium 3.3 (L) 3.6 - 5.5 mmol/L    Chloride 103 96 - 112 mmol/L    Co2 21 20 - 33 mmol/L    Anion Gap 8.0 0.0 - 11.9    Glucose 83 65 - 99 mg/dL    Bun 3 (L) 8 - 22 mg/dL    Creatinine 0.62 0.50 - 1.40 mg/dL    Calcium 7.9 (L) 8.5 - 10.5 mg/dL    AST(SGOT) 38 12 - 45 U/L    ALT(SGPT) 17 2 - 50 U/L    Alkaline Phosphatase 135 (H) 30 - 99 U/L    Total Bilirubin 5.4 (H) 0.1 - 1.5 mg/dL    Albumin 2.2 (L) 3.2 - 4.9 g/dL    Total Protein 6.9 6.0 - 8.2 g/dL    Globulin 4.7 (H) 1.9 - 3.5 g/dL    A-G Ratio 0.5 g/dL   LIPASE   Result Value Ref Range    Lipase 30 11 - 82 U/L   BTYPE NATRIURETIC PEPTIDE   Result Value Ref Range    B Natriuretic Peptide 204 (H) 0 - 100 pg/mL   PROTHROMBIN TIME (INR)   Result Value Ref Range    PT 23.0 (H) 12.0 - 14.6 sec    INR 1.97 (H) 0.87 - 1.13   APTT   Result Value Ref Range    APTT 44.7 (H) 24.7 - 36.0 sec   MAGNESIUM   Result Value Ref Range    Magnesium 1.8 1.5 - 2.5 mg/dL   AMMONIA   Result Value Ref Range    Ammonia 32 11 - 45 umol/L   ESTIMATED GFR   Result Value Ref Range    GFR If African American >60 >60 mL/min/1.73 m 2    GFR If Non African American >60 >60 mL/min/1.73 m 2   ACETAMINOPHEN   Result Value Ref Range    Acetomenophen -Tylenol <10 10 - 30 ug/mL                        All labs reviewed by  me.      RADIOLOGY  DX-ABDOMEN COMPLETE WITH AP OR PA CXR   Final Result      No evidence of bowel obstruction.      Bibasilar atelectasis.        The radiologist's interpretation of all radiological studies have been reviewed by me.    COURSE & MEDICAL DECISION MAKING  Nursing notes, Phoebe GUNDERSON reviewed in chart.    Obtained and reviewed past medical records from her surgery on 3/10/17. 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 2 days ago, her white blood cell count was 14, several days before that, prior to discharge, it was 11    2:17 PM Patient seen and examined at bedside. The patient presents with light headedness and dizziness and the differential diagnosis includes but is not limited to postoperative infection, pyelonephritis, pneumonia. Ordered abdominal x-ray, ammonia, urinalysis, CBC, CMP, lipase, troponin, BNP, prothrombin time, APTT, EKG and other labs. Patient will be treated with NS infusion for her symptoms.      3:15PM I consulted with Dr. Colunga (vascular surgeon) who agrees to evaluate patient and has asked me to admit her to internal medicine.    3:32 PM - I discussed the patient's case and the above findings with Dr. Viveros (hospitalist) who will see and admit the patient. Care is transferred at this time.    DISPOSITION:  Patient will be admitted to Dr. Viveros (hospitalist)  in guarded condition.      FINAL IMPRESSION  1. Post-operative infection    2. Hypokalemia    3. 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    Electronically signed by: Adriana Perez (Scribe), 3/19/2017    Jessica DODD M.D. personally performed the services described in this documentation, as scribed by Adriana Perez in my presence, and it is both accurate and complete.    The note accurately reflects work and decisions made by me.  Jessica Birmingham  3/19/2017  6:38 PM

## 2017-03-19 NOTE — ED NOTES
"Chief Complaint   Patient presents with   • Dizziness     feels like she cant walk or she will pass out   • Lightheadedness   • Nausea   • Abdominal Pain     harded skin area LLQ, dark brown in color, painful to touch       Pt SUDHAKAR NOGUERA, picked up from [address], pt is homeless and had her gallbladder removed 3/11/17, per EMS pt was prescribed hydrocodone on 3/17/17 and pt only has 1 pill left. Pt states that ever since she woke up this morning he has been feeling lightheaded, she denies standing up quickly and states she \"takes her pain medication as prescribed\". Pt also complains of a hard area on her skin LLQ abdomen area, area is dark brown painful to touch.  "

## 2017-03-19 NOTE — IP AVS SNAPSHOT
[employer] Access Code: [email or web address]  Expires: 4/13/2017 12:59 PM    Your email address is not on file at [facility].  Email Addresses are required for you to sign up for [employer], please contact [phone] to verify your personal information and to provide your email address prior to attempting to register for [employer].    Nury Thornton  [address] [place] [address]    [employer]  A secure, online tool to manage your health information     [employer]’s [employer]® is a secure, online tool that connects you to your personalized health information from the privacy of your home -- day or night - making it very easy for you to manage your healthcare. Once the activation process is completed, you can even access your medical information using the [employer] jeanette, which is available for free in the Apple Jeanette store or Google Play store.     To learn more about [employer], visit [email or web address]    There are two levels of access available (as shown below):   My Chart Features  [facility] Primary Care Doctor [facility]  Specialists [facility]  Urgent  Care Non-[facility] Primary Care Doctor   Email your healthcare team securely and privately 24/7 X X X    Manage appointments: schedule your next appointment; view details of past/upcoming appointments X      Request prescription refills. X      View recent personal medical records, including lab and immunizations X X X X   View health record, including health history, allergies, medications X X X X   Read reports about your outpatient visits, procedures, consult and ER notes X X X X   See your discharge summary, which is a recap of your hospital and/or ER visit that includes your diagnosis, lab results, and care plan X X  X     How to register for [employer]:  Once your e-mail address has been verified, follow the following steps to sign up for [employer].     1. Go to  [email or web address]  2. Click on the Sign Up Now box, which takes you to the New Member Sign Up page. You will need to  provide the following information:  a. Enter your [employer] Access Code exactly as it appears at the top of this page. (You will not need to use this code after you’ve completed the sign-up process. If you do not sign up before the expiration date, you must request a new code.)   b. Enter your date of birth.   c. Enter your home email address.   d. Click Submit, and follow the next screen’s instructions.  3. Create a [employer] ID. This will be your [employer] login ID and cannot be changed, so think of one that is secure and easy to remember.  4. Create a [employer] password. You can change your password at any time.  5. Enter your Password Reset Question and Answer. This can be used at a later time if you forget your password.   6. Enter your e-mail address. This allows you to receive e-mail notifications when new information is available in [employer].  7. Click Sign Up. You can now view your health information.    For assistance activating your [employer] account, call [phone]

## 2017-03-19 NOTE — IP AVS SNAPSHOT
" <p align=\"LEFT\"><IMG SRC=\"[email or web address]\" alt=\"Image\" WIDTH=\"50%\" HEIGHT=\"200\" BORDER=\"\"></p>                   Name:Nury Thornton  Medical Record Number:[id number]  CSN: [id number]    YOB: 1980   Age: 36 y.o.  Sex: female  HT:1.702 m (5' 7.01\") WT: 89.1 kg (196 lb 6.9 oz)          Admit Date: 3/19/2017     Discharge Date:   Today's Date: 3/23/2017  Attending Doctor:  Herberth Mcnally M.D.                  Allergies:  Nkda          Follow-up Information     1. Follow up with PATRICA Swain. Go on 3/27/2017.    Specialty:  Family Medicine    Why:  Please arrive at 1:00 pm for a 1:30 pm appointment. Thank you.    Contact information    [id number]  [facility] [address]  [phone]           Medication List      Take these Medications        Instructions    ferrous gluconate 324 (38 FE) MG Tabs   Commonly known as:  FERGON    Take 324 mg by mouth 3 times a day, with meals.   Dose:  324 mg       furosemide 20 MG Tabs   Commonly known as:  LASIX    Take 20 mg by mouth every day.   Dose:  20 mg       hydrocodone-acetaminophen 5-325 MG Tabs per tablet   Commonly known as:  NORCO    Doctor's comments:  No driving while on pain medications   Take 1-2 Tabs by mouth every four hours as needed (as needed for pain).   Dose:  1-2 Tab       medroxyPROGESTERone 5 MG Tabs   Commonly known as:  PROVERA    Take 10 mg by mouth every day.   Dose:  10 mg       omeprazole 20 MG delayed-release capsule   Commonly known as:  PRILOSEC    Take 1 Cap by mouth 2 times a day.   Dose:  20 mg       pentoxifylline  MG CR tablet   Commonly known as:  TRENTAL    Take 400 mg by mouth 3 times a day, with meals.   Dose:  400 mg       potassium chloride SA 20 MEQ Tbcr   Commonly known as:  Kdur    Take 20 mEq by mouth every day.   Dose:  20 mEq       spironolactone 50 MG Tabs   Commonly known as:  ALDACTONE    Take 50 mg by mouth every day.   Dose:  50 mg         "

## 2017-03-19 NOTE — ED NOTES
Pt complaining of left upper chest pain, 5/10, states it comes and goes, says since her surgery she has had this kind of pain. Dr. Birmingham notified.

## 2017-03-19 NOTE — IP AVS SNAPSHOT
3/23/2017          Nury Thornton  [address] [place] [address]    Dear Nury:    [facility] wants to ensure your discharge home is safe and you or your loved ones have had all your questions answered regarding your care after you leave the hospital.    You may receive a telephone call within two days of your discharge.  This call is to make certain you understand your discharge instructions as well as ensure we provided you with the best care possible during your stay with us.     The call will only last approximately 3-5 minutes and will be done by a nurse.    Once again, we want to ensure your discharge home is safe and that you have a clear understanding of any next steps in your care.  If you have any questions or concerns, please do not hesitate to contact us, we are here for you.  Thank you for choosing [facility] for your healthcare needs.    Sincerely,    Ramesh Rojas    [facility]

## 2017-03-19 NOTE — IP AVS SNAPSHOT
" Home Care Instructions                                                                                                                  Name:Nury Thornton  Medical Record Number:[id number]  CSN: [id number]    YOB: 1980   Age: 36 y.o.  Sex: female  HT:1.702 m (5' 7.01\") WT: 89.1 kg (196 lb 6.9 oz)          Admit Date: 3/19/2017     Discharge Date:   Today's Date: 3/23/2017  Attending Doctor:  Herberth Mcnally M.D.                  Allergies:  Nkda            Discharge Instructions       Discharge Instructions    Discharged to other by taxi with relative. Discharged via walking, hospital escort: Refused.  Special equipment needed: Not Applicable    Be sure to schedule a follow-up appointment with your primary care doctor or any specialists as instructed.     Discharge Plan:   Diet Plan: Discussed  Activity Level: Discussed  Confirmed Follow up Appointment: Appointment Scheduled  Confirmed Symptoms Management: Discussed  Medication Reconciliation Updated: Yes  Influenza Vaccine Indication: Not indicated: Previously immunized this influenza season and > 8 years of age    I understand that a diet low in cholesterol, fat, and sodium is recommended for good health. Unless I have been given specific instructions below for another diet, I accept this instruction as my diet prescription.   Other diet: low fiber    Special Instructions: None    · Is patient discharged on Warfarin / Coumadin?   No     · Is patient Post Blood Transfusion?  No    Sepsis, Adult  Sepsis is a serious infection of your blood or tissues that affects your whole body. The infection that causes sepsis may be bacterial, viral, fungal, or parasitic. Sepsis may be life threatening. Sepsis can cause your blood pressure to drop. This may result in shock. Shock causes your central nervous system and your organs to stop working correctly.   RISK FACTORS  Sepsis can happen in anyone, but it is more likely to happen in people who have weakened immune " systems.  SIGNS AND SYMPTOMS   Symptoms of sepsis can include:  · Fever or low body temperature (hypothermia).  · Rapid breathing (hyperventilation).  · Chills.  · Rapid heartbeat (tachycardia).  · Confusion or light-headedness.  · Trouble breathing.  · Urinating much less than usual.  · Cool, clammy skin or red, flushed skin.  · Other problems with the heart, kidneys, or brain.  DIAGNOSIS   Your health care provider will likely do tests to look for an infection, to see if the infection has spread to your blood, and to see how serious your condition is. Tests can include:  · Blood tests, including cultures of your blood.  · Cultures of other fluids from your body, such as:  · Urine.  · Pus from wounds.  · Mucus coughed up from your lungs.  · Urine tests other than cultures.  · X-ray exams or other imaging tests.  TREATMENT   Treatment will begin with elimination of the source of infection. If your sepsis is likely caused by a bacterial or fungal infection, you will be given antibiotic or antifungal medicines.  You may also receive:  · Oxygen.  · Fluids through an IV tube.  · Medicines to increase your blood pressure.  · A machine to clean your blood (dialysis) if your kidneys fail.  · A machine to help you breathe if your lungs fail.  SEEK IMMEDIATE MEDICAL CARE IF:  You get an infection or develop any of the signs and symptoms of sepsis after surgery or a hospitalization.     This information is not intended to replace advice given to you by your health care provider. Make sure you discuss any questions you have with your health care provider.     Document Released: 09/15/2004 Document Revised: 05/03/2016 Document Reviewed: 08/25/2014  [employer] Patient Education ©2016 [employer]    Alcoholic Liver Disease  The liver is an organ that converts food into energy, absorbs vitamins from food, removes toxins from the blood, and makes proteins. Alcoholic liver disease happens when the liver becomes damaged  due to alcohol consumption and it stops working properly.  CAUSES  This condition is caused by drinking too much alcohol over a number of years.  RISK FACTORS  This condition is more likely to develop in:  · Women.  · People who have a family history of the disease.  · People who have poor nutrition.  · People who are obese.  SYMPTOMS  Early symptoms of this condition include:  · Fatigue.  · Weakness.  · Abdominal discomfort on the upper right side.  Symptoms of moderate disease include:  · Fever.  · Yellow, pale, or darkening skin.  · Abdominal pain.  · Nausea and vomiting.  · Weight loss.  · Loss of appetite.  Symptoms of advanced disease include:  · Abdominal swelling.  · Nosebleeds or bleeding gums.  · Itchy skin.  · Enlarged fingertips (clubbing).  · Light-colored stools that smell very bad (steatorrhea).  · Mood changes.  · Feeling agitated.  · Confusion.  · Trouble concentrating.  Some people do not have symptoms until the condition becomes severe. Symptoms are often worse after heavy drinking.  DIAGNOSIS  This condition is diagnosed with:  · A physical exam.  · Blood tests.  · Taking a sample of liver tissue to examine under a microscope (liver biopsy).  You may also have other tests, including:   · X-rays.  · Ultrasound.  TREATMENT  Treatment may include:  · Stopping alcohol use to allow the liver to heal.  · Joining a support group or meeting with a counselor.  · Medicines to reduce inflammation. These may be recommended if the disease is severe.  · A liver transplant. This is the only treatment if the disease is very severe.  · Nutritional therapy. This may involve:  ¨ Taking vitamins.  ¨ Eating foods that are high in thiamine, such as whole-wheat cereals, pork, and raw vegetables.  ¨ Eating foods that have a lot of folic acid, such as vegetables, fruits, meats, beans, nuts, and dairy foods.  ¨ Eating a diet that includes carbohydrate-rich foods, such as yogurt, beans, potatoes, and rice.  HOME CARE  INSTRUCTIONS  · Do not drink alcohol.  · Take medicines only as directed by your health care provider.  · Take vitamins only as directed by your health care provider.  · Follow any diet instructions that are given to you by your health care provider.  SEEK MEDICAL CARE IF:  · You have a fever.  · You have shortness of breath or have difficulty breathing.  · You have bright red blood in your stool, or you have black, tarry stools.  · You are vomiting blood.  · Your skin color becomes more yellow, pale, or dark.  · You develop headaches.  · You have trouble thinking.  · You have problems balancing or walking.     This information is not intended to replace advice given to you by your health care provider. Make sure you discuss any questions you have with your health care provider.     Document Released: 01/08/2016 Document Reviewed: 01/08/2016  [employer] Interactive Patient Education ©2016 [employer]        Depression / Suicide Risk    As you are discharged from this [facility] facility, it is important to learn how to keep safe from harming yourself.    Recognize the warning signs:  · Abrupt changes in personality, positive or negative- including increase in energy   · Giving away possessions  · Change in eating patterns- significant weight changes-  positive or negative  · Change in sleeping patterns- unable to sleep or sleeping all the time   · Unwillingness or inability to communicate  · Depression  · Unusual sadness, discouragement and loneliness  · Talk of wanting to die  · Neglect of personal appearance   · Rebelliousness- reckless behavior  · Withdrawal from people/activities they love  · Confusion- inability to concentrate     If you or a loved one observes any of these behaviors or has concerns about self-harm, here's what you can do:  · Talk about it- your feelings and reasons for harming yourself  · Remove any means that you might use to hurt yourself (examples: pills, rope, extension cords,  firearm)  · Get professional help from the community (Mental Health, Substance Abuse, psychological counseling)  · Do not be alone:Call your Safe Contact- someone whom you trust who will be there for you.  · Call your local CRISIS HOTLINE 151-4885 or 318-887-7316  · Call your local Children's Mobile Crisis Response Team [place] (731) 174-7498 or www.Bridgestream  · Call the toll free National Suicide Prevention Hotlines   · National Suicide Prevention Lifeline 822-661-BDVE (0106)  · [employer] Hope Line Network 800-SUICIDE (480-6511)        Follow-up Information     1. Follow up with PATRICA Swain. Go on 3/27/2017.    Specialty:  Family Medicine    Why:  Please arrive at 1:00 pm for a 1:30 pm appointment. Thank you.    Contact information    [id number]  [place] [address]  [phone]           Discharge Medication Instructions:    Below are the medications your physician expects you to take upon discharge:    Review all your home medications and newly ordered medications with your doctor and/or pharmacist. Follow medication instructions as directed by your doctor and/or pharmacist.    Please keep your medication list with you and share with your physician.               Medication List      CONTINUE taking these medications        Instructions    ferrous gluconate 324 (38 FE) MG Tabs   Last time this was given:  324 mg on 3/23/2017  9:14 AM   Commonly known as:  FERGON    Take 324 mg by mouth 3 times a day, with meals.   Dose:  324 mg       furosemide 20 MG Tabs   Commonly known as:  LASIX    Take 20 mg by mouth every day.   Dose:  20 mg       hydrocodone-acetaminophen 5-325 MG Tabs per tablet   Commonly known as:  NORCO    Doctor's comments:  No driving while on pain medications   Take 1-2 Tabs by mouth every four hours as needed (as needed for pain).   Dose:  1-2 Tab       medroxyPROGESTERone 5 MG Tabs   Commonly known as:  PROVERA    Take 10 mg by mouth every day.   Dose:  10 mg        omeprazole 20 MG delayed-release capsule   Last time this was given:  20 mg on 3/23/2017  8:29 AM   Commonly known as:  PRILOSEC    Take 1 Cap by mouth 2 times a day.   Dose:  20 mg       pentoxifylline  MG CR tablet   Commonly known as:  TRENTAL    Take 400 mg by mouth 3 times a day, with meals.   Dose:  400 mg       potassium chloride SA 20 MEQ Tbcr   Last time this was given:  60 mEq on 3/23/2017  9:22 AM   Commonly known as:  Kdur    Take 20 mEq by mouth every day.   Dose:  20 mEq       spironolactone 50 MG Tabs   Commonly known as:  ALDACTONE    Take 50 mg by mouth every day.   Dose:  50 mg         STOP taking these medications     ciprofloxacin 500 MG Tabs   Commonly known as:  CIPRO       metronidazole 500 MG Tabs   Commonly known as:  FLAGYL       ondansetron 4 MG Tbdp   Commonly known as:  ZOFRAN ODT               Instructions           Diet / Nutrition:    Follow any diet instructions given to you by your doctor or the dietician, including how much salt (sodium) you are allowed each day.    If you are overweight, talk to your doctor about a weight reduction plan.    Activity:    Remain physically active following your doctor's instructions about exercise and activity.    Rest often.     Any time you become even a little tired or short of breath, SIT DOWN and rest.    Worsening Symptoms:    Report any of the following signs and symptoms to the doctor's office immediately:    *Pain of jaw, arm, or neck  *Chest pain not relieved by medication                               *Dizziness or loss of consciousness  *Difficulty breathing even when at rest   *More tired than usual                                       *Bleeding drainage or swelling of surgical site  *Swelling of feet, ankles, legs or stomach                 *Fever (>100ºF)  *Pink or blood tinged sputum  *Weight gain (3lbs/day or 5lbs /week)           *Shock from internal defibrillator (if applicable)  *Palpitations or irregular heartbeats                 *Cool and/or numb extremities    Stroke Awareness    Common Risk Factors for Stroke include:    Age  Atrial Fibrillation  Carotid Artery Stenosis  Diabetes Mellitus  Excessive alcohol consumption  High blood pressure  Overweight   Physical inactivity  Smoking    Warning signs and symptoms of a stroke include:    *Sudden numbness or weakness of the face, arm or leg (especially on one side of the body).  *Sudden confusion, trouble speaking or understanding.  *Sudden trouble seeing in one or both eyes.  *Sudden trouble walking, dizziness, loss of balance or coordination.Sudden severe headache with no known cause.    It is very important to get treatment quickly when a stroke occurs. If you experience any of the above warning signs, call 911 immediately.                   Disclaimer         Quit Smoking / Tobacco Use:    I understand the use of any tobacco products increases my chance of suffering from future heart disease or stroke and could cause other illnesses which may shorten my life. Quitting the use of tobacco products is the single most important thing I can do to improve my health. For further information on smoking / tobacco cessation call a Toll Free Quit Line at 1-994.870.9996 (*National [employer]) or 1-369.682.4046 (American Lung Association) or you can access the web based program at www.lungExaprotect.org.    [place] Tobacco Users Help Line:  (547) 786-6986       Toll Free: 1-155.165.9230  Quit Tobacco Program [facility] (290)658-4197    Crisis Hotline:    [place] Crisis Hotline:  9-399-NGAZLXS or 1-920.974.5531    [place] Crisis Hotline:    1-539.345.7782 or 246-123-3081    Discharge Survey:   Thank you for choosing [facility]. We hope we did everything we could to make your hospital stay a pleasant one. You may be receiving a phone survey and we would appreciate your time and participation in answering the questions. Your input is very valuable to us in our efforts to  improve our service to our patients and their families.        My signature on this form indicates that:    1. I have reviewed and understand the above information.  2. My questions regarding this information have been answered to my satisfaction.  3. I have formulated a plan with my discharge nurse to obtain my prescribed medications for home.                  Disclaimer         __________________________________                     __________       ________                       Patient Signature                                                 Date                    Time

## 2017-03-20 NOTE — CARE PLAN
Problem: Nutritional:  Goal: Achieve adequate nutritional intake  Patient will consume 50% of meals  Outcome: NOT MET  Intervention: Advance diet as tolerated  Patient is currently on a clear liquid diet  Intervention: Monitor PO intake, weights, and laboratory values  Please record % of meals consumed in ADLs to help monitor PO adequacy.

## 2017-03-20 NOTE — ED NOTES
Med rec updated and complete.  Allergies reviewed.  Met with pt at bedside and discussed  Current medications.  Pt states that she has been taking all  Her medications as prescribed including  The antibiotics.

## 2017-03-20 NOTE — PROGRESS NOTES
2 RN skin:    No evidence of skin breakdown, lesions or ecchymosis. Pt has red but blanching buttocks and 4 healed surgical incisions (2 open to air, 2 covered with gauze dressing).

## 2017-03-20 NOTE — DIETARY
"Nutrition Services:      Pt is a 36 yr old female admitted with Sepsis.      Past Medical History   Diagnosis Date   • Depression    • Psychiatric disorder    • Stroke (CMS-HCC)    • ASTHMA    • Pneumonia 11/18/2016   • Sepsis (CMS-HCC) 11/18/2016   • HTN (hypertension) 3/14/2017     Spoke with pt at bedside.  Pt appeared well-nourished.  Pt reports a poor appetite due to recent gallbladder removal on 3/10.  Pt states at home she is \"always hungry\" but cannot eat.  She is only able to take a few bites with her medications.  Pt feels she has gained a few pounds since her surgery due to bloating in her abdomen.  Pt states UBW is ~77.2 kg (170lbs) - current wt is 84.1 kg (185lbs).  Pt denies any problems chewing or swallowing.      Current diet:  Clear liquid  Weight:  84.1 kg (185 lbs) - 3/19 stand up scale wt   BMI:  29  Pertinent labs:  Albumin 1.9, BUN 2, Sodium 133, Potassium 3.4  Pertinent medications:  Kdur  Fluids:  IVF NS infusion @125mL/hr  Skin:  No skin breakdown noted  GI:  Last BM 3/20.  Pt reports abdominal discomfort and bloating ~6mo    Recommendation:    Advance diet as pt is able per MD  Nutrition Representative to see pt for meals daily  Record % of meals consumed in ADLs to help monitor PO adequacy  RD to monitor wt and lab trends    RD following for PO intake and diet advancement    "

## 2017-03-20 NOTE — H&P
PRIMARY CARE PHYSICIAN:  [facility].    RECENT SURGEON:  Mejia Colunga MD    CHIEF COMPLAINT:  Abdominal bloating, swelling with abdominal and chest pain.    HISTORY OF PRESENT ILLNESS:  Patient is a 36-year-old female with history of   recent cholecystitis, post laparoscopic cholecystectomy by Dr. Colunga on   3/10/2017, alcoholic cirrhosis, hypertension, and asthma evaluated here at   [facility] with complaints of increasing abdominal bloating and swelling.  She has   had initially some improvement in her abdominal pain relieved by pain   relievers.  Reports pain has been gradually there, worsened.  Reports   sensation of fevers, chills, has had no melena or hematochezia.  Bowel   movements are okay.  No shortness of breath.  The pain is generalized in her   epigastric right upper quadrant and periumbilical area.  She has had also   left-sided chest pain radiating to her right following surgery.  Reports no   shortness breath or cough.  No calf pain or leg swelling.  No localized   weakness.    REVIEW OF SYSTEMS:  As above, otherwise negative according to AMA and CMS   criteria.    PAST MEDICAL HISTORY:  Includes:  1.  Alcoholic cirrhosis.  2.  Recent laparoscopic cholecystectomy for acute cholecystitis.  3.  Hypertension.  4.  Transaminitis with alcoholic liver disease, alcoholic cirrhosis, anemia.  5.  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  6.  Alcohol abuse, drug abuse.    PAST SURGICAL HISTORY:  Includes as above:  1.  Possible endometrial ablation for uterine bleeding per records.  2.  Bilateral ankle surgeries.    MEDICATIONS:  Previously, she had been on Lasix 20 daily, Provera 10 mg daily,   Prilosec 20 daily, pentoxifylline 400 mg t.i.d., potassium 20 mEq daily,   Aldactone 50 daily, iron supplements, Cipro 500 mg b.i.d., Flagyl 500 mg every   8 hours, Norco 5/325 one to two every 4 hours as needed, and Zofran 4 mg q. 4   hours as  needed.    ALLERGIES:  No known drug allergies.    SOCIAL HISTORY:  Reports no tobacco or alcohol over the past month and a half,   previously was drinking 10 beers a day.  Also, history of methamphetamine   use, quit _____.  She is currently staying at a shelter.    FAMILY HISTORY:  Hypertension.    PHYSICAL EXAMINATION:  VITAL SIGNS:  Temperature 36.3, pulse 90s to low 100s, blood pressure of   96/ systolic, 99% on room air, respiratory was 12, 77 kilograms.  GENERAL:  The patient was disheveled appearing, alert, appropriately oriented,   in moderate distress citing abdominal discomfort.  HEENT:  Anicteric.  Extraocular movements are intact.  Mucous membranes were   dry.  NECK:  No cervical or supraclavicular adenopathy.  Trachea midline.  No JVD.  CARDIOVASCULAR:  Regular rate and rhythm.  No murmurs.  LUNGS:  Clear to auscultation bilaterally with normal wall excursion effort.    There was chest wall tenderness out of proportion to light touch, parasternal   region.  ABDOMEN:  Mildly distended, obese, soft.  There is tenderness to palpation in   periumbilical right upper quadrant region, epigastric area without guarding or   rebound.  Lap stabs healing with overlying scabs.  BACK:  No CVA or paraspinal tenderness.  EXTREMITIES:  There is no lower extremity edema, negative Homans sign,   symmetric, generalized 5/5 strength.  NEUROLOGIC:  Cranial nerves grossly intact.  No focal extremity weakness.  SKIN:  Warm, dry, without pillar.  PSYCHIATRIC:  Calm, cooperative without depressed affect, pulses 2+ symmetric   radial.  No peripheral cyanosis.  Capillary refill is brisk.    LABORATORY DATA:  White count 15, hemoglobin 9.5, platelet count 87,000, MCV   of 103, BUN and creatinine 3 and 0.6.  Sodium 132, potassium 3.3, blood sugar   83, alkaline phosphatase 135, total bilirubin 5.4, albumin 2.2.    Troponin less than 0.01.  BNP of 204.  INR 1.97.    CT scan of the abdomen and pelvis revealed diffuse anasarca  with mild   abdominal and pelvic ascites, hepatomegaly or splenomegaly, status post   cholecystectomy changes with mild wall thickening strength first and second   portion of duodenum with recent gallbladder fossa.    CT angio of the chest revealed a hepatocellular disease, hepatosplenomegaly,   nodular enhancement of liver, likely related to cirrhosis, surgical absence of   gallbladder, postoperative changes, and abdominal ascites.    IMPRESSION AND PLAN:  1.  Sepsis, possible biliary source.  She does have a mild wall thickening   with stranding first portion of duodenum, possible colitis.  Other   differential includes infected seroma or fluid collection within the   gallbladder fossa.  Patient will be started on IV Rocephin and Flagyl.  We   will provide analgesics for pain including IV morphine for breakthrough pain   symptoms.  Surgery, Dr. Colunga has been contacted for consultation.  We will   follow up cultures.  Provide IV fluids.  2.  Chest pain appears musculoskeletal, reproducible on exam.  CT without PE   or acute findings.  We will follow up serial cardiac enzymes to evaluate for   acute coronary syndrome.  Monitor on telemetry.  Provide analgesics.  3.  History of alcoholic liver disease with cirrhosis, has had decreased oral   intake.  We will hold her diuretics for now.  4.  History of alcoholic hepatitis.  Continue previous outpatient medications,   Trental.  5.  Anemia, without symptoms of bleed, likely chronic disease.  6.  Hypokalemia.  We will replete.  7.  Leukocytosis attributed to infection.       ____________________________________     MD FERCHO DELCIDBV / NTS    DD:  03/19/2017 17:15:11  DT:  03/19/2017 18:19:34    D#:  [id number]  Job#:  [id number]

## 2017-03-20 NOTE — PROGRESS NOTES
Pt just arrived from ER. Bedside report completed. Assumed pt care. Pt A&O 4, resting in bed comfortably with no signs of labored breathing on RA.  Pt tele monitor in place, cardiac rhythm being monitored.  Pt call light within reach, bed in low position, upper bed rails up, non skid socks in place.  Pt denies any pain or other distress at this time.  Patient was updated on the plan of care for the night.  All fall precautions in place.  Will continue to monitor.

## 2017-03-20 NOTE — PROGRESS NOTES
Hospital Medicine Progress Note, Adult, Complex               Author: Nicolas Beaver    Chief Complaint Today: 36 year old female admitted with abdominal pain Date & Time created: 3/20/2017  2:18 PM     Interval History:  Ms Thornton is a 36 year old female who had her Gallbaldder out recently and thus I did a HIDA scan to ensure there is no biliary leak, this was negative, she does have colitis and small intestinal inflammation consistent with gastroenteritis. She is placed on fluids, liquid diet and antibiotics. Surgery has been consulted and they will be looking at her to ensure she has no surgical complication. Medically complex, guarded prognosis, discussed with nursing.     Review of Systems:  Review of Systems   Constitutional: Negative for fever, chills and diaphoresis.   HENT: Negative.    Eyes: Negative.  Negative for double vision.   Respiratory: Negative.  Negative for cough, hemoptysis and wheezing.    Cardiovascular: Positive for chest pain. Negative for palpitations and leg swelling.   Gastrointestinal: Positive for nausea and abdominal pain. Negative for heartburn, vomiting, diarrhea, constipation and blood in stool.   Genitourinary: Negative.  Negative for urgency, frequency and hematuria.   Musculoskeletal: Negative.  Negative for joint pain.   Skin: Negative.  Negative for itching and rash.   Neurological: Positive for weakness. Negative for dizziness, focal weakness, seizures, loss of consciousness and headaches.   Endo/Heme/Allergies: Negative.  Does not bruise/bleed easily.   Psychiatric/Behavioral: Positive for depression and substance abuse. Negative for suicidal ideas. The patient is nervous/anxious and has insomnia.        Physical Exam:  Physical Exam   Constitutional: She is oriented to person, place, and time. She appears well-developed and well-nourished. She is cooperative. No distress. Nasal cannula in place.   HENT:   Head: Normocephalic and atraumatic.   Right Ear: External ear normal.    Left Ear: External ear normal.   Nose: Nose normal.   Mouth/Throat: Oropharynx is clear and moist.   Eyes: Conjunctivae and EOM are normal. Pupils are equal, round, and reactive to light.   Neck: Normal range of motion. Neck supple. No JVD present. No thyromegaly present.   Cardiovascular: Regular rhythm.  Tachycardia present.  Exam reveals no gallop and no friction rub.    No murmur heard.  Pulmonary/Chest: Accessory muscle usage present. She has decreased breath sounds in the right middle field, the right lower field, the left middle field and the left lower field. She has no wheezes. She has no rhonchi. She has no rales. She exhibits no tenderness.   Abdominal: Soft. She exhibits distension and ascites. Bowel sounds are increased. There is tenderness in the right upper quadrant and epigastric area.   Genitourinary:   Bynum catheter   Musculoskeletal: Normal range of motion. She exhibits edema and tenderness.   Lymphadenopathy:     She has no cervical adenopathy.   Neurological: She is alert and oriented to person, place, and time. She has normal reflexes. No cranial nerve deficit. Coordination normal.   Skin: Skin is warm and dry. No rash noted. She is not diaphoretic. There is erythema.   Psychiatric: She has a normal mood and affect. Her behavior is normal. Judgment and thought content normal.   Nursing note and vitals reviewed.      Labs:        Invalid input(s): [id number]  Recent Labs      03/17/17   1438  03/19/17   1400  03/19/17   1733   TROPONINI  <0.01  <0.01  <0.01   BNPBTYPENAT   --   204*   --      Recent Labs      03/17/17   1438  03/19/17   1400  03/20/17   0200   SODIUM  135  132*  133*   POTASSIUM  3.4*  3.3*  3.4*   CHLORIDE  102  103  108   CO2  25  21  18*   BUN  4*  3*  2*   CREATININE  0.50  0.62  0.52   MAGNESIUM   --   1.8   --    CALCIUM  8.5  7.9*  7.3*     Recent Labs      03/17/17   1438  03/19/17   1400  03/20/17   0200   ALTSGPT  19  17  13   ASTSGOT  50*  38  31   ALKPHOSPHAT   147*  135*  117*   TBILIRUBIN  4.5*  5.4*  3.8*   LIPASE  67  30   --    GLUCOSE  96  83  95     Recent Labs      17   1438  17   1400  17   0200   RBC  2.85*  2.87*  2.41*   HEMOGLOBIN  9.4*  9.5*  8.2*   HEMATOCRIT  29.1*  29.7*  24.6*   PLATELETCT  126*  87*  82*   PROTHROMBTM   --   23.0*  23.9*   APTT   --   44.7*  49.6*   INR   --   1.97*  2.07*     Recent Labs      17   1438  17   1400  17   0200   WBC  14.7*  15.1*  12.9*   NEUTSPOLYS  78.40*  83.60*  87.70*   LYMPHOCYTES  12.70*  7.90*  5.20*   MONOCYTES  5.70  5.60  5.30   EOSINOPHILS  2.10  1.90  0.90   BASOPHILS  0.40  0.30  0.00   ASTSGOT  50*  38  31   ALTSGPT  19  17  13   ALKPHOSPHAT  147*  135*  117*   TBILIRUBIN  4.5*  5.4*  3.8*           Hemodynamics:  Temp (24hrs), Av.7 °C (98 °F), Min:36.4 °C (97.6 °F), Max:36.8 °C (98.3 °F)  Temperature: 36.6 °C (97.8 °F)  Pulse  Av.9  Min: 90  Max: 113Heart Rate (Monitored): 94  Blood Pressure: 115/74 mmHg, NIBP: (!) 98/72 mmHg     Respiratory:    Respiration: 20, Pulse Oximetry: 95 %           Fluids:    Intake/Output Summary (Last 24 hours) at 17 1418  Last data filed at 17   Gross per 24 hour   Intake    240 ml   Output    400 ml   Net   -160 ml     Weight: 84 kg (185 lb 3 oz)  GI/Nutrition:  Orders Placed This Encounter   Procedures   • Diet Order     Standing Status: Standing      Number of Occurrences: 1      Standing Expiration Date:      Order Specific Question:  Diet:     Answer:  Clear Liquid [10]     Medical Decision Making, by Problem:  Active Hospital Problems    Diagnosis   • Sepsis (CMS-HCC) [A41.9]  -patient has source as gastroenteritis  -patient is with leukocytosis  -she will have cultures followed  -lactic acid peaked at 2.1 and is down to 1.7   • HTN (hypertension) [I10]  -medical management and monitor vitals   • Transaminitis due to alcohol liver disease [R74.0]  -remains elevated   • Hypokalemia [E87.6]  -potassium  supplement   • Alcoholism (CMS-[facility]) [F10.20]  -monitor   • Macrocytic anemia [D53.9]  -monitor   • Tobacco abuse [Z72.0]  --nicotine replacement protocol and cessation education provided for more than 10 minutes, discussed options of nicotine patch, acupuncture, medical treatment with wellbutrin and chantix. Discussed other options. Code 40884   • Marijuana abuse [F12.10]  -counseled   • GERD (gastroesophageal reflux disease) [K21.9]  -PPI   • Leukocytosis [D72.829]  -related to infection   • PTSD (post-traumatic stress disorder) [F43.10]  -chronic   • Hypocalcemia [E83.51]  -monitor   • Coagulopathy (CMS-HCC) [D68.9]  -related to liver dysfunction with her alcohol abuse     I spent a total of 45 minutes during this clinical encounter of which > 50% was devoted to counseling and coordinating care including review of records, pertinent lab data and studies, as well as discussing diagnostic evaluation and work up, planned therapeutic interventions and future disposition of care. Where indicated, the assessment and plan reflect discussion of patient with consultants, other healthcare providers, family members, and additional research needed to obtain further information in formulating the plan of care of this patient.     EKG reviewed, Radiology images reviewed, Labs reviewed and Medications reviewed  Bynum catheter: No Bynum      DVT Prophylaxis: Contraindicated - High bleeding risk    Ulcer prophylaxis: Yes  Antibiotics: Treating active infection/contamination beyond 24 hours perioperative coverage  Assessed for rehab: Patient was assess for and/or received rehabilitation services during this hospitalization

## 2017-03-20 NOTE — PROGRESS NOTES
Vaneke with technician for JOHN alvarez, notify her that patient has had pain meds and has eaten breakfast. She said that is OK and that patient can still have test at 10:30. OK to give pain meds per technician.

## 2017-03-20 NOTE — PROGRESS NOTES
Pt resting in bed at this time, even visible chest rise, no apparent signs of distress, call light in place, bed in low and locked position.

## 2017-03-21 NOTE — PROGRESS NOTES
"Patient ate all of lunch with no nausea or additional pain. Patient reports \"I have the same pain I've been having, it hasn't gotten worse with food.\" Patient notified to let RN know when she has next bowel movement.   "

## 2017-03-21 NOTE — CARE PLAN
Problem: Communication  Goal: The ability to communicate needs accurately and effectively will improve  Outcome: PROGRESSING AS EXPECTED    Problem: Safety  Goal: Will remain free from injury  Outcome: PROGRESSING AS EXPECTED  Pt educated on fall risk precautions.     Problem: Infection  Goal: Will remain free from infection  Outcome: PROGRESSING AS EXPECTED  Pt educated on hand hygiene.     Problem: Venous Thromboembolism (VTW)/Deep Vein Thrombosis (DVT) Prevention:  Goal: Patient will participate in Venous Thrombosis (VTE)/Deep Vein Thrombosis (DVT)Prevention Measures  Outcome: PROGRESSING AS EXPECTED    Problem: Bowel/Gastric:  Goal: Normal bowel function is maintained or improved  Outcome: PROGRESSING AS EXPECTED

## 2017-03-21 NOTE — CARE PLAN
Problem: Safety  Goal: Will remain free from injury  Intervention: Provide assistance with mobility  Pt mobility assessed at beginning of shift, pt up self and steady.  Fall precautions in place, non-slip socks on, bed in lowest, locked position and call light is within reach.  Pt educated to call for assistance and verbalizes understanding.       Problem: Pain Management  Goal: Pain level will decrease to patient’s comfort goal  Intervention: Follow pain managment plan developed in collaboration with patient and Interdisciplinary Team  Medicated per MAR, educated on pain scale, implemented non-pharmacological methods: distraction, one-on-one discussion, repositioned, rest.

## 2017-03-21 NOTE — PROGRESS NOTES
Bedside report completed.  Assumed pt care.  Pt A&O 4, resting in bed comfortably with no signs of labored breathing on RA, fiance at bedside. Pt tele monitor in place, cardiac rhythm being monitored. Pt call light within reach, bed in low position, upper bed rails up, non skid socks in place. Pt was just medicated for abdominal pain by day RN.  Patient was updated on the plan of care for the night.  All fall precautions in place.  Will continue to monitor.

## 2017-03-21 NOTE — CARE PLAN
Problem: Communication  Goal: The ability to communicate needs accurately and effectively will improve  Outcome: PROGRESSING AS EXPECTED    Problem: Safety  Goal: Will remain free from injury  Outcome: PROGRESSING AS EXPECTED  Pt educated on fall risk precautions.     Problem: Infection  Goal: Will remain free from infection  Outcome: PROGRESSING AS EXPECTED  Pt educated on hand hygiene.     Problem: Venous Thromboembolism (VTW)/Deep Vein Thrombosis (DVT) Prevention:  Goal: Patient will participate in Venous Thrombosis (VTE)/Deep Vein Thrombosis (DVT)Prevention Measures  Outcome: PROGRESSING AS EXPECTED    03/20/17 2000   OTHER   Risk Assessment Score 0   VTE RISK Low   Mechanical Prophylaxis Patient educated regarding VTE risk and need for SCDs, understands and continues to refuse         Problem: Bowel/Gastric:  Goal: Normal bowel function is maintained or improved  Outcome: PROGRESSING AS EXPECTED  Pt to notify RN of next BM     Problem: Knowledge Deficit  Goal: Knowledge of disease process/condition, treatment plan, diagnostic tests, and medications will improve  Outcome: PROGRESSING AS EXPECTED    Problem: Pain Management  Goal: Pain level will decrease to patient’s comfort goal  Outcome: PROGRESSING AS EXPECTED  IV morphine discontinued, switching to only oral medication.

## 2017-03-21 NOTE — PROGRESS NOTES
Pt is c/o bloating and ABD distention. Pt reports having a second bowel movement this afternoon and reports forgetting to notify RN (me) to look at it. Pt requesting to see doctor. Page placed to Dr. Terry, return call and MD is placing orders.

## 2017-03-21 NOTE — PROGRESS NOTES
Bedside report of patient from night shift RN and assumed care. Explained plan of care to patient; questions answered. Bed in low position, call light within reach, hourly rounding in place, room/goal board updated and current. Patient c/o ABD pain, will bring medication with morning medication.

## 2017-03-21 NOTE — PROGRESS NOTES
Hospital Medicine Progress Note, Adult, Complex               Author: Luz KRISTINE Terry     Date & Time created: 3/21/2017  7:50 AM   ID/CC: 36 yr old F with decompensated alcoholic liver disease/ , hx of alcoholism in remission, hx of recent lap cholecystectomy by  on 3/10/17 presented with abdominal bloating and swelling. Pt was treated for possible gastroenteritis.    Interval History:  Pt was on full liquid diet and was hungry and wanted to eat, her diarrhea had slowed down.  However after lunch she feels bloated and more distended.  On chart review pt has previously seen by  , and also she had egd/colonoscopy in 11/2016 and was found to be unremarkable except internal hemorrhoids  Pt confirmed that her alcohol use is in remission for > 1 mo now  Pt has abdominal distention with ascites.  D/cd ivf and started on albumin, paracentesis ordered.    Review of Systems:  Review of Systems   Constitutional: Negative for fever, chills and diaphoresis.   HENT: Negative for congestion, ear discharge and nosebleeds.    Eyes: Negative for blurred vision and double vision.   Respiratory: Negative for cough, hemoptysis and wheezing.    Cardiovascular: Positive for chest pain. Negative for palpitations and leg swelling.   Gastrointestinal: Positive for nausea and abdominal pain. Negative for heartburn, vomiting, diarrhea, constipation, blood in stool and melena.   Genitourinary: Negative for dysuria, urgency, frequency and hematuria.   Musculoskeletal: Negative for myalgias and joint pain.   Skin: Negative for itching and rash.   Neurological: Positive for weakness. Negative for dizziness, focal weakness, seizures, loss of consciousness and headaches.   Endo/Heme/Allergies: Does not bruise/bleed easily.   Psychiatric/Behavioral: Positive for depression and substance abuse (in remission ). Negative for suicidal ideas. The patient is nervous/anxious and has insomnia.        Physical Exam:  Physical Exam    Constitutional: She is oriented to person, place, and time.  Non-toxic appearance. No distress. Nasal cannula in place.   Weak appearing    HENT:   Head: Normocephalic and atraumatic.   Eyes: Conjunctivae and EOM are normal. Pupils are equal, round, and reactive to light. Right eye exhibits no discharge. Left eye exhibits no discharge.   Neck: Normal range of motion. Neck supple. No JVD present. No thyromegaly present.   Cardiovascular: Regular rhythm.  Tachycardia present.  Exam reveals no gallop and no friction rub.    No murmur heard.  Pulmonary/Chest: No tachypnea. No respiratory distress. She has decreased breath sounds in the right middle field, the right lower field, the left middle field and the left lower field. She has no wheezes. She has no rhonchi. She has no rales. She exhibits no tenderness.   Abdominal: Soft. She exhibits distension and ascites. Bowel sounds are increased. There is tenderness in the right upper quadrant and epigastric area. There is no rebound and no guarding.   Musculoskeletal: Normal range of motion. She exhibits edema and tenderness.   Neurological: She is oriented to person, place, and time.   Awake   Skin: Skin is warm and dry. She is not diaphoretic. No erythema.   Psychiatric:   Anxious    Nursing note and vitals reviewed.      Labs:        Invalid input(s): [id number]  Recent Labs      03/19/17   1400  03/19/17   1733   TROPONINI  <0.01  <0.01   BNPBTYPENAT  204*   --      Recent Labs      03/19/17   1400  03/20/17   0200   SODIUM  132*  133*   POTASSIUM  3.3*  3.4*   CHLORIDE  103  108   CO2  21  18*   BUN  3*  2*   CREATININE  0.62  0.52   MAGNESIUM  1.8   --    CALCIUM  7.9*  7.3*     Recent Labs      03/19/17   1400  03/20/17   0200   ALTSGPT  17  13   ASTSGOT  38  31   ALKPHOSPHAT  135*  117*   TBILIRUBIN  5.4*  3.8*   LIPASE  30   --    GLUCOSE  83  95     Recent Labs      03/19/17   1400  03/20/17   0200   RBC  2.87*  2.41*   HEMOGLOBIN  9.5*  8.2*   HEMATOCRIT   29.7*  24.6*   PLATELETCT  87*  82*   PROTHROMBTM  23.0*  23.9*   APTT  44.7*  49.6*   INR  1.97*  2.07*     Recent Labs      17   1400  17   0200   WBC  15.1*  12.9*   NEUTSPOLYS  83.60*  87.70*   LYMPHOCYTES  7.90*  5.20*   MONOCYTES  5.60  5.30   EOSINOPHILS  1.90  0.90   BASOPHILS  0.30  0.00   ASTSGOT  38  31   ALTSGPT  17  13   ALKPHOSPHAT  135*  117*   TBILIRUBIN  5.4*  3.8*           Hemodynamics:  Temp (24hrs), Av.8 °C (98.3 °F), Min:36.4 °C (97.6 °F), Max:37.2 °C (98.9 °F)  Temperature: 37.2 °C (98.9 °F)  Pulse  Av.7  Min: 90  Max: 113   Blood Pressure: 116/57 mmHg     Respiratory:    Respiration: 16, Pulse Oximetry: 96 %           Fluids:    Intake/Output Summary (Last 24 hours) at 17 0750  Last data filed at 17 0600   Gross per 24 hour   Intake   1940 ml   Output      0 ml   Net   1940 ml     Weight: 88.1 kg (194 lb 3.6 oz)  GI/Nutrition:  Orders Placed This Encounter   Procedures   • Diet Order     Standing Status: Standing      Number of Occurrences: 1      Standing Expiration Date:      Order Specific Question:  Diet:     Answer:  Full Liquid [11]     Medical Decision Making, by Problem:  Active Hospital Problems    Diagnosis   • Sepsis (CMS-HCC) [A41.9]POA  Treating for possible SBP vs gastroenteritis  On C3/flagyl  Checking paracentesis  Checking C diff      • HTN (hypertension) [I10]BP lower side  Meds on hold     • Transaminitis due to alcohol liver disease [R74.0]  -Decompensated liver failure     • Hypokalemia [E87.6]  -replacing K/Mag     • Alcoholism (CMS-HCC) [F10.20]  -in remission since last 1 mo per pt     • Macrocytic anemia [D53.9]  -from liver disease     • Tobacco abuse [Z72.0]  -counseled cessation     • Marijuana abuse [F12.10]  -counseled     • GERD (gastroesophageal reflux disease) [K21.9]  -PPI   • Leukocytosis [D72.829]  -from sepsis     • PTSD (post-traumatic stress disorder) [F43.10]  -chronic     • Hypocalcemia [E83.51]  -correct calcium  nl  Check Vit D and Mag     • Coagulopathy (CMS-HCC) [D68.9]  -related to liver dysfunction with her alcohol abuse     The total time spent face to face with this patient was 35 mins of which 60% of time was spent on counseling and coordinating care.  Specifically speaking with charge RN, bedside RN, and multidisciplinary team regarding poc    EKG reviewed, Radiology images reviewed, Labs reviewed and Medications reviewed  Bynum catheter: No Bynum      DVT Prophylaxis: Contraindicated - High bleeding risk    Ulcer prophylaxis: Yes  Antibiotics: Treating active infection/contamination beyond 24 hours perioperative coverage  Assessed for rehab: Patient was assess for and/or received rehabilitation services during this hospitalization

## 2017-03-22 NOTE — CARE PLAN
Problem: Knowledge Deficit  Goal: Knowledge of disease process/condition, treatment plan, diagnostic tests, and medications will improve  Intervention: Explain information regarding disease process/condition, treatment plan, diagnostic tests, and medications and document in education  Patient and SO updated on plan of care, verbalized understanding      Problem: Pain Management  Goal: Pain level will decrease to patient’s comfort goal  Intervention: Follow pain managment plan developed in collaboration with patient and Interdisciplinary Team  Medicated per MAR for pain

## 2017-03-22 NOTE — CARE PLAN
Problem: Nutritional:  Goal: Achieve adequate nutritional intake  Patient will consume 50% of meals   Outcome: MET Date Met:  03/22/17  Intervention: Advance diet as tolerated  Pt is on a Low Fiber, Low Fat diet.   Intervention: Monitor PO intake, weights, and laboratory values  Pt is consuming % of meals.

## 2017-03-22 NOTE — PROGRESS NOTES
Patient resting in bed, assessment complete, awake and alert, medicated for pain.  Scheduled medications given, needs addressed, call light in reach, bed alarm on, will monitor.

## 2017-03-22 NOTE — PROGRESS NOTES
Received report from NOC RN. Assumed pt care. Assessment completed. A&Ox4. Pain 5/10. O2 99% on RA.   Pt is currently in bed. Denies SOB, chest pain, dizziness.   Pt is up self. Bed in lowest position, bed locked, RN and CNA numbers provided, no further needs at this time. Safety precautions in place. Hourly rounding in progress.

## 2017-03-22 NOTE — PROGRESS NOTES
Bedside report completed, patient resting in bed, updated on plan of care, verbalized understanding, will monitor.

## 2017-03-22 NOTE — PROGRESS NOTES
Patient resting in bed, no distress noted, call light in reach, bed alarm on, will monitor.  Monitor summary: non-tele.

## 2017-03-23 NOTE — DISCHARGE INSTRUCTIONS
Discharge Instructions    Discharged to other by taxi with relative. Discharged via walking, hospital escort: Refused.  Special equipment needed: Not Applicable    Be sure to schedule a follow-up appointment with your primary care doctor or any specialists as instructed.     Discharge Plan:   Diet Plan: Discussed  Activity Level: Discussed  Confirmed Follow up Appointment: Appointment Scheduled  Confirmed Symptoms Management: Discussed  Medication Reconciliation Updated: Yes  Influenza Vaccine Indication: Not indicated: Previously immunized this influenza season and > 8 years of age    I understand that a diet low in cholesterol, fat, and sodium is recommended for good health. Unless I have been given specific instructions below for another diet, I accept this instruction as my diet prescription.   Other diet: low fiber    Special Instructions: None    · Is patient discharged on Warfarin / Coumadin?   No     · Is patient Post Blood Transfusion?  No    Sepsis, Adult  Sepsis is a serious infection of your blood or tissues that affects your whole body. The infection that causes sepsis may be bacterial, viral, fungal, or parasitic. Sepsis may be life threatening. Sepsis can cause your blood pressure to drop. This may result in shock. Shock causes your central nervous system and your organs to stop working correctly.   RISK FACTORS  Sepsis can happen in anyone, but it is more likely to happen in people who have weakened immune systems.  SIGNS AND SYMPTOMS   Symptoms of sepsis can include:  · Fever or low body temperature (hypothermia).  · Rapid breathing (hyperventilation).  · Chills.  · Rapid heartbeat (tachycardia).  · Confusion or light-headedness.  · Trouble breathing.  · Urinating much less than usual.  · Cool, clammy skin or red, flushed skin.  · Other problems with the heart, kidneys, or brain.  DIAGNOSIS   Your health care provider will likely do tests to look for an infection, to see if the infection has spread  to your blood, and to see how serious your condition is. Tests can include:  · Blood tests, including cultures of your blood.  · Cultures of other fluids from your body, such as:  · Urine.  · Pus from wounds.  · Mucus coughed up from your lungs.  · Urine tests other than cultures.  · X-ray exams or other imaging tests.  TREATMENT   Treatment will begin with elimination of the source of infection. If your sepsis is likely caused by a bacterial or fungal infection, you will be given antibiotic or antifungal medicines.  You may also receive:  · Oxygen.  · Fluids through an IV tube.  · Medicines to increase your blood pressure.  · A machine to clean your blood (dialysis) if your kidneys fail.  · A machine to help you breathe if your lungs fail.  SEEK IMMEDIATE MEDICAL CARE IF:  You get an infection or develop any of the signs and symptoms of sepsis after surgery or a hospitalization.     This information is not intended to replace advice given to you by your health care provider. Make sure you discuss any questions you have with your health care provider.     Document Released: 09/15/2004 Document Revised: 05/03/2016 Document Reviewed: 08/25/2014  [employer] Interactive Patient Education ©2016 [employer]    Alcoholic Liver Disease  The liver is an organ that converts food into energy, absorbs vitamins from food, removes toxins from the blood, and makes proteins. Alcoholic liver disease happens when the liver becomes damaged due to alcohol consumption and it stops working properly.  CAUSES  This condition is caused by drinking too much alcohol over a number of years.  RISK FACTORS  This condition is more likely to develop in:  · Women.  · People who have a family history of the disease.  · People who have poor nutrition.  · People who are obese.  SYMPTOMS  Early symptoms of this condition include:  · Fatigue.  · Weakness.  · Abdominal discomfort on the upper right side.  Symptoms of moderate disease  include:  · Fever.  · Yellow, pale, or darkening skin.  · Abdominal pain.  · Nausea and vomiting.  · Weight loss.  · Loss of appetite.  Symptoms of advanced disease include:  · Abdominal swelling.  · Nosebleeds or bleeding gums.  · Itchy skin.  · Enlarged fingertips (clubbing).  · Light-colored stools that smell very bad (steatorrhea).  · Mood changes.  · Feeling agitated.  · Confusion.  · Trouble concentrating.  Some people do not have symptoms until the condition becomes severe. Symptoms are often worse after heavy drinking.  DIAGNOSIS  This condition is diagnosed with:  · A physical exam.  · Blood tests.  · Taking a sample of liver tissue to examine under a microscope (liver biopsy).  You may also have other tests, including:   · X-rays.  · Ultrasound.  TREATMENT  Treatment may include:  · Stopping alcohol use to allow the liver to heal.  · Joining a support group or meeting with a counselor.  · Medicines to reduce inflammation. These may be recommended if the disease is severe.  · A liver transplant. This is the only treatment if the disease is very severe.  · Nutritional therapy. This may involve:  ¨ Taking vitamins.  ¨ Eating foods that are high in thiamine, such as whole-wheat cereals, pork, and raw vegetables.  ¨ Eating foods that have a lot of folic acid, such as vegetables, fruits, meats, beans, nuts, and dairy foods.  ¨ Eating a diet that includes carbohydrate-rich foods, such as yogurt, beans, potatoes, and rice.  HOME CARE INSTRUCTIONS  · Do not drink alcohol.  · Take medicines only as directed by your health care provider.  · Take vitamins only as directed by your health care provider.  · Follow any diet instructions that are given to you by your health care provider.  SEEK MEDICAL CARE IF:  · You have a fever.  · You have shortness of breath or have difficulty breathing.  · You have bright red blood in your stool, or you have black, tarry stools.  · You are vomiting blood.  · Your skin color becomes  more yellow, pale, or dark.  · You develop headaches.  · You have trouble thinking.  · You have problems balancing or walking.     This information is not intended to replace advice given to you by your health care provider. Make sure you discuss any questions you have with your health care provider.     Document Released: 01/08/2016 Document Reviewed: 01/08/2016  [employer] Interactive Patient Education ©2016 [employer]        Depression / Suicide Risk    As you are discharged from this [facility] facility, it is important to learn how to keep safe from harming yourself.    Recognize the warning signs:  · Abrupt changes in personality, positive or negative- including increase in energy   · Giving away possessions  · Change in eating patterns- significant weight changes-  positive or negative  · Change in sleeping patterns- unable to sleep or sleeping all the time   · Unwillingness or inability to communicate  · Depression  · Unusual sadness, discouragement and loneliness  · Talk of wanting to die  · Neglect of personal appearance   · Rebelliousness- reckless behavior  · Withdrawal from people/activities they love  · Confusion- inability to concentrate     If you or a loved one observes any of these behaviors or has concerns about self-harm, here's what you can do:  · Talk about it- your feelings and reasons for harming yourself  · Remove any means that you might use to hurt yourself (examples: pills, rope, extension cords, firearm)  · Get professional help from the community (Mental Health, Substance Abuse, psychological counseling)  · Do not be alone:Call your Safe Contact- someone whom you trust who will be there for you.  · Call your local CRISIS HOTLINE 936-9911 or 762-664-5817  · Call your local Children's Mobile Crisis Response Team [place] (489) 869-5348 or www.HypePoints  · Call the toll free National Suicide Prevention Hotlines   · National Suicide Prevention Lifeline 048-652-ETRZ  (1764)  · [facility] 800-SUICIDE (554-8005)

## 2017-03-23 NOTE — PROGRESS NOTES
Discharging patient home per physician order. Discharged with no need. Demonstrated understanding of discharge instructions, for sepsis and alcohol liver disease. Ambulating without assistance, voiding without difficulty, pain well controlled, tolerating oral medications, oxygen saturation greater than 90%, tolerating low fiber diet. Verbalized understanding of discharge instructions. All questions answered. Belongings with patient at time of discharge.

## 2017-03-23 NOTE — PROGRESS NOTES
Bedside report completed.  Assumed pt care. Patient in bed, resting, in no apparent distress.  Family is at bedside.  Safety precautions in place. Call light & personal belongings within reach.  Plan of care discussed.  Patient is no room air, IV is currently running albumin.  Reports 8/10 abdominal pain, medicated per MAR.  No needs expressed.  Will continue to monitor.
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
## 2017-03-23 NOTE — CARE PLAN
Problem: Infection  Goal: Will remain free from infection  Outcome: PROGRESSING AS EXPECTED  Intervention: Implement standard precautions and perform hand washing before and after patient contact  Patient and family educated and understands the importance of handwashing to prevent the spread of infection for the patient, her visitors, and her care team.      Problem: Pain Management  Goal: Pain level will decrease to patient’s comfort goal  Outcome: PROGRESSING AS EXPECTED  Intervention: Follow pain managment plan developed in collaboration with patient and Interdisciplinary Team  Patient's pain is being managed and patient has been educated regarding non-pharmacologic methods of pain management.

## 2017-03-25 NOTE — DISCHARGE SUMMARY
ADMITTING DIAGNOSES:  Sepsis with questionable biliary source, chest pain felt   to be musculoskeletal, alcoholic cirrhosis, alcoholic hepatitis, anemia,   hypokalemia, leukocytosis.    DISCHARGE DIAGNOSES:  Sepsis felt secondary to gastroenteritis fully treated,   hypertension, transaminitis due to alcoholic liver disease, alcoholism,   macrocytic anemia, tobacco abuse, marijuana abuse, esophageal reflux,   posttraumatic stress disorder, coagulopathy secondary to cirrhosis.    CONSULTATIONS:  None.    PROCEDURES:  None.    DISCHARGE DIET:  Low sodium, low potassium.    ACTIVITY:  As tolerated.    FOLLOWUP:  Follow up with primary care, preferably within 1-2 weeks of   hospital discharge.    DISCHARGE MEDICATIONS:  Ferrous gluconate 324 mg 3 times daily with meals,   Lasix 20 mg daily, Norco 5/325 1-2 tabs every 4 hours as needed for pain,    Provera 5-10 mg daily, Prilosec 20 mg twice daily, pentoxifylline 2 times   daily with meals, potassium chloride 20 mEq daily,  spironolactone 50 mg   daily.  She is to stop her ciprofloxacin, metronidazole, and Zofran.    HISTORY OF PRESENT ILLNESS AND HOSPITAL COURSE:  This is a 36-year-old female   who came in with sepsis, has no complaints.  She had no obvious bacterial   source of infection.  She was previously on Cipro and Flagyl.  Prior to   admission, she started antibiotics and finished the course while she was in the hospital.  She felt well and   stable, and was anxious to discharge to home; therefore, she was able to be   discharged to home in good and stable condition with close outpatient   followup.    Total time of discharge, 37 minutes.       ____________________________________     MD PRIETO Ibarra / RACHELL    DD:  03/24/2017 14:33:31  DT:  03/24/2017 18:46:48    D#:  [id number]  Job#:  [id number]

## 2017-04-02 NOTE — ADDENDUM NOTE
Encounter addended by: Bethany Adams R.N. on: 4/2/2017 12:28 PM<BR>     Documentation filed: Inpatient Document Flowsheet

## 2017-04-15 NOTE — IP AVS SNAPSHOT
Reactivity Access Code: [email or web address]  Expires: 5/17/2017  2:22 PM    Your email address is not on file at [facility].  Email Addresses are required for you to sign up for Reactivity, please contact [phone] to verify your personal information and to provide your email address prior to attempting to register for Reactivity.    Nury Thornton  [address] [place] [address]    Remote Assistantt  A secure, online tool to manage your health information     [employer]’s Reactivity® is a secure, online tool that connects you to your personalized health information from the privacy of your home -- day or night - making it very easy for you to manage your healthcare. Once the activation process is completed, you can even access your medical information using the Reactivity jeanette, which is available for free in the Apple Jeanette store or Google Play store.     To learn more about Reactivity, visit [email or web address]    There are two levels of access available (as shown below):   My Chart Features  [facility] Primary Care Doctor [facility]  Specialists [facility]  Urgent  Care Non-[facility] Primary Care Doctor   Email your healthcare team securely and privately 24/7 X X X    Manage appointments: schedule your next appointment; view details of past/upcoming appointments X      Request prescription refills. X      View recent personal medical records, including lab and immunizations X X X X   View health record, including health history, allergies, medications X X X X   Read reports about your outpatient visits, procedures, consult and ER notes X X X X   See your discharge summary, which is a recap of your hospital and/or ER visit that includes your diagnosis, lab results, and care plan X X  X     How to register for Remote Assistantt:  Once your e-mail address has been verified, follow the following steps to sign up for Remote Assistantt.     1. Go to  [email or web address]  2. Click on the Sign Up Now box, which takes you to the New Member Sign Up page. You will need to  provide the following information:  a. Enter your [employer] Access Code exactly as it appears at the top of this page. (You will not need to use this code after you’ve completed the sign-up process. If you do not sign up before the expiration date, you must request a new code.)   b. Enter your date of birth.   c. Enter your home email address.   d. Click Submit, and follow the next screen’s instructions.  3. Create a [employer] ID. This will be your [employer] login ID and cannot be changed, so think of one that is secure and easy to remember.  4. Create a [employer] password. You can change your password at any time.  5. Enter your Password Reset Question and Answer. This can be used at a later time if you forget your password.   6. Enter your e-mail address. This allows you to receive e-mail notifications when new information is available in [employer].  7. Click Sign Up. You can now view your health information.    For assistance activating your [employer] account, call [phone]

## 2017-04-15 NOTE — IP AVS SNAPSHOT
" <p align=\"LEFT\"><IMG SRC=\"[email or web address]\" alt=\"Image\" WIDTH=\"50%\" HEIGHT=\"200\" BORDER=\"\"></p>                   Name:Nury Thornton  Medical Record Number:[id number]  CSN: [id number]    YOB: 1980   Age: 36 y.o.  Sex: female  HT:1.702 m (5' 7\") WT: 81 kg (178 lb 9.2 oz)          Admit Date: 4/15/2017     Discharge Date:   Today's Date: 4/17/2017  Attending Doctor:  Chevy Lee M.D.                  Allergies:  Nkda          Follow-up Information     1. Follow up with [facility] In 2 weeks.    Contact information    [facility]  [facility] [address]  [phone]           Medication List      Take these Medications        Instructions    ciprofloxacin 500 MG Tabs   Commonly known as:  CIPRO    Take 1 Tab by mouth 2 times a day for 4 days.   Dose:  500 mg       ferrous gluconate 324 (38 FE) MG Tabs   Commonly known as:  FERGON    Take 324 mg by mouth 3 times a day, with meals.   Dose:  324 mg       furosemide 20 MG Tabs   Commonly known as:  LASIX    Take 20 mg by mouth every day.   Dose:  20 mg       metronidazole 500 MG Tabs   Commonly known as:  FLAGYL    Take 1 Tab by mouth every 8 hours for 4 days.   Dose:  500 mg       * omeprazole 20 MG delayed-release capsule   What changed:  Another medication with the same name was added. Make sure you understand how and when to take each.   Commonly known as:  PRILOSEC    Take 1 Cap by mouth 2 times a day.   Dose:  20 mg       * omeprazole 20 MG delayed-release capsule   What changed:  You were already taking a medication with the same name, and this prescription was added. Make sure you understand how and when to take each.   Commonly known as:  PRILOSEC    Take 1 Cap by mouth every 12 hours.   Dose:  20 mg       pentoxifylline  MG CR tablet   Commonly known as:  TRENTAL    Take 400 mg by mouth 3 times a day, with meals.   Dose:  400 mg       potassium chloride SA 20 MEQ Tbcr   What changed:  additional instructions   Commonly known " as:  Kdur    Take 1 Tab by mouth every day. Initially take 40 meq daily x 3 days, then decrease to 20 meq dailly   Dose:  20 mEq       spironolactone 50 MG Tabs   Commonly known as:  ALDACTONE    Take 50 mg by mouth every day.   Dose:  50 mg       * Notice:  This list has 2 medication(s) that are the same as other medications prescribed for you. Read the directions carefully, and ask your doctor or other care provider to review them with you.

## 2017-04-15 NOTE — IP AVS SNAPSHOT
" Home Care Instructions                                                                                                                  Name:Nury Thornton  Medical Record Number:[id number]  CSN: [id number]    YOB: 1980   Age: 36 y.o.  Sex: female  HT:1.702 m (5' 7\") WT: 81 kg (178 lb 9.2 oz)          Admit Date: 4/15/2017     Discharge Date:   Today's Date: 4/17/2017  Attending Doctor:  Chevy Lee M.D.                  Allergies:  Nkda            Discharge Instructions       Discharge Instructions    Discharged to home by car with relative. Discharged via wheelchair, hospital escort: Yes.  Special equipment needed: Not Applicable    Be sure to schedule a follow-up appointment with your primary care doctor or any specialists as instructed.     Discharge Plan:   Diet Plan: Discussed  Activity Level: Discussed  Smoking Cessation Offered: Patient Counseled  Confirmed Follow up Appointment: Patient to Call and Schedule Appointment  Medication Reconciliation Updated: Yes  Influenza Vaccine Indication: Not indicated: Previously immunized this influenza season and > 8 years of age    I understand that a diet low in cholesterol, fat, and sodium is recommended for good health. Unless I have been given specific instructions below for another diet, I accept this instruction as my diet prescription.   Other diet: Regular Diet    Special Instructions: None    · Is patient discharged on Warfarin / Coumadin?   No     · Is patient Post Blood Transfusion?  No    Depression / Suicide Risk    As you are discharged from this [facility] facility, it is important to learn how to keep safe from harming yourself.    Recognize the warning signs:  · Abrupt changes in personality, positive or negative- including increase in energy   · Giving away possessions  · Change in eating patterns- significant weight changes-  positive or negative  · Change in sleeping patterns- unable to sleep or sleeping all the time   · Unwillingness " or inability to communicate  · Depression  · Unusual sadness, discouragement and loneliness  · Talk of wanting to die  · Neglect of personal appearance   · Rebelliousness- reckless behavior  · Withdrawal from people/activities they love  · Confusion- inability to concentrate     If you or a loved one observes any of these behaviors or has concerns about self-harm, here's what you can do:  · Talk about it- your feelings and reasons for harming yourself  · Remove any means that you might use to hurt yourself (examples: pills, rope, extension cords, firearm)  · Get professional help from the community (Mental Health, Substance Abuse, psychological counseling)  · Do not be alone:Call your Safe Contact- someone whom you trust who will be there for you.  · Call your local CRISIS HOTLINE 681-3377 or 337-288-1209  · Call your local Children's Mobile Crisis Response Team [place] (724) 802-9456 or www.[employer]  · Call the toll free National Suicide Prevention Hotlines   · National Suicide Prevention Lifeline 738-424-YOYI (2159)  · [employer] Hope Line Network 800-SUICIDE (110-1751)        Follow-up Information     1. Follow up with [facility] In 2 weeks.    Contact information    [facility]  [facility] [id number] [phone]           Discharge Medication Instructions:    Below are the medications your physician expects you to take upon discharge:    Review all your home medications and newly ordered medications with your doctor and/or pharmacist. Follow medication instructions as directed by your doctor and/or pharmacist.    Please keep your medication list with you and share with your physician.               Medication List      START taking these medications        Instructions    Morning Afternoon Evening Bedtime    ciprofloxacin 500 MG Tabs   Commonly known as:  CIPRO        Take 1 Tab by mouth 2 times a day for 4 days.   Dose:  500 mg                        metronidazole 500 MG Tabs   Commonly known  as:  FLAGYL        Take 1 Tab by mouth every 8 hours for 4 days.   Dose:  500 mg                          CHANGE how you take these medications        Instructions    Morning Afternoon Evening Bedtime    * omeprazole 20 MG delayed-release capsule   What changed:  Another medication with the same name was added. Make sure you understand how and when to take each.   Last time this was given:  20 mg on 4/17/2017  8:26 AM   Commonly known as:  PRILOSEC        Take 1 Cap by mouth 2 times a day.   Dose:  20 mg                        * omeprazole 20 MG delayed-release capsule   What changed:  You were already taking a medication with the same name, and this prescription was added. Make sure you understand how and when to take each.   Last time this was given:  20 mg on 4/17/2017  8:26 AM   Commonly known as:  PRILOSEC        Take 1 Cap by mouth every 12 hours.   Dose:  20 mg                        potassium chloride SA 20 MEQ Tbcr   What changed:  additional instructions   Last time this was given:  60 mEq on 4/17/2017 11:30 AM   Commonly known as:  Kdur        Take 1 Tab by mouth every day. Initially take 40 meq daily x 3 days, then decrease to 20 meq dailly   Dose:  20 mEq                        * Notice:  This list has 2 medication(s) that are the same as other medications prescribed for you. Read the directions carefully, and ask your doctor or other care provider to review them with you.      CONTINUE taking these medications        Instructions    Morning Afternoon Evening Bedtime    ferrous gluconate 324 (38 FE) MG Tabs   Commonly known as:  FERGON        Take 324 mg by mouth 3 times a day, with meals.   Dose:  324 mg                        furosemide 20 MG Tabs   Last time this was given:  20 mg on 4/16/2017  8:17 AM   Commonly known as:  LASIX        Take 20 mg by mouth every day.   Dose:  20 mg                        pentoxifylline  MG CR tablet   Last time this was given:  400 mg on 4/17/2017 11:30 AM      Commonly known as:  TRENTAL        Take 400 mg by mouth 3 times a day, with meals.   Dose:  400 mg                        spironolactone 50 MG Tabs   Last time this was given:  50 mg on 4/17/2017  8:26 AM   Commonly known as:  ALDACTONE        Take 50 mg by mouth every day.   Dose:  50 mg                             Where to Get Your Medications      You can get these medications from any pharmacy     Bring a paper prescription for each of these medications    - metronidazole 500 MG Tabs      Information about where to get these medications is not yet available     ! Ask your nurse or doctor about these medications    - ciprofloxacin 500 MG Tabs  - omeprazole 20 MG delayed-release capsule  - potassium chloride SA 20 MEQ Tbcr            Instructions           Diet / Nutrition:    Follow any diet instructions given to you by your doctor or the dietician, including how much salt (sodium) you are allowed each day.    If you are overweight, talk to your doctor about a weight reduction plan.    Activity:    Remain physically active following your doctor's instructions about exercise and activity.    Rest often.     Any time you become even a little tired or short of breath, SIT DOWN and rest.    Worsening Symptoms:    Report any of the following signs and symptoms to the doctor's office immediately:    *Pain of jaw, arm, or neck  *Chest pain not relieved by medication                               *Dizziness or loss of consciousness  *Difficulty breathing even when at rest   *More tired than usual                                       *Bleeding drainage or swelling of surgical site  *Swelling of feet, ankles, legs or stomach                 *Fever (>100ºF)  *Pink or blood tinged sputum  *Weight gain (3lbs/day or 5lbs /week)           *Shock from internal defibrillator (if applicable)  *Palpitations or irregular heartbeats                *Cool and/or numb extremities    Stroke Awareness    Common Risk Factors for Stroke  include:    Age  Atrial Fibrillation  Carotid Artery Stenosis  Diabetes Mellitus  Excessive alcohol consumption  High blood pressure  Overweight   Physical inactivity  Smoking    Warning signs and symptoms of a stroke include:    *Sudden numbness or weakness of the face, arm or leg (especially on one side of the body).  *Sudden confusion, trouble speaking or understanding.  *Sudden trouble seeing in one or both eyes.  *Sudden trouble walking, dizziness, loss of balance or coordination.Sudden severe headache with no known cause.    It is very important to get treatment quickly when a stroke occurs. If you experience any of the above warning signs, call 911 immediately.                   Disclaimer         Quit Smoking / Tobacco Use:    I understand the use of any tobacco products increases my chance of suffering from future heart disease or stroke and could cause other illnesses which may shorten my life. Quitting the use of tobacco products is the single most important thing I can do to improve my health. For further information on smoking / tobacco cessation call a Toll Free Quit Line at 1-221.633.9721 (*[employer] [place]) or 1-893.387.9257 (American Lung Association) or you can access the web based program at www.lungusa.org.    [place] Tobacco Users Help Line:  (108) 455-6917       Toll Free: 1-421.143.1551  Quit Tobacco Program [facility] (060)723-9169    Crisis Hotline:    [place] Crisis Hotline:  4-758-JCQFIQX or 1-662.423.7366    [place] Crisis Hotline:    1-359.966.5350 or 604-519-9321    Discharge Survey:   Thank you for choosing [facility]. We hope we did everything we could to make your hospital stay a pleasant one. You may be receiving a phone survey and we would appreciate your time and participation in answering the questions. Your input is very valuable to us in our efforts to improve our service to our patients and their families.        My signature on this form  indicates that:    1. I have reviewed and understand the above information.  2. My questions regarding this information have been answered to my satisfaction.  3. I have formulated a plan with my discharge nurse to obtain my prescribed medications for home.                  Disclaimer         __________________________________                     __________       ________                       Patient Signature                                                 Date                    Time

## 2017-04-15 NOTE — IP AVS SNAPSHOT
4/17/2017    Nury Thornton  [address] [place] [address]    Dear Nury:    [facility] wants to ensure your discharge home is safe and you or your loved ones have had all of your questions answered regarding your care after you leave the hospital.    Below is a list of resources and contact information should you have any questions regarding your hospital stay, follow-up instructions, or active medical symptoms.    Questions or Concerns Regarding… Contact   Medical Questions Related to Your Discharge  (7 days a week, 8am-5pm) Contact a Nurse Care Coordinator   [phone]   Medical Questions Not Related to Your Discharge  (24 hours a day / 7 days a week)  Contact the Nurse Health Line   [phone]    Medications or Discharge Instructions Refer to your discharge packet   or contact your [facility] Primary Care Provider   [phone]   Follow-up Appointment(s) Schedule your appointment via [employer]   or contact Scheduling [phone]   Billing Review your statement via [employer]  or contact Billing [phone]   Medical Records Review your records via [employer]   or contact Medical Records [phone]     You may receive a telephone call within two days of discharge. This call is to make certain you understand your discharge instructions and have the opportunity to have any questions answered. You can also easily access your medical information, test results and upcoming appointments via the [employer] free online health management tool. You can learn more and sign up at [employer]. For assistance setting up your [employer] account, please call [phone].    Once again, we want to ensure your discharge home is safe and that you have a clear understanding of any next steps in your care. If you have any questions or concerns, please do not hesitate to contact us, we are here for you. Thank you for choosing [facility] for your healthcare needs.    Sincerely,    Your [facility] Healthcare Team

## 2017-04-16 PROBLEM — R10.9 ABDOMINAL PAIN: Status: ACTIVE | Noted: 2017-01-01

## 2017-04-16 PROBLEM — A41.9 SEPSIS (HCC): Status: ACTIVE | Noted: 2017-01-01

## 2017-04-16 PROBLEM — K74.60 CIRRHOSIS (HCC): Status: ACTIVE | Noted: 2017-01-01

## 2017-04-16 NOTE — PROGRESS NOTES
Assumed care of patient @ 0700, report received at bedside,  assessment done, labs and orders noted.  Pt A+ & Ox4, PIV running NS @ 75.   No signs of distress noted at this time.  Pt boyfriend is bedside. Pt is resting comfortably in bed, has been updated on the plan of care.    Bed is in lowest position, fall risk socks in place, call light within reach. Pt verbalized all needs are met at this time.

## 2017-04-16 NOTE — CARE PLAN
Problem: Communication  Goal: The ability to communicate needs accurately and effectively will improve  Outcome: PROGRESSING AS EXPECTED  Pt capable of verbalizing all needs and utilizes call light system approprietly.    Problem: Safety  Goal: Will remain free from falls  Outcome: PROGRESSING AS EXPECTED  Pt fall risk, pt wearing treaded socks, bed locked and in lowest position.  Pt refusing bed alarm, pt educated on need but still refusing.  Pt call light and phone within reach.

## 2017-04-16 NOTE — ED NOTES
Explained to patient many pain medications contraindicated due poor liver functions, patient verbalizes understanding that she might not get pain medicine on a regular basis

## 2017-04-16 NOTE — H&P
"HOSPITAL MEDICINE ADMISSION NOTE    Date of Service: 4/15/2017   Name: Nury Thornton   : 1980  MRN: [id number]  Primary Care Physician: Pcp Pt States None    Chief Complaint  Malaise, subjective fevers    History of Present Illness  Nury Thornton is a 36 y.o. female with history of alcoholic cirrhosis who complains of malaise and subjective fevers. She feels she has an infection. She was recently hospitalized for the same thing. She also has chronic diffuse abdominal pain and was asking for pain medications. She admits to nausea, nonbloody/nonbilious, not coffee grounds vomiting and 2 day history of nonbloody, not melenous loose stools. She denied cough or dysuria. She has not followed primary care or GI doctor for her cirrhosis.   At the ED, she is afebrile and hemodynamically stable. No leukocytosis. Mild hypokalemia. No leukocytosis, but has thrombocytopenia and anemia, which is chronic. Mild elevation og lactic acid. ED physician put her on rocephin, Flagyl for \"intraabdominal source\" of infection and wanted to admit for this reason. She denied recent travel, unusual food or sick contacts. I told ED to obtain scan to eval for acites. That returned showing minimal ascites, some duodenal wall thickening.  When I saw her at the ED, she was sleeping but arousable. She was asking for pain medications for abdominal pain, but I spoke to her that we are limited on that because of cirrhosis. Abdomen soft, pain out of proportion to exam.  Trace edema.     Home Medications  No current facility-administered medications on file prior to encounter.     Current Outpatient Prescriptions on File Prior to Encounter   Medication Sig Dispense Refill   • hydrocodone-acetaminophen (NORCO) 5-325 MG Tab per tablet Take 1-2 Tabs by mouth every four hours as needed (as needed for pain). 20 Tab 0   • pentoxifylline CR (TRENTAL) 400 MG CR tablet Take 400 mg by mouth 3 times a day, with meals.     • furosemide (LASIX) 20 MG Tab Take 20 " mg by mouth every day.     • spironolactone (ALDACTONE) 50 MG Tab Take 50 mg by mouth every day.     • potassium chloride SA (KDUR) 20 MEQ Tab CR Take 20 mEq by mouth every day.     • omeprazole (PRILOSEC) 20 MG delayed-release capsule Take 1 Cap by mouth 2 times a day. 30 Cap 0   • ferrous gluconate (FERGON) 324 (38 FE) MG Tab Take 324 mg by mouth 3 times a day, with meals.         Allergies  Nkda    Past Medical and Surgical History  Past Medical History   Diagnosis Date   • Depression    • Psychiatric disorder    • Stroke (CMS-[facility])    • ASTHMA    • Pneumonia 2016   • Sepsis (CMS-[facility]) 2016   • HTN (hypertension) 3/14/2017     Past Surgical History   Procedure Laterality Date   • Tubal ligation     • Other orthopedic surgery           • Other       LT ANKLE   • Ankle orif  10/4/2009     Performed by MAZIN HUNT at SURGERY [facility]   • Gyn surgery        x3    • Krzysztof by laparoscopy N/A 3/10/2017     Procedure: KRZYSZTOF BY LAPAROSCOPY POSS OPEN;  Surgeon: Mejia Colunga M.D.;  Location: SURGERY [facility];  Service:        Family History  Family History   Problem Relation Age of Onset   • Heart Disease Father    • Diabetes Mother        Social History  Social History     Social History   • Marital Status: Single     Spouse Name: N/A   • Number of Children: N/A   • Years of Education: N/A     Occupational History   • Not on file.     Social History Main Topics   • Smoking status: Current Some Day Smoker -- 0.25 packs/day     Types: Cigarettes   • Smokeless tobacco: Never Used   • Alcohol Use: Yes   • Drug Use: No      Comment: Hx of meth and marijuana   • Sexual Activity: Not on file     Other Topics Concern   • Not on file     Social History Narrative       Review of Systems  GENERAL: Subjective fevers or chills.  HEENT: No nasal congestion, epistaxis. No hearing loss.  EYES: No vision loss.  SKIN: No new or obvious rashes.  RESPIRATORY: No shortness of breath,  "coughing, wheezing, hemoptysis.  CARDIOVASCULAR: No chest pain, dyspnea on exertion, palpitations, murmur or claudications.  GASTROINTESTINAL: Nausea, vomiting, diarrhea, abdominal pain.  GENITOURINARY: No dysuria. No incontinence.  MUSOSKELETAL: No falls, myalgias or arthralgias.  NEUROLOGIC: No headaches, loss of consciousness or seizure activity.  PSYCHIATRIC: Stable mood. Not currently anxious or depressed.    Physical Exam  Filed Vitals:    04/16/17 0019 04/16/17 0100 04/16/17 0233 04/16/17 0257   BP:    105/55   Pulse: 95 109  101   Temp:    36.7 °C (98 °F)   TempSrc:       Resp:    20   Height:   1.702 m (5' 7\")    Weight:   81 kg (178 lb 9.2 oz)    SpO2: 95% 96%  98%   No intake or output data in the 24 hours ending 04/16/17 0743  Vitals Reviewed  General/Constitutional: No acute distress.   Head: Normocephalic, atraumatic  ENT: Oral mucosa is moist. No obvious pharyngeal exudates  Eyes: Pink conjunctiva, no scleral icterus  Neck: Supple, no lymphadenopathy  Cardiovascular: Normal rate and regular rhythm. S1,2 noted. No murmurs, gallops or rubs.  Pulmonary: Clear to auscultation bilaterally. No wheezes, rales or rhonchi  Abdominal: Soft, pain out of proportion to exam, not distended, bowel sounds normoactive.  Musculoskeletal: No tenderness to palpation of chest wall.  Neurologic: Grossly nonfocal, moving all extremities.  Genitourinary: No gross hematuria  Skin: No obvious rash.  Psychiatric: Pleasant, cooperative.  Labs Reviewed  Imaging reviewed       Labs  Lab Results   Component Value Date/Time    WBC 6.5 04/16/2017 06:03 AM    RBC 2.50* 04/16/2017 06:03 AM    HEMOGLOBIN 8.6* 04/16/2017 06:03 AM    HEMATOCRIT 25.5* 04/16/2017 06:03 AM    .0* 04/16/2017 06:03 AM    MCH 34.4* 04/16/2017 06:03 AM    MCHC 33.7 04/16/2017 06:03 AM    MPV 12.4 04/16/2017 06:03 AM    NEUTROPHILS-POLYS 61.40 04/15/2017 09:22 PM    LYMPHOCYTES 27.40 04/15/2017 09:22 PM    MONOCYTES 7.00 04/15/2017 09:22 PM    EOSINOPHILS " 2.80 04/15/2017 09:22 PM    BASOPHILS 0.60 04/15/2017 09:22 PM    HYPOCHROMIA 1+ 11/25/2016 02:10 AM    ANISOCYTOSIS 2+ 03/23/2017 03:01 AM      Lab Results   Component Value Date/Time    SODIUM 136 04/16/2017 06:03 AM    POTASSIUM 3.3* 04/16/2017 06:03 AM    CHLORIDE 106 04/16/2017 06:03 AM    CO2 22 04/16/2017 06:03 AM    GLUCOSE 85 04/16/2017 06:03 AM    BUN 2* 04/16/2017 06:03 AM    CREATININE 0.44* 04/16/2017 06:03 AM      Lab Results   Component Value Date/Time    PT 24.9* 03/22/2017 02:58 AM    INR 2.18* 03/22/2017 02:58 AM        Imaging  Ct-abdomen-pelvis With    4/16/2017  4/15/2017 11:52 PM HISTORY/REASON FOR EXAM:  Pain. Nausea and vomiting TECHNIQUE/EXAM DESCRIPTION:   CT scan of the abdomen and pelvis with contrast. Contrast-enhanced helical scanning was obtained from the diaphragmatic domes through the pubic symphysis following the bolus administration of nonionic contrast without complication. 100 mL of Omnipaque 350 nonionic contrast was administered without complication. Low dose optimization technique was utilized for this CT exam including automated exposure control and adjustment of the mA and/or kV according to patient size. COMPARISON: 3/17/2017 FINDINGS: CT Abdomen: Liver is enlarged and heterogeneous with diffuse low-attenuation. Spleen is enlarged measuring 16.7 cm. Adrenal glands are unremarkable. The kidneys are unremarkable. The pancreas is unremarkable. Gallbladder is absent. Fluid collection again present within the gallbladder fossa measuring 18 x 25 x 34 mm.  Tiny gas bubbles are present, improved from prior exam. CT Pelvis: Bladder is unremarkable. Uterus and ovaries are grossly unremarkable. Increased small bowel fluid. Apparent wall thickening of the ascending and proximal transverse colon. Small amount of peritoneal fluid. No pneumoperitoneum. Appendix is normal in caliber. Abdominal aorta is unremarkable. No major bony abnormality is seen.     4/16/2017  1.  Fluid collection  with minimal gas within the gallbladder fossa, likely postoperative and decreased from prior exam. 2.  Enlarged heterogeneous low-density liver may indicate fatty infiltration, cirrhosis or possibly hepatitis. 3.  Splenomegaly. 4.  Minimal peritoneal fluid. 5.  Multiple mildly dilated small bowel loops suggesting ileus. 6.  Mild wall thickening of proximal colon, concerning for colitis. 7.  No CT evidence for appendicitis.    Ct-abdomen-pelvis With    3/17/2017  3/17/2017 4:32 PM HISTORY/REASON FOR EXAM:  Epigastric Pain Status post gallbladder removal. Abdominal pain. TECHNIQUE/EXAM DESCRIPTION:   CT scan of the abdomen and pelvis with contrast. Contrast-enhanced helical scanning was obtained from the diaphragmatic domes through the pubic symphysis following the bolus administration of nonionic contrast without complication. 100 mL of Omnipaque 350 nonionic contrast was administered without complication. Low dose optimization technique was utilized for this CT exam including automated exposure control and adjustment of the mA and/or kV according to patient size. COMPARISON: 11/20/2016, ultrasound 3/9/2017 FINDINGS: Lung bases: Trace bibasilar atelectasis. No effusion. Abdomen: The liver is enlarged with heterogeneous enhancement. There is trace perihepatic ascites. The spleen is enlarged, measuring 17 cm. The pancreas is unremarkable. The gallbladder is surgically absent. There is fluid and gas within the gallbladder fossa.. The adrenal glands are normal in size. The kidneys enhance symmetrically. The abdominal aorta is normal in caliber. There is no lymphadenopathy. Mild wall thickening and stranding of the first and second portion duodenum adjacent to the surgical bed. Diffuse anasarca. Pelvis: No obvious abnormality seen in the pelvic structures but evaluation limited on CT. Moderate amount of fluid in the pelvis. No aggressive bone lesions are seen. ___________________________________     3/17/2017  1. Diffuse  anasarca with mild abdominal and pelvic ascites. 2. Hepatomegaly with ill-defined enhancement. Splenomegaly. 3. Status post cholecystectomy. Postsurgical fluid and gas within the gallbladder fossa, could be sterile or infected. No drainable fluid collection identified. 4. Mild wall thickening and stranding of the first and second portion duodenum likely reactive change to adjacent gallbladder fossa.    Dx-abdomen Complete With Ap Or Pa Cxr    3/19/2017  3/19/2017 2:37 PM HISTORY/REASON FOR EXAM:  Gastrointestinal Complaint. Epigastric pain, nausea and vomiting TECHNIQUE/EXAM DESCRIPTION AND NUMBER OF VIEWS:  Two views of the abdomen and single view of the chest. COMPARISON: CT scan March 17, 2017 FINDINGS: Chest x-ray reveals bibasilar atelectasis. No bowel dilatation identified. No free air. Surgical clips identified in right upper quadrant consistent with history of cholecystectomy. Visualized bony structures are unremarkable.     3/19/2017  No evidence of bowel obstruction. Bibasilar atelectasis.    Dx-chest-portable (1 View)    4/15/2017  4/15/2017 9:27 PM HISTORY/REASON FOR EXAM:  Shortness of Breath. Body aches, headache, nausea and vomiting TECHNIQUE/EXAM DESCRIPTION AND NUMBER OF VIEWS: Single portable view of the chest. COMPARISON: 3/19/2017 FINDINGS: Cardiomediastinal contour is within normal limits. No focal pulmonary consolidation. No pleural fluid collection or pneumothorax. RIGHT costophrenic angle is partially cut from the image. No major bony abnormality is seen.     4/15/2017  No acute cardiopulmonary disease.    Nm-hepatobiliary Scan    3/20/2017  3/20/2017 10:40 AM HISTORY/REASON FOR EXAM:  Abdominal Pain Status post costectomy, evaluate for bile leak TECHNIQUE/EXAM DESCRIPTION AND NUMBER OF VIEWS: Hepatobiliary scan. COMPARISON: Hepatobiliary scan 11/22/2016 CT abdomen and pelvis 3/17/2017 PROCEDURE:  5.3 mCi Tc 99m-Choletec was administered intravenously, followed by 60 minutes of anterior  planar imaging. FINDINGS: Prompt uptake and excretion of radiotracer by the liver. Activity is seen within the common bile duct at 15 minutes. Activity is within the bowel at 15 minutes. Small amount of bile reflux to the stomach. No abnormal activity to indicate bile leak.     3/20/2017  1.  No evidence for bile leak or common bile duct obstruction. 2.  Small amount of bile reflux to the stomach.    Us-abdomen Limited    3/21/2017  3/21/2017 4:18 PM HISTORY/REASON FOR EXAM:  Pain TECHNIQUE/EXAM DESCRIPTION: Limited abdominal ultrasound. COMPARISON: CT 3/17/2017 FINDINGS: There is a small amount of ascites in the 4 quadrants.     3/21/2017  Small amount of ascites.    Echocardiogram Comp W/o Cont    3/21/2017  Transthoracic Echo Report Echocardiography Laboratory CONCLUSIONS No prior study is available for comparison. The left ventricle was normal in size and function. Left ventricular ejection fraction is visually estimated to be 65%. The right ventricle was normal in size and function. Structurally normal aortic valve without significant stenosis and trace  regurgitation. Trace mitral regurgitation. Trace tricuspid regurgitation. Normal pericardium without effusion. LOPEZ SIERRA Exam Date:         2017                    17:31 Exam Location:     Inpatient Priority:          Routine Ordering Physician:        JEFF POND Referring Physician:       [id number] Hurst Sonographer:               Bella Johnson RDCS Age:    36     Gender:    F MRN:    [id number] :    1980 BSA:    2      Ht (in):    67     Wt (lb):    194 Exam Type:     Complete Indications:     Murmur ICD Codes:       R01.1 CPT Codes:       38040 BP:   116    /   57     HR:   105 Technical Quality:       Good MEASUREMENTS  (Male / Female) Normal Values 2D ECHO LV Diastolic Diameter PLAX        4.8 cm                4.2 - 5.9 / 3.9 - 5.3 cm LV Systolic Diameter PLAX         3.1 cm                2.1 - 4.0 cm IVS Diastolic Thickness            0.92 cm               LVPW Diastolic Thickness          1 cm                  LVOT Diameter                     2.1 cm                Estimated LV Ejection Fraction    65 %                  LV Ejection Fraction MOD BP       68.8 %                >= 55  % LV Ejection Fraction MOD 4C       61.8 %                LV Ejection Fraction MOD 2C       74.1 %                IVC Diameter                      1.6 cm                M-MODE Aortic Root Diameter MM           3.4 cm                DOPPLER AV Peak Velocity                  2.5 m/s               AV Peak Gradient                  24.4 mmHg             AV Mean Gradient                  12.7 mmHg             LVOT Peak Velocity                1.3 m/s               AV Area Cont Eq vti               2.2 cm²               Mitral E Point Velocity           0.85 m/s              Mitral E to A Ratio               0.77                  MV Pressure Half Time             35.4 ms               MV Area PHT                       6.2 cm²               MV Deceleration Time              122 ms                Pulmonary Vein Systolic Velocity  0.69 m/s              Pulmonary Vein Diastolic Velocit  0.5 m/s               Pulmonary Vein S/D Ratio          1.4                   Pulmonary Vein A Velocity         0.48 m/s              Pulmonary Vein A Duration         0 ms                  TR Peak Velocity                  243 cm/s              PV Peak Velocity                  2 m/s                 PV Peak Gradient                  16.4 mmHg             RVOT Peak Velocity                1.3 m/s               * Indicates values subject to auto-interpretation LV EF:  65    % FINDINGS Left Ventricle Left ventricular ejection fraction is visually estimated to be 65%. The left ventricle was normal in size and function. Right Ventricle The right ventricle was normal in size and function. Right Atrium The right atrium is normal in size. Left Atrium The left atrium is normal in size. Mitral Valve  Thickened mitral valve leaflets. No mitral stenosis. Trace mitral regurgitation. Aortic Valve Structurally normal aortic valve without significant stenosis and trace  regurgitation. Tricuspid Valve Structurally normal tricuspid valve. No tricuspid stenosis. Trace tricuspid regurgitation. Estimated right ventricular systolic pressure  is 30 mmHg. Pulmonic Valve The pulmonic valve is not well visualized. No pulmonic stenosis. Trace pulmonic insufficiency. The pulmonary artery and  pulmonary branches appear dilated Pericardium Normal pericardium without effusion. Aorta The aortic root is normal. Shanika Cantu MD, [facility] (Electronically Signed) Final Date:     21 March 2017                 20:08    Ct-cta Chest Pulmonary Artery W/ Recons    3/17/2017  3/17/2017 4:32 PM HISTORY/REASON FOR EXAM:  Atraumatic Pain TECHNIQUE/EXAM DESCRIPTION: CT angiogram scan for pulmonary embolism with contrast, with reconstructions. 1.25 mm helical sections were obtained from the lung apices through the lung bases following the rapid bolus administration of 100 mL of Omnipaque 350 nonionic contrast. Thin-section overlapping reconstruction interval was utilized. Coronal reconstructions were generated from the axial data. MIP post processing was performed and utilized for the interpretation. Low dose optimization technique was utilized for this CT exam including automated exposure control and adjustment of the mA and/or kV according to patient size. COMPARISON: None FINDINGS: Pulmonary Embolism: No. Main Pulmonary Arteries: No. Segmental branches: No. Subsegmental branches: No. Additional Comments: Prominent cardiovascular silhouette. Aorta is opacified and patent. Small pericardial fluid collections. . Lungs: Left lower lobe atelectasis. Pleura: No pleural effusion. Nodes: No enlarged lymph nodes. Additional findings: Hepatomegaly. Geographic low-attenuation within the liver. Serpiginous nodular arterial enhancement within the liver.  Splenomegaly. Upper abdomen ascites. Surgical absence gallbladder. Fluid and air within the gallbladder fossa consistent with recent surgery.     3/17/2017  1.  No evidence pulmonary emboli. 2.  Left lower lobe atelectasis. Pneumonitis not excluded. No pleural effusion. 3.  Hepatocellular disease. Hepatosplenomegaly. 4.  Serpiginous and nodular arterial enhancement within the liver likely related to hepatocellular disease/cirrhosis. Underlying neoplasm not excluded. 5.  Surgical absence gallbladder. Fluid and air within the gallbladder fossa consistent with postoperative change. 6.  Upper abdomen ascites.       Assessment and Plan  Admit to inpatient    Duodenitis  Likely cause of mild abdominal discomfort, N/V and diarrhea. IVF gental hydration, clears then advance diet as tolerated. Conservative management.    Cirrhosis  Hepatosplenomegaly  Coagulopathy  Ascites, mild  Anemia, thrombocytopenia  Hyperbilirubinemia  No bleeding  Vitamin K for elevated INR  Trend H/H and platelets  Blood pressure on the low normal side, so continue diuretics, only add nadolol or propranolol if there is BP room for portal hypertension  Low threshold to consult GI , but most likely will need PCP and Hepatology referral outpatient  Counseled her on alcohol cessation    Lactic acidosis, mild  Probably from local hypoperffusion from liver disease. Normalized.    SCDs    I spent 72 minutes evaluating Nury Thornton, reviewing the chart, vitals, labs and imaging, discussing the case with ED physician, medication reconciliation, speaking with Nury Thorntonand boyfriend/ at length, placing orders and enacting the plan above.

## 2017-04-16 NOTE — PROGRESS NOTES
Hospital Medicine Progress Note, Adult, Complex               Author: Mike Viveros Date & Time created: 4/16/2017  7:34 AM     CC:  35 yo female hx etoh cirrhosis , macrocytic anemia  , recent dc 3-24-17 for Sepsis- non clear source  Admitted with co Abd pain, N/V.     Interval History:    States she recently start drinking heavily again lately.  Nausea improved- wants to try solids. No withdrawal symptoms     CT findings :  1.  Fluid collection with minimal gas within the gallbladder fossa, likely postoperative and decreased from prior exam.  2.  Enlarged heterogeneous low-density liver may indicate fatty infiltration, cirrhosis or possibly hepatitis.  3.  Splenomegaly.  4.  Minimal peritoneal fluid.  5.  Multiple mildly dilated small bowel loops suggesting ileus.  6.  Mild wall thickening of proximal colon, concerning for colitis.      Review of Systems:  Review of Systems   Constitutional: Negative for fever and chills.   HENT: Negative for congestion.    Respiratory: Negative for cough and shortness of breath.    Cardiovascular: Negative for chest pain and leg swelling.   Gastrointestinal: Positive for nausea, vomiting and abdominal pain. Negative for blood in stool.   Neurological: Positive for weakness. Negative for sensory change, speech change, focal weakness and headaches.   Psychiatric/Behavioral: Positive for substance abuse. Negative for hallucinations.       Physical Exam:  Physical Exam   Constitutional: She is oriented to person, place, and time. No distress.   Eyes: EOM are normal. Scleral icterus is present.   Neck: No JVD present.   Cardiovascular: Normal rate and regular rhythm.    No murmur heard.  Pulmonary/Chest: No stridor. She has no wheezes. She has no rales.   Abdominal: Soft. Bowel sounds are normal. She exhibits no distension. There is tenderness (mild). There is no rebound and no guarding.   Musculoskeletal: She exhibits no edema.   Neurological: She is alert and oriented to person,  place, and time. No cranial nerve deficit.   Skin: Skin is warm and dry. She is not diaphoretic.   Psychiatric: She has a normal mood and affect. Her behavior is normal.       Labs:        Invalid input(s): [id number]      Recent Labs      04/15/17   2122  04/16/17   0603   SODIUM  140  136   POTASSIUM  3.4*  3.3*   CHLORIDE  109  106   CO2  20  22   BUN  <2*  2*   CREATININE  0.45*  0.44*   CALCIUM  8.7  7.5*     Recent Labs      04/15/17   2122  04/16/17   0603   ALTSGPT  30  25   ASTSGOT  74*  62*   ALKPHOSPHAT  135*  128*   TBILIRUBIN  4.1*  3.5*   LIPASE  57   --    GLUCOSE  81  85     Recent Labs      04/15/17   2122  04/16/17   0603   RBC  2.66*  2.50*   HEMOGLOBIN  9.0*  8.6*   HEMATOCRIT  26.9*  25.5*   PLATELETCT  128*  68*     Recent Labs      04/15/17   2122  04/16/17   0603   WBC  9.1  6.5   NEUTSPOLYS  61.40   --    LYMPHOCYTES  27.40   --    MONOCYTES  7.00   --    EOSINOPHILS  2.80   --    BASOPHILS  0.60   --    ASTSGOT  74*  62*   ALTSGPT  30  25   ALKPHOSPHAT  135*  128*   TBILIRUBIN  4.1*  3.5*           Hemodynamics:  Temp (24hrs), Av.3 °C (97.4 °F), Min:36 °C (96.8 °F), Max:36.7 °C (98 °F)  Temperature: 36.7 °C (98 °F)  Pulse  Av  Min: 95  Max: 122   Blood Pressure: 105/55 mmHg, NIBP: 106/63 mmHg     Respiratory:    Respiration: 20, Pulse Oximetry: 98 %        RUL Breath Sounds: Clear, RML Breath Sounds: Clear, RLL Breath Sounds: Diminished, NEO Breath Sounds: Clear, LLL Breath Sounds: Diminished  Fluids:  No intake or output data in the 24 hours ending 17 0734  Weight: 81 kg (178 lb 9.2 oz)  GI/Nutrition:  Orders Placed This Encounter   Procedures   • Diet Order     Standing Status: Standing      Number of Occurrences: 1      Standing Expiration Date:      Order Specific Question:  Diet:     Answer:  Clear Liquid [10]     Medical Decision Making, by Problem:  Active Hospital Problems    Diagnosis   • *Sepsis (CMS-HCC) [A41.9]- suspect Colitis vs Urine - although symptoms  suggest GI source.  Fu Urine Cx   ivfs  Atbs- rocephin, flagyl.   • Abdominal pain [R10.9]w N/V  ileus vs colitis vs  etoh gastritis- improved.   Start oral oxycodone  Oral ppi   Pain control toradol. oxyccodone   • Cirrhosis (CMS-HCC) [K74.60] w/o decompensation.  IVFs, hold diuretics.    • Transaminitis due to alcohol liver disease [R74.0] w splenomegaly   Trental.   • Macrocytic anemia [D53.9]- chronic , no symptoms of bleed- likely attrib to cirrhosis    • Alcoholism (CMS-HCC) [F10.20] active.   GB fossa fluid collection assoc w lap kaitlynn 3-10 -17 , previous yap w negative HIDA -- improved, unlikely source infxn.  Coagulopathy - no symptoms of bleed  Trial vit k x 3 days.- fu coags.       Labs reviewed, Medications reviewed and Radiology images reviewed  Bynum catheter: No Bynum

## 2017-04-16 NOTE — PROGRESS NOTES
"Assumed care of patient at   0700. Received report from RN. Patient is AOX 4. Assessment complete. Labs reviewed.Patient and RN discussed plan of care. Patient questions answered. Patient needs are met at this time. Bed in lowest and locked position. Upper bed rails up.  Call light is within reach. Hourly rounding in place.  /69 mmHg  Pulse 68  Temp(Src) 36 °C (96.8 °F) (Temporal)  Resp 17  Ht 1.702 m (5' 7\")  Wt 81 kg (178 lb 9.2 oz)  BMI 27.96 kg/m2  SpO2 96%  LMP 02/19/2017  Breastfeeding? No    "

## 2017-04-16 NOTE — PROGRESS NOTES
Pt arrived at 0215 from ED by oliver accompanied by transporter.  Pt ambulated to bed with stand by assist.  Pt assessed, complaining of nausea and 7/10 abdominal pain.  Pt has no pain medication ordered at this time.  2 RN skin assessment complete, skin intact.  Updated pt on unit routine including call light system, hourly rounding, white board info, need for bed alarm, and visitors policy.  Bed in lowest position, locked, pt wearing yellow treaded socks.  Pt refusing bed alarm, pt educated on the need for the bed alarm but still refusing.  Pt instructed on need to call for assistance prior to getting out of bed, pt verbalized understanding.  Call light, phone, and personal belongs within reach, white board updated.

## 2017-04-16 NOTE — ED PROVIDER NOTES
ED Provider Note    Scribed for Dr. Ryan Posadas M.D. by Dashawn Reyes. 4/15/2017  9:07 PM    Primary care provider: Pcp Pt States None  Means of arrival: Walk in  History obtained from: Patient  History limited by: None    CHIEF COMPLAINT  Chief Complaint   Patient presents with   • Flu Like Symptoms       HPI  Nury Thornton is a 36 y.o. female who presents to the Emergency Department for evaluation of flu-like symptoms onset 2 days ago. Patient states she was in the hospital one month ago. Per nursing notes, her previous visit was for post-operative infection. She reports still being sick since then. She notes being unable to keep vitamins and other foods in her body and reports losing 40 lbs. She also notes associated lightheadedness, headache, sore throat, runny nose, vomiting, upper abdominal pain, difficulty eating, and diarrhea. She denies cough and urination problems. Patient is unsure if she has had a fever.  Patient with multiple vague complaints, but generally feels ill with some abdominal pain subjective fever    REVIEW OF SYSTEMS  Pertinent positives include lightheadedness, headache, sore throat, runny nose, vomiting, upper abdominal pain, difficulty eating, diarrhea. Pertinent negatives include no cough or urination problems. As above, all other systems reviewed and are negative.   See HPI for further details.   C    PAST MEDICAL HISTORY   has a past medical history of Depression; Psychiatric disorder; Stroke (CMS-HCC); ASTHMA; Pneumonia (11/18/2016); Sepsis (CMS-[facility]) (11/18/2016); and HTN (hypertension) (3/14/2017).    SURGICAL HISTORY   has past surgical history that includes tubal ligation; other orthopedic surgery; other; ankle orif (10/4/2009); gyn surgery; and kaitlynn by laparoscopy (N/A, 3/10/2017).    SOCIAL HISTORY  Social History   Substance Use Topics   • Smoking status: Current Every Day Smoker -- 0.25 packs/day     Types: Cigarettes   • Alcohol Use: Yes      History   Drug Use No      "Comment: Hx of meth and marijuana       FAMILY HISTORY  Family History   Problem Relation Age of Onset   • Heart Disease Father    • Diabetes Mother        CURRENT MEDICATIONS  No current facility-administered medications on file prior to encounter.     Current Outpatient Prescriptions on File Prior to Encounter   Medication Sig Dispense Refill   • hydrocodone-acetaminophen (NORCO) 5-325 MG Tab per tablet Take 1-2 Tabs by mouth every four hours as needed (as needed for pain). 20 Tab 0   • pentoxifylline CR (TRENTAL) 400 MG CR tablet Take 400 mg by mouth 3 times a day, with meals.     • furosemide (LASIX) 20 MG Tab Take 20 mg by mouth every day.     • spironolactone (ALDACTONE) 50 MG Tab Take 50 mg by mouth every day.     • potassium chloride SA (KDUR) 20 MEQ Tab CR Take 20 mEq by mouth every day.     • medroxyPROGESTERone (PROVERA) 5 MG Tab Take 10 mg by mouth every day.     • omeprazole (PRILOSEC) 20 MG delayed-release capsule Take 1 Cap by mouth 2 times a day. 30 Cap 0   • ferrous gluconate (FERGON) 324 (38 FE) MG Tab Take 324 mg by mouth 3 times a day, with meals.        ALLERGIES  Allergies   Allergen Reactions   • Nkda [No Known Drug Allergy]        PHYSICAL EXAM  VITAL SIGNS: /63 mmHg  Pulse 122  Temp(Src) 36 °C (96.8 °F) (Temporal)  Resp 18  Ht 1.702 m (5' 7\")  Wt 77.111 kg (170 lb)  BMI 26.62 kg/m2  SpO2 99%  LMP 02/19/2017  Constitutional: Well developed, Well nourished, mild distress  HENT: Normocephalic, Atraumatic, Bilateral external ears normal, Oropharynx moist, No oral exudates.   Eyes: PERRLA, EOMI, Conjunctiva normal, No discharge.   Neck: No tenderness, Supple, No stridor.   Lymphatic: No lymphadenopathy noted.   Cardiovascular: Tachycardic, Normal rhythm.   Thorax & Lungs: Clear to auscultation bilaterally, No respiratory distress, No wheezing, No crackles.   Abdomen: Soft, No masses, No pulsatile masses. Diffuse minimal abdominal tenderness.  Skin: Warm, Dry, No erythema, No rash. "   Extremities:, No edema No cyanosis.   Musculoskeletal: No tenderness to palpation or major deformities noted.  Intact distal pulses  Neurologic: Awake, alert. Moves all extremities spontaneously.  Psychiatric: Affect normal, Judgment normal, Mood normal.       LABS  Results for orders placed or performed during the hospital encounter of 04/15/17   CBC WITH DIFFERENTIAL   Result Value Ref Range    WBC 9.1 4.8 - 10.8 K/uL    RBC 2.66 (L) 4.20 - 5.40 M/uL    Hemoglobin 9.0 (L) 12.0 - 16.0 g/dL    Hematocrit 26.9 (L) 37.0 - 47.0 %    .1 (H) 81.4 - 97.8 fL    MCH 33.8 (H) 27.0 - 33.0 pg    MCHC 33.5 (L) 33.6 - 35.0 g/dL    RDW 49.9 35.9 - 50.0 fL    Platelet Count 128 (L) 164 - 446 K/uL    MPV 9.8 9.0 - 12.9 fL    Neutrophils-Polys 61.40 44.00 - 72.00 %    Lymphocytes 27.40 22.00 - 41.00 %    Monocytes 7.00 0.00 - 13.40 %    Eosinophils 2.80 0.00 - 6.90 %    Basophils 0.60 0.00 - 1.80 %    Immature Granulocytes 0.80 0.00 - 0.90 %    Nucleated RBC 0.20 /100 WBC    Neutrophils (Absolute) 5.58 2.00 - 7.15 K/uL    Lymphs (Absolute) 2.48 1.00 - 4.80 K/uL    Monos (Absolute) 0.63 0.00 - 0.85 K/uL    Eos (Absolute) 0.25 0.00 - 0.51 K/uL    Baso (Absolute) 0.05 0.00 - 0.12 K/uL    Immature Granulocytes (abs) 0.07 0.00 - 0.11 K/uL    NRBC (Absolute) 0.02 K/uL   COMP METABOLIC PANEL   Result Value Ref Range    Sodium 140 135 - 145 mmol/L    Potassium 3.4 (L) 3.6 - 5.5 mmol/L    Chloride 109 96 - 112 mmol/L    Co2 20 20 - 33 mmol/L    Anion Gap 11.0 0.0 - 11.9    Glucose 81 65 - 99 mg/dL    Bun <2 (L) 8 - 22 mg/dL    Creatinine 0.45 (L) 0.50 - 1.40 mg/dL    Calcium 8.7 8.5 - 10.5 mg/dL    AST(SGOT) 74 (H) 12 - 45 U/L    ALT(SGPT) 30 2 - 50 U/L    Alkaline Phosphatase 135 (H) 30 - 99 U/L    Total Bilirubin 4.1 (H) 0.1 - 1.5 mg/dL    Albumin 3.0 (L) 3.2 - 4.9 g/dL    Total Protein 8.2 6.0 - 8.2 g/dL    Globulin 5.2 (H) 1.9 - 3.5 g/dL    A-G Ratio 0.6 g/dL   LIPASE   Result Value Ref Range    Lipase 57 11 - 82 U/L   LACTIC  ACID   Result Value Ref Range    Lactic Acid 2.3 (H) 0.5 - 2.0 mmol/L   ESTIMATED GFR   Result Value Ref Range    GFR If African American >60 >60 mL/min/1.73 m 2    GFR If Non African American >60 >60 mL/min/1.73 m 2      All labs reviewed by me.      RADIOLOGY  DX-CHEST-PORTABLE (1 VIEW)   Final Result      No acute cardiopulmonary disease.      CT-ABDOMEN-PELVIS WITH    (Results Pending)     The radiologist's interpretation of all radiological studies have been reviewed by me.    COURSE & MEDICAL DECISION MAKING  Pertinent Labs & Imaging studies reviewed. (See chart for details)    9:07 PM - Patient seen and examined at bedside. The patient will be resuscitated with 1L NS IV.  Ordered DX chest, CBC, CMP, lipase, lactic acid, urinalysis culture, HCG qualitative, refractometer SG to evaluate her symptoms. The differential diagnoses include but are not limited to: Sepsis, urinary tract infection intra-abdominal source of sepsis, pneumonia    11:08 PM Paged hospitalist.    11:15 PM - I discussed the patient's case and the above findings with hospitalist who will admit the patient.  CT scan is still pending at this time I think can consider if there is significant ascites peritoneal tap for evaluation, although patient's exam is really quite unremarkable    DISPOSITION:  Patient will be admitted to hospitalist     Decision Making:   Patient with severe alcoholic liver disease, admitted last month with sepsis of undetermined source, now presenting with a tachycardia and low blood pressure and probable recurrent sepsis.  Lactic acid is elevated at 2.3.  Fluid bolus will give antibiotics for presumed gastrointestinal source of sepsis.  I will go ahead and get a CT scan of the abdomen since she does have some abdominal pain but abdominal exam is not really very remarkable   FINAL IMPRESSION  1. Sepsis, due to unspecified organism (CMS-[facility])          I, Dashawn Reyes (Scribe), am scribing for, and in the presence of,  Ryan Posadas M.D..    Electronically signed by: Dashawn Reyes (Scribe), 4/15/2017    IRyan M.D. personally performed the services described in this documentation, as scribed by Dashawn Reyes in my presence, and it is both accurate and complete.    The note accurately reflects work and decisions made by me.  Ryan Posadas  4/16/2017  12:01 AM

## 2017-04-17 NOTE — CARE PLAN
Problem: Safety  Goal: Will remain free from injury  Intervention: Provide assistance with mobility  Pt is up self, steady.  Calls appropriately, treaded slipper socks in place      Problem: Knowledge Deficit  Goal: Knowledge of disease process/condition, treatment plan, diagnostic tests, and medications will improve  Intervention: Explain information regarding disease process/condition, treatment plan, diagnostic tests, and medications and document in education  MD discussed with patient the likely course of her disease process if the pt keeps drinking.  Pt expressed a desire to not drink anymore and her willingness to discuss options for rehab after hospitalization.

## 2017-04-17 NOTE — DISCHARGE PLANNING
Patient discharging today requesting resource list related to alcohol dependence.  Patient not in room at the time I left list at bedside, however a friend was at bedside at the time and is aware of the list .

## 2017-04-17 NOTE — PROGRESS NOTES
"Assumed care of patient at 0700.  Received report from RN. Patient is AOX 4 . Assessment complete. Labs reviewed.Patient and RN discussed plan of care. Patient questions answered. Patient needs are met at this time. Bed in lowest and locked position. Upper bed rails up.  Call light is within reach. Hourly rounding in place.  /64 mmHg  Pulse 77  Temp(Src) 36.6 °C (97.8 °F) (Temporal)  Resp 17  Ht 1.702 m (5' 7\")  Wt 81 kg (178 lb 9.2 oz)  BMI 27.96 kg/m2  SpO2 97%  LMP 02/19/2017  Breastfeeding? No    "

## 2017-04-17 NOTE — PROGRESS NOTES
Patient received resources for ETOH support. Patient discharged to home. Patient has prescriptions and d/c paperwork in hand.

## 2017-04-17 NOTE — DISCHARGE INSTRUCTIONS
Discharge Instructions    Discharged to home by car with relative. Discharged via wheelchair, hospital escort: Yes.  Special equipment needed: Not Applicable    Be sure to schedule a follow-up appointment with your primary care doctor or any specialists as instructed.     Discharge Plan:   Diet Plan: Discussed  Activity Level: Discussed  Smoking Cessation Offered: Patient Counseled  Confirmed Follow up Appointment: Patient to Call and Schedule Appointment  Medication Reconciliation Updated: Yes  Influenza Vaccine Indication: Not indicated: Previously immunized this influenza season and > 8 years of age    I understand that a diet low in cholesterol, fat, and sodium is recommended for good health. Unless I have been given specific instructions below for another diet, I accept this instruction as my diet prescription.   Other diet: Regular Diet    Special Instructions: None    · Is patient discharged on Warfarin / Coumadin?   No     · Is patient Post Blood Transfusion?  No    Depression / Suicide Risk    As you are discharged from this [facility] facility, it is important to learn how to keep safe from harming yourself.    Recognize the warning signs:  · Abrupt changes in personality, positive or negative- including increase in energy   · Giving away possessions  · Change in eating patterns- significant weight changes-  positive or negative  · Change in sleeping patterns- unable to sleep or sleeping all the time   · Unwillingness or inability to communicate  · Depression  · Unusual sadness, discouragement and loneliness  · Talk of wanting to die  · Neglect of personal appearance   · Rebelliousness- reckless behavior  · Withdrawal from people/activities they love  · Confusion- inability to concentrate     If you or a loved one observes any of these behaviors or has concerns about self-harm, here's what you can do:  · Talk about it- your feelings and reasons for harming yourself  · Remove any means that you might use  to hurt yourself (examples: pills, rope, extension cords, firearm)  · Get professional help from the community (Mental Health, Substance Abuse, psychological counseling)  · Do not be alone:Call your Safe Contact- someone whom you trust who will be there for you.  · Call your local CRISIS HOTLINE 613-0857 or 794-675-3082  · Call your local Children's Mobile Crisis Response Team [place] (349) 965-2712 or www.[employer]  · Call the toll free National Suicide Prevention Hotlines   · National Suicide Prevention Lifeline 328-308-HPNM (8776)  · National Hope Line Network 800-SUICIDE (944-9767)

## 2017-04-17 NOTE — PROGRESS NOTES
Med rec complete per patient.  Pt states she has not taken any medication in about a week because she ran out.  Allergies reviewed - NKDA.  No PO antibiotics in last 30 days.

## 2017-04-17 NOTE — CARE PLAN
Problem: Infection  Goal: Will remain free from infection  Outcome: PROGRESSING AS EXPECTED  Pt on IV abx, showing no signs of new or worsening infection    Problem: Venous Thromboembolism (VTW)/Deep Vein Thrombosis (DVT) Prevention:  Goal: Patient will participate in Venous Thrombosis (VTE)/Deep Vein Thrombosis (DVT)Prevention Measures  Outcome: PROGRESSING AS EXPECTED  Pt wearing SCDs at all time while in bed and ambulates 1-2 times per day.

## 2017-04-17 NOTE — PROGRESS NOTES
Hospital Medicine Progress Note, Adult, Complex               Author: Mike Viveros Date & Time created: 4/17/2017  8:13 AM     CC:  37 yo female hx etoh cirrhosis , macrocytic anemia  , recent dc 3-24-17 for Sepsis- non clear source  Admitted with co Abd pain, N/V.     Interval History:    States she recently start drinking heavily again lately.  Nausea improved- wants to try solids. No withdrawal symptoms.    CT findings :  1.  Fluid collection with minimal gas within the gallbladder fossa, likely postoperative and decreased from prior exam.  2.  Enlarged heterogeneous low-density liver may indicate fatty infiltration, cirrhosis or possibly hepatitis.  3.  Splenomegaly.  4.  Minimal peritoneal fluid.  5.  Multiple mildly dilated small bowel loops suggesting ileus.  6.  Mild wall thickening of proximal colon, concerning for colitis.      Review of Systems:  Review of Systems   Constitutional: Negative for fever and chills.   HENT: Negative for congestion.    Respiratory: Negative for cough and shortness of breath.    Cardiovascular: Negative for chest pain and leg swelling.   Gastrointestinal: Positive for nausea, vomiting and abdominal pain. Negative for blood in stool.   Neurological: Positive for weakness. Negative for sensory change, speech change, focal weakness and headaches.   Psychiatric/Behavioral: Positive for substance abuse. Negative for hallucinations.       Physical Exam:  Physical Exam   Constitutional: She is oriented to person, place, and time. No distress.   Eyes: EOM are normal. Scleral icterus is present.   Neck: No JVD present.   Cardiovascular: Normal rate and regular rhythm.    No murmur heard.  Pulmonary/Chest: No stridor. She has no wheezes. She has no rales.   Abdominal: Soft. Bowel sounds are normal. She exhibits no distension. There is tenderness (mild). There is no rebound and no guarding.   Musculoskeletal: She exhibits no edema.   Neurological: She is alert and oriented to person,  place, and time. No cranial nerve deficit.   Skin: Skin is warm and dry. She is not diaphoretic.   Psychiatric: She has a normal mood and affect. Her behavior is normal.       Labs:        Invalid input(s): [id number]      Recent Labs      04/15/17   2122  04/16/17   0603  04/17/17   030   SODIUM  140  136  135   POTASSIUM  3.4*  3.3*  2.9*   CHLORIDE  109  106  107   CO2  20  22  20   BUN  <2*  2*  2*   CREATININE  0.45*  0.44*  0.53   CALCIUM  8.7  7.5*  7.8*     Recent Labs      04/15/17   2122  04/16/17   0603  04/17/17   030   ALTSGPT  30  25  19   ASTSGOT  74*  62*  43   ALKPHOSPHAT  135*  128*  104*   TBILIRUBIN  4.1*  3.5*  4.3*   LIPASE  57   --    --    GLUCOSE  81  85  86     Recent Labs      04/15/17   2122  04/16/17   0603  04/17/17   030   RBC  2.66*  2.50*  2.47*   HEMOGLOBIN  9.0*  8.6*  8.5*   HEMATOCRIT  26.9*  25.5*  25.1*   PLATELETCT  128*  68*  80*   PROTHROMBTM   --    --   23.4*   INR   --    --   2.01*     Recent Labs      04/15/17   2122  04/16/17   0603  04/17/17   0307   WBC  9.1  6.5  4.1*   NEUTSPOLYS  61.40   --   66.50   LYMPHOCYTES  27.40   --   20.90*   MONOCYTES  7.00   --   7.50   EOSINOPHILS  2.80   --   3.90   BASOPHILS  0.60   --   0.50   ASTSGOT  74*  62*  43   ALTSGPT  30  25  19   ALKPHOSPHAT  135*  128*  104*   TBILIRUBIN  4.1*  3.5*  4.3*           Hemodynamics:  Temp (24hrs), Av.6 °C (97.8 °F), Min:36.2 °C (97.1 °F), Max:36.9 °C (98.4 °F)  Temperature: 36.6 °C (97.8 °F)  Pulse  Av.4  Min: 68  Max: 122   Blood Pressure: 118/64 mmHg     Respiratory:    Respiration: 17, Pulse Oximetry: 97 %        RUL Breath Sounds: Clear, RML Breath Sounds: Clear, RLL Breath Sounds: Diminished, NEO Breath Sounds: Clear, LLL Breath Sounds: Diminished  Fluids:    Intake/Output Summary (Last 24 hours) at 17 0813  Last data filed at 17 1800   Gross per 24 hour   Intake   1718 ml   Output    300 ml   Net   1418 ml        GI/Nutrition:  Orders Placed This Encounter    Procedures   • DIET ORDER     Standing Status: Standing      Number of Occurrences: 1      Standing Expiration Date:      Order Specific Question:  Diet:     Answer:  Low Fiber(GI Soft) [2]     Medical Decision Making, by Problem:  Active Hospital Problems    Diagnosis   • *Sepsis (CMS-HCC) [A41.9]- suspect Colitis vs Urine - although symptoms suggest GI source.  Fu Urine Cx   ivfs  Atbs- rocephin, flagyl.   • Abdominal pain [R10.9]w N/V  ileus vs colitis vs  etoh gastritis- improved.   Start oral oxycodone  Oral ppi   Pain control toradol. oxyccodone   • Cirrhosis (CMS-HCC) [K74.60] w/o decompensation.  IVFs, hold diuretics.    • Transaminitis due to alcohol liver disease [R74.0] w splenomegaly   Trental.   • Macrocytic anemia [D53.9]- chronic , no symptoms of bleed- likely attrib to cirrhosis    • Alcoholism (CMS-HCC) [F10.20] active.   GB fossa fluid collection assoc w lap kaitlynn 3-10 -17 , previous yap w negative HIDA -- improved, unlikely source infxn.  Coagulopathy - no symptoms of bleed  Trial vit k x 3 days.- fu coags.       Labs reviewed, Medications reviewed and Radiology images reviewed  Bynum catheter: No Bynum

## 2017-04-18 NOTE — PROGRESS NOTES
· Placed discharge outreach phone call to patient s/p hospital discharge 4/17/17.  Mother Taisha answered.  Phone number listed in Epic record is mother's phone number.  Unable to complete discharge outreach with pt d/t no contact phone number.

## 2017-04-18 NOTE — DISCHARGE SUMMARY
ADMISSION DIAGNOSES:  1.  Duodenitis.  2.  Nausea and vomiting with abdominal pain.  3.  History of alcoholic cirrhosis.  4.  Hepatosplenomegaly.  5.  Mild ascites.  6.  Anemia, thrombocytopenia.  7.  Hyperbilirubinemia.  8.  Mild lactic acidosis secondary to hypoperfusion, liver disease.    DISCHARGE DIAGNOSES:  1.  Sepsis secondary to colitis, urinary source infection, resolved.  2.  Lactic acidosis attributed to dehydration, hypoperfusion, possible early   sepsis, corrected.  3.  Colitis, symptoms improved.  4.  Alcoholic gastritis, clinically better.  5.  History of alcoholic cirrhosis without decompensation.  6.  Chronic macrocytic anemia.  7.  Coagulopathy without symptoms of bleed attributed to her cirrhosis.  8.  History of alcohol abuse, recent heavy use.    IMAGING TESTS:  A CT scan of abdomen and pelvis on 04/15/2017 revealed fluid   collection, no gas within gallbladder fossa, decreased from prior exam, likely   postop, large heterogeneous low density of liver indicative of cirrhosis,   possibly hepatitis, splenomegaly, minimal peritoneal fluid.  Mild wall   thickening in the proximal colon except for colitis.  Mild dilated loops   suggestive of ileus.    HOSPITAL COURSE:  The patient is a 36-year-old female with history of   alcoholic cirrhosis, laparoscopic cholecystectomy with recent discharge on   03/24/2017 for sepsis, unclear source, alcohol abuse, chronic anemia,   thrombocytopenia attributed to alcoholic cirrhosis, was admitted with symptoms   of abdominal pain with nausea and vomiting.  She was treated for sepsis with   elevated lactic acid level and tachycardia.  With IV fluids, her lactic acid   normalized.  Heart rate improved.  The patient was treated with empiric IV   antibiotics, Rocephin and Flagyl for possible colitis, although differential   for her abdominal pain, nausea, vomiting included early ileus for alcoholic   gastritis.  She reports having failed abstinence and drinking alcohol  heavily   recently.  I suspected alcoholic gastritis since symptoms were improved on   oral PPI therapy.  With antibiotics, addition of PPI therapy, antiemetics,   pain relievers, the patient's symptoms markedly improved.  She was afebrile.    Her potassium was low and was replaced.  I advised her at length regarding   cessation of alcohol use.  Her urine culture was negative.    DISCHARGE INSTRUCTIONS:  Discharge to home.    DIET:  Cessation of alcohol.    DISCHARGE MEDICATIONS:  1.  Cipro 500 b.i.d. for additional 4 days.  2.  Flagyl 500 q. 8 hours for additional 4 days.  3.  Tramadol 50 mg q. 4 hours p.r.n. moderate pain.  4.  Prilosec 20 mg b.i.d.  5.  Potassium 40 mg daily for 3 days, then decrease to 20 mg daily as   prescribed.  6.  Iron sulfate 325 t.i.d.  7.  Lasix 20 daily.  8.  Trental 400 t.i.d.  9.  Aldactone 50 mg daily.    FOLLOWUP:  Follow up with [facility] or primary care provider in   approximately 2 weeks.    Total discharge time approximately 45 minutes.       ____________________________________     NATALYA MURILLO MD    QBV / NTS    DD:  04/17/2017 19:39:22  DT:  04/18/2017 08:08:59    D#:  [id number]  Job#:  [id number]

## 2017-05-03 NOTE — ED PROVIDER NOTES
ED Provider Note    CHIEF COMPLAINT  Altered mental status    HPI  Nury Thornton is a 36 y.o. female who presents by EMS for evaluation of altered mental status.  According to paramedics patient was walking home with her boyfriend after imbibing large amounts of alcohol and became too weak to proceed.  Her boyfriend called 911 and the patient was brought in for evaluation.  The patient admits to imbibing alcohol.  The patient has been seen multiple times in the past related to alcohol abuse and alcohol related complications.  The patient's had no recent: Fever, cough, vomiting, hematemesis, melena, hematochezia, genitourinary symptoms.  No other acute symptomatology or complaints.    REVIEW OF SYSTEMS  See HPI for further details.  No history of: Diabetes, thyroid dysfunction, cancer.  All other systems negative.    PAST MEDICAL HISTORY  Past Medical History   Diagnosis Date   • Depression    • Psychiatric disorder    • Stroke (CMS-[facility])    • ASTHMA    • Pneumonia 2016   • Sepsis (CMS-[facility]) 2016   • HTN (hypertension) 3/14/2017    alcoholic cirrhosis    FAMILY HISTORY  Family History   Problem Relation Age of Onset   • Heart Disease Father    • Diabetes Mother        SOCIAL HISTORY  Positive tobacco use; positive alcohol abuse; positive polysubstance abuse;    SURGICAL HISTORY  Past Surgical History   Procedure Laterality Date   • Tubal ligation     • Other orthopedic surgery           • Other       LT ANKLE   • Ankle orif  10/4/2009     Performed by MAZIN HUNT at SURGERY [facility]   • Gyn surgery        x3    • Krzysztof by laparoscopy N/A 3/10/2017     Procedure: KRZYSZTOF BY LAPAROSCOPY POSS OPEN;  Surgeon: eMjia Colunga M.D.;  Location: SURGERY [facility];  Service:        CURRENT MEDICATIONS  See nurses notes    ALLERGIES  Allergies   Allergen Reactions   • Nkda [No Known Drug Allergy]        PHYSICAL EXAM  VITAL SIGNS: see nurse's notes  Constitutional: A 36-year-old  female, slurred speech, slow to respond, but does follow commands  HENT: Normocephalic, Atraumatic, Nares:Clear, Oropharynx: Mildly dry, posterior pharynx:clear   Eyes: PERRL, EOMI, Conjunctiva normal, No discharge.   Neck: Normal range of motion, No tenderness, Supple, No stridor.   Lymphatic: No lymphadenopathy noted.   Cardiovascular: Regular rate and rhythm without mumurs, gallups, rubs   Thorax & Lungs: Normal Equal breath sounds, No respiratory distress, No wheezing, no stridor, no rales. No chest tenderness.   Abdomen: Soft, nontender, nondistended, no organomegaly, positive bowel sounds normal in quality. No guarding or rebound.  Skin: Mildly decreased skin turgor, pink, warm, dry. No rashes, petechiae, purpura. Normal capillary refill.   Back: No tenderness, No CVA tenderness.   Extremities: Intact distal pulses, No edema, No tenderness, No cyanosis,  Vascular: Pulses are 2+, symmetric in the upper and lower extremities.  Musculoskeletal: Mild arthritic changes. No tenderness to palpation or major deformities noted.   Neurologic: Slurred speech, slow to respond, will follow commands and move all extremities to request;    RADIOLOGY/PROCEDURES  CT-HEAD W/O   Final Result         1. No acute intracranial abnormality. No evidence of acute intracranial hemorrhage or mass lesion.                   COURSE & MEDICAL DECISION MAKING  Pertinent Labs & Imaging studies reviewed. (See chart for details)  1.  Monitor  2.  IV: Detox    Laboratory studies: CBC shows white count 7.4, 55% neutrophils, 32% lymphocytes, 8% monocytes, hemoglobin 9.4, hematocrit 28.2, , platelet count 85,000; beta hCG is negative; lipase 55; CMP shows potassium at 3.5, glucose 105, AST 57, alkaline phosphatase 126, bilirubin 5.2, albumin 3.1, protein 8.6, globulin 5.5, otherwise within normal limits;    Discussion: At this time, the patient presents for evaluation of altered mental status.  The patient is acutely intoxicated.  The  patient initially had gotten up and ripped out her IV and was demanding to leave.  The patient went outside and then promptly fell back hitting the back of her head.  The patient was brought back and was reevaluated.  CT scanning was performed which showed no evidence of any acute fractures.  The patient blood pressure was low but she was given IV fluids with improvement in her blood pressure.  At this time, the patient states that she would like to go to [facility] for assistance with her alcohol abuse.  She was evaluated by the Alert Team counselor.  They said that they would accept her as they've treated her in the past.  At this time, the patient has findings consistent with her chronic alcohol abuse.  However, I do not see any acute conditions requiring emergent hospitalization.  We'll wait for the patient to sober up to the point where she has stable for outpatient treatment and assistance with her alcohol abuse at [facility].    FINAL IMPRESSION  1. Altered mental status, unspecified altered mental status type    2. Alcohol intoxication, with unspecified complication    3. Hepatic cirrhosis, unspecified hepatic cirrhosis type (CMS-HCC)    4. Macrocytic anemia           PLAN  .  The patient will be observed until she is stable for discharge for treatment at [facility]    Electronically signed by: Guy G Gansert, 5/3/2017 7:46 AM

## 2017-05-03 NOTE — ED NOTES
[facility] bedside at this time to be taken to facility.  Patient states she does not want to go.  Attempted to convince patient by this RN, [employer] and [facility] employee but patient refuses to go.  She states that she isnt ready to go yet and that she will not go home and drink.  Patient has steady gait at this time.  Dr. Mancilla states okay to d/c

## 2017-05-03 NOTE — ED NOTES
Patient found outside of ED on the ground after she tripped on a rock and fell.  Patient found lying on the ground asleep.  Patient lifted onto gurney and wheeled back to rm 36.  Patient is A&Ox4 at this time.  Is tearful about child she keeps speaking about.  Patient states she is willing to go to [facility].  Patient easily goes to sleep but is arousable.

## 2017-05-03 NOTE — ED NOTES
Patient up to restroom with 2 assist of this rn and .  Patient unable to stand or move independently.  Patient voided and was escorted back to bed.

## 2017-05-03 NOTE — ED AVS SNAPSHOT
[employer] Access Code: [email or web address]  Expires: 5/17/2017  2:22 PM    Your email address is not on file at [facility].  Email Addresses are required for you to sign up for [employer], please contact [phone] to verify your personal information and to provide your email address prior to attempting to register for [employer].    Nury Thornton  [address] [place] [address]    [employer]  A secure, online tool to manage your health information     [facility]’s [employer]® is a secure, online tool that connects you to your personalized health information from the privacy of your home -- day or night - making it very easy for you to manage your healthcare. Once the activation process is completed, you can even access your medical information using the [employer] jeanette, which is available for free in the Apple Jeanette store or Google Play store.     To learn more about [employer], visit [email or web address]    There are two levels of access available (as shown below):   My Chart Features  [facility] Primary Care Doctor [facility]  Specialists [facility]  Urgent  Care Non-[facility] Primary Care Doctor   Email your healthcare team securely and privately 24/7 X X X    Manage appointments: schedule your next appointment; view details of past/upcoming appointments X      Request prescription refills. X      View recent personal medical records, including lab and immunizations X X X X   View health record, including health history, allergies, medications X X X X   Read reports about your outpatient visits, procedures, consult and ER notes X X X X   See your discharge summary, which is a recap of your hospital and/or ER visit that includes your diagnosis, lab results, and care plan X X  X     How to register for [employer]:  Once your e-mail address has been verified, follow the following steps to sign up for [employer].     1. Go to  [email or web address]  2. Click on the Sign Up Now box, which takes you to the New Member Sign Up page. You will need to  provide the following information:  a. Enter your [employer] Access Code exactly as it appears at the top of this page. (You will not need to use this code after you’ve completed the sign-up process. If you do not sign up before the expiration date, you must request a new code.)   b. Enter your date of birth.   c. Enter your home email address.   d. Click Submit, and follow the next screen’s instructions.  3. Create a [employer] ID. This will be your [employer] login ID and cannot be changed, so think of one that is secure and easy to remember.  4. Create a [employer] password. You can change your password at any time.  5. Enter your Password Reset Question and Answer. This can be used at a later time if you forget your password.   6. Enter your e-mail address. This allows you to receive e-mail notifications when new information is available in [employer].  7. Click Sign Up. You can now view your health information.    For assistance activating your [employer] account, call [phone]

## 2017-05-03 NOTE — DISCHARGE INSTRUCTIONS
"Alcohol Intoxication  Alcohol intoxication occurs when the amount of alcohol that a person has consumed impairs his or her ability to mentally and physically function. Alcohol directly impairs the normal chemical activity of the brain. Drinking large amounts of alcohol can lead to changes in mental function and behavior, and it can cause many physical effects that can be harmful.   Alcohol intoxication can range in severity from mild to very severe. Various factors can affect the level of intoxication that occurs, such as the person's age, gender, weight, frequency of alcohol consumption, and the presence of other medical conditions (such as diabetes, seizures, or heart conditions). Dangerous levels of alcohol intoxication may occur when people drink large amounts of alcohol in a short period (binge drinking). Alcohol can also be especially dangerous when combined with certain prescription medicines or \"recreational\" drugs.  SIGNS AND SYMPTOMS  Some common signs and symptoms of mild alcohol intoxication include:  · Loss of coordination.  · Changes in mood and behavior.  · Impaired judgment.  · Slurred speech.  As alcohol intoxication progresses to more severe levels, other signs and symptoms will appear. These may include:  · Vomiting.  · Confusion and impaired memory.  · Slowed breathing.  · Seizures.  · Loss of consciousness.  DIAGNOSIS   Your health care provider will take a medical history and perform a physical exam. You will be asked about the amount and type of alcohol you have consumed. Blood tests will be done to measure the concentration of alcohol in your blood. In many places, your blood alcohol level must be lower than 80 mg/dL (0.08%) to legally drive. However, many dangerous effects of alcohol can occur at much lower levels.   TREATMENT   People with alcohol intoxication often do not require treatment. Most of the effects of alcohol intoxication are temporary, and they go away as the alcohol naturally " leaves the body. Your health care provider will monitor your condition until you are stable enough to go home. Fluids are sometimes given through an IV access tube to help prevent dehydration.   HOME CARE INSTRUCTIONS  · Do not drive after drinking alcohol.  · Stay hydrated. Drink enough water and fluids to keep your urine clear or pale yellow. Avoid caffeine.    · Only take over-the-counter or prescription medicines as directed by your health care provider.    SEEK MEDICAL CARE IF:   · You have persistent vomiting.    · You do not feel better after a few days.  · You have frequent alcohol intoxication. Your health care provider can help determine if you should see a substance use treatment counselor.  SEEK IMMEDIATE MEDICAL CARE IF:   · You become shaky or tremble when you try to stop drinking.    · You shake uncontrollably (seizure).    · You throw up (vomit) blood. This may be bright red or may look like black coffee grounds.    · You have blood in your stool. This may be bright red or may appear as a black, tarry, bad smelling stool.    · You become lightheaded or faint.    MAKE SURE YOU:   · Understand these instructions.  · Will watch your condition.  · Will get help right away if you are not doing well or get worse.     This information is not intended to replace advice given to you by your health care provider. Make sure you discuss any questions you have with your health care provider.     Document Released: 09/27/2006 Document Revised: 08/20/2014 Document Reviewed: 05/23/2014  [employer] Interactive Patient Education ©2016 [employer]

## 2017-05-03 NOTE — ED AVS SNAPSHOT
Home Care Instructions                                                                                                                Nury Thornton   MRN: [id number]    Department:  [facility], Emergency Dept   Date of Visit:  5/3/2017            [facility], Emergency Dept    [address] [facility] [address]    Phone:  [phone]      You were seen by     1. Guy G Gansert, M.D.    2. Donald Mancilla M.D.      Your Diagnosis Was     Altered mental status, unspecified altered mental status type     R41.82       These are the medications you received during your hospitalization from 05/03/2017 0734 to 05/03/2017 1450     Date/Time Order Dose Route Action    05/03/2017 0809 er detox iv 1000 mL (D5LR + thiamine 100 mg + folic acid 1 mg + magnesium 1 g) infusion 1,000 mL Intravenous New Bag    05/03/2017 1200 NS infusion 1,000 mL 1,000 mL Intravenous New Bag    05/03/2017 1245 NS infusion 1,000 mL 1,000 mL Intravenous New Bag      Follow-up Information     1. Follow up with [facility].    Why:  1.  Avoid alcohol; 2.  Follow-up with primary care;    Contact information    [address] [place] [id number] [phone]      Medication Information     Review all of your home medications and newly ordered medications with your primary doctor and/or pharmacist as soon as possible. Follow medication instructions as directed by your doctor and/or pharmacist.     Please keep your complete medication list with you and share with your physician. Update the information when medications are discontinued, doses are changed, or new medications (including over-the-counter products) are added; and carry medication information at all times in the event of emergency situations.               Medication List      ASK your doctor about these medications        Instructions    Morning Afternoon Evening Bedtime    ferrous gluconate 324 (38 FE) MG Tabs   Commonly known as:  FERGON           Take 324 mg by mouth 3 times a day, with meals.   Dose:  324 mg                        furosemide 20 MG Tabs   Commonly known as:  LASIX        Take 20 mg by mouth every day.   Dose:  20 mg                        * omeprazole 20 MG delayed-release capsule   Commonly known as:  PRILOSEC        Take 1 Cap by mouth 2 times a day.   Dose:  20 mg                        * omeprazole 20 MG delayed-release capsule   Commonly known as:  PRILOSEC        Take 1 Cap by mouth every 12 hours.   Dose:  20 mg                        pentoxifylline  MG CR tablet   Commonly known as:  TRENTAL        Take 400 mg by mouth 3 times a day, with meals.   Dose:  400 mg                        potassium chloride SA 20 MEQ Tbcr   Commonly known as:  Kdur        Take 1 Tab by mouth every day. Initially take 40 meq daily x 3 days, then decrease to 20 meq dailly   Dose:  20 mEq                        spironolactone 50 MG Tabs   Commonly known as:  ALDACTONE        Take 50 mg by mouth every day.   Dose:  50 mg                        tramadol 50 MG Tabs   Commonly known as:  ULTRAM        Take 1 Tab by mouth every four hours as needed for Moderate Pain.   Dose:  50 mg                        * Notice:  This list has 2 medication(s) that are the same as other medications prescribed for you. Read the directions carefully, and ask your doctor or other care provider to review them with you.            Procedures and tests performed during your visit     APTT    CBC WITH DIFFERENTIAL    COMP METABOLIC PANEL    CT-HEAD W/O    DIAGNOSTIC ALCOHOL    ESTIMATED GFR    HCG Qual Serum    LIPASE    PROTHROMBIN TIME    SALINE LOCK        Discharge Instructions       Alcohol Intoxication  Alcohol intoxication occurs when the amount of alcohol that a person has consumed impairs his or her ability to mentally and physically function. Alcohol directly impairs the normal chemical activity of the brain. Drinking large amounts of alcohol can lead to changes in  "mental function and behavior, and it can cause many physical effects that can be harmful.   Alcohol intoxication can range in severity from mild to very severe. Various factors can affect the level of intoxication that occurs, such as the person's age, gender, weight, frequency of alcohol consumption, and the presence of other medical conditions (such as diabetes, seizures, or heart conditions). Dangerous levels of alcohol intoxication may occur when people drink large amounts of alcohol in a short period (binge drinking). Alcohol can also be especially dangerous when combined with certain prescription medicines or \"recreational\" drugs.  SIGNS AND SYMPTOMS  Some common signs and symptoms of mild alcohol intoxication include:  · Loss of coordination.  · Changes in mood and behavior.  · Impaired judgment.  · Slurred speech.  As alcohol intoxication progresses to more severe levels, other signs and symptoms will appear. These may include:  · Vomiting.  · Confusion and impaired memory.  · Slowed breathing.  · Seizures.  · Loss of consciousness.  DIAGNOSIS   Your health care provider will take a medical history and perform a physical exam. You will be asked about the amount and type of alcohol you have consumed. Blood tests will be done to measure the concentration of alcohol in your blood. In many places, your blood alcohol level must be lower than 80 mg/dL (0.08%) to legally drive. However, many dangerous effects of alcohol can occur at much lower levels.   TREATMENT   People with alcohol intoxication often do not require treatment. Most of the effects of alcohol intoxication are temporary, and they go away as the alcohol naturally leaves the body. Your health care provider will monitor your condition until you are stable enough to go home. Fluids are sometimes given through an IV access tube to help prevent dehydration.   HOME CARE INSTRUCTIONS  · Do not drive after drinking alcohol.  · Stay hydrated. Drink enough " water and fluids to keep your urine clear or pale yellow. Avoid caffeine.    · Only take over-the-counter or prescription medicines as directed by your health care provider.    SEEK MEDICAL CARE IF:   · You have persistent vomiting.    · You do not feel better after a few days.  · You have frequent alcohol intoxication. Your health care provider can help determine if you should see a substance use treatment counselor.  SEEK IMMEDIATE MEDICAL CARE IF:   · You become shaky or tremble when you try to stop drinking.    · You shake uncontrollably (seizure).    · You throw up (vomit) blood. This may be bright red or may look like black coffee grounds.    · You have blood in your stool. This may be bright red or may appear as a black, tarry, bad smelling stool.    · You become lightheaded or faint.    MAKE SURE YOU:   · Understand these instructions.  · Will watch your condition.  · Will get help right away if you are not doing well or get worse.     This information is not intended to replace advice given to you by your health care provider. Make sure you discuss any questions you have with your health care provider.     Document Released: 09/27/2006 Document Revised: 08/20/2014 Document Reviewed: 05/23/2014  [employer] Interactive Patient Education ©2016 [employer]            Patient Information     Patient Information    Following emergency treatment: all patient requiring follow-up care must return either to a private physician or a clinic if your condition worsens before you are able to obtain further medical attention, please return to the emergency room.     Billing Information    At [facility], we work to make the billing process streamlined for our patients.  Our Representatives are here to answer any questions you may have regarding your hospital bill.  If you have insurance coverage and have supplied your insurance information to us, we will submit a claim to your insurer on your behalf.  Should you have any  questions regarding your bill, we can be reached online or by phone as follows:  Online: You are able pay your bills online or live chat with our representatives about any billing questions you may have. We are here to help Monday - Friday from 8:00am to 7:30pm and 9:00am - 12:00pm on Saturdays.  Please visit [email or web address]  for more information.   Phone:  [phone] or [phone]    Please note that your emergency physician, surgeon, pathologist, radiologist, anesthesiologist, and other specialists are not employed by [facility] and will therefore bill separately for their services.  Please contact them directly for any questions concerning their bills at the numbers below:     Emergency Physician Services:  [phone]  [facility]:  [phone]  [facility]:  [phone]  [facility]:  [phone]    1. Your final bill may vary from the amount quoted upon discharge if all procedures are not complete at that time, or if your doctor has additional procedures of which we are not aware. You will receive an additional bill if you return to the Emergency Department at [facility] for suture removal regardless of the facility of which the sutures were placed.     2. Please arrange for settlement of this account at the emergency registration.    3. All self-pay accounts are due in full at the time of treatment.  If you are unable to meet this obligation then payment is expected within 4-5 days.     4. If you have had radiology studies (CT, X-ray, Ultrasound, MRI), you have received a preliminary result during your emergency department visit. Please contact the radiology department [phone] to receive a copy of your final result. Please discuss the Final result with your primary physician or with the follow up physician provided.     Crisis Hotline:  National Crisis Hotline:  7-653-BTATSJS or 1-173.471.5723  [facility]  Hotline:    1-650.894.1558 or 565-138-6093         ED Discharge Follow Up Questions    1. In order to provide you with very good care, we would like to follow up with a phone call in the next few days.  May we have your permission to contact you?     YES /  NO    2. What is the best phone number to call you? (       )_____-__________    3. What is the best time to call you?      Morning  /  Afternoon  /  Evening                   Patient Signature:  ____________________________________________________________    Date:  ____________________________________________________________
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
## 2017-05-03 NOTE — ED PROVIDER NOTES
ED Provider Note    I was informed by nursing staff that the patient has now decided not to go to [facility] and would rather go home. She understands that she has the option to go to [facility] by just giving them a call. She is able to ambulate under her own power and is competent to make decisions at this time and cannot be held against her will.    She is discharged in stable condition and encouraged to seek help for her alcoholism as she has a history of many emergency visits as a result    1. Altered mental status, unspecified altered mental status type    2. Alcohol intoxication, with unspecified complication    3. Hepatic cirrhosis, unspecified hepatic cirrhosis type (CMS-HCC)    4. Macrocytic anemia

## 2017-05-03 NOTE — ED NOTES
Given phone number to [facility] if she changes her mind.      Pt had agreed to go to [facility] but changed her mind when Liza from [facility] arrived to take her.

## 2017-05-03 NOTE — ED NOTES
Patient pulled out IV at this time and started screaming that she wants to leave and started walking towards exit on own.  Attempted to redirect patient multiple times.  Multiple times patient agreed to stay in room.  Patient given lunch box.  Patient continues to get up and rip off bandage over IV site and bleed everywhere in hallway.  Dr. Gansert bedside watching patient behavior.  States okay to let her leave.  Patient signed AMA form.  Patient states she will just go to [place]

## 2017-05-03 NOTE — ED AVS SNAPSHOT
5/3/2017    Nury Thornton  [address] [place] [address]    Dear Nury:    [facility] wants to ensure your discharge home is safe and you or your loved ones have had all of your questions answered regarding your care after you leave the hospital.    Below is a list of resources and contact information should you have any questions regarding your hospital stay, follow-up instructions, or active medical symptoms.    Questions or Concerns Regarding… Contact   Medical Questions Related to Your Discharge  (7 days a week, 8am-5pm) Contact a Nurse Care Coordinator   [phone]   Medical Questions Not Related to Your Discharge  (24 hours a day / 7 days a week)  Contact the Nurse Health Line   [phone]    Medications or Discharge Instructions Refer to your discharge packet   or contact your [facility] Primary Care Provider   [phone]   Follow-up Appointment(s) Schedule your appointment via [employer]   or contact Scheduling [phone]   Billing Review your statement via [employer]  or contact Billing [phone]   Medical Records Review your records via [employer]   or contact Medical Records [phone]     You may receive a telephone call within two days of discharge. This call is to make certain you understand your discharge instructions and have the opportunity to have any questions answered. You can also easily access your medical information, test results and upcoming appointments via the [employer] free online health management tool. You can learn more and sign up at [employer]. For assistance setting up your [employer] account, please call [phone].    Once again, we want to ensure your discharge home is safe and that you have a clear understanding of any next steps in your care. If you have any questions or concerns, please do not hesitate to contact us, we are here for you. Thank you for choosing [facility] for your healthcare needs.    Sincerely,    Your [facility] Healthcare Team

## 2017-05-03 NOTE — ED NOTES
Patient to rm 36 BIB by [facility] for alcohol intox.  Patient was walking home drunk with boyfriend and couldn't make it EMS states.  Pt was drowsy and difficult to arouse.  States she only drank, denies drug use.  Patient is known alcoholic and has liver cirrhosis.  Patient answers questions appropriately, has some slurred speech.  C/o generalized pain, requesting pain medicine.

## 2017-05-03 NOTE — ED NOTES
After patient wanders out of the room for the 4th time and is reoriented back, patient states she will stay if she can go to [facility].  Dr. Gansert notified of patient request.  Dr. Gansert states to d/c patient, that we are not going to deal with her while she omar up. Patient A&Ox4 at this time and can ambulate without assistance with a somewhat steady gait.   is with her.

## 2017-05-03 NOTE — ED NOTES
"[facility] bedside.  Patient states \"yes I do\" when asked if she wants to go to [facility].  "

## 2017-05-04 NOTE — PROGRESS NOTES
· Chart reviewed.  Phone number listed in Epic record is mother's phone number.  Unable to complete discharge outreach with pt d/t no contact phone number.

## 2017-05-17 NOTE — ED AVS SNAPSHOT
App Partner Access Code: Activation code not generated  Current App Partner Status: Patient Declined    Your email address is not on file at [facility].  Email Addresses are required for you to sign up for App Partner, please contact [phone] to verify your personal information and to provide your email address prior to attempting to register for App Partner.    Nury Thornton  [address] [place] [address]    [employer]  A secure, online tool to manage your health information     [facility]’s App Partner® is a secure, online tool that connects you to your personalized health information from the privacy of your home -- day or night - making it very easy for you to manage your healthcare. Once the activation process is completed, you can even access your medical information using the App Partner jeanette, which is available for free in the Apple Jeanette store or Google Play store.     To learn more about App Partner, visit [email or web address]    There are two levels of access available (as shown below):   My Chart Features  [facility] Primary Care Doctor [facility]  Specialists [facility]  Urgent  Care Non-[facility] Primary Care Doctor   Email your healthcare team securely and privately 24/7 X X X    Manage appointments: schedule your next appointment; view details of past/upcoming appointments X      Request prescription refills. X      View recent personal medical records, including lab and immunizations X X X X   View health record, including health history, allergies, medications X X X X   Read reports about your outpatient visits, procedures, consult and ER notes X X X X   See your discharge summary, which is a recap of your hospital and/or ER visit that includes your diagnosis, lab results, and care plan X X  X     How to register for [employer]:  Once your e-mail address has been verified, follow the following steps to sign up for [employer].     1. Go to  [email or web address]  2. Click on the Sign Up Now box, which takes you to the New Member Sign Up  page. You will need to provide the following information:  a. Enter your [employer] Access Code exactly as it appears at the top of this page. (You will not need to use this code after you’ve completed the sign-up process. If you do not sign up before the expiration date, you must request a new code.)   b. Enter your date of birth.   c. Enter your home email address.   d. Click Submit, and follow the next screen’s instructions.  3. Create a [employer] ID. This will be your [employer] login ID and cannot be changed, so think of one that is secure and easy to remember.  4. Create a [employer] password. You can change your password at any time.  5. Enter your Password Reset Question and Answer. This can be used at a later time if you forget your password.   6. Enter your e-mail address. This allows you to receive e-mail notifications when new information is available in [employer].  7. Click Sign Up. You can now view your health information.    For assistance activating your [employer] account, call [phone]

## 2017-05-17 NOTE — ED AVS SNAPSHOT
Home Care Instructions                                                                                                                Nury Thornton   MRN: [id number]    Department:  [facility], Emergency Dept   Date of Visit:  5/17/2017            [facility], Emergency Dept    [address]    [place] [address]    Phone:  [phone]      You were seen by     1. Armond Kasper M.D.    2. Bennie Posadas M.D.      Your Diagnosis Was     Flank pain     R10.9       These are the medications you received during your hospitalization from 05/17/2017 2216 to 05/18/2017 0348     Date/Time Order Dose Route Action    05/18/2017 0017 HYDROmorphone (DILAUDID) injection 1 mg 1 mg Intravenous Given    05/18/2017 0017 ondansetron (ZOFRAN) syringe/vial injection 4 mg 4 mg Intravenous Given      Follow-up Information     1. Schedule an appointment as soon as possible for a visit with [facility].    Contact information    [facility]  [place] [facility] [place] [facility] [place] [facility] [place] [address]  [phone]        Medication Information     Review all of your home medications and newly ordered medications with your primary doctor and/or pharmacist as soon as possible. Follow medication instructions as directed by your doctor and/or pharmacist.     Please keep your complete medication list with you and share with your physician. Update the information when medications are discontinued, doses are changed, or new medications (including over-the-counter products) are added; and carry medication information at all times in the event of emergency situations.               Medication List      ASK your doctor about these medications        Instructions    Morning Afternoon Evening Bedtime    ferrous gluconate 324 (38 FE) MG Tabs   Commonly known as:  FERGON        Take 324 mg by mouth 3 times a day, with meals.   Dose:  324 mg                        furosemide 20 MG Tabs   Commonly known as:  LASIX        Take 20 mg by mouth every day.    Dose:  20 mg                        * omeprazole 20 MG delayed-release capsule   Commonly known as:  PRILOSEC        Take 1 Cap by mouth 2 times a day.   Dose:  20 mg                        * omeprazole 20 MG delayed-release capsule   Commonly known as:  PRILOSEC        Take 1 Cap by mouth every 12 hours.   Dose:  20 mg                        pentoxifylline  MG CR tablet   Commonly known as:  TRENTAL        Take 400 mg by mouth 3 times a day, with meals.   Dose:  400 mg                        potassium chloride SA 20 MEQ Tbcr   Commonly known as:  Kdur        Take 1 Tab by mouth every day. Initially take 40 meq daily x 3 days, then decrease to 20 meq dailly   Dose:  20 mEq                        spironolactone 50 MG Tabs   Commonly known as:  ALDACTONE        Take 50 mg by mouth every day.   Dose:  50 mg                        tramadol 50 MG Tabs   Commonly known as:  ULTRAM        Take 1 Tab by mouth every four hours as needed for Moderate Pain.   Dose:  50 mg                        * Notice:  This list has 2 medication(s) that are the same as other medications prescribed for you. Read the directions carefully, and ask your doctor or other care provider to review them with you.            Procedures and tests performed during your visit     CBC WITH DIFFERENTIAL    COMP METABOLIC PANEL    DIAGNOSTIC ALCOHOL    DIFFERENTIAL MANUAL    ESTIMATED GFR    IMMATURE PLT FRACTION    LIPASE    MORPHOLOGY    PERIPHERAL SMEAR REVIEW    POC UA    URINE CULTURE (ADD ON)        Discharge Instructions       We could not find the cause of your pain. Your liver is more inflamed because of your excessive alcohol use. If you discontinue abusing alcohol U will not die in 6 months. If you continue to drink like this you may shorten your life greatly. Return for new fever, uncontrolled vomiting, severe dizziness, ill appearance. Follow-up with the [facility].          Patient Information     Patient Information    Following  emergency treatment: all patient requiring follow-up care must return either to a private physician or a clinic if your condition worsens before you are able to obtain further medical attention, please return to the emergency room.     Billing Information    At [facility], we work to make the billing process streamlined for our patients.  Our Representatives are here to answer any questions you may have regarding your hospital bill.  If you have insurance coverage and have supplied your insurance information to us, we will submit a claim to your insurer on your behalf.  Should you have any questions regarding your bill, we can be reached online or by phone as follows:  Online: You are able pay your bills online or live chat with our representatives about any billing questions you may have. We are here to help Monday - Friday from 8:00am to 7:30pm and 9:00am - 12:00pm on Saturdays.  Please visit [email or web address]  for more information.   Phone:  [phone] or [phone]    Please note that your emergency physician, surgeon, pathologist, radiologist, anesthesiologist, and other specialists are not employed by [facility] and will therefore bill separately for their services.  Please contact them directly for any questions concerning their bills at the numbers below:     Emergency Physician Services:  [phone]  [facility]:  [phone]  [facility]:  [phone]  [facility]:  [phone]    1. Your final bill may vary from the amount quoted upon discharge if all procedures are not complete at that time, or if your doctor has additional procedures of which we are not aware. You will receive an additional bill if you return to the Emergency Department at [facility] for suture removal regardless of the facility of which the sutures were placed.     2. Please arrange for settlement of this account at the emergency  registration.    3. All self-pay accounts are due in full at the time of treatment.  If you are unable to meet this obligation then payment is expected within 4-5 days.     4. If you have had radiology studies (CT, X-ray, Ultrasound, MRI), you have received a preliminary result during your emergency department visit. Please contact the radiology department [phone] to receive a copy of your final result. Please discuss the Final result with your primary physician or with the follow up physician provided.     Crisis Hotline:  [place] Crisis Hotline:  0-399-CIGPSWR or 1-985.519.3817  [place] Crisis Hotline:    1-228.201.2427 or 280-338-0682         ED Discharge Follow Up Questions    1. In order to provide you with very good care, we would like to follow up with a phone call in the next few days.  May we have your permission to contact you?     YES /  NO    2. What is the best phone number to call you? (       )_____-__________    3. What is the best time to call you?      Morning  /  Afternoon  /  Evening                   Patient Signature:  ____________________________________________________________    Date:  ____________________________________________________________

## 2017-05-17 NOTE — ED AVS SNAPSHOT
5/18/2017    Nury Thornton  [address] [place] [address]    Dear Nury:    Critical access hospital wants to ensure your discharge home is safe and you or your loved ones have had all of your questions answered regarding your care after you leave the hospital.    Below is a list of resources and contact information should you have any questions regarding your hospital stay, follow-up instructions, or active medical symptoms.    Questions or Concerns Regarding… Contact   Medical Questions Related to Your Discharge  (7 days a week, 8am-5pm) Contact a Nurse Care Coordinator   [phone]   Medical Questions Not Related to Your Discharge  (24 hours a day / 7 days a week)  Contact the Nurse Health Line   [phone]    Medications or Discharge Instructions Refer to your discharge packet   or contact your [facility] Primary Care Provider   [phone]   Follow-up Appointment(s) Schedule your appointment via [employer]   or contact Scheduling [phone]   Billing Review your statement via [employer]  or contact Billing [phone]   Medical Records Review your records via [employer]   or contact Medical Records [phone]     You may receive a telephone call within two days of discharge. This call is to make certain you understand your discharge instructions and have the opportunity to have any questions answered. You can also easily access your medical information, test results and upcoming appointments via the [employer] free online health management tool. You can learn more and sign up at [employer]. For assistance setting up your [employer] account, please call [phone].    Once again, we want to ensure your discharge home is safe and that you have a clear understanding of any next steps in your care. If you have any questions or concerns, please do not hesitate to contact us, we are here for you. Thank you for choosing [facility] for your healthcare needs.    Sincerely,    Your [facility] Healthcare Team

## 2017-05-18 NOTE — ED PROVIDER NOTES
"ED Provider Note    Scribed for Armond Kasper M.D. by Andressa Cardona. 5/17/2017  11:39 PM    Primary care provider: Pcp Pt States None  Means of arrival: Ezekielvitaliy   History obtained from: Patient   History limited by: None     CHIEF COMPLAINT  Chief Complaint   Patient presents with   • Back Pain     pt states \"my kidneys are hurting\" since yesterday pain with urination, pt hx of cirrhosis    • Painful Urination     dark urine pt states possible blood        HPI  Nury Thornton is a 36 y.o. female who presents with severe bilateral back pain onset earlier today. Her pain is rated as a 10/10 in severity. Patient states her \"kidneys are hurting.\" She reports abdominal pain for the past 7 months secondary to history of cirrhosis. She notes prior cholecystectomy and 3 c-sections. Patient also reports associated fever, dysuria, urinary frequency, nausea, vomiting, and diarrhea. She reports vomiting each time she wakes up in the morning. Patient has a history of alcoholism. She states she quit drinking alcohol for 3 months, but she began drinking again on 4/30/2017 after being told she \"only had six months to live.\" She also notes history of anemia. Patient denies constipation.     REVIEW OF SYSTEMS  Pertinent positives include: bilateral back pain, abdominal pain, fever, dysuria, urinary frequency, nausea, vomiting, diarrhea.  Pertinent negatives include: constipation.  10+ systems reviewed and negative.  C     PAST MEDICAL HISTORY  Past Medical History   Diagnosis Date   • Depression    • Psychiatric disorder    • Stroke (CMS-HCC)    • ASTHMA    • Pneumonia 11/18/2016   • Sepsis (CMS-HCC) 11/18/2016   • HTN (hypertension) 3/14/2017   • Cirrhosis (CMS-HCC)        FAMILY HISTORY  Family History   Problem Relation Age of Onset   • Heart Disease Father    • Diabetes Mother        SOCIAL HISTORY  Social History   Substance Use Topics   • Smoking status: Current Some Day Smoker -- 0.25 packs/day     Types: Cigarettes   • Smokeless " "tobacco: Never Used   • Alcohol Use: Yes     History   Drug Use No     Comment: Hx of meth and marijuana       SURGICAL HISTORY  Past Surgical History   Procedure Laterality Date   • Tubal ligation     • Other orthopedic surgery           • Other       LT ANKLE   • Ankle orif  10/4/2009     Performed by MAZIN HUNT at SURGERY [facility]   • Gyn surgery        x3    • Krzysztof by laparoscopy N/A 3/10/2017     Procedure: KRZYSZTOF BY LAPAROSCOPY POSS OPEN;  Surgeon: Mejia Colunga M.D.;  Location: SURGERY [facility];  Service:        CURRENT MEDICATIONS  No current facility-administered medications on file prior to encounter.     Current Outpatient Prescriptions on File Prior to Encounter   Medication Sig Dispense Refill   • potassium chloride SA (KDUR) 20 MEQ Tab CR Take 1 Tab by mouth every day. Initially take 40 meq daily x 3 days, then decrease to 20 meq dailly 60 Tab 2   • omeprazole (PRILOSEC) 20 MG delayed-release capsule Take 1 Cap by mouth every 12 hours. 30 Cap 0   • tramadol (ULTRAM) 50 MG Tab Take 1 Tab by mouth every four hours as needed for Moderate Pain. 20 Tab 0   • pentoxifylline CR (TRENTAL) 400 MG CR tablet Take 400 mg by mouth 3 times a day, with meals.     • furosemide (LASIX) 20 MG Tab Take 20 mg by mouth every day.     • spironolactone (ALDACTONE) 50 MG Tab Take 50 mg by mouth every day.     • omeprazole (PRILOSEC) 20 MG delayed-release capsule Take 1 Cap by mouth 2 times a day. 30 Cap 0   • ferrous gluconate (FERGON) 324 (38 FE) MG Tab Take 324 mg by mouth 3 times a day, with meals.         ALLERGIES  Allergies   Allergen Reactions   • Nkda [No Known Drug Allergy]        PHYSICAL EXAM  VITAL SIGNS: /97 mmHg  Pulse 115  Temp(Src) 36.4 °C (97.5 °F)  Resp 18  Ht 1.702 m (5' 7\")  Wt 77.111 kg (170 lb)  BMI 26.62 kg/m2  SpO2 95%  Reviewed and hypertensive, tachycardic  Constitutional: Well developed, Well nourished.  HENT: Normocephalic, atraumatic, " bilateral external ears normal, oropharynx moist, No exudates or erythema. Left cheek and periorbital bruise. No bony tenderness.   Eyes: PERRLA, conjunctiva pink, no scleral icterus.   Cardiovascular: Regular rate and rhythm. No murmurs, rubs or gallops.   Respiratory: Lungs clear to auscultation bilaterally. No wheezes, rales, or rhonchi.   Abdomen: Abdomen soft, non distended. Active bowel sounds. Bilateral upper quadrant abdominal pain.   Back: No spinal tenderness.   Skin: No erythema, no rash.   Genitourinary: No costovertebral angle tenderness.   Neurologic: Alert & oriented x 3, cranial nerves 2-12 intact by passive exam.  No focal deficit noted.  Psychiatric: Intoxicated     DIFFERENTIAL DIAGNOSIS:  Pyelonephritis, renal colic, chronic pain, alcohol intoxication, cirrhosis, pancreatitis.    LABORATORY:  Results for orders placed or performed during the hospital encounter of 05/17/17   CBC WITH DIFFERENTIAL   Result Value Ref Range    WBC 6.9 4.8 - 10.8 K/uL    RBC 2.18 (L) 4.20 - 5.40 M/uL    Hemoglobin 7.8 (L) 12.0 - 16.0 g/dL    Hematocrit 23.9 (L) 37.0 - 47.0 %    .6 (H) 81.4 - 97.8 fL    MCH 35.8 (H) 27.0 - 33.0 pg    MCHC 32.6 (L) 33.6 - 35.0 g/dL    RDW 71.2 (H) 35.9 - 50.0 fL    Platelet Count 52 (L) 164 - 446 K/uL    MPV 12.2 9.0 - 12.9 fL    Nucleated RBC 0.30 /100 WBC    NRBC (Absolute) 0.02 K/uL    Neutrophils-Polys 60.00 44.00 - 72.00 %    Lymphocytes 25.20 22.00 - 41.00 %    Monocytes 9.60 0.00 - 13.40 %    Eosinophils 1.70 0.00 - 6.90 %    Basophils 3.50 (H) 0.00 - 1.80 %    Neutrophils (Absolute) 4.14 2.00 - 7.15 K/uL    Lymphs (Absolute) 1.74 1.00 - 4.80 K/uL    Monos (Absolute) 0.66 0.00 - 0.85 K/uL    Eos (Absolute) 0.12 0.00 - 0.51 K/uL    Baso (Absolute) 0.24 (H) 0.00 - 0.12 K/uL    Anisocytosis 1+     Macrocytosis 1+    COMP METABOLIC PANEL   Result Value Ref Range    Sodium 139 135 - 145 mmol/L    Potassium 3.5 (L) 3.6 - 5.5 mmol/L    Chloride 103 96 - 112 mmol/L    Co2 24 20 -  33 mmol/L    Anion Gap 12.0 (H) 0.0 - 11.9    Glucose 98 65 - 99 mg/dL    Bun 3 (L) 8 - 22 mg/dL    Creatinine 0.45 (L) 0.50 - 1.40 mg/dL    Calcium 8.0 (L) 8.5 - 10.5 mg/dL    AST(SGOT) 121 (H) 12 - 45 U/L    ALT(SGPT) 32 2 - 50 U/L    Alkaline Phosphatase 138 (H) 30 - 99 U/L    Total Bilirubin 8.1 (H) 0.1 - 1.5 mg/dL    Albumin 3.2 3.2 - 4.9 g/dL    Total Protein 8.5 (H) 6.0 - 8.2 g/dL    Globulin 5.3 (H) 1.9 - 3.5 g/dL    A-G Ratio 0.6 g/dL   LIPASE   Result Value Ref Range    Lipase 75 11 - 82 U/L   DIAGNOSTIC ALCOHOL   Result Value Ref Range    Diagnostic Alcohol 0.39 (H) 0.00 g/dL   POC UA   Result Value Ref Range    POC Color Yellow     POC Appearance Clear     POC Glucose Negative Negative mg/dL    POC Ketones Negative Negative mg/dL    POC Specific Gravity >=1.030 (A) 1.005-1.030    POC Blood Large (A) Negative    POC Urine PH 6.0 5.0-8.0    POC Protein Trace (A) Negative mg/dL    POC Nitrites Negative Negative    POC Leukocyte Esterase Negative Negative         INTERVENTIONS:  Medications   ondansetron (ZOFRAN) syringe/vial injection 4 mg (4 mg Intravenous Given 5/18/17 0017)   HYDROmorphone (DILAUDID) injection 1 mg (1 mg Intravenous Given 5/18/17 0017)     Response: Deep somnolence.    COURSE & MEDICAL DECISION MAKING    11:39 PM - Patient seen and examined at bedside. Patient will be treated with Dilaudid 1 mg IV and Zofran 4 mg IV for her symptoms. Ordered CBC with differential, CMP, lipase, POC urinalysis, urinalysis, and diagnostic alcohol to evaluate.     11:49 PM Review of past medical records from 05/03/2017 shows patient is baseline anemic at 9.4; platelets 85; CMP unremarkable with only minor abnormalities. Abdominal CT in mid April showed enlarged liver and splenomegaly.     Initially ill-appearing patient presents complaining of new flank pain, chronic abdominal pain, cirrhosis and 2 weeks of binge alcohol use. She has hematuria but no signs of UTI. Based on CT abdomen and pelvis reviewed from  April there are no kidney stones. Patient has increased liver enzymes due to her alcohol use. She has anemia which is slightly worse and baseline. She has persistent and worse thrombocytopenia. At this point to see no indication for repeat CT scan..    PLAN:  Observe in ED until can walk without signs of intoxication  Discontinue alcohol use  Return for pain and fever, uncontrolled vomiting, dizziness, ill appearance    [place] [id number]  [place] [address]  [phone]    Schedule an appointment as soon as possible for a visit      CONDITION: fair but currently stable.    FINAL IMPRESSION  1. Flank pain    2. Chronic abdominal pain    3. Alcoholic cirrhosis of liver with ascites (CMS-HCC)    4. Acute alcohol intoxication, uncomplicated          Electronically signed by: Andressa Cardona, 5/17/2017 11:39 PM    The note accurately reflects work and decisions made by me.  Armond Kasper  5/18/2017  1:22 AM    Electronically signed by: Armond Kasper, 5/18/2017 1:22 AM

## 2017-05-18 NOTE — ED NOTES
Pt to [facility] via wheelchair.  Agree with triage assessment.  Pt changed into gown.  Chart up for ERP.

## 2017-05-18 NOTE — ED NOTES
Patient reports she has been diagnosed with liver failure and reportedly has 6 less than months to live.  Patient reports generalized pain since last October; severe onset of bilateral flank pain today.

## 2017-05-18 NOTE — ED NOTES
Nury Thornton discharged via ambulation with .  Discharge instructions given and reviewed, patient educated to follow up with [facility], verbalized understanding.  All personal belongings in possession.  No questions at this time.

## 2017-05-18 NOTE — DISCHARGE INSTRUCTIONS
We could not find the cause of your pain. Your liver is more inflamed because of your excessive alcohol use. If you discontinue abusing alcohol U will not die in 6 months. If you continue to drink like this you may shorten your life greatly. Return for new fever, uncontrolled vomiting, severe dizziness, ill appearance. Follow-up with the [facility].

## 2017-05-18 NOTE — ED NOTES
".  Chief Complaint   Patient presents with   • Back Pain     pt states \"my kidneys are hurting\" since yesterday pain with urination, pt hx of cirrhosis    • Painful Urination     dark urine pt states possible blood      .Pt Informed regarding triage process and verbalized understanding to inform triage tech or RN for any changes in condition.  Placed in lobby.      "

## 2017-08-28 PROBLEM — D50.0 ANEMIA DUE TO GASTROINTESTINAL BLOOD LOSS: Status: ACTIVE | Noted: 2017-01-01

## 2017-08-28 NOTE — ED NOTES
"Med rec complete per Pt at bedside  Pt has not had in medication in \"at least a week\"  Pt states she \"lost it\"  Allergies reviewed  No ABX In last 30 days  "

## 2017-08-28 NOTE — ED NOTES
Ambulated to bathroom, c/o loose stool. Pt reports not being able to get urine sample. Returned to room placed back on monitor. Pt c/o pain 7/10 to abdomen.
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
## 2017-08-28 NOTE — PROCEDURES
Consent for operation or procedure: Risks and benefits discussed with patient and consent obtained    Anesthesia: 3 cc of Lidocaine    Patient positioned supine. Abdomen examined with ultrasound for appropriate site placement. Site marked and prepared with 3 swabs of betadine. Site draped with sterile dressing. Wheal of lidocaine placed. Lidocaine then introduced deep to the peritoneum. Skin punctured with an 11 blade scalpel. Paracentesis needle was placed through the skin into the abdominal cavity.  30mL of slightly turbid tan-brown fluid were quickly collected. The needle was withdrawn and the site cleaned and bandaged. Fluid was walked to the laboratory for analysis.    Estimated Blood Loss: minimal     Complications:  The patient tolerated the procedure well without complications.

## 2017-08-28 NOTE — ED NOTES
Medicated for fever with one time dose of tylenol. Pt medicated for abdominal pain. Discussed initiating MARCELA oliveiraal on pt and Dr. Galeas aware of pt etoh.
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
## 2017-08-28 NOTE — ED NOTES
Sandie from Lab called with critical result of h/h 5.3/15.1. Critical lab result read back.  Dr. Nayak notified of critical lab result at 1045.  Critical lab result read back.
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
## 2017-08-29 PROBLEM — K65.2 SPONTANEOUS BACTERIAL PERITONITIS (HCC): Status: ACTIVE | Noted: 2017-01-01

## 2017-08-29 PROBLEM — K70.11 ALCOHOLIC HEPATITIS WITH ASCITES: Status: ACTIVE | Noted: 2017-01-01

## 2017-08-29 PROBLEM — F10.939 ALCOHOL WITHDRAWAL (HCC): Status: ACTIVE | Noted: 2017-01-01

## 2017-08-29 PROBLEM — N17.9 ACUTE KIDNEY INJURY (HCC): Status: ACTIVE | Noted: 2017-01-01
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
## 2017-08-29 NOTE — DIETARY
Nutrition Services: Consult poor PO intake    Pt is a 36 y.o. Female with Dx: Anemia due to gastrointestinal blood loss    PMH: Alcoholic cirrhosis, hepatitis, smoker  BMI= 28.45  Labs and meds reviewed  ADLs and Wound tab reviewed    Admit day 1. Pt on a hepatic (2-gram sodium) diet. Pt states taking a few bites @ meals today. Discussed pt's food preferences and added snacks. Pt likely to benefit from high-protein nutrition supplements as well. Nutrition Representative seeing pt daily for menu choices.     RD will monitor.   Please place order for Boost Plus.

## 2017-08-29 NOTE — CONSULTS
DATE OF SERVICE:  08/29/2017    REQUESTING PHYSICIAN:  Luis Murray MD from the [facility] Internal Medicine Gold   Team.    REASON FOR CONSULTATION:  Acute kidney injury.    The patient seen and examined, medical records were reviewed.  Unfortunately,   the patient is a poor historian because she is very lethargic.  She is   arousable, but very lethargic.  Most of the story was reviewing the record and   discussing the case with Dr. Murray.    HISTORY OF PRESENT ILLNESS:  Briefly, the patient is an unfortunate   36-year-old lady with a past medical history significant for history of   alcohol abuse, history of liver cirrhosis, presented to the emergency room   with increasing abdominal pain.  Patient was eventually diagnosed with   spontaneous bacterial peritonitis.  The patient has been admitted to the   intensive care unit.  GI has been consulted and she has been given some IV   fluid; however, her hospitalization has been complicated by acute kidney   injury with a creatinine up to 1.9.  Her creatinine baseline about 0.45 back   in May 2017.  The patient's abdomen is distended.  She did have diagnostic   paracentesis on admission.    Patient has no recent use of NSAIDs or IV contrast.    Patient has been severely anemic with hemoglobin around 5-6 despite blood   transfusion.    Past medical history, social history, family history, medication, review of   system, and allergies are unobtainable because of the patient's mental status.    Please refer to the chart.  I have reviewed the medical records.    PHYSICAL EXAMINATION:  GENERAL:  Patient is very lethargic and in mild distress.  VITAL SIGNS:  Showed blood pressure of 128/69, heart rate was 85.  HEENT:  Normocephalic, atraumatic.  Her sclerae is icteric.  Patient is   jaundiced.  NECK:  No lymphadenopathy.  CHEST:  Normal.  LUNGS:  Clear to auscultation.  HEART:  S1, S2.  ABDOMEN:  Distended and mild tenderness.  EXTREMITIES:  There is +1 edema.  SKIN:  There is  jaundice.  NEUROLOGIC:  Patient is lethargic, but arousable.    LABORATORY DATA:  Recent labs were reviewed.  Also I did review the abdominal   ultrasound.  The right kidney measured at 11.5 cm without hydronephrosis.    ASSESSMENT AND PLAN:  1.  Acute kidney injury.  The etiology is not very clear, most likely is   hepatorenal syndrome versus acute tubular necrosis.  2.  Hyponatremia.  3.  Alcoholic liver cirrhosis.  4.  Severe anemia.  5.  Hypoalbuminemia.    PLAN:  1.  Unfortunately, the patient has a very poor prognosis because of her liver   disease and considering the possibility of hepatorenal syndrome unfortunately,   the patient will not be a good candidate for dialysis.  At this time, I would   recommend to continue aggressive hydration and we need to discuss in detail   with the GI service the prognosis of her liver disease.  If she has end-stage   liver disease without any good possibility of liver transplant, we might need   to consider palliative therapy.  1.  Renal dose all medications.  2.  Avoid nephrotoxins.  3.  Multisystem organ failure.  4.  Prognosis is poor.    Plan discussed in detail with Dr. Murray who I would like to thank for   consulting this very interesting case.       ____________________________________     FADI NAJJAR, MD FN / RACHELL    DD:  08/29/2017 14:24:00  DT:  08/29/2017 16:29:24    D#:  [id number]  Job#:  [id number]

## 2017-08-29 NOTE — ED NOTES
Dr. Galeas to bedside. Reassured pt and updated to poc. Awaiting lab results for room assignment. Pt remains tachy.

## 2017-08-29 NOTE — ASSESSMENT & PLAN NOTE
Complicated by severe sepsis and hepatorenal syndrome. Very poor prognosis, despite patient's young age. Patient lacks capacity secondary to mental status.  - Comfort care

## 2017-08-29 NOTE — PROGRESS NOTES
"Pulmonary Critical Care Progress Note      Date of Service: 8/29/2017    Chief Complaint: GIB     History of Present Illness: 36 y.o. Female with h/o EtOH use, 1-2 pints vodka daily, cirrhosis, asthma; presents to the ED today with complaints of increasing abdominal pain and distention over the past month. She reports associated nausea, weakness and fatigue. She denied hematemesis but has reported some bright red blood per rectum over the past 2 days. In the ED she does not have significant anemia with a hemoglobin of 5.3, acute kidney injury, coagulopathy/thrombocytopenia and markedly elevated liver transaminases. Paracentesis was obtained in the ED consistent with spontaneous bacterial peritonitis. She has a fever of 102.4. She received IV antibiotics, no PMN, IV fluid and electrolyte replacement in the ED. Gastroenterology has been consulted.    ROS:  Respiratory: negative pleuritic chest pain and positive shortness of breath, Cardiac: negative chest pain and negative palpitations, GI: negative nausea and negative vomiting.  All other systems negative.    Interval Events:  24 hour interval history reviewed   Tm 102.4  +4.6L over last 24hr, +5L since admit  No cxr this am  Hb 5.5  Plat 62  K 3.2      Exam  Blood pressure (!) 95/45, pulse (!) 126, temperature 37.2 °C (98.9 °F), resp. rate (!) 26, height 1.702 m (5' 7\"), weight 82.4 kg (181 lb 10.5 oz), SpO2 95 %, not currently breastfeeding.  Gen: nad  Heent: nc, at, + scleral icterus  Cvs: tachy rate  Resp: diminished  Abdo: + dist, + ttp, no r/g  Ext: + edema  Neuro: non focal      PFSH:  No change.    Respiratory:     Pulse Oximetry: 95 %  Chest Tube Drains:          ImagingAvailable data reviewed         Invalid input(s): [id number]    HemoDynamics:  Pulse: (!) 126, Heart Rate (Monitored): (!) 113  Blood Pressure: (!) 95/45, NIBP: (!) 109/34         Imaging: Available data reviewed        Neuro:  GCS Total Mika Coma Score: 14         Imaging: Available " data reviewed    Fluids:  Intake/Output       08/27/17 0700 - 08/28/17 0659 (Not Admitted) 08/28/17 0700 - 08/29/17 0659 08/29/17 0700 - 08/30/17 0659      1860-1393 4873-7988 Total 0700-1859 1900-0659 Total 4480-1840 9446-7811 Total       Intake    P.O.  --  -- --  --  120 120  --  -- --    P.O. -- -- -- -- 120 120 -- -- --    I.V.  --  -- --  --  487.8 487.8  --  -- --    Magnesium Sulfate Volume -- -- -- -- 151.8 151.8 -- -- --    Rally bag -- -- -- -- 84 84 -- -- --    IV Volume (Rally bag) -- -- -- -- 252 252 -- -- --    Blood  --  -- --  3700  305 4005  350  -- 350    Volume (RELEASE RED BLOOD CELLS) -- -- -- 3700 -- 3700 -- -- --    Volume (RELEASE RED BLOOD CELLS) -- -- -- -- 105 105 -- -- --    Volume (RELEASE FRESH FROZEN PLASMA) -- -- -- -- 200 200 -- -- --    Volume (RELEASE FRESH FROZEN PLASMA) -- -- -- -- -- -- 350 -- 350    Total Intake -- -- -- 3700 912.8 4612.8 350 -- 350       Output    Urine  --  -- --  --  15 15  --  -- --    Straight Catheter -- -- -- -- 15 15 -- -- --    Stool  --  -- --  --  -- --  --  -- --    Number of Times Stooled -- -- -- -- 1 x 1 x -- -- --    Total Output -- -- -- -- 15 15 -- -- --       Net I/O     -- -- -- 3700 897.8 4597.8 350 -- 350        Weight: 82.4 kg (181 lb 10.5 oz)  Recent Labs      08/28/17   1023  08/28/17 1751 08/28/17 2136 08/29/17 0140   SODIUM  130*  129*  130*  131*   POTASSIUM  2.8*  2.7*  3.0*  3.2*   CHLORIDE  96  97  99  99   CO2  25  21  22  23   BUN  16  18  21  23*   CREATININE  1.12  1.35  1.62*  1.68*   MAGNESIUM  1.2*   --    --   2.4   PHOSPHORUS  3.9   --    --    --    CALCIUM  7.6*  7.3*  7.7*  7.9*       GI/Nutrition:    Imaging: Available data reviewed  NPO  Liver Function  Recent Labs      08/28/17   1023  08/28/17 1751  08/28/17 2136 08/29/17 0140   ALTSGPT  20   --    --    --    ASTSGOT  58*   --    --    --    ALKPHOSPHAT  104*   --    --    --    TBILIRUBIN  15.0*   --    --    --    LIPASE  29   --    --    --     GLUCOSE  105*  104*  103*  105*       Heme:  Recent Labs      17   1023  17   1751  17   0140   RBC  1.26*  1.40*  1.26*  1.47*   HEMOGLOBIN  5.3*  5.5*  5.1*  5.5*   HEMATOCRIT  15.1*  16.0*  14.7*  15.5*   PLATELETCT  95*  65*  59*  62*   PROTHROMBTM  27.1*   --    --    --    APTT  50.1*   --    --    --    INR  2.42*   --    --    --        Infectious Disease:  Temp  Av.3 °C (99.1 °F)  Min: 36.6 °C (97.9 °F)  Max: 39.1 °C (102.4 °F)  Micro: cultures reviewed  Recent Labs      17   1023  17   17517   0140   WBC  11.7*  8.8  11.1*  10.6   NEUTSPOLYS  81.60*   --    --   84.10*   LYMPHOCYTES  10.00*   --    --   7.10*   MONOCYTES  5.00   --    --   1.70   EOSINOPHILS  2.10   --    --   0.00   BASOPHILS  0.30   --    --   0.00   ASTSGOT  58*   --    --    --    ALTSGPT  20   --    --    --    ALKPHOSPHAT  104*   --    --    --    TBILIRUBIN  15.0*   --    --    --      Current Facility-Administered Medications   Medication Dose Frequency Provider Last Rate Last Dose   • SODIUM CHLORIDE 0.9 % IV SOLN           • SODIUM CHLORIDE 0.9 % IV SOLN           • potassium chloride SA (Kdur) tablet 40 mEq  40 mEq BID Luis Murray M.D.   40 mEq at 17 0754   • cefTRIAXone (ROCEPHIN) 2 g in  mL IVPB  2 g Q24HRS NAT MarieO.   Stopped at 17 1341   • senna-docusate (PERICOLACE or SENOKOT S) 8.6-50 MG per tablet 2 Tab  2 Tab BID LARRY Marie.O.   2 Tab at 17 2153    And   • polyethylene glycol/lytes (MIRALAX) PACKET 1 Packet  1 Packet QDAY PRN Terry Galeas D.O.        And   • magnesium hydroxide (MILK OF MAGNESIA) suspension 30 mL  30 mL QDAY PRN Terry Galeas D.O.        And   • bisacodyl (DULCOLAX) suppository 10 mg  10 mg QDAY PRN Terry Galeas D.O.       • Respiratory Care per Protocol   Continuous RT Terry Galeas D.O.       • ondansetron (ZOFRAN) syringe/vial injection 4 mg  4 mg Q4HRS PRN  NAT MarieO.   4 mg at 08/29/17 0758   • ondansetron (ZOFRAN ODT) dispertab 4 mg  4 mg Q4HRS PRN LARRY Marie.O.       • promethazine (PHENERGAN) tablet 12.5-25 mg  12.5-25 mg Q4HRS PRN LARRY Marie.O.       • promethazine (PHENERGAN) suppository 12.5-25 mg  12.5-25 mg Q4HRS PRN LARRY Marie.O.       • prochlorperazine (COMPAZINE) injection 5-10 mg  5-10 mg Q4HRS PRN LARRY Marie.O.       • glucose 4 g chewable tablet 16 g  16 g Q15 MIN PRN NAT MarieO.        And   • dextrose 50% (D50W) injection 25 mL  25 mL Q15 MIN PRN LARRY Marie.O.       • lorazepam (ATIVAN) tablet 0.5 mg  0.5 mg Q4HRS PRN LARRY Marie.O.       • lorazepam (ATIVAN) tablet 1 mg  1 mg Q4HRS PRN NAT MarieO.        Or   • lorazepam (ATIVAN) injection 0.5 mg  0.5 mg Q4HRS PRN NAT MarieO.   Stopped at 08/28/17 1920   • lorazepam (ATIVAN) tablet 2 mg  2 mg Q2HRS PRN NAT MarieO.        Or   • lorazepam (ATIVAN) injection 1 mg  1 mg Q2HRS PRN LARRY Marie.O.   1 mg at 08/28/17 1922   • lorazepam (ATIVAN) tablet 3 mg  3 mg Q HOUR PRN NAT MarieO.        Or   • lorazepam (ATIVAN) injection 1.5 mg  1.5 mg Q HOUR PRN NAT MarieO.       • lorazepam (ATIVAN) tablet 4 mg  4 mg Q15 MIN PRN NAT MarieO.        Or   • lorazepam (ATIVAN) injection 2 mg  2 mg Q15 MIN PRN LARRY Marie.O.       • morphine (pf) 4 mg/ml injection 2 mg  2 mg Q4HRS PRN Terry Galeas D.O.   2 mg at 08/29/17 0439   • phytonadione (AQUA-MEPHYTON) injection 10 mg  10 mg DAILY Harrison De Oliveira M.D.   10 mg at 08/29/17 0755   • thiamine (THIAMINE) tablet 100 mg  100 mg DAILY Terry Galeas D.O.        And   • folic acid (FOLVITE) tablet 1 mg  1 mg DAILY Terry Galeas D.O.        And   • multivitamin (THERAGRAN) tablet 1 Tab  1 Tab DAILY Terry Galeas D.O.       • pantoprazole (PROTONIX) injection 40 mg  40 mg BID Terry Galeas D.O.   40  mg at 08/28/17 2341     Last reviewed on 8/29/2017  1:22 AM by Becky Garcia R.N.    Quality  Measures:  Radiology images reviewed, Labs reviewed and Medications reviewed                      Assessment and Plan:  SBP                        - f/u ascites fluid cx                        - cephalosporin                         - albumin  GIB, Guaiac positive stools and acute blood loss anemia                        - GI following                        - PPI, no octreotide                        - serial HH                        - PRBCs to >7                        - emergent EGD if active bleeding  Acute alcoholic hepatitis, severe, Maddrey DF 84                        - albumin                        - held steroid with SBP                        - US liver  ALMA                        - IVF, albumin                        - follow  Coagulopathy                        - vit K                        - TEG with decreased clot strength  H/o EtOH, ? W/d                         - CIWA vs phenobarb  Hypokalemia                        - replace IV and PO  HypoMg++                        - replace IV    Discussed patient condition and risk of morbidity and/or mortality with RN, RT, Therapies and Pharmacy.  Discussed in detail with resident.   The patient remains critically ill.  Critical care time = 32 minutes in directly providing and coordinating critical care and extensive data review.  No time overlap and excludes procedures.

## 2017-08-29 NOTE — CARE PLAN
Problem: Knowledge Deficit  Goal: Knowledge of disease process/condition, treatment plan, diagnostic tests, and medications will improve  Outcome: PROGRESSING AS EXPECTED  Patient verbalized understanding of disease process, treatment plan, tests and medications.     Problem: Fluid Volume:  Goal: Will maintain balanced intake and output  Outcome: PROGRESSING SLOWER THAN EXPECTED  No urine output overnight, straight cath for 15mL of dark urine. MD aware. Oral and IV intake adequate.

## 2017-08-29 NOTE — CONSULTS
Critical Care/Pulmonary Consultation    Date of service: 8/28/2017    Consulting Physician: Dr Nayak    History of Present Illness: Nury Thornton is a 36 y.o. Female with h/o EtOH use, 1-2 pints vodka daily, cirrhosis, asthma; presents to the ED today with complaints of increasing abdominal pain and distention over the past month. She reports associated nausea, weakness and fatigue. She denied hematemesis but has reported some bright red blood per rectum over the past 2 days. In the ED she does not have significant anemia with a hemoglobin of 5.3, acute kidney injury, coagulopathy/thrombocytopenia and markedly elevated liver transaminases. Paracentesis was obtained in the ED consistent with spontaneous bacterial peritonitis. She has a fever of 102.4. She received IV antibiotics, no PMN, IV fluid and electrolyte replacement in the ED. Gastroenterology has been consulted.    No current facility-administered medications on file prior to encounter.      Current Outpatient Prescriptions on File Prior to Encounter   Medication Sig Dispense Refill   • potassium chloride SA (KDUR) 20 MEQ Tab CR Take 1 Tab by mouth every day. Initially take 40 meq daily x 3 days, then decrease to 20 meq dailly 60 Tab 2   • furosemide (LASIX) 20 MG Tab Take 20 mg by mouth every day.         Social History   Substance Use Topics   • Smoking status: Current Some Day Smoker     Packs/day: 0.25     Types: Cigarettes   • Smokeless tobacco: Never Used   • Alcohol use Yes        Past Medical History:   Diagnosis Date   • HTN (hypertension) 3/14/2017   • Pneumonia 11/18/2016   • Sepsis (CMS-HCC) 11/18/2016   • ASTHMA    • Cirrhosis (CMS-HCC)    • Depression    • Psychiatric disorder    • Stroke (CMS-HCC)        Past Surgical History:   Procedure Laterality Date   • KRZYSZTOF BY LAPAROSCOPY N/A 3/10/2017    Procedure: KRZYSZTOF BY LAPAROSCOPY POSS OPEN;  Surgeon: Mejia Colunga M.D.;  Location: SURGERY [facility];  Service:    • ANKLE ORIF   "10/4/2009    Performed by MAZIN HUNT at SURGERY [facility] ORS   • GYN SURGERY       x3 2005   • OTHER      LT ANKLE   • OTHER ORTHOPEDIC SURGERY      2009    • TUBAL LIGATION         Allergies: Nkda [no known drug allergy]    Family History   Problem Relation Age of Onset   • Heart Disease Father    • Diabetes Mother        Vitals:    17 0937 17 0940 17 1000 17 1030   Height: 1.702 m (5' 7\")      Weight: 77.1 kg (170 lb)      Weight % change since last entry.: 0 %      BP:  145/89     Pulse:  (!) 130 (!) 120 (!) 123   BMI (Calculated): 26.63      Resp:  18 (!) 24 20   Temp:  37.2 °C (99 °F)      17 1130 17 1200 17 1304 17 1308   Height:       Weight:       Weight % change since last entry.:       BP:   113/59    Pulse: (!) 119 (!) 120 (!) 147 (!) 149   BMI (Calculated):       Resp: (!) 25 (!) 24 (!) 22 (!) 24   Temp:   37.5 °C (99.5 °F)     17 1317 17 1330 17 1334 17 1345   Height:       Weight:       Weight % change since last entry.:       BP:       Pulse: (!) 149 (!) 151  (!) 144   BMI (Calculated):       Resp: (!) 28 (!) 25  (!) 5   Temp:   37.5 °C (99.5 °F)     17 1355 17 1400 17 1402 17 1403   Height:       Weight:       Weight % change since last entry.:       BP:       Pulse: (!) 147 (!) 144 (!) 145 (!) 144   BMI (Calculated):       Resp: 13 (!) 26 (!) 25 (!) 28   Temp:        17 1404 17 1405 17 1406 17 1407   Height:       Weight:       Weight % change since last entry.:       BP:       Pulse: (!) 145 (!) 145 (!) 144 (!) 146   BMI (Calculated):       Resp: (!) 30 (!) 26 20 (!) 30   Temp:        17 1408 17 1409 17 1410 17 1411   Height:       Weight:       Weight % change since last entry.:       BP:       Pulse: (!) 144 (!) 146 (!) 146 (!) 146   BMI (Calculated):       Resp: (!) 28 (!) 24 (!) 21 (!) 25   Temp:        17 1412 17 1413 " 08/28/17 1414 08/28/17 1415   Height:       Weight:       Weight % change since last entry.:       BP:       Pulse: (!) 145 (!) 146 (!) 146 (!) 146   BMI (Calculated):       Resp: (!) 22 (!) 24 (!) 24 (!) 27   Temp:        08/28/17 1417 08/28/17 1418 08/28/17 1419 08/28/17 1420   Height:       Weight:       Weight % change since last entry.:       BP:       Pulse: (!) 145 (!) 147 (!) 145 (!) 144   BMI (Calculated):       Resp: (!) 24 (!) 22 20 20   Temp:        08/28/17 1421 08/28/17 1422 08/28/17 1423 08/28/17 1424   Height:       Weight:       Weight % change since last entry.:       BP:       Pulse: (!) 145 (!) 142 (!) 143 (!) 143   BMI (Calculated):       Resp: (!) 22 20 (!) 22 (!) 24   Temp:        08/28/17 1425 08/28/17 1426 08/28/17 1427 08/28/17 1428   Height:       Weight:       Weight % change since last entry.:       BP:       Pulse: (!) 142 (!) 149 (!) 145 (!) 147   BMI (Calculated):       Resp: (!) 24 20 20 20   Temp:        08/28/17 1429 08/28/17 1430 08/28/17 1431 08/28/17 1432   Height:       Weight:       Weight % change since last entry.:       BP:       Pulse: (!) 146 (!) 145 (!) 145 (!) 145   BMI (Calculated):       Resp: 20 (!) 22 20 20   Temp:        08/28/17 1433 08/28/17 1434 08/28/17 1435 08/28/17 1436   Height:       Weight:       Weight % change since last entry.:       BP:       Pulse: (!) 148 (!) 147 (!) 144 (!) 149   BMI (Calculated):       Resp: 20 (!) 22 20 20   Temp:        08/28/17 1437 08/28/17 1438 08/28/17 1439 08/28/17 1440   Height:       Weight:       Weight % change since last entry.:       BP:       Pulse: (!) 147 (!) 148 (!) 143 (!) 144   BMI (Calculated):       Resp: (!) 22 20 20 20   Temp:        08/28/17 1441 08/28/17 1442 08/28/17 1443 08/28/17 1444   Height:       Weight:       Weight % change since last entry.:       BP:       Pulse: (!) 147 (!) 145 (!) 143 (!) 142   BMI (Calculated):       Resp: 20 (!) 22 20 (!) 22   Temp:        08/28/17 1445 08/28/17 1446 08/28/17  1447 08/28/17 1448   Height:       Weight:       Weight % change since last entry.:       BP:       Pulse: (!) 141 (!) 142 (!) 140 (!) 144   BMI (Calculated):       Resp: (!) 22 (!) 22 (!) 22 (!) 22   Temp:        08/28/17 1449 08/28/17 1450 08/28/17 1451 08/28/17 1452   Height:       Weight:       Weight % change since last entry.:       BP:       Pulse: (!) 141 (!) 144 (!) 141 (!) 141   BMI (Calculated):       Resp: 20 20 20 (!) 22   Temp:        08/28/17 1453 08/28/17 1454 08/28/17 1455 08/28/17 1456   Height:       Weight:       Weight % change since last entry.:       BP:       Pulse: (!) 141 (!) 141 (!) 144 (!) 141   BMI (Calculated):       Resp: (!) 22 20 20 (!) 22   Temp:        08/28/17 1457 08/28/17 1458 08/28/17 1459 08/28/17 1500   Height:       Weight:       Weight % change since last entry.:       BP:       Pulse: (!) 143 (!) 144 (!) 143 (!) 155   BMI (Calculated):       Resp: (!) 22 (!) 22 (!) 22 20   Temp:        08/28/17 1501 08/28/17 1502 08/28/17 1503 08/28/17 1504   Height:       Weight:       Weight % change since last entry.:       BP:       Pulse: (!) 143 (!) 143 (!) 144 (!) 142   BMI (Calculated):       Resp: 20 (!) 22 (!) 22 20   Temp:        08/28/17 1506 08/28/17 1507 08/28/17 1508 08/28/17 1509   Height:       Weight:       Weight % change since last entry.:       BP:       Pulse: (!) 144 (!) 139 (!) 143 (!) 143   BMI (Calculated):       Resp: 20 20 20 20   Temp:        08/28/17 1510 08/28/17 1511 08/28/17 1512 08/28/17 1513   Height:       Weight:       Weight % change since last entry.:       BP:       Pulse: (!) 129 (!) 151 (!) 153 (!) 148   BMI (Calculated):       Resp: 20 20 20 20   Temp:        08/28/17 1514 08/28/17 1515 08/28/17 1516 08/28/17 1517   Height:       Weight:       Weight % change since last entry.:       BP:       Pulse: (!) 147 (!) 148 (!) 147 (!) 145   BMI (Calculated):       Resp: 20 20 20 (!) 22   Temp:        08/28/17 1518 08/28/17 1530 08/28/17 1545 08/28/17  1549   Height:       Weight:       Weight % change since last entry.:       BP:       Pulse: (!) 145 (!) 148 (!) 140    BMI (Calculated):       Resp: 20 20 18    Temp:    (!) 39.1 °C (102.4 °F)    08/28/17 1615 08/28/17 1630 08/28/17 1645 08/28/17 1700   Height:       Weight:       Weight % change since last entry.:       BP:       Pulse: (!) 141 (!) 137 (!) 138 (!) 132   BMI (Calculated):       Resp: 20 18 18 18   Temp:        08/28/17 1715 08/28/17 1730 08/28/17 1745 08/28/17 1751   Height:       Weight:       Weight % change since last entry.:       BP:       Pulse: (!) 131 (!) 132 (!) 131    BMI (Calculated):       Resp: 20 20 20    Temp:    37.4 °C (99.3 °F)    08/28/17 1800 08/28/17 1815 08/28/17 1830 08/28/17 1900   Height:       Weight:       Weight % change since last entry.:       BP:       Pulse: (!) 131 (!) 128 (!) 127 (!) 125   BMI (Calculated):       Resp: 16 20 (!) 22 (!) 22   Temp:        08/28/17 1947 08/28/17 2015 08/28/17 2030 08/28/17 2045   Height:       Weight:       Weight % change since last entry.:       BP:       Pulse: (!) 123 (!) 121 (!) 120 (!) 121   BMI (Calculated):       Resp: (!) 23 (!) 25     Temp:        08/28/17 2100   Height:    Weight:    Weight % change since last entry.:    BP:    Pulse: (!) 119   BMI (Calculated):    Resp:    Temp:        Physical Examination  General:Lethargic but arouses, not in respiratory distress, complains of some ongoing abdominal discomfort  Neuro/Psych: PERRL, following all commands, mildly lethargic but nonfocal exam  HEENT: Trachea midline, mucosal membranes dry  CVS: Distant, regular, tachycardic  Respiratory: Clear, no wheezing  Abdomen/: Distended, positive fluid wave, diminished bowel sounds, mild tenderness  Extremities: Trace edema  Skin: Cool, dry    Recent Labs      08/28/17   1023  08/28/17   1751   WBC  11.7*  8.8   NEUTSPOLYS  81.60*   --    LYMPHOCYTES  10.00*   --    MONOCYTES  5.00   --    EOSINOPHILS  2.10   --    BASOPHILS  0.30    --    ASTSGOT  58*   --    ALTSGPT  20   --    ALKPHOSPHAT  104*   --    TBILIRUBIN  15.0*   --      Recent Labs      08/28/17   1023  08/28/17   1751   SODIUM  130*  129*   POTASSIUM  2.8*  2.7*   CHLORIDE  96  97   CO2  25  21   BUN  16  18   CREATININE  1.12  1.35   MAGNESIUM  1.2*   --    PHOSPHORUS  3.9   --    CALCIUM  7.6*  7.3*     Recent Labs      08/28/17   1023  08/28/17   1751   ALTSGPT  20   --    ASTSGOT  58*   --    ALKPHOSPHAT  104*   --    TBILIRUBIN  15.0*   --    LIPASE  29   --    GLUCOSE  105*  104*           Invalid input(s): [id number]  US-LIVER AND BILIARY TREE   Final Result      1.  Findings consistent with hepatic cirrhosis.   2.  Pulsatile antegrade portal vein flow indicating elevated RIGHT-sided pressures.   3.  Moderate ascites.         DX-CHEST-PORTABLE (1 VIEW)   Final Result      Hypoventilatory chest with basilar atelectasis, no consolidation          Assessment and Plan:  SBP   - tap in ED   - empiric cephalosporin    - f/u cx   - albumin   - follow renal fxn  GIB, Guaiac positive stools and acute blood loss anemia   - GI following   - PPI, no octreotide   - serial HH   - PRBCs to >7   - emergent EGD if active bleeding  Acute alcoholic hepatitis, severe, Maddrey DF 84   - albumin   - held steroid with SBP   - US liver  ALMA   - IVF, albumin   - follow  Coagulopathy   - vit K   - plt TEG  H/o EtOH, ? W/d    - CIWA vs phenobarb  Hypokalemia   - replace IV and PO   - tele monitoring   HypoMg++   - replace IV  Admit to ICU   - discussed with senior resident  I have seen and examined the patient today.  I have reviewed the medical record, laboratory data, imaging and all relevant studies.  I have discussed the plan of care with the Internal Medicine Resident and agree with the note and plan as documented.     The patient remains critically ill.  Critical care time = 40 minutes in directly providing and coordinating critical care and extensive data review.  No time overlap and  excludes procedures.

## 2017-08-29 NOTE — PROGRESS NOTES
Vianca from Lab called with critical result of hgb/hct at 0220. Critical lab result read back to Vianca.   Dr. Russell notified of critical lab result at 0240.  Critical lab result read back by Dr. Russell.    Dr. Russell ordered CBC/BMP recheck at 0600. No further orders at this time.

## 2017-08-29 NOTE — CARE PLAN
Problem: Nutritional:  Goal: Achieve adequate nutritional intake  Patient will consume 50% of meals, snacks, and supplements.  Outcome: PROGRESSING SLOWER THAN EXPECTED

## 2017-08-29 NOTE — ED NOTES
Viacna from Lab called with critical result of hemoglobin-5.1 and hematocrit-14.7 at 2248. Critical lab result read back to Vianca.   Dr. HAMPTON team notified of critical lab result at x.  Critical lab result read back by Dr. HAMPTON team. Paged team at 1459

## 2017-08-29 NOTE — PROGRESS NOTES
HG still 5.5 after transfusion of 2 units in  Settings of positive FOBT & cirrhosis,she was given  Vitamin k, last INT >2, creatinine is worsening to 1.62. will recheck INR, urine lytes and give 3 units of FFP and one unit of blood and consult GI at am

## 2017-08-29 NOTE — PROGRESS NOTES
[facility] ICU Progress Note      Admit Date: 8/28/2017  ICU Day: 1    Resident(s): Luis Murray   Attending: BERTA PITTMAN/ Dr. Lawrence    Date & Time:   8/29/2017   1:32 PM       Patient ID:    Name:             Nury Thornton     YOB: 1980  Age:                 36 y.o.  female   MRN:               [id number]    Diagnosis:  Spontaneous bacterial peritonitis  Alcoholic hepatitis  Cirrhosis with ascites  Acute kidney injury   Anemia with possible gastrointestinal bleed  Alcohol withdrawal    HPI:  Ms. Thornton is a pleasant 36 year old female with PMHx significant for alcohol abuse, alcoholic cirrhosis, hypertension, GERD, chronic abdominal pain presented to the ED on 8/28/2017 complaining of abdominal pain. She was found to have spontaneous bacterial peritonitis on admission and a possible GI bleed and was admitted to the tele floor. She was transferred to the ICU for closer monitoring on the same day of admission for closer monitoring.    Consultants:  Gastroenterology: Dr. De Oliveira  Nephrology: Dr. Najjar  PMA: Dr. Bryan, Dr. Lawrence    Interval Events:  S. Creatinine continues to worsen despite IV fluid resuscitation.  Very minimal urine output since admission.  Gastroenterology and nephrology were consulted.  No obvious bleed identified on physical examination.  Patient continues to receive 2 units of packed red blood cells and also received 2 units of FFP.  Continues to have abdominal pain and nausea although somewhat improved.    ROS:  Constitutional: Reports fevers and chills  HEENT: Denies nasal congestion, sore throat  Respiratory: Reports shortness of breath, cough  Cardiovascular: Reports chest pain, denies palpitations  Abdomen: Reports nausea, abdominal pain  Neuro: Denies focal deficits    Physical Exam:  GEN: ill appearing  HEENT: NC/AT.   CV: S1, S2, NSR, No m/r/g  RESP: CTABL, no w/r/r, no basilar crackles   ABD: Tender to palpation, abdomen distended, bowel sounds present  EXT: No LE edema b/l.  No signs of cyanosis  NEURO: Alert and oriented x 4    Respiratory:     Respiration: (!) 23, Pulse Oximetry: 95 %, O2 Daily Delivery Respiratory : Room Air with O2 Available    Chest Tube Drains:          Invalid input(s): [id number]    HemoDynamics:  Pulse: (!) 125, Heart Rate (Monitored): (!) 126 Blood Pressure: 112/68, NIBP: 107/72      Neuro:      Fluids:    Date 08/29/17 0700 - 08/30/17 0659   Shift 9853-4695 0772-5608 7015-5227 24 Hour Total   I  N  T  A  K  E   Blood 665   665      Volume (RELEASE FRESH FROZEN PLASMA) 350   350      Volume (RELEASE FRESH FROZEN PLASMA) 315   315    Shift Total 665   665   O  U  T  P  U  T   Shift Total          665        Intake/Output Summary (Last 24 hours) at 08/29/17 1332  Last data filed at 08/29/17 0830   Gross per 24 hour   Intake          1927.78 ml   Output               15 ml   Net          1912.78 ml       Weight: 82.4 kg (181 lb 10.5 oz)  Body mass index is 28.45 kg/m².    Recent Labs      08/28/17   1023   08/28/17 2136 08/29/17 0140 08/29/17   1138   SODIUM  130*   < >  130*  131*  128*   POTASSIUM  2.8*   < >  3.0*  3.2*  3.5*   CHLORIDE  96   < >  99  99  96   CO2  25   < >  22  23  22   BUN  16   < >  21  23*  27*   CREATININE  1.12   < >  1.62*  1.68*  1.91*   MAGNESIUM  1.2*   --    --   2.4   --    PHOSPHORUS  3.9   --    --    --    --    CALCIUM  7.6*   < >  7.7*  7.9*  8.6    < > = values in this interval not displayed.       GI/Nutrition:  Recent Labs      08/28/17   1023   08/28/17 2136 08/29/17 0140 08/29/17   1138   ALTSGPT  20   --    --    --    --    ASTSGOT  58*   --    --    --    --    ALKPHOSPHAT  104*   --    --    --    --    TBILIRUBIN  15.0*   --    --    --    --    LIPASE  29   --    --    --    --    GLUCOSE  105*   < >  103*  105*  95    < > = values in this interval not displayed.       Heme:  Recent Labs      08/28/17   1023   08/28/17   2151  08/29/17   0140  08/29/17   1138  08/29/17   1139   RBC  1.26*   <  >  1.26*  1.47*  1.82*   --    HEMOGLOBIN  5.3*   < >  5.1*  5.5*  6.6*   --    HEMATOCRIT  15.1*   < >  14.7*  15.5*  18.9*   --    PLATELETCT  95*   < >  59*  62*  63*   --    PROTHROMBTM  27.1*   --    --    --    --   25.8*   APTT  50.1*   --    --    --    --    --    INR  2.42*   --    --    --    --   2.28*    < > = values in this interval not displayed.       Infectious Disease:  Temp  Av.3 °C (99.2 °F)  Min: 36.6 °C (97.9 °F)  Max: 39.1 °C (102.4 °F)  Recent Labs      17   1023   17   2151  17   0140  17   1138   WBC  11.7*   < >  11.1*  10.6  11.4*   NEUTSPOLYS  81.60*   --    --   84.10*   --    LYMPHOCYTES  10.00*   --    --   7.10*   --    MONOCYTES  5.00   --    --   1.70   --    EOSINOPHILS  2.10   --    --   0.00   --    BASOPHILS  0.30   --    --   0.00   --    ASTSGOT  58*   --    --    --    --    ALTSGPT  20   --    --    --    --    ALKPHOSPHAT  104*   --    --    --    --    TBILIRUBIN  15.0*   --    --    --    --     < > = values in this interval not displayed.       Meds:  • NS       • NS       • potassium chloride SA  40 mEq     • oxycodone immediate-release  5 mg     • albumin human 25%  75 g     • cefTRIAXone (ROCEPHIN) IVPB  2 g Stopped (17 0920)   • senna-docusate  2 Tab      And   • polyethylene glycol/lytes  1 Packet      And   • magnesium hydroxide  30 mL      And   • bisacodyl  10 mg     • Respiratory Care per Protocol       • ondansetron  4 mg     • ondansetron  4 mg     • promethazine  12.5-25 mg     • promethazine  12.5-25 mg     • prochlorperazine  5-10 mg     • glucose 4 g  16 g      And   • dextrose 50%  25 mL     • lorazepam  0.5 mg     • lorazepam  1 mg      Or   • lorazepam  0.5 mg     • lorazepam  2 mg      Or   • lorazepam  1 mg     • lorazepam  3 mg      Or   • lorazepam  1.5 mg     • lorazepam  4 mg      Or   • lorazepam  2 mg     • phytonadione  10 mg     • thiamine  100 mg      And   • folic acid  1 mg      And   • multivitamin  1  Tab     • pantoprazole  40 mg          Problem and Plan:    Anemia due to gastrointestinal blood loss  Hb on admission is 5.5. Could be a lower GI bleed based on history.  Continue with packed red blood cells to keep Hb >7.  Monitor with CBC q6hrs.  On Pantoprazole BID dosing.  Gastroenterology on board and following patient, appreciate recommendations.  Endoscopy when stable.    Cirrhosis (CMS-HCC)  Secondary to alcohol use and confirmed with ultrasound of liver.  There may be a small possibility of autoimmune cause.  F-actin tested in December was weakly positive at 28.  Will repeat DANYA and F-actin.  Hold diuretics for now.  AFP pending.    Abdominal pain  Likely secondary to SBP.  Other possible causes are alcoholic hepatitis, peptic ulcer or gastritis.  On PPI.  Will manage with morphine 2mg Q4hrs PRN and/or oxycodone 5mg Q4hrs PRN.    Coagulopathy (CMS-HCC)  Secondary to cirrhosis of liver.  INR is 2.4 and platelets are at 65.  TEG study is however pending.  Status post 2 units of FFP to control the possible GI bleed.    Alcoholic hepatitis with ascites  Maddrey's discriminant function is 84.  However given SBP, will hold steroids for now.  MELD score is 33 indicative of 52% 3-month mortality rate.  Similarly she is Child-Sanabria class C with a score of 13.  Prognosis is poor and guarded.    Spontaneous bacterial peritonitis (CMS-HCC)  Comfirmed with ascitic tap which is showing PMNs highly elevated even after correction for RBCs.  Started on ceftriaxone.  Ascitic fluid gram stain is showing no organisms so far and culture is pending.  One dose of albumin was given on 8/28/17.  Second dose to be given on 8/30.    Alcohol withdrawal (CMS-[facility])  History of significant alcohol abuse.  Placed patient on CIWA protocol.  Ativan IV or PO based on CIWA scores to be given.  Rally bag already given.  Multivitamins, thiamine and folate for 5 days to be given.    Acute kidney injury (CMS-[facility])  Patient's kidney function is  slowly worsening despite IV fluid resuscitation and volume replacement.  Differential could be but not limited to hepato-renal syndrome or ATN from episode of hypotension.  No significant urine output since admission, but patient may be incontinent.  Will continue to monitor renal function.  Nephrology consulted.  Will consider octreotide and midodrine based if renal function is worsening.  Patient currently on day 2 of albumin, will receive a total of 3 days.        DISPO: Remain in ICU    CODE STATUS: Full code    Quality Measures:  Bynum Catheter: none  DVT Prophylaxis: SCDs  Ulcer Prophylaxis: PPI for GI ulcer  Antibiotics: Ceftriaxone  Lines: PIV    Procedures:  Paracentesis: 8/28/17    Imaging:  Ultrasound abdomen: Cirrhosis of liver and ascites

## 2017-08-29 NOTE — ASSESSMENT & PLAN NOTE
Hepatorenal syndrome secondary to alcohol-induced cirrhosis and spontaneous peritonitis with severe sepsis.  - No interventions per comfort care

## 2017-08-29 NOTE — SENIOR ADMIT NOTE
This patient is a 36-year-old female past medical history of alcoholic cirrhosis, macrocytic anemia, hypertension, coagulopathy, gastroesophageal reflux disease reporting to the emergency department on 8/28/2017 complaining of abdominal pain with associated distention, nausea, vomiting with scant blood. She was noted on presentation to be tachycardic with heart rate in the 120s-140s, normotensive, in moderate distress due to abdominal pain and discomfort. Abdominal exam is notable for exquisite tenderness to palpation, guarding, positive fluid wave, obvious ascites. Bedside diagnostic paracentesis yielded fluid consistent with possible bacterial peritonitis.    ASSESSMENT:  1. Spontaneous bacterial peritonitis  2. Alcoholic cirrhosis  3. Acute kidney injury  4. Gastrointestinal bleeding  5. Macrocytic anemia  6. Alcohol withdrawal syndrome  7. Hypokalemia  8. Hypomagnesemia  9. Coagulopathy secondary to cirrhosis      PLAN:  The patient appears to be improving clinically with transfusion of packed red blood cells in emergency department, electrolyte repletion, supportive care. We have started her on antibiotics (initially ordered cefotaxime for better intraperitoneal concentration of antibiotics, however owing to national shortage, will use ceftriaxone instead per pharmacy protocol) and albumin (1.5 g/kg to be administered over 6 hours) for diagnosis of spontaneous bacterial peritonitis. Gastroenterology ([facility]) was called, saw patient at bedside. Suspicion for active significant gastrointestinal bleeding is very low as patient has only scant blood in vomitus, guaiac positive but not ana blood from rectum. As patient is improving with conservative measures, appropriate for admission to telemetry floor with low threshold for transfer to ICU if she decompensates. CIWA protocol in place for alcohol withdrawal symptoms.    Please see concomitant H&P from intern Malena Calderon for more detailed discussion.

## 2017-08-29 NOTE — H&P
Internal Medicine Admitting History and Physical    Name Nury Thornton 1980   Age/Sex 36 y.o. female   MRN [id number]   Code Status FULL     After 5PM or if no immediate response to page, please call for cross-coverage  Attending/Team: Royce Lawrence/ Jeramy See Patient List for primary contact information  Call [phone] to page    1st Call - Day Intern (R1):   Gordo Pelayo 2nd Call - Day Sr. Resident (R2/R3):   Adal       Chief Complaint:  Sever abdominal pain and distension    HPI:  35 yo female with past medical history of liver cirrhosis, hepatitis C, HTN, alcohol abuse, tobacco abuse, marijuana abuse and macrocytic anemia. Presented to the ER today because of sever abdominal pain, abdominal distension with nausea andvomiting. The abdominal pain is all over the abdomen and it is 10 out of 10 in severity, the pain does not radiate and it is sharp in nature.   The patient has tachypnea, tachycardia, no skin discoloration, no bruises or ecchymosis  Patient denies trauma, similar episodes in the past, chest pain or shortness of breath.     Review of Systems   Constitutional: Positive for diaphoresis and fever. Negative for chills.   HENT: Negative for ear discharge, ear pain, hearing loss and tinnitus.    Eyes: Negative for blurred vision, double vision and photophobia.   Respiratory: Negative for cough, hemoptysis, sputum production, shortness of breath and wheezing.    Cardiovascular: Positive for palpitations and leg swelling. Negative for chest pain, orthopnea and claudication.   Gastrointestinal: Positive for abdominal pain, heartburn, nausea and vomiting. Negative for blood in stool, constipation, diarrhea and melena.   Genitourinary: Negative for dysuria, frequency and urgency.   Musculoskeletal: Negative for myalgias and neck pain.   Skin: Negative for itching and rash.   Neurological: Negative for dizziness, tingling, tremors, sensory change, speech change, focal weakness, seizures,  loss of consciousness and headaches.   Endo/Heme/Allergies: Negative for environmental allergies. Does not bruise/bleed easily.   Psychiatric/Behavioral: Negative for depression and suicidal ideas.             Past Medical History:   Past Medical History:   Diagnosis Date   • HTN (hypertension) 3/14/2017   • Pneumonia 2016   • Sepsis (CMS-HCC) 2016   • ASTHMA    • Cirrhosis (CMS-HCC)    • Depression    • Psychiatric disorder    • Stroke (CMS-HCC)        Past Surgical History:  Past Surgical History:   Procedure Laterality Date   • KRZYSZTOF BY LAPAROSCOPY N/A 3/10/2017    Procedure: KZRYSZTOF BY LAPAROSCOPY POSS OPEN;  Surgeon: Mejia Colunga M.D.;  Location: SURGERY [facility];  Service:    • ANKLE ORIF  10/4/2009    Performed by MAZIN HUNT at SURGERY [facility]   • GYN SURGERY       x3    • OTHER      LT ANKLE   • OTHER ORTHOPEDIC SURGERY          • TUBAL LIGATION         Current Outpatient Medications:  Home Medications     Reviewed by Joseph Post (Pharmacy Tech) on 17 at 1022  Med List Status: Complete   Medication Last Dose Status   furosemide (LASIX) 20 MG Tab >week Active   potassium chloride SA (KDUR) 20 MEQ Tab CR >week Active                Medication Allergy/Sensitivities:  Allergies   Allergen Reactions   • Nkda [No Known Drug Allergy]          Family History:  Family History   Problem Relation Age of Onset   • Heart Disease Father    • Diabetes Mother        Social History:  Social History     Social History   • Marital status: Single     Spouse name: N/A   • Number of children: N/A   • Years of education: N/A     Occupational History   • Not on file.     Social History Main Topics   • Smoking status: Current Some Day Smoker     Packs/day: 0.25     Types: Cigarettes   • Smokeless tobacco: Never Used   • Alcohol use Yes   • Drug use: No      Comment: Hx of meth and marijuana   • Sexual activity: Not on file     Other Topics Concern   • Not on  "file     Social History Narrative   • No narrative on file     Living situation: home      Physical Exam     Vitals:    08/28/17 2200 08/28/17 2215 08/28/17 2230 08/28/17 2300   BP:       Pulse: (!) 121 (!) 116 (!) 117    Resp: 20 (!) 22 (!) 21    Temp: 37.1 °C (98.8 °F) 37 °C (98.6 °F) 37.2 °C (99 °F)    SpO2: 91% 92% 93%    Weight:    82.4 kg (181 lb 10.5 oz)   Height:    1.702 m (5' 7\")     Body mass index is 28.45 kg/m².  BP (!) 92/52   Pulse (!) 117   Temp 37.2 °C (99 °F)   Resp (!) 21   Ht 1.702 m (5' 7\")   Wt 82.4 kg (181 lb 10.5 oz)   SpO2 93%   BMI 28.45 kg/m²   O2 therapy: Pulse Oximetry: 93 %, O2 (LPM): 2, O2 Delivery: None (Room Air)    Physical Exam   Constitutional: She is oriented to person, place, and time. She appears distressed.   HENT:   Head: Normocephalic.   Eyes: Pupils are equal, round, and reactive to light. Right eye exhibits no discharge. Left eye exhibits no discharge. No scleral icterus.   Neck: Neck supple. No tracheal deviation present. No thyromegaly present.   Cardiovascular:   No murmur heard.  Pulmonary/Chest: No stridor. No respiratory distress. She has no wheezes. She has no rales. She exhibits no tenderness.   Abdominal: She exhibits distension and mass. There is tenderness. There is rebound and guarding.   Genitourinary: Rectal exam shows guaiac negative stool. No vaginal discharge found.   Musculoskeletal: She exhibits edema.   Neurological: She is alert and oriented to person, place, and time. No cranial nerve deficit.   Skin: Skin is warm. She is diaphoretic.             Data Review       Old Records Request:   Completed  Current Records review and summary: Completed    Lab Data Review:  Recent Results (from the past 24 hour(s))   CBC WITH DIFFERENTIAL    Collection Time: 08/28/17 10:23 AM   Result Value Ref Range    WBC 11.7 (H) 4.8 - 10.8 K/uL    RBC 1.26 (L) 4.20 - 5.40 M/uL    Hemoglobin 5.3 (LL) 12.0 - 16.0 g/dL    Hematocrit 15.1 (LL) 37.0 - 47.0 %    .4 " (H) 81.4 - 97.8 fL    MCH 41.3 (H) 27.0 - 33.0 pg    MCHC 34.0 33.6 - 35.0 g/dL    RDW 70.4 (H) 35.9 - 50.0 fL    Platelet Count 95 (L) 164 - 446 K/uL    MPV 9.2 9.0 - 12.9 fL    Nucleated RBC 0.00 /100 WBC    NRBC (Absolute) 0.00 K/uL    Neutrophils-Polys 81.60 (H) 44.00 - 72.00 %    Lymphocytes 10.00 (L) 22.00 - 41.00 %    Monocytes 5.00 0.00 - 13.40 %    Eosinophils 2.10 0.00 - 6.90 %    Basophils 0.30 0.00 - 1.80 %    Immature Granulocytes 1.00 (H) 0.00 - 0.90 %    Lymphs (Absolute) 1.17 1.00 - 4.80 K/uL    Monos (Absolute) 0.58 0.00 - 0.85 K/uL    Eos (Absolute) 0.25 0.00 - 0.51 K/uL    Baso (Absolute) 0.03 0.00 - 0.12 K/uL    Immature Granulocytes (abs) 0.12 (H) 0.00 - 0.11 K/uL    Neutrophils (Absolute) 9.55 (H) 2.00 - 7.15 K/uL    Hypochromia 1+     Anisocytosis 3+     Macrocytosis 3+    COMP METABOLIC PANEL    Collection Time: 08/28/17 10:23 AM   Result Value Ref Range    Sodium 130 (L) 135 - 145 mmol/L    Potassium 2.8 (L) 3.6 - 5.5 mmol/L    Chloride 96 96 - 112 mmol/L    Co2 25 20 - 33 mmol/L    Anion Gap 9.0 0.0 - 11.9    Glucose 105 (H) 65 - 99 mg/dL    Bun 16 8 - 22 mg/dL    Creatinine 1.12 0.50 - 1.40 mg/dL    Calcium 7.6 (L) 8.5 - 10.5 mg/dL    AST(SGOT) 58 (H) 12 - 45 U/L    ALT(SGPT) 20 2 - 50 U/L    Alkaline Phosphatase 104 (H) 30 - 99 U/L    Total Bilirubin 15.0 (H) 0.1 - 1.5 mg/dL    Albumin 2.0 (L) 3.2 - 4.9 g/dL    Total Protein 7.5 6.0 - 8.2 g/dL    Globulin 5.5 (H) 1.9 - 3.5 g/dL    A-G Ratio 0.4 g/dL   LIPASE    Collection Time: 08/28/17 10:23 AM   Result Value Ref Range    Lipase 29 11 - 82 U/L   APTT    Collection Time: 08/28/17 10:23 AM   Result Value Ref Range    APTT 50.1 (H) 24.7 - 36.0 sec   PROTHROMBIN TIME (INR)    Collection Time: 08/28/17 10:23 AM   Result Value Ref Range    PT 27.1 (H) 12.0 - 14.6 sec    INR 2.42 (H) 0.87 - 1.13   AMMONIA    Collection Time: 08/28/17 10:23 AM   Result Value Ref Range    Ammonia 49 (H) 11 - 45 umol/L   MAGNESIUM    Collection Time: 08/28/17  10:23 AM   Result Value Ref Range    Magnesium 1.2 (L) 1.5 - 2.5 mg/dL   PHOSPHORUS    Collection Time: 08/28/17 10:23 AM   Result Value Ref Range    Phosphorus 3.9 2.5 - 4.5 mg/dL   DIAGNOSTIC ALCOHOL    Collection Time: 08/28/17 10:23 AM   Result Value Ref Range    Diagnostic Alcohol 0.01 (H) 0.00 g/dL   PERIPHERAL SMEAR REVIEW    Collection Time: 08/28/17 10:23 AM   Result Value Ref Range    Peripheral Smear Review see below    PLATELET ESTIMATE    Collection Time: 08/28/17 10:23 AM   Result Value Ref Range    Plt Estimation Decreased    MORPHOLOGY    Collection Time: 08/28/17 10:23 AM   Result Value Ref Range    RBC Morphology Present     Polychromia 1+     Poikilocytosis 1+     Schistocytes 1+     Tear Drop Cells 1+     Echinocytes 1+    DIFFERENTIAL COMMENT    Collection Time: 08/28/17 10:23 AM   Result Value Ref Range    Comments-Diff see below    ESTIMATED GFR    Collection Time: 08/28/17 10:23 AM   Result Value Ref Range    GFR If African American >60 >60 mL/min/1.73 m 2    GFR If Non African American 55 (A) >60 mL/min/1.73 m 2   COD (ADULT)    Collection Time: 08/28/17 11:01 AM   Result Value Ref Range    ABO Grouping Only O     Rh Grouping Only POS     Antibody Screen-Cod NEG     Component R       R3                  Red Blood Cells3    [id number]   transfused   08/28/17   12:46      Product Type Red Blood Cells LR Pheresis     Dispense Status Transfused     Unit Number (Barcoded) [id number]     Product Code (Barcoded) C0096Q50     Blood Type (Barcoded) 5100     Component R       R3                  Red Blood Cells3    [id number]   issued       08/28/17   21:47      Product Type Red Blood Cells LR Pheresis     Dispense Status Issued     Unit Number (Barcoded) [id number]     Product Code (Barcoded) U8599N11     Blood Type (Barcoded) 5100    FLUID TOTAL PROTEIN    Collection Time: 08/28/17 12:30 PM   Result Value Ref Range    Fluid Type Paracentesis     Total Protein Fluid <1.0 g/dL   FLUID CELL  COUNT    Collection Time: 08/28/17 12:30 PM   Result Value Ref Range    Fluid Type Paracentesis     Color-Body Fluid Yellow     Character-Body Fluid Cloudy     Total RBC Count 4000 cells/uL    Total WBC 1286 cells/uL    Polys 93 %    Lymphs 4 %    Mononuclear Cells - Fluid 2 %    Mesothelial Cells - CSF 1 %    Comments see below    Basic Metabolic Panel (BMP)    Collection Time: 08/28/17  5:51 PM   Result Value Ref Range    Sodium 129 (L) 135 - 145 mmol/L    Potassium 2.7 (LL) 3.6 - 5.5 mmol/L    Chloride 97 96 - 112 mmol/L    Co2 21 20 - 33 mmol/L    Glucose 104 (H) 65 - 99 mg/dL    Bun 18 8 - 22 mg/dL    Creatinine 1.35 0.50 - 1.40 mg/dL    Calcium 7.3 (L) 8.5 - 10.5 mg/dL    Anion Gap 11.0 0.0 - 11.9   CBC without Differential    Collection Time: 08/28/17  5:51 PM   Result Value Ref Range    WBC 8.8 4.8 - 10.8 K/uL    RBC 1.40 (L) 4.20 - 5.40 M/uL    Hemoglobin 5.5 (LL) 12.0 - 16.0 g/dL    Hematocrit 16.0 (LL) 37.0 - 47.0 %    .3 (H) 81.4 - 97.8 fL    MCH 39.3 (H) 27.0 - 33.0 pg    MCHC 34.4 33.6 - 35.0 g/dL    Platelet Count 65 (L) 164 - 446 K/uL    MPV 10.2 9.0 - 12.9 fL   HEPATITIS PANEL ACUTE(4 COMPONENTS)    Collection Time: 08/28/17  5:51 PM   Result Value Ref Range    Hepatitis B Surface Antigen Negative Negative    Hepatitis C Antibody Negative Negative    Hepatitis B Cors Ab,IgM Negative Negative    Hepatitis A Virus Ab, IgM Negative Negative   HEMOGLOBIN A1C    Collection Time: 08/28/17  5:51 PM   Result Value Ref Range    Glycohemoglobin 4.4 0.0 - 5.6 %    Est Avg Glucose 80 mg/dL   TSH    Collection Time: 08/28/17  5:51 PM   Result Value Ref Range    TSH 1.440 0.300 - 3.700 uIU/mL   ESTIMATED GFR    Collection Time: 08/28/17  5:51 PM   Result Value Ref Range    GFR If  53 (A) >60 mL/min/1.73 m 2    GFR If Non  44 (A) >60 mL/min/1.73 m 2   BASIC METABOLIC PANEL    Collection Time: 08/28/17  9:36 PM   Result Value Ref Range    Sodium 130 (L) 135 - 145 mmol/L     Potassium 3.0 (L) 3.6 - 5.5 mmol/L    Chloride 99 96 - 112 mmol/L    Co2 22 20 - 33 mmol/L    Glucose 103 (H) 65 - 99 mg/dL    Bun 21 8 - 22 mg/dL    Creatinine 1.62 (H) 0.50 - 1.40 mg/dL    Calcium 7.7 (L) 8.5 - 10.5 mg/dL    Anion Gap 9.0 0.0 - 11.9   ESTIMATED GFR    Collection Time: 08/28/17  9:36 PM   Result Value Ref Range    GFR If  43 (A) >60 mL/min/1.73 m 2    GFR If Non  36 (A) >60 mL/min/1.73 m 2   CBC without Differential    Collection Time: 08/28/17  9:51 PM   Result Value Ref Range    WBC 11.1 (H) 4.8 - 10.8 K/uL    RBC 1.26 (L) 4.20 - 5.40 M/uL    Hemoglobin 5.1 (LL) 12.0 - 16.0 g/dL    Hematocrit 14.7 (LL) 37.0 - 47.0 %    .1 (H) 81.4 - 97.8 fL    MCH 39.7 (H) 27.0 - 33.0 pg    MCHC 34.5 33.6 - 35.0 g/dL    Platelet Count 59 (L) 164 - 446 K/uL    MPV 10.7 9.0 - 12.9 fL   PERIPHERAL SMEAR REVIEW    Collection Time: 08/28/17  9:51 PM   Result Value Ref Range    Peripheral Smear Review see below        Imaging/Procedures Review:    ndependant Imaging Review: Completed  US-LIVER AND BILIARY TREE   Final Result      1.  Findings consistent with hepatic cirrhosis.   2.  Pulsatile antegrade portal vein flow indicating elevated RIGHT-sided pressures.   3.  Moderate ascites.         DX-CHEST-PORTABLE (1 VIEW)   Final Result      Hypoventilatory chest with basilar atelectasis, no consolidation                      Assessment/Plan     Anemia due to gastrointestinal blood loss   Assessment & Plan    - Pt labs on admission show that the pt is severely anemic (Hb is 5.5).  - Pt is pale and lethargic with tachycardia.  - on examination, the Pt has a distended abdomen with guarding and tenderness all over the abdomen.  Plan  - Pt receives 1 RBC packing  - follow up on Hb and transfuse accordingly  - wait for lab results of the peritoneal fluid  - starting empiric ABX treatment for the patient. SBP?  - correcting the electrolyte abnormality accordingly.        Abdominal pain-  (present on admission)   Assessment & Plan    - Pt presented with sever abdominal pain and distention  - abdomen is tender, tense with guarding  - suspect SBP? Hemorrhage?  Plan  - ABX  - peritoneocentesis  - electrolyte correction  - anemia and thrombocytopenia correction  - sending peritoneal fluid to the lab          Cirrhosis (CMS-HCC)- (present on admission)   Assessment & Plan    - PMH of liver cirrhosis  - Pt presented with sever abdominal tenderness and sever ascitics.  - sever anemia, thrombocytopenia with high transaminase  Plan  - peritoneocentesis  - ABX  - correct electrolytes  - correct anemia and thrombocytopenia            Transaminitis due to alcohol liver disease- (present on admission)   Assessment & Plan    PMH of liver cirrhosis  Presented to the ED with sever abdominal pain and ascitis  AST is 58 ALT 20 and   Plan  - treat the ascitics and correct electrolytes  - correct thrombocytopenia and anemia  Follow up on transaminase        Coagulopathy (CMS-HCC)- (present on admission)   Assessment & Plan    - Hx of liver cirrhosis and hepatitis C  - presented with sever abdominal pain and ascitics  - Sever anemia (Hb 5.5) and thrombocytopenia (platelet count is 65)  - PT is 27.1, INR is 2.42  Plan  Correct anemia and thrombocytopenia  Follow on PT, PTT              Anticipated Hospital stay:  >2 midnights        Quality Measures    Reviewed items::  Labs reviewed, Radiology images reviewed and Medications reviewed  Bynum catheter::  Critically Ill - Requiring Accurate Measurement of Urinary Output  DVT prophylaxis pharmacological::  Contraindicated - Anemia requiring blood transfusion  DVT prophylaxis - mechanical:  SCDs

## 2017-08-29 NOTE — ASSESSMENT & PLAN NOTE
- Patient care plan in comfort care  - Laura's discriminant function is 92  - MELD score is 36 indicative of 52% 3-month mortality rate  - Child-Sanabria class C with a score of 13

## 2017-08-29 NOTE — PROGRESS NOTES
Gastroenterology Progress Note    Date of Service: 08/29/2017    Subjective: Pt reports that her pain has improved but still a lot of discomfort with movement. She denies BM or hematemesis.       Objective:    Vitals -   Vitals:    08/29/17 0800 08/29/17 0815 08/29/17 0830 08/29/17 0845   BP: (!) 95/45  112/68 112/68   Pulse: (!) 126 (!) 116 (!) 121 (!) 120   Resp: (!) 26 19 (!) 22 (!) 24   Temp: 37.2 °C (98.9 °F)  37.4 °C (99.4 °F) 37.4 °C (99.4 °F)   SpO2: 95% 96% 97% 95%   Weight:       Height:           Gen: AAOx3, NAD  HEENT: + icteric, Nares patent, mucous membranes dry  CVS: regular rhythm, increased rate  Pulm: CTAB, no wheezes  Abd: soft, diffusely TTP, distended, dimished bowel sounds  Ext: no edema    Labs:  Lab Results   Component Value Date/Time    SODIUM 131 (L) 08/29/2017 01:40 AM    POTASSIUM 3.2 (L) 08/29/2017 01:40 AM    CHLORIDE 99 08/29/2017 01:40 AM    CO2 23 08/29/2017 01:40 AM    GLUCOSE 105 (H) 08/29/2017 01:40 AM    BUN 23 (H) 08/29/2017 01:40 AM    CREATININE 1.68 (H) 08/29/2017 01:40 AM    CREATININE 0.7 06/16/2006 11:30 PM      Lab Results   Component Value Date/Time    WBC 10.6 08/29/2017 01:40 AM    RBC 1.47 (L) 08/29/2017 01:40 AM    HEMOGLOBIN 5.5 (LL) 08/29/2017 01:40 AM    HEMATOCRIT 15.5 (LL) 08/29/2017 01:40 AM    .4 (H) 08/29/2017 01:40 AM    MCH 37.4 (H) 08/29/2017 01:40 AM    MCHC 35.5 (H) 08/29/2017 01:40 AM    MPV 9.6 08/29/2017 01:40 AM    NEUTSPOLYS 84.10 (H) 08/29/2017 01:40 AM    LYMPHOCYTES 7.10 (L) 08/29/2017 01:40 AM    MONOCYTES 1.70 08/29/2017 01:40 AM    EOSINOPHILS 0.00 08/29/2017 01:40 AM    BASOPHILS 0.00 08/29/2017 01:40 AM    HYPOCHROMIA 1+ 08/28/2017 10:23 AM    ANISOCYTOSIS 3+ 08/29/2017 01:40 AM      Lab Results   Component Value Date/Time    PROTHROMBTM 27.1 (H) 08/28/2017 10:23 AM    INR 2.42 (H) 08/28/2017 10:23 AM        ASSESSMENT:   1) SBP - on ABX and albumin  2) abdominal pain - mildly improved  3) acute  blood loss anemia - no obvious overt bleeding currently. Receiving blood and FFP. Will need eval when INR has improved  4) guiac positive stools  5) elevated liver tests  6) suspected ETOH hepatitis  7) coagulopathy  8) ETOH withdrawal  9) ALMA       PLAN:   1) continue ABX and albumin for SBP; pt will need SBP prophylaxis at discharge  2) Renal function has worsened. Would recheck after patient has received volume with FFP. If still worsening, consider Midodrine and Octreotide for hepatorenal   3) Monitor stools.Will plan for at least EGD when INR has improved  4) Continue CIWA protocol  5) Follow up AFP  6) Pt will need vaccination for hepatitis A and B  7) Pt will need banana bag with thiamine given overall malnourished state  8) continue PPI  9) High DF consistent with ETOH hepatitis; agree with holding steroids due to SBP

## 2017-08-29 NOTE — ASSESSMENT & PLAN NOTE
Secondary to spontaneous bacterial peritonitis and ascites.  - [place] analgesics as needed per comfort care status

## 2017-08-30 NOTE — PROGRESS NOTES
[facility] Progress Note      Admit Date: 8/28/2017  ICU Day: 2    Resident(s): Luis Murray   Attending: BERTA PITTMAN/ Dr. Lawrence    Date & Time:   8/30/2017   10:39 AM       Patient ID:    Name:             Nury Thornton     YOB: 1980  Age:                 36 y.o.  female   MRN:               [id number]    Diagnosis:  Spontaneous bacterial peritonitis  Alcoholic hepatitis  Cirrhosis with ascites  Acute kidney injury - Hepato-renal syndrome versus ATN   Anemia with possible gastrointestinal bleed  Alcohol withdrawal    HPI:  Ms. Thornton is a 36 year old female with PMHx significant for alcohol abuse, alcoholic cirrhosis, hypertension, GERD, chronic abdominal pain presented to the ED on 8/28/2017 complaining of abdominal pain. She was found to have spontaneous bacterial peritonitis on admission and a possible GI bleed and was admitted to the tele floor. She was transferred to the ICU for closer monitoring on the same day of admission for closer monitoring.        Consultants:  Gastroenterology: Dr. De Oliveira  Nephrology: Dr. Najjar  PMA: Dr. Bryan, Dr. Lawrence     Interval Events:  Overnight, Hb continued to drop and she received an additional unit of PRBC.  Octreotide and midodrine was started for possible hepatorenal syndrome.  Reports abdominal pain to be about the same  Culture from ascitic fluid is growing lactose fermenting gram negative rods.  Had a temperature spike of 100.9F  Will transfuse platelets for lot clot strength.    ROS:  Constitutional: Reports fevers and chills, Fatigued and tired.  HEENT: Denies nasal congestion, sore throat  Respiratory: Reports shortness of breath, cough  Cardiovascular: Denies chest pain, denies palpitations  Abdomen: Reports nausea, abdominal pain  Genitourinary: Denies dysuria. Reports decrease in urine output  Neuro: Denies focal deficits     Physical Exam:  GEN: ill appearing, lethargic  HEENT: NC/AT.   CV: S1, S2, NSR, No m/r/g  RESP: CTABL, no w/r/r, no basilar  crackles   ABD: Tender to palpation, abdomen distended, bowel sounds present  EXT: No LE edema b/l. No signs of cyanosis  NEURO: Alert and oriented x 4    Respiratory:     Respiration: (!) 22, Pulse Oximetry: 94 %    Chest Tube Drains:          Invalid input(s): [id number]    HemoDynamics:  Pulse: (!) 123, Heart Rate (Monitored): (!) 123 Blood Pressure: 102/45, NIBP: 105/54      Neuro:      Fluids:        Intake/Output Summary (Last 24 hours) at 08/30/17 1039  Last data filed at 08/30/17 0400   Gross per 24 hour   Intake          1437.25 ml   Output              522 ml   Net           915.25 ml          Body mass index is 28.45 kg/m².    Recent Labs      08/28/17   1023   08/29/17   0140  08/29/17 1138 08/29/17 1726 08/30/17   0737   SODIUM  130*   < >  131*  128*  130*  128*   POTASSIUM  2.8*   < >  3.2*  3.5*  3.7  4.4   CHLORIDE  96   < >  99  96  98  100   CO2  25   < >  23  22  21  20   BUN  16   < >  23*  27*  31*  33*   CREATININE  1.12   < >  1.68*  1.91*  2.23*  1.98*   MAGNESIUM  1.2*   --   2.4   --    --   2.0   PHOSPHORUS  3.9   --    --    --    --   3.4   CALCIUM  7.6*   < >  7.9*  8.6  8.8  8.6    < > = values in this interval not displayed.       GI/Nutrition:  Recent Labs      08/28/17   1023   08/29/17   1138  08/29/17 1726 08/30/17   0737   ALTSGPT  20   --    --    --   12   ASTSGOT  58*   --    --    --   29   ALKPHOSPHAT  104*   --    --    --   71   TBILIRUBIN  15.0*   --    --    --   28.5*   LIPASE  29   --    --    --    --    GLUCOSE  105*   < >  95  106*  75    < > = values in this interval not displayed.       Heme:  Recent Labs      08/28/17   1023   08/29/17   1139  08/29/17 1726 08/30/17   0204  08/30/17   0737   RBC  1.26*   < >   --   2.06*  1.69*  2.18*   HEMOGLOBIN  5.3*   < >   --   7.0*  6.0*  7.6*   HEMATOCRIT  15.1*   < >   --   20.2*  16.7*  21.5*   PLATELETCT  95*   < >   --   66*  65*  68*   PROTHROMBTM  27.1*   --   25.8*   --    --    --    APTT  50.1*    --    --    --    --    --    INR  2.42*   --   2.28*   --    --    --    IRON   --    --    --    --   85   --    FERRITIN   --    --    --    --   383.0*   --    TOTIRONBC   --    --    --    --   126*   --     < > = values in this interval not displayed.       Infectious Disease:  Temp  Av.8 °C (100 °F)  Min: 37.5 °C (99.5 °F)  Max: 38.1 °C (100.5 °F)  Recent Labs      17   1023   17   0140   17   1726  17   0204  17   0737   WBC  11.7*   < >  10.6   < >  11.9*  13.8*  13.9*   NEUTSPOLYS  81.60*   --   84.10*   --    --    --   89.10*   LYMPHOCYTES  10.00*   --   7.10*   --    --    --   5.10*   MONOCYTES  5.00   --   1.70   --    --    --   4.20   EOSINOPHILS  2.10   --   0.00   --    --    --   0.80   BASOPHILS  0.30   --   0.00   --    --    --   0.80   ASTSGOT  58*   --    --    --    --    --   29   ALTSGPT  20   --    --    --    --    --   12   ALKPHOSPHAT  104*   --    --    --    --    --   71   TBILIRUBIN  15.0*   --    --    --    --    --   28.5*    < > = values in this interval not displayed.       Meds:  • lactulose  30 mL     • octreotide  100 mcg     • potassium chloride SA  40 mEq     • oxycodone immediate-release  5 mg     • morphine injection  2 mg     • midodrine  10 mg     • cefTRIAXone (ROCEPHIN) IVPB  2 g Stopped (17 0936)   • senna-docusate  2 Tab      And   • polyethylene glycol/lytes  1 Packet      And   • magnesium hydroxide  30 mL      And   • bisacodyl  10 mg     • Respiratory Care per Protocol       • ondansetron  4 mg     • ondansetron  4 mg     • promethazine  12.5-25 mg     • promethazine  12.5-25 mg     • prochlorperazine  5-10 mg     • glucose 4 g  16 g      And   • dextrose 50%  25 mL     • lorazepam  0.5 mg     • lorazepam  1 mg      Or   • lorazepam  0.5 mg     • lorazepam  2 mg      Or   • lorazepam  1 mg     • lorazepam  3 mg      Or   • lorazepam  1.5 mg     • lorazepam  4 mg      Or   • lorazepam  2 mg     • phytonadione  10 mg      • thiamine  100 mg      And   • folic acid  1 mg      And   • multivitamin  1 Tab     • pantoprazole  40 mg          Problem and Plan:    Anemia due to gastrointestinal blood loss  Hb on admission is 5.5. Could be a lower GI bleed based on history.  Continue with packed red blood cells to keep Hb >7.  Monitor with CBC q6hrs.  On Pantoprazole BID dosing.  Gastroenterology on board and following patient, appreciate recommendations.  Endoscopy when stable.    Cirrhosis (CMS-HCC)  Secondary to alcohol use and confirmed with ultrasound of liver.  There may be a small possibility of autoimmune cause.  F-actin tested in December was weakly positive at 28.  DANYA, F-actin and celiac panel pending.  Hold diuretics for now.  AFP pending.  Her MELD score is high at 33 and she has 52% 3-month mortality rate.  Palliative care consult in place.    Abdominal pain  Likely secondary to SBP.  Other possible causes are alcoholic hepatitis, peptic ulcer or gastritis.  On PPI.  Will manage with morphine 2mg Q4hrs PRN and/or oxycodone 5mg Q4hrs PRN.    Coagulopathy (CMS-HCC)  Secondary to cirrhosis of liver.  INR is 2.4 and platelets are at 65.  TEG study is is showing decrease in clot strength but it was drawn after FFP was given.  Status post 2 units of FFP to control the possible GI bleed.    Alcoholic hepatitis with ascites  Maddrey's discriminant function is 84.  However given SBP, will hold steroids for now.  MELD score is 33 indicative of 52% 3-month mortality rate.  Similarly she is Child-Sanabria class C with a score of 13.  Prognosis is poor and guarded.    Spontaneous bacterial peritonitis (CMS-HCC)  Comfirmed with ascitic tap which is showing PMNs highly elevated even after correction for RBCs.  Ascitic fluid gram stain is showing no organisms so far and culture is growing rare gram negative lactose fermenting rods.  Albumin given on 8/28 to 8/30.  On ceftriaxone, will modify as susceptibility results become available.    Alcohol  withdrawal (CMS-HCC)  History of significant alcohol abuse.  Placed patient on CIWA protocol.  Ativan IV or PO based on CIWA scores to be given.  Rally bag already given.  Multivitamins, thiamine and folate for 5 days to be given.    Acute kidney injury (CMS-HCC)  Patient's kidney function is slowly worsening despite IV fluid resuscitation and volume replacement.  Differential could be but not limited to hepato-renal syndrome or ATN from episode of hypotension.  Urine output is in oliguric range.  Nephrology on board.  On octreotide, midodrine and albumin to improve perfusion to kidney.        DISPO: Remain in ICU     CODE STATUS: Full code     Quality Measures:  Bynum Catheter: none  DVT Prophylaxis: SCDs  Ulcer Prophylaxis: PPI for GI ulcer  Antibiotics: Ceftriaxone  Lines: PIV     Procedures:  Paracentesis: 8/28/17     Imaging:  CXR: No interval change

## 2017-08-30 NOTE — PROGRESS NOTES
12 hour chart check complete. Spoke with Dr. Murray regarding pts tachycardia rate 140s-150s. Start IVF at 75ml/hr, will closely monitor.

## 2017-08-30 NOTE — CARE PLAN
Problem: Communication  Goal: The ability to communicate needs accurately and effectively will improve  Outcome: PROGRESSING AS EXPECTED  Pt and family explained plan of care, education given and questions answered. Pt and family state they understand the serverity of the situation.    Problem: Medication  Goal: Compliance with prescribed medication will improve  Outcome: PROGRESSING AS EXPECTED  Pt tolerating medications as ordered and all questions answered.

## 2017-08-30 NOTE — PROGRESS NOTES
Hospital Medicine Progress Note, Adult, Complex               Author: Xavi Naqvijjar Date & Time created: 8/30/2017  4:30 PM     Interval History:  Pt with alcoholic liver cirrhosis , presented with SBP.  Developed ALMA.    Review of Systems:  Review of Systems   Unable to perform ROS: Mental acuity       Physical Exam:  Physical Exam   Constitutional: She appears lethargic. She appears cachectic. She appears ill.   HENT:   Head: Normocephalic and atraumatic.   Nose: Nose normal.   Mouth/Throat: No oropharyngeal exudate.   Eyes: Conjunctivae are normal. Right eye exhibits no discharge. Left eye exhibits no discharge. Scleral icterus is present.   Neck: Normal range of motion. Neck supple. No JVD present. No tracheal deviation present. No thyromegaly present.   Cardiovascular: Normal rate, regular rhythm and normal heart sounds.  Exam reveals no friction rub.    No murmur heard.  Pulmonary/Chest: Effort normal and breath sounds normal. No respiratory distress. She has no wheezes. She has no rales.   Abdominal: She exhibits distension. There is tenderness. There is no rebound.   Musculoskeletal: She exhibits edema. She exhibits no tenderness.   Neurological: She appears lethargic.   Skin: Skin is warm and dry. No erythema.   Juandice    Nursing note and vitals reviewed.      Labs:        Invalid input(s): [id number]      Recent Labs      08/28/17   1023   08/29/17   0140  08/29/17   1138  08/29/17   1726  08/30/17   0737   SODIUM  130*   < >  131*  128*  130*  128*   POTASSIUM  2.8*   < >  3.2*  3.5*  3.7  4.4   CHLORIDE  96   < >  99  96  98  100   CO2  25   < >  23  22  21  20   BUN  16   < >  23*  27*  31*  33*   CREATININE  1.12   < >  1.68*  1.91*  2.23*  1.98*   MAGNESIUM  1.2*   --   2.4   --    --   2.0   PHOSPHORUS  3.9   --    --    --    --   3.4   CALCIUM  7.6*   < >  7.9*  8.6  8.8  8.6    < > = values in this interval not displayed.     Recent Labs      08/28/17   1023   08/29/17   1138  08/29/17   1726   1737   ALTSGPT  20   --    --    --   12   ASTSGOT  58*   --    --    --   29   ALKPHOSPHAT  104*   --    --    --   71   TBILIRUBIN  15.0*   --    --    --   28.5*   LIPASE  29   --    --    --    --    GLUCOSE  105*   < >  95  106*  75    < > = values in this interval not displayed.     Recent Labs      17   1023   17   1139   17   02017   0717   1343   RBC  1.26*   < >   --    < >  1.69*  2.18*  2.08*   HEMOGLOBIN  5.3*   < >   --    < >  6.0*  7.6*  7.3*   HEMATOCRIT  15.1*   < >   --    < >  16.7*  21.5*  20.5*   PLATELETCT  95*   < >   --    < >  65*  68*  74*   PROTHROMBTM  27.1*   --   25.8*   --    --    --    --    APTT  50.1*   --    --    --    --    --    --    INR  2.42*   --   2.28*   --    --    --    --    IRON   --    --    --    --   85   --    --    FERRITIN   --    --    --    --   383.0*   --    --    TOTIRONBC   --    --    --    --   126*   --    --     < > = values in this interval not displayed.     Recent Labs      17   1023   17   0140   17   02017   0717   1343   WBC  11.7*   < >  10.6   < >  13.8*  13.9*  12.4*   NEUTSPOLYS  81.60*   --   84.10*   --    --   89.10*   --    LYMPHOCYTES  10.00*   --   7.10*   --    --   5.10*   --    MONOCYTES  5.00   --   1.70   --    --   4.20   --    EOSINOPHILS  2.10   --   0.00   --    --   0.80   --    BASOPHILS  0.30   --   0.00   --    --   0.80   --    ASTSGOT  58*   --    --    --    --   29   --    ALTSGPT  20   --    --    --    --   12   --    ALKPHOSPHAT  104*   --    --    --    --   71   --    TBILIRUBIN  15.0*   --    --    --    --   28.5*   --     < > = values in this interval not displayed.           Hemodynamics:  Temp (24hrs), Av.9 °C (100.2 °F), Min:37.7 °C (99.8 °F), Max:38.2 °C (100.7 °F)  Temperature: 38 °C (100.4 °F)  Pulse  Av.9  Min: 102  Max: 155Heart Rate (Monitored): (!) 126  Blood Pressure: 114/60, NIBP: 106/47     Respiratory:     Respiration: (!) 26, Pulse Oximetry: 93 %     Work Of Breathing / Effort: Mild;Tachypnea  RUL Breath Sounds: Clear, RML Breath Sounds: Clear, RLL Breath Sounds: Diminished, NEO Breath Sounds: Clear, LLL Breath Sounds: Diminished  Fluids:    Intake/Output Summary (Last 24 hours) at 08/30/17 1630  Last data filed at 08/30/17 1252   Gross per 24 hour   Intake          1449.83 ml   Output              720 ml   Net           729.83 ml        GI/Nutrition:  Orders Placed This Encounter   Procedures   • DIET ORDER     Standing Status:   Standing     Number of Occurrences:   1     Order Specific Question:   Diet:     Answer:   Hepatic [9]     Medical Decision Making, by Problem:  Active Hospital Problems    Diagnosis   • Acute kidney injury (CMS-HCC) [N17.9]   • Spontaneous bacterial peritonitis (CMS-HCC) [K65.2]   • Anemia due to gastrointestinal blood loss [D50.0]   • Alcoholic hepatitis with ascites [K70.11]   • Alcohol withdrawal (CMS-HCC) [F10.239]   • Cirrhosis (CMS-HCC) [K74.60]   • Abdominal pain [R10.9]   • Coagulopathy (CMS-HCC) [D68.9]         Reviewed items::  Labs reviewed, Radiology images reviewed and Medications reviewed  Bynum catheter::  Critically Ill - Requiring Accurate Measurement of Urinary Output    1 ALMA: prerenal vs hepatorenal syndrome, nonoliguric  no acute need for HD  Continue IVF  Renal dose all meds  Avoid nephrotoxins  Prognosis poor sec to liver dx.  Consult palliative  D/W Dr Escalante

## 2017-08-30 NOTE — CARE PLAN
Problem: Knowledge Deficit  Goal: Knowledge of disease process/condition, treatment plan, diagnostic tests, and medications will improve  Outcome: PROGRESSING AS EXPECTED  Plan of care discussed with patient.

## 2017-08-30 NOTE — PALLIATIVE CARE
"Palliative Care follow-up  Received msg that pt's SO is at bedside. Duarte is at bedside, discussed with pt and Duarte pt's understanding of clinical picture. Pt verbalized that her liver, kidneys and heart are shutting down. PC RN discussed pt's goals, pt stated \"To get better\". PC RN discussed if she did not clinically improve, would she want life support, dialysis or life prolonging treatments. Pt stated yes. PC RN asked if her heart would stop or stopped breathing would she want CPR and life support to prolong her life, pt stated yes. PC RN asked if the doctors told her that there was no further treatment for her liver and kidney, and she declined and even with life support it wouldn't change her outcome of multisystem failure, would she want life support or be made comfortable, pt stated \"yes I want life support\". Duarte stated, \"I don't think you understand babe, if you were gonna die anyway's you want that burden of being kept alive on machines?\" pt is lethargic and fell asleep, after stimulation pt said \"yes\". Duarte stated he would like to get in contact with family so they can come visit and talk with the MD about pt's prognosis. He also wanted to talk to pt more about her healthcare wishes. PC RN provided contact card and encouraged to call with time when family will be present so a family conference can be set up.       Updated:   Dr. Lawrence    Plan:   Follow up with pt and Duarte regarding other family coming to visit and family conference.     Thank you for allowing Palliative Care to participate in this patient's care. Please feel free to call [phone] with any questions or concerns.  "

## 2017-08-30 NOTE — PROGRESS NOTES
"Pulmonary Critical Care Progress Note      Date of Service: 8/30/2017    Chief Complaint: GIB     History of Present Illness: 36 y.o. Female with h/o EtOH use, 1-2 pints vodka daily, cirrhosis, asthma; presents to the ED today with complaints of increasing abdominal pain and distention over the past month. She reports associated nausea, weakness and fatigue. She denied hematemesis but has reported some bright red blood per rectum over the past 2 days. In the ED she does not have significant anemia with a hemoglobin of 5.3, acute kidney injury, coagulopathy/thrombocytopenia and markedly elevated liver transaminases. Paracentesis was obtained in the ED consistent with spontaneous bacterial peritonitis. She has a fever of 102.4. She received IV antibiotics, no PMN, IV fluid and electrolyte replacement in the ED. Gastroenterology has been consulted.    ROS:  Respiratory: negative pleuritic chest pain and positive shortness of breath, Cardiac: negative chest pain and negative palpitations, GI: negative nausea and negative vomiting.  All other systems negative.    Interval Events:  24 hour interval history reviewed   Tm 100.5  +1.5L over last 24hr, +6L since admit  No cxr this am  Hb 6  Plat 65  K 3.2  1u prbcs over night (total 4u prbcs so far)  ST 120s-140s    Exam  Blood pressure 102/45, pulse (!) 128, temperature 37.8 °C (100 °F), resp. rate (!) 21, height 1.702 m (5' 7\"), weight 82.4 kg (181 lb 10.5 oz), SpO2 95 %, not currently breastfeeding.  Gen: nad  Heent: nc, at, + scleral icterus  Cvs: tachy rate  Resp: diminished  Abdo: + dist, + ttp, no r/g  Ext: + edema  Neuro: non focal      PFSH:  No change.    Respiratory:     Pulse Oximetry: 95 %  Chest Tube Drains:          ImagingAvailable data reviewed         Invalid input(s): [id number]    HemoDynamics:  Pulse: (!) 128, Heart Rate (Monitored): (!) 128  Blood Pressure: 102/45, NIBP: 113/64         Imaging: Available data reviewed        Neuro:  GCS Total [place] " Coma Score: 14         Imaging: Available data reviewed    Fluids:  Intake/Output       08/28/17 0700 - 08/29/17 0659 08/29/17 0700 - 08/30/17 0659 08/30/17 0700 - 08/31/17 0659      9900-9516 0123-7775 Total 4756-6953 0616-6521 Total 8983-0193 1008-8025 Total       Intake    P.O.  --  120 120  --  500 500  --  -- --    P.O. -- 120 120 -- 500 500 -- -- --    I.V.  --  487.8 487.8  --  -- --  --  -- --    Magnesium Sulfate Volume -- 151.8 151.8 -- -- -- -- -- --    Rally bag -- 84 84 -- -- -- -- -- --    IV Volume (Rally bag) -- 252 252 -- -- -- -- -- --    Blood  3700  305 4005  1321.3  281 1602.3  --  -- --    Volume (RELEASE RED BLOOD CELLS) 3700 -- 3700 -- -- -- -- -- --    Volume (RELEASE RED BLOOD CELLS) -- 105 105 -- -- -- -- -- --    Volume (RELEASE FRESH FROZEN PLASMA) -- 200 200 -- -- -- -- -- --    Volume (RELEASE FRESH FROZEN PLASMA) -- -- -- 350 -- 350 -- -- --    Volume (RELEASE FRESH FROZEN PLASMA) -- -- -- 315 -- 315 -- -- --    Volume (RELEASE RED BLOOD CELLS) -- -- -- 406.3 -- 406.3 -- -- --    Volume (RELEASE RED BLOOD CELLS) -- -- -- 250 -- 250 -- -- --    Volume (RELEASE RED BLOOD CELLS) -- -- -- -- 281 281 -- -- --    Total Intake 3700 912.8 4612.8 1321.3 781 2102.3 -- -- --       Output    Urine  --  15 15  2  520 522  --  -- --    Straight Catheter -- 15 15 -- -- -- -- -- --    Void (ml) -- -- -- 2 520 522 -- -- --    Stool  --  -- --  --  -- --  --  -- --    Number of Times Stooled -- 1 x 1 x 1 x -- 1 x -- -- --    Total Output -- 15 15 2 520 522 -- -- --       Net I/O     3700 897.8 4597.8 1319.3 261 1580.3 -- -- --           Recent Labs      08/28/17   1023   08/29/17   0140  08/29/17   1138  08/29/17   1726  08/30/17   0737   SODIUM  130*   < >  131*  128*  130*  128*   POTASSIUM  2.8*   < >  3.2*  3.5*  3.7  4.4   CHLORIDE  96   < >  99  96  98  100   CO2  25   < >  23  22  21  20   BUN  16   < >  23*  27*  31*  33*   CREATININE  1.12   < >  1.68*  1.91*  2.23*  1.98*   MAGNESIUM  1.2*    --   2.4   --    --   2.0   PHOSPHORUS  3.9   --    --    --    --   3.4   CALCIUM  7.6*   < >  7.9*  8.6  8.8  8.6    < > = values in this interval not displayed.       GI/Nutrition:    Imaging: Available data reviewed  NPO  Liver Function  Recent Labs      17   1023   08/29/17   1138  08/29/17   1726  08/30/17   0737   ALTSGPT  20   --    --    --   12   ASTSGOT  58*   --    --    --   29   ALKPHOSPHAT  104*   --    --    --   71   TBILIRUBIN  15.0*   --    --    --   28.5*   LIPASE  29   --    --    --    --    GLUCOSE  105*   < >  95  106*  75    < > = values in this interval not displayed.       Heme:  Recent Labs      17   1023   08/29/17   1138  08/29/17   1139  08/29/17   1726  08/30/17   0204   RBC  1.26*   < >  1.82*   --   2.06*  1.69*   HEMOGLOBIN  5.3*   < >  6.6*   --   7.0*  6.0*   HEMATOCRIT  15.1*   < >  18.9*   --   20.2*  16.7*   PLATELETCT  95*   < >  63*   --   66*  65*   PROTHROMBTM  27.1*   --    --   25.8*   --    --    APTT  50.1*   --    --    --    --    --    INR  2.42*   --    --   2.28*   --    --    IRON   --    --    --    --    --   85   FERRITIN   --    --    --    --    --   383.0*   TOTIRONBC   --    --    --    --    --   126*    < > = values in this interval not displayed.       Infectious Disease:  Temp  Av.7 °C (99.9 °F)  Min: 37.4 °C (99.4 °F)  Max: 38.1 °C (100.5 °F)  Micro: cultures reviewed  Recent Labs      17   1023   17   0140  17   02017   0737   WBC  11.7*   < >  10.6  11.4*  11.9*  13.8*   --    NEUTSPOLYS  81.60*   --   84.10*   --    --    --    --    LYMPHOCYTES  10.00*   --   7.10*   --    --    --    --    MONOCYTES  5.00   --   1.70   --    --    --    --    EOSINOPHILS  2.10   --   0.00   --    --    --    --    BASOPHILS  0.30   --   0.00   --    --    --    --    ASTSGOT  58*   --    --    --    --    --   29   ALTSGPT  20   --    --    --    --    --   12   ALKPHOSPHAT  104*   --     --    --    --    --   71   TBILIRUBIN  15.0*   --    --    --    --    --   28.5*    < > = values in this interval not displayed.     Current Facility-Administered Medications   Medication Dose Frequency Provider Last Rate Last Dose   • lactulose 20 GM/30ML solution 30 mL  30 mL BID Luis Murray M.D.       • potassium chloride SA (Kdur) tablet 40 mEq  40 mEq BID Luis Murray M.D.   40 mEq at 08/30/17 0524   • oxycodone immediate-release (ROXICODONE) tablet 5 mg  5 mg Q4HRS PRN Royce Lawrence M.D.   5 mg at 08/29/17 1655   • albumin human 25% solution 75 g  75 g DAILY Luis Murray M.D. 150 mL/hr at 08/29/17 1422 75 g at 08/29/17 1422   • morphine (pf) 4 mg/ml injection 2 mg  2 mg Q4HRS PRN Luis Murray M.D.   2 mg at 08/29/17 1355   • NS infusion   Continuous Luis Murray M.D. 125 mL/hr at 08/30/17 0747     • octreotide (SANDOSTATIN) 1,250 mcg in  mL Infusion  50 mcg/hr Continuous Joseph Russell M.D. 10 mL/hr at 08/29/17 1937 50 mcg/hr at 08/29/17 1937   • midodrine (PROAMATINE) tablet 10 mg  10 mg TID WITH MEALS Joseph Russell M.D.   10 mg at 08/30/17 0746   • cefTRIAXone (ROCEPHIN) 2 g in  mL IVPB  2 g Q24HRS Terry Galeas D.O.   Stopped at 08/29/17 0920   • senna-docusate (PERICOLACE or SENOKOT S) 8.6-50 MG per tablet 2 Tab  2 Tab BID LARRY Marie.O.   2 Tab at 08/29/17 1923    And   • polyethylene glycol/lytes (MIRALAX) PACKET 1 Packet  1 Packet QDAY PRN Terry Galeas D.O.        And   • magnesium hydroxide (MILK OF MAGNESIA) suspension 30 mL  30 mL QDAY PRN Terry Galeas D.O.        And   • bisacodyl (DULCOLAX) suppository 10 mg  10 mg QDAY PRN Terry Galeas D.O.       • Respiratory Care per Protocol   Continuous RT Terry Galeas D.O.       • ondansetron (ZOFRAN) syringe/vial injection 4 mg  4 mg Q4HRS PRN Terry Galeas D.O.   4 mg at 08/29/17 0758   • ondansetron (ZOFRAN ODT) dispertab 4 mg  4 mg Q4HRS PRN Terry Galeas D.O.       • promethazine  (PHENERGAN) tablet 12.5-25 mg  12.5-25 mg Q4HRS PRN LARRY Marie.O.       • promethazine (PHENERGAN) suppository 12.5-25 mg  12.5-25 mg Q4HRS PRN LARRY Marie.O.       • prochlorperazine (COMPAZINE) injection 5-10 mg  5-10 mg Q4HRS PRN LARRY Marie.O.       • glucose 4 g chewable tablet 16 g  16 g Q15 MIN PRN LARRY Marie.O.        And   • dextrose 50% (D50W) injection 25 mL  25 mL Q15 MIN PRN LARRY Marie.O.       • lorazepam (ATIVAN) tablet 0.5 mg  0.5 mg Q4HRS PRN LARRY Marie.O.       • lorazepam (ATIVAN) tablet 1 mg  1 mg Q4HRS PRN NAT MarieO.        Or   • lorazepam (ATIVAN) injection 0.5 mg  0.5 mg Q4HRS PRN LARRY Marie.O.   Stopped at 08/28/17 1920   • lorazepam (ATIVAN) tablet 2 mg  2 mg Q2HRS PRN NAT MarieO.        Or   • lorazepam (ATIVAN) injection 1 mg  1 mg Q2HRS PRN LARRY Marie.O.   1 mg at 08/28/17 1922   • lorazepam (ATIVAN) tablet 3 mg  3 mg Q HOUR PRN NAT MarieO.        Or   • lorazepam (ATIVAN) injection 1.5 mg  1.5 mg Q HOUR PRN LARRY Marie.O.       • lorazepam (ATIVAN) tablet 4 mg  4 mg Q15 MIN PRN LARRY Marie.O.   4 mg at 08/29/17 1924    Or   • lorazepam (ATIVAN) injection 2 mg  2 mg Q15 MIN PRN LARRY Marie.O.       • phytonadione (AQUA-MEPHYTON) injection 10 mg  10 mg DAILY Harrison De Oliveira M.D.   10 mg at 08/29/17 0755   • thiamine (THIAMINE) tablet 100 mg  100 mg DAILY LARRY Marie.O.   100 mg at 08/29/17 1922    And   • folic acid (FOLVITE) tablet 1 mg  1 mg DAILY NAT MarieO.   1 mg at 08/29/17 1924    And   • multivitamin (THERAGRAN) tablet 1 Tab  1 Tab DAILY NAT MarieO.   1 Tab at 08/29/17 1923   • pantoprazole (PROTONIX) injection 40 mg  40 mg BID NAT MarieO.   40 mg at 08/30/17 0118     Last reviewed on 8/29/2017  1:22 AM by Becky Garcia R.N.    Quality  Measures:   Medications reviewed, Radiology images reviewed and Labs  reviewed                      Assessment and Plan:  SBP                        - f/u ascites fluid cx                        - rocephin                         - albumin  GIB, Guaiac positive stools and acute blood loss anemia                        - GI following                        - PPI, octreotide                        - serial HH                        - PRBCs to >7  Acute alcoholic hepatitis, severe, Maddrey DF 84                        - albumin                        - held steroid with SBP                        - US liver reviewed              - bili up to 30             - ammonia 61  ALMA             - prerenal/hrs/atn                        - IVF, albumin                        - renal on board  Coagulopathy                        - vit K                        - TEG with decreased clot strength  H/o EtOH, suspect component of W/d now                         - CIWA vs phenobarb  Hypokalemia                        - replace IV and PO  HypoMg++                        - replace IV  Thrombocytopenia    - holding in 60-70 range    Patient remains very high risk for deterioration and decompensation with high likelihood of cardiopulmonary collapse and death. Very close monitoring for institution of mechanical ventilation, pressor support, etc.     Discussed patient condition and risk of morbidity and/or mortality with RN, RT, Therapies and Pharmacy.  Discussed in detail with resident.   The patient remains critically ill.  Critical care time = 32 minutes in directly providing and coordinating critical care and extensive data review.  No time overlap and excludes procedures.

## 2017-08-30 NOTE — PROGRESS NOTES
Per GI if kidney functions are worening then we may start octreotide & Midodrine.   Cr --> 1.12 --> 1.35 -->1.6s --> 1.9 --> 2.23  GFR --> 50s --> 40s --> 30s --> 25    Also she is suspected case of alcoholic hepatitis --> aLura's Discriminant Function for Alcoholic Hepatitis is 84.5 based on laps on 8/28 (bilirubin 15, & INR 27.1)      Hepatorenal syndrome  ? Alcoholic hepatitis    Plan  Start octreotide & Midodrine.   Day team to consider methyl prednisolone or pentoxifylline

## 2017-08-30 NOTE — CONSULTS
Reason for PC Consult: Advance Care Planning    Consulted by:   Dr. Murray    Assessment:  General:   36 year old female admitted for anemia on 8/28/17. Pt has a history of ETOH cirrhosis, Hepatitis C, HTN, tobacco use, marijuana use, and macrocytic anemia. Pt presented to the ED for severe abdominal pain, distention, nausea and vomiting.     Dyspnea: No, 92% on 4L NC  Last BM: 08/29/17   Pain: No  Depression: Mood appropriate for situation     Spiritual:  Is Mosque or spirituality important for coping with this illness? No    Has a  or spiritual provider visit been requested? No    Palliative Performance Scale: 50%    Advance Directive: None on File  DPOA: None on File, [facility] Taisha Brown ([phone])    POLST: None on File    Code Status: Full     Outcome:  PC RN introduced self and role of PC. Pt is arousable but lethargic, PC RN attempted to discussed GOC pt request to wait until her significant other Duarte Longo is at bedside, he does not have a cell phone for PC RN to call to schedule a time to meet. PC RN discussed advanced directive, pt requested to complete AD when Duarte is at bedside.     Updated:   Dr. Lawrence    Plan:   Return when Duarte, SO is at bedside.     Thank you for allowing Palliative Care to participate in this patient's care. Please feel free to call [phone] with any questions or concerns.

## 2017-08-30 NOTE — PROGRESS NOTES
Gastroenterology Progress Note    Date of Service: 08/30/2017    Subjective: Pt is drowsy and answers some questions. Still reports abdominal pain but improved from previous. Pt was febrile in the last 24 hours.       Objective:    Vitals -   Vitals:    08/30/17 0945 08/30/17 1000 08/30/17 1015 08/30/17 1030   BP:       Pulse: (!) 125 (!) 127 (!) 122 (!) 123   Resp: 20 (!) 24 (!) 23 (!) 22   Temp:       SpO2: 92% 92% 93% 94%   Weight:       Height:           Gen: opens eyes, lethargic, chronically ill appearing  HEENT: +icteric, Nares patent, membranes dry  CVS: regular rhythm, tachycardic  Pulm: CTAB, no wheezes  Abd: soft, diffuse mild TTP, distended, diminished bowel sounds  Ext: no edema    Labs:  Lab Results   Component Value Date/Time    SODIUM 128 (L) 08/30/2017 07:37 AM    POTASSIUM 4.4 08/30/2017 07:37 AM    CHLORIDE 100 08/30/2017 07:37 AM    CO2 20 08/30/2017 07:37 AM    GLUCOSE 75 08/30/2017 07:37 AM    BUN 33 (H) 08/30/2017 07:37 AM    CREATININE 1.98 (H) 08/30/2017 07:37 AM    CREATININE 0.7 06/16/2006 11:30 PM      Lab Results   Component Value Date/Time    WBC 13.9 (H) 08/30/2017 07:37 AM    RBC 2.18 (L) 08/30/2017 07:37 AM    HEMOGLOBIN 7.6 (L) 08/30/2017 07:37 AM    HEMATOCRIT 21.5 (L) 08/30/2017 07:37 AM    MCV 98.6 (H) 08/30/2017 07:37 AM    MCH 34.9 (H) 08/30/2017 07:37 AM    MCHC 35.3 (H) 08/30/2017 07:37 AM    MPV 9.8 08/30/2017 07:37 AM    NEUTSPOLYS 89.10 (H) 08/30/2017 07:37 AM    LYMPHOCYTES 5.10 (L) 08/30/2017 07:37 AM    MONOCYTES 4.20 08/30/2017 07:37 AM    EOSINOPHILS 0.80 08/30/2017 07:37 AM    BASOPHILS 0.80 08/30/2017 07:37 AM    HYPOCHROMIA 1+ 08/28/2017 10:23 AM    ANISOCYTOSIS 1+ 08/30/2017 07:37 AM      Lab Results   Component Value Date/Time    PROTHROMBTM 25.8 (H) 08/29/2017 11:39 AM    INR 2.28 (H) 08/29/2017 11:39 AM        ASSESSMENT:   1) SBP - on ABX and albumin. E.Coli in ascites, would f/u sensitivities  2) abdominal pain - mildly  improved  3) acute blood loss anemia - again no obvious overt bleeding currently. Received more blood but no clear bleeding source  4) guiac positive stools  5) elevated liver tests  6) suspected ETOH hepatitis - can add Trental. Would hold on steroids due to infection  7) coagulopathy  8) ETOH withdrawal  9) ALMA  10) ETOH cirrhosis - pt is not a transplant candidate due to her ETOH use. She would need 6 months at least of sobriety before this could even be considered        PLAN:   1) continue ABx; pt will need SBP prophylaxis at discharge  2) Renal function improved some with midodrine/octreotide/albumin. Continue to monitor  3) Pt will need EGD when afebrile and more alert and stable. If signs of active bleed, would proceed with EGD ASAP  4) Continue CIWA protocol  5)  continue PPI  6) add Trental today    Pt has a very poor prognosis due to her cirrhosis, ETOH hepatitis, SBP and renal dysfunction. She is not a transplant candidate due to her ETOH use. Her only treatment options are supportive care. Palliative care consult is not unreasonable.

## 2017-08-31 NOTE — CARE PLAN
Problem: Communication  Goal: The ability to communicate needs accurately and effectively will improve  Outcome: PROGRESSING AS EXPECTED  Pt updated on plan of care and questions answered.

## 2017-08-31 NOTE — PROGRESS NOTES
"Pulmonary Critical Care Progress Note      Date of Service: 8/30/2017    Chief Complaint: GIB     History of Present Illness: 36 y.o. Female with h/o EtOH use, 1-2 pints vodka daily, cirrhosis, asthma; presents to the ED today with complaints of increasing abdominal pain and distention over the past month. She reports associated nausea, weakness and fatigue. She denied hematemesis but has reported some bright red blood per rectum over the past 2 days. In the ED she does not have significant anemia with a hemoglobin of 5.3, acute kidney injury, coagulopathy/thrombocytopenia and markedly elevated liver transaminases. Paracentesis was obtained in the ED consistent with spontaneous bacterial peritonitis. She has a fever of 102.4. She received IV antibiotics, no PMN, IV fluid and electrolyte replacement in the ED. Gastroenterology has been consulted.    ROS:  Respiratory: negative pleuritic chest pain and positive shortness of breath, Cardiac: negative chest pain and negative palpitations, GI: negative nausea and negative vomiting.  All other systems negative.    Interval Events:  24 hour interval history reviewed   Tm 100.6  +680cc over last 24hr, +6.6L since admit  No cxr this am  Hb 7.7  Plat 65  Bili up to 29  Cr up    Exam  Blood pressure 114/60, pulse (!) 120, temperature 37.6 °C (99.7 °F), resp. rate 19, height 1.702 m (5' 7\"), weight 82.4 kg (181 lb 10.5 oz), SpO2 96 %, not currently breastfeeding.  Gen: nad  Heent: nc, at, + scleral icterus  Cvs: tachy rate  Resp: diminished  Abdo: + dist, + ttp, no r/g  Ext: + edema  Neuro: non focal      PFSH:  No change.    Respiratory:     Pulse Oximetry: 96 %  Chest Tube Drains:          ImagingAvailable data reviewed         Invalid input(s): [id number]    HemoDynamics:  Pulse: (!) 120, Heart Rate (Monitored): (!) 120  NIBP: 113/69         Imaging: Available data reviewed        Neuro:  GCS Total Mika Coma Score: 14         Imaging: Available data " reviewed    Fluids:  Intake/Output       08/29/17 0700 - 08/30/17 0659 08/30/17 0700 - 08/31/17 0659 08/31/17 0700 - 09/01/17 0659      0700-1859 1900-0659 Total 0700-1859 1900-0659 Total 0700-1859 1900-0659 Total       Intake    P.O.  --  500 500  270  420 690  120  -- 120    P.O. -- 500 500 270 420 690 120 -- 120    I.V.  --  -- --  173.8  -- 173.8  --  -- --    Octreotide Volume -- -- -- 123.8 -- 123.8 -- -- --    IV Volume (Albumin) -- -- -- 50 -- 50 -- -- --    Blood  1321.3  281 1602.3  225  -- 225  --  -- --    Volume (RELEASE FRESH FROZEN PLASMA) 350 -- 350 -- -- -- -- -- --    Volume (RELEASE FRESH FROZEN PLASMA) 315 -- 315 -- -- -- -- -- --    Volume (RELEASE RED BLOOD CELLS) 406.3 -- 406.3 -- -- -- -- -- --    Volume (RELEASE RED BLOOD CELLS) 250 -- 250 -- -- -- -- -- --    Volume (RELEASE RED BLOOD CELLS) -- 281 281 -- -- -- -- -- --    Volume (RELEASE PLATELET PHERESIS) -- -- -- 225 -- 225 -- -- --    Total Intake 1321.3 781 2102.3 668.8 420 1088.8 120 -- 120       Output    Urine  2  520 522  200  200 400  320  -- 320    Number of Times Voided -- -- -- 1 x -- 1 x -- -- --    Indwelling Cathether -- -- -- -- -- -- 320 -- 320    Void (ml) 2 520 522 200 200 400 -- -- --    Stool  --  -- --  --  -- --  --  -- --    Number of Times Stooled 1 x -- 1 x -- 4 x 4 x 2 x -- 2 x    Total Output 2 520 522 200 200 400 320 -- 320       Net I/O     1319.3 261 1580.3 468.8 220 688.8 -200 -- -200           Recent Labs      08/29/17   0140   08/29/17   1726  08/30/17   0737  08/31/17   0147   SODIUM  131*   < >  130*  128*  131*   POTASSIUM  3.2*   < >  3.7  4.4  4.8   CHLORIDE  99   < >  98  100  102   CO2  23   < >  21  20  21   BUN  23*   < >  31*  33*  33*   CREATININE  1.68*   < >  2.23*  1.98*  2.25*   MAGNESIUM  2.4   --    --   2.0  2.2   PHOSPHORUS   --    --    --   3.4  4.1   CALCIUM  7.9*   < >  8.8  8.6  8.6    < > = values in this interval not displayed.       GI/Nutrition:    Imaging: Available data  reviewed  NPO  Liver Function  Recent Labs      17   1726  17   0147   ALTSGPT   --   12  12   ASTSGOT   --   29  40   ALKPHOSPHAT   --   71  59   TBILIRUBIN   --   28.5*  29.4*   GLUCOSE  106*  75  89       Heme:  Recent Labs      17   1139   17   0204   17   0147  17   0430  17   0737  17   1350   RBC   --    < >  1.69*   < >  2.12*   --   2.23*  2.24*   HEMOGLOBIN   --    < >  6.0*   < >  7.5*   --   7.5*  7.7*   HEMATOCRIT   --    < >  16.7*   < >  20.7*   --   21.9*  22.1*   PLATELETCT   --    < >  65*   < >  73*   --   64*  63*   PROTHROMBTM  25.8*   --    --    --    --   25.5*   --    --    INR  2.28*   --    --    --    --   2.24*   --    --    IRON   --    --   85   --    --    --    --    --    FERRITIN   --    --   383.0*   --    --    --    --    --    TOTIRONBC   --    --   126*   --    --    --    --    --     < > = values in this interval not displayed.       Infectious Disease:  Temp  Av.9 °C (100.2 °F)  Min: 37.6 °C (99.7 °F)  Max: 38.1 °C (100.6 °F)  Micro: cultures reviewed  Recent Labs      17   0140   17   0717   01417   0717   1350   WBC  10.6   < >  13.9*   < >  11.3*  11.1*  10.7   NEUTSPOLYS  84.10*   --   89.10*   --    --    --    --    LYMPHOCYTES  7.10*   --   5.10*   --    --    --    --    MONOCYTES  1.70   --   4.20   --    --    --    --    EOSINOPHILS  0.00   --   0.80   --    --    --    --    BASOPHILS  0.00   --   0.80   --    --    --    --    ASTSGOT   --    --   29   --   40   --    --    ALTSGPT   --    --   12   --   12   --    --    ALKPHOSPHAT   --    --   71   --   59   --    --    TBILIRUBIN   --    --   28.5*   --   29.4*   --    --     < > = values in this interval not displayed.     Current Facility-Administered Medications   Medication Dose Frequency Provider Last Rate Last Dose   • NS infusion   Continuous Luis Murray M.D. 75 mL/hr at 17 0841     •  lactulose 20 GM/30ML solution 30 mL  30 mL BID Luis Murray M.D.   30 mL at 08/31/17 0843   • octreotide (SANDOSTATIN) injection 100 mcg  100 mcg TID Royce Lawrence M.D.   100 mcg at 08/31/17 0843   • pentoxifylline CR (TRENTAL) tablet 400 mg  400 mg TID Harrison De Oliveira M.D.   400 mg at 08/31/17 0843   • albumin human 25% solution 25 g  25 g DAILY Luis Murray M.D. 150 mL/hr at 08/31/17 0844 25 g at 08/31/17 0844   • midodrine (PROAMATINE) tablet 10 mg  10 mg TID WITH MEALS Joseph Russell M.D.   10 mg at 08/31/17 1133   • cefTRIAXone (ROCEPHIN) 2 g in  mL IVPB  2 g Q24HRS LARRY Marie.O.   Stopped at 08/31/17 0913   • senna-docusate (PERICOLACE or SENOKOT S) 8.6-50 MG per tablet 2 Tab  2 Tab BID LARRY Marie.O.   Stopped at 08/31/17 0900    And   • polyethylene glycol/lytes (MIRALAX) PACKET 1 Packet  1 Packet QDAY PRN LARRY Marie.O.        And   • magnesium hydroxide (MILK OF MAGNESIA) suspension 30 mL  30 mL QDAY PRN LARRY Marie.O.        And   • bisacodyl (DULCOLAX) suppository 10 mg  10 mg QDAY PRN LARRY Marie.O.       • Respiratory Care per Protocol   Continuous RT LARRY Marie.O.       • ondansetron (ZOFRAN) syringe/vial injection 4 mg  4 mg Q4HRS PRN LARRY Marie.O.   4 mg at 08/29/17 0758   • ondansetron (ZOFRAN ODT) dispertab 4 mg  4 mg Q4HRS PRN LARRY Marie.O.       • promethazine (PHENERGAN) tablet 12.5-25 mg  12.5-25 mg Q4HRS PRN LARRY Marie.O.       • promethazine (PHENERGAN) suppository 12.5-25 mg  12.5-25 mg Q4HRS PRN Terry Galeas D.O.       • prochlorperazine (COMPAZINE) injection 5-10 mg  5-10 mg Q4HRS PRN NAT MarieO.       • glucose 4 g chewable tablet 16 g  16 g Q15 MIN PRN Terry Galeas D.O.        And   • dextrose 50% (D50W) injection 25 mL  25 mL Q15 MIN PRN Terry Galeas D.O.       • thiamine (THIAMINE) tablet 100 mg  100 mg DAILY NAT MarieO.   100 mg at 08/31/17 0843    And   •  folic acid (FOLVITE) tablet 1 mg  1 mg DAILY LARRY Marie.O.   1 mg at 08/31/17 0845    And   • multivitamin (THERAGRAN) tablet 1 Tab  1 Tab DAILY LARRY Marie.O.   1 Tab at 08/31/17 0843   • pantoprazole (PROTONIX) injection 40 mg  40 mg BID LARRY Marie.O.   40 mg at 08/31/17 0000     Last reviewed on 8/29/2017  1:22 AM by Becky Garcia R.N.    Quality  Measures:  Labs reviewed, Radiology images reviewed and Medications reviewed                      Assessment and Plan:  SBP                        - ascites E Coli                        - rocephin                         - albumin  GIB, Guaiac positive stools and acute blood loss anemia                        - GI following                        - PPI, octreotide                        - serial HH                        - PRBCs to >7  Acute alcoholic hepatitis, severe, Maddrey DF 84                        - albumin                        - held steroid with SBP                        - US liver reviewed              - bili up to 30             - ammonia 61  ALMA             - prerenal/hrs/atn                        - IVF, albumin                        - renal on board  Coagulopathy                        - vit K                        - TEG with decreased clot strength  H/o EtOH, suspect component of W/d now                         - CIWA vs phenobarb  Hypokalemia                        - replace IV and PO  HypoMg++                        - replace IV  Thrombocytopenia    - holding in 60-70 range    Patient remains very high risk for deterioration and decompensation with high likelihood of cardiopulmonary collapse and death. Very close monitoring for institution of mechanical ventilation, pressor support, etc.     Discussed patient condition and risk of morbidity and/or mortality with RN, RT, Therapies and Pharmacy.  Discussed in detail with resident.   The patient remains critically ill.  Critical care time = 32 minutes in directly  providing and coordinating critical care and extensive data review.  No time overlap and excludes procedures.

## 2017-08-31 NOTE — PROGRESS NOTES
Hospital Medicine Progress Note, Adult, Complex               Author: Xavi Naqvijjar Date & Time created: 8/31/2017  3:10 PM     Interval History:  Pt with alcoholic liver cirrhosis , presented with SBP.  Developed ALMA.  Events last 24 h noted  Review of Systems:  Review of Systems   Unable to perform ROS: Mental acuity       Physical Exam:  Physical Exam   Constitutional: She appears lethargic. She appears cachectic. She appears ill.   HENT:   Head: Normocephalic and atraumatic.   Nose: Nose normal.   Mouth/Throat: No oropharyngeal exudate.   Eyes: Conjunctivae are normal. Right eye exhibits no discharge. Left eye exhibits no discharge.   Neck: Normal range of motion. Neck supple. No JVD present. No tracheal deviation present. No thyromegaly present.   Cardiovascular: Normal rate, regular rhythm and normal heart sounds.  Exam reveals no friction rub.    No murmur heard.  Pulmonary/Chest: Effort normal and breath sounds normal. No respiratory distress. She has no wheezes. She has no rales.   Abdominal: She exhibits distension. There is tenderness. There is no rebound.   Musculoskeletal: She exhibits edema. She exhibits no tenderness.   Neurological: She appears lethargic.   Skin: Skin is warm and dry. No erythema.   Juandice    Nursing note and vitals reviewed.      Labs:        Invalid input(s): [id number]      Recent Labs      08/29/17   0140   08/29/17 1726 08/30/17 0737 08/31/17 0147   SODIUM  131*   < >  130*  128*  131*   POTASSIUM  3.2*   < >  3.7  4.4  4.8   CHLORIDE  99   < >  98  100  102   CO2  23   < >  21  20  21   BUN  23*   < >  31*  33*  33*   CREATININE  1.68*   < >  2.23*  1.98*  2.25*   MAGNESIUM  2.4   --    --   2.0  2.2   PHOSPHORUS   --    --    --   3.4  4.1   CALCIUM  7.9*   < >  8.8  8.6  8.6    < > = values in this interval not displayed.     Recent Labs      08/29/17   1726 08/30/17 0737  08/31/17 0147   ALTSGPT   --   12  12   ASTSGOT   --   29  40   ALKPHOSPHAT   --   71   59   TBILIRUBIN   --   28.5*  29.4*   GLUCOSE  106*  75  89     Recent Labs      17   1139   17   0204   17   01417   0430  17   0717   1350   RBC   --    < >  1.69*   < >  2.12*   --   2.23*  2.24*   HEMOGLOBIN   --    < >  6.0*   < >  7.5*   --   7.5*  7.7*   HEMATOCRIT   --    < >  16.7*   < >  20.7*   --   21.9*  22.1*   PLATELETCT   --    < >  65*   < >  73*   --   64*  63*   PROTHROMBTM  25.8*   --    --    --    --   25.5*   --    --    INR  2.28*   --    --    --    --   2.24*   --    --    IRON   --    --   85   --    --    --    --    --    FERRITIN   --    --   383.0*   --    --    --    --    --    TOTIRONBC   --    --   126*   --    --    --    --    --     < > = values in this interval not displayed.     Recent Labs      17   01417   0717   1350   WBC  10.6   < >  13.9*   < >  11.3*  11.1*  10.7   NEUTSPOLYS  84.10*   --   89.10*   --    --    --    --    LYMPHOCYTES  7.10*   --   5.10*   --    --    --    --    MONOCYTES  1.70   --   4.20   --    --    --    --    EOSINOPHILS  0.00   --   0.80   --    --    --    --    BASOPHILS  0.00   --   0.80   --    --    --    --    ASTSGOT   --    --   29   --   40   --    --    ALTSGPT   --    --   12   --   12   --    --    ALKPHOSPHAT   --    --   71   --   59   --    --    TBILIRUBIN   --    --   28.5*   --   29.4*   --    --     < > = values in this interval not displayed.           Hemodynamics:  Temp (24hrs), Av.9 °C (100.2 °F), Min:37.6 °C (99.7 °F), Max:38.1 °C (100.6 °F)  Temperature: 37.6 °C (99.7 °F)  Pulse  Av.7  Min: 102  Max: 155Heart Rate (Monitored): (!) 120  NIBP: 113/69     Respiratory:    Respiration: 19, Pulse Oximetry: 96 %     Work Of Breathing / Effort: Mild;Tachypnea  RUL Breath Sounds: Clear, RML Breath Sounds: Clear, RLL Breath Sounds: Diminished, NEO Breath Sounds: Clear, LLL Breath Sounds:  Diminished  Fluids:    Intake/Output Summary (Last 24 hours) at 08/31/17 1510  Last data filed at 08/31/17 1400   Gross per 24 hour   Intake              540 ml   Output              520 ml   Net               20 ml        GI/Nutrition:  Orders Placed This Encounter   Procedures   • DIET ORDER     Standing Status:   Standing     Number of Occurrences:   1     Order Specific Question:   Diet:     Answer:   Hepatic [9]     Medical Decision Making, by Problem:  Active Hospital Problems    Diagnosis   • Acute kidney injury (CMS-HCC) [N17.9]   • Spontaneous bacterial peritonitis (CMS-HCC) [K65.2]   • Anemia due to gastrointestinal blood loss [D50.0]   • Alcoholic hepatitis with ascites [K70.11]   • Alcohol withdrawal (CMS-HCC) [F10.239]   • Cirrhosis (CMS-HCC) [K74.60]   • Abdominal pain [R10.9]   • Coagulopathy (CMS-HCC) [D68.9]         Reviewed items::  Labs reviewed, Radiology images reviewed and Medications reviewed  Bynum catheter::  Critically Ill - Requiring Accurate Measurement of Urinary Output    1 ALMA: prerenal vs hepatorenal syndrome, nonoliguric  Pt is not a candidate for HD because of liver disease/futile prognosis  Continue IVF  Renal dose all meds  Avoid nephrotoxins  Prognosis poor sec to liver dx.  Consult palliative  D/W Dr Murray

## 2017-08-31 NOTE — PALLIATIVE CARE
Palliative Care follow-up  PC RN stopped by room, no family pt sleeping.      Updated:   Bedside RN    Plan:   Will return when family present.    Thank you for allowing Palliative Care to participate in this patient's care. Please feel free to call [phone] with any questions or concerns.

## 2017-09-01 NOTE — PROGRESS NOTES
Hospital Medicine Progress Note, Adult, Complex               Author: Xavi Najjar Date & Time created: 9/1/2017  12:55 PM     Interval History:  Pt with alcoholic liver cirrhosis , presented with SBP.  Developed ALMA.  Events last 24 h noted.  Still lethargic    Review of Systems:  Review of Systems   Unable to perform ROS: Mental acuity       Physical Exam:  Physical Exam   Constitutional: She appears lethargic. She appears cachectic. She appears ill.   HENT:   Head: Normocephalic and atraumatic.   Nose: Nose normal.   Mouth/Throat: No oropharyngeal exudate.   Eyes: Conjunctivae are normal. Right eye exhibits no discharge. Left eye exhibits no discharge.   Neck: Normal range of motion. Neck supple. No JVD present. No tracheal deviation present. No thyromegaly present.   Cardiovascular: Normal rate, regular rhythm and normal heart sounds.  Exam reveals no friction rub.    No murmur heard.  Pulmonary/Chest: Effort normal and breath sounds normal. No respiratory distress. She has no wheezes. She has no rales.   Abdominal: She exhibits distension. There is tenderness. There is no rebound.   Musculoskeletal: She exhibits edema. She exhibits no tenderness.   Neurological: She appears lethargic.   Skin: Skin is warm and dry. No erythema.   Juandice    Nursing note and vitals reviewed.      Labs:        Invalid input(s): [id number]      Recent Labs      08/30/17   0737  08/31/17   0147  09/01/17   0335   SODIUM  128*  131*  128*   POTASSIUM  4.4  4.8  4.2   CHLORIDE  100  102  102   CO2  20  21  18*   BUN  33*  33*  33*   CREATININE  1.98*  2.25*  1.96*   MAGNESIUM  2.0  2.2  2.0   PHOSPHORUS  3.4  4.1  4.2   CALCIUM  8.6  8.6  8.6     Recent Labs      08/30/17   0737  08/31/17   0147  09/01/17   0335   ALTSGPT  12  12  12   ASTSGOT  29  40  29   ALKPHOSPHAT  71  59  58   TBILIRUBIN  28.5*  29.4*  28.8*   GLUCOSE  75  89  95     Recent Labs      08/30/17   0204   08/31/17   0430   08/31/17   1350  08/31/17   2200   17   0335   RBC  1.69*   < >   --    < >  2.24*  2.33*  2.27*   HEMOGLOBIN  6.0*   < >   --    < >  7.7*  8.1*  8.0*   HEMATOCRIT  16.7*   < >   --    < >  22.1*  23.0*  22.4*   PLATELETCT  65*   < >   --    < >  63*  67*  65*   PROTHROMBTM   --    --   25.5*   --    --    --   26.8*   INR   --    --   2.24*   --    --    --   2.39*   IRON  85   --    --    --    --    --    --    FERRITIN  383.0*   --    --    --    --    --    --    TOTIRONBC  126*   --    --    --    --    --    --     < > = values in this interval not displayed.     Recent Labs      17   0737   17   0147   17   1350  17   2200  17   0335   WBC  13.9*   < >  11.3*   < >  10.7  11.0*  10.5   NEUTSPOLYS  89.10*   --    --    --    --    --   80.10*   LYMPHOCYTES  5.10*   --    --    --    --    --   6.40*   MONOCYTES  4.20   --    --    --    --    --   11.20   EOSINOPHILS  0.80   --    --    --    --    --   0.90   BASOPHILS  0.80   --    --    --    --    --   0.20   ASTSGOT  29   --   40   --    --    --   29   ALTSGPT  12   --   12   --    --    --   12   ALKPHOSPHAT  71   --   59   --    --    --   58   TBILIRUBIN  28.5*   --   29.4*   --    --    --   28.8*    < > = values in this interval not displayed.           Hemodynamics:  Temp (24hrs), Av.1 °C (98.8 °F), Min:36.9 °C (98.4 °F), Max:37.8 °C (100 °F)  Temperature: 36.9 °C (98.4 °F)  Pulse  Av.3  Min: 102  Max: 155Heart Rate (Monitored): (!) 104  NIBP: 111/67     Respiratory:    Respiration: (!) 35, Pulse Oximetry: 100 %     Work Of Breathing / Effort: Tachypnea  RUL Breath Sounds: Clear, RML Breath Sounds: Clear, RLL Breath Sounds: Clear, NEO Breath Sounds: Clear, LLL Breath Sounds: Clear  Fluids:    Intake/Output Summary (Last 24 hours) at 17 1255  Last data filed at 17 0600   Gross per 24 hour   Intake             1190 ml   Output              850 ml   Net              340 ml        GI/Nutrition:  Orders Placed This Encounter    Procedures   • DIET ORDER     Standing Status:   Standing     Number of Occurrences:   1     Order Specific Question:   Diet:     Answer:   Hepatic [9]     Medical Decision Making, by Problem:  Active Hospital Problems    Diagnosis   • Acute kidney injury (CMS-HCC) [N17.9]   • Spontaneous bacterial peritonitis (CMS-HCC) [K65.2]   • Anemia due to gastrointestinal blood loss [D50.0]   • Alcoholic hepatitis with ascites [K70.11]   • Alcohol withdrawal (CMS-HCC) [F10.239]   • Cirrhosis (CMS-HCC) [K74.60]   • Abdominal pain [R10.9]   • Coagulopathy (CMS-HCC) [D68.9]         Reviewed items::  Labs reviewed, Radiology images reviewed and Medications reviewed  Bynum catheter::  Critically Ill - Requiring Accurate Measurement of Urinary Output    1 ALMA: prerenal vs hepatorenal syndrome, nonoliguric  Pt is not a candidate for HD because of liver disease/futile prognosis  Continue IVF  Renal dose all meds  Avoid nephrotoxins  Prognosis poor sec to liver dx.  Recommend CMO

## 2017-09-01 NOTE — PROGRESS NOTES
Pulmonary Critical Care Progress Note      Date of Service: 9/1/2017    Chief Complaint: GIB     History of Present Illness: 36 y.o. Female with h/o EtOH use, 1-2 pints vodka daily, cirrhosis, asthma; presents to the ED today with complaints of increasing abdominal pain and distention over the past month. She reports associated nausea, weakness and fatigue. She denied hematemesis but has reported some bright red blood per rectum over the past 2 days. In the ED she does not have significant anemia with a hemoglobin of 5.3, acute kidney injury, coagulopathy/thrombocytopenia and markedly elevated liver transaminases. Paracentesis was obtained in the ED consistent with spontaneous bacterial peritonitis. She has a fever of 102.4. She received IV antibiotics, no PMN, IV fluid and electrolyte replacement in the ED. Gastroenterology has been consulted.    ROS:  Respiratory: negative pleuritic chest pain and positive shortness of breath, Cardiac: negative chest pain and negative palpitations, GI: negative nausea and negative vomiting.  All other systems negative.    Interval Events:  24 hour interval history reviewed    - Patient is fluctuating on her goals of care at one moment wanting comfort care and another one wanting ongoing limited medical management. Is consistent however on DNR with no escalation of care. Having ongoing abdominal pain and requesting multiple dosages of IV analgesics despite being very drowsy   - Decreasing serum sodium, improving acute kidney injury      PFSH:  No change.    Respiratory:   3 L/m nasal cannula, not cooperative with ISS  Pulse Oximetry: 100 %          Exam: Occasional tachypnea, few rhonchi at bases but otherwise breathing comfortably and speaking in full sentences, moderate cough  ImagingAvailable data reviewed         Invalid input(s): [id number]    HemoDynamics:  Pulse: (!) 106, Heart Rate (Monitored): (!) 106  NIBP: 106/65       Exam: Sinus tachycardia, no murmur, 2+ edema in  lower extremities  Imaging: Available data reviewed        Neuro:  GCS Total Mika Coma Score: 14       Exam: Drowsy but arouses and is oriented ×4, understands terminal prognosis of liver condition  Imaging: Available data reviewed    Fluids:  Intake/Output       08/30/17 0700 - 08/31/17 0659 08/31/17 0700 - 09/01/17 0659 09/01/17 0700 - 09/02/17 0659      8189-1202 1364-0529 Total 0700-1859 1900-0659 Total [id number] 1900-0659 Total       Intake    P.O.  270  420 690  120  290 410  --  -- --    P.O. 270 420 690 120 290 410 -- -- --    I.V.  173.8  -- 173.8  --  900 900  --  -- --    Octreotide Volume 123.8 -- 123.8 -- -- -- -- -- --    IV Volume (Albumin) 50 -- 50 -- -- -- -- -- --    IV Volume (NS) -- -- -- -- 900 900 -- -- --    Blood  225  -- 225  --  -- --  --  -- --    Volume (RELEASE PLATELET PHERESIS) 225 -- 225 -- -- -- -- -- --    Total Intake 668.8 420 1088.8 120 1190 1310 -- -- --       Output    Urine  200  200 400  525  545 1070  --  -- --    Number of Times Voided 1 x -- 1 x -- -- -- -- -- --    Indwelling Cathether -- -- --  -- -- --    Void (ml) 200 200 400 -- -- -- -- -- --    Stool  --  -- --  --  -- --  --  -- --    Number of Times Stooled -- 4 x 4 x 3 x 7 x 10 x -- -- --    Total Output 200 200 400  -- -- --       Net I/O     468.8 220 688.8 -405 645 240 -- -- --           Recent Labs      08/30/17   0737  08/31/17   0147  09/01/17   0335   SODIUM  128*  131*  128*   POTASSIUM  4.4  4.8  4.2   CHLORIDE  100  102  102   CO2  20  21  18*   BUN  33*  33*  33*   CREATININE  1.98*  2.25*  1.96*   MAGNESIUM  2.0  2.2  2.0   PHOSPHORUS  3.4  4.1  4.2   CALCIUM  8.6  8.6  8.6       GI/Nutrition:  Exam: Distended, positive fluid shift, mild tenderness to palpation  Imaging: Available data reviewed  taking PO  Liver Function  Recent Labs      08/30/17   0737  08/31/17   0147  09/01/17   0335   ALTSGPT  12  12  12   ASTSGOT  29  40  29   ALKPHOSPHAT  71  59  58   TBILIRUBIN  28.5*   29.4*  28.8*   GLUCOSE  75  89  95       Heme:  Recent Labs      17   1139   17   0204   17   0430   17   1350  17   0335   RBC   --    < >  1.69*   < >   --    < >  2.24*  2.33*  2.27*   HEMOGLOBIN   --    < >  6.0*   < >   --    < >  7.7*  8.1*  8.0*   HEMATOCRIT   --    < >  16.7*   < >   --    < >  22.1*  23.0*  22.4*   PLATELETCT   --    < >  65*   < >   --    < >  63*  67*  65*   PROTHROMBTM  25.8*   --    --    --   25.5*   --    --    --   26.8*   INR  2.28*   --    --    --   2.24*   --    --    --   2.39*   IRON   --    --   85   --    --    --    --    --    --    FERRITIN   --    --   383.0*   --    --    --    --    --    --    TOTIRONBC   --    --   126*   --    --    --    --    --    --     < > = values in this interval not displayed.       Infectious Disease:  Temp  Av.2 °C (99 °F)  Min: 36.9 °C (98.4 °F)  Max: 37.8 °C (100 °F)  Micro: cultures reviewed  Recent Labs      17   0737   17   0147   17   1350  17   0335   WBC  13.9*   < >  11.3*   < >  10.7  11.0*  10.5   NEUTSPOLYS  89.10*   --    --    --    --    --   80.10*   LYMPHOCYTES  5.10*   --    --    --    --    --   6.40*   MONOCYTES  4.20   --    --    --    --    --   11.20   EOSINOPHILS  0.80   --    --    --    --    --   0.90   BASOPHILS  0.80   --    --    --    --    --   0.20   ASTSGOT  29   --   40   --    --    --   29   ALTSGPT  12   --   12   --    --    --   12   ALKPHOSPHAT  71   --   59   --    --    --   58   TBILIRUBIN  28.5*   --   29.4*   --    --    --   28.8*    < > = values in this interval not displayed.     Current Facility-Administered Medications   Medication Dose Frequency Provider Last Rate Last Dose   • NS infusion   Continuous Luis Murray M.D. 75 mL/hr at 17     • rifaximin (XIFAXAN) tablet 550 mg  550 mg BID Harrison De Oliveira M.D.   550 mg at 17   • lactulose 20 GM/30ML solution 30 mL  30 mL BID  Luis Murray M.D.   30 mL at 08/31/17 2110   • octreotide (SANDOSTATIN) injection 100 mcg  100 mcg TID Royce Lawrence M.D.   100 mcg at 08/31/17 2133   • pentoxifylline CR (TRENTAL) tablet 400 mg  400 mg TID Harrison De Oliveira M.D.   400 mg at 08/31/17 2110   • albumin human 25% solution 25 g  25 g DAILY Luis Murray M.D. 150 mL/hr at 08/31/17 0844 25 g at 08/31/17 0844   • midodrine (PROAMATINE) tablet 10 mg  10 mg TID WITH MEALS Joseph Russell M.D.   10 mg at 08/31/17 1752   • cefTRIAXone (ROCEPHIN) 2 g in  mL IVPB  2 g Q24HRS LARRY Marie.O.   Stopped at 08/31/17 0913   • senna-docusate (PERICOLACE or SENOKOT S) 8.6-50 MG per tablet 2 Tab  2 Tab BID LARRY Marie.O.   Stopped at 08/31/17 0900    And   • polyethylene glycol/lytes (MIRALAX) PACKET 1 Packet  1 Packet QDAY PRN LARRY Marie.O.        And   • magnesium hydroxide (MILK OF MAGNESIA) suspension 30 mL  30 mL QDAY PRN LARRY Marie.O.        And   • bisacodyl (DULCOLAX) suppository 10 mg  10 mg QDAY PRN LARRY Marie.O.       • Respiratory Care per Protocol   Continuous RT LARRY Marie.O.       • ondansetron (ZOFRAN) syringe/vial injection 4 mg  4 mg Q4HRS PRN LARRY Marie.O.   4 mg at 08/29/17 0758   • ondansetron (ZOFRAN ODT) dispertab 4 mg  4 mg Q4HRS PRN LARRY Marie.O.       • promethazine (PHENERGAN) tablet 12.5-25 mg  12.5-25 mg Q4HRS PRN LARRY Marie.O.       • promethazine (PHENERGAN) suppository 12.5-25 mg  12.5-25 mg Q4HRS PRN Terry Galeas D.O.       • prochlorperazine (COMPAZINE) injection 5-10 mg  5-10 mg Q4HRS PRN Terry Galeas D.O.       • glucose 4 g chewable tablet 16 g  16 g Q15 MIN PRN Terry Galeas D.O.        And   • dextrose 50% (D50W) injection 25 mL  25 mL Q15 MIN PRN Terry Galeas D.O.       • thiamine (THIAMINE) tablet 100 mg  100 mg DAILY Terry Galeas D.O.   100 mg at 08/31/17 0843    And   • folic acid (FOLVITE) tablet 1 mg  1 mg DAILY  NAT MarieO.   1 mg at 08/31/17 0845    And   • multivitamin (THERAGRAN) tablet 1 Tab  1 Tab DAILY Terry Galeas D.O.   1 Tab at 08/31/17 0843   • pantoprazole (PROTONIX) injection 40 mg  40 mg BID LARRY Marie.O.   40 mg at 08/31/17 2346     Last reviewed on 8/29/2017  1:22 AM by Becky Garcia R.N.    Quality  Measures:  Medications reviewed, Labs reviewed and Radiology images reviewed  Bynum catheter: Urinary Tract Retention or Urinary Tract Obstruction      DVT Prophylaxis: Contraindicated - High bleeding risk  DVT prophylaxis - mechanical: SCDs  Ulcer prophylaxis: Not indicated  Antibiotics: Treating active infection/contamination beyond 24 hours perioperative coverage  Assessed for rehab: Patient/family refusal      Assessment and Plan:  SBP - ascites E Coli                        - Continue rocephin ×7 days                        - albumin  GIB, Guaiac positive stools and acute blood loss anemia                        - GI following                        - PPI, octreotide                        - serial HH                        - PRBCs to >7  Acute alcoholic hepatitis, severe with associated thrombocytopenia, hypercoagulable condition, hepatic encephalopathy, hyperbilirubinemia, ascites                        - albumin                        - held steroid with SBP  ALMA             - prerenal/hrs/atn                        - IVF, albumin                        - renal on board  Coagulopathy                        - vit K                        - TEG with decreased clot strength  H/o EtOH  - vitamins  Hypokalemia - repletion  HypoMg++ - repletion  Acute on chronic abdominal pain - analgesias when necessary, may need therapeutic paracentesis  Hyponatremia  Prophylaxis, diet, DNR, palliative care following     At this time patient understands the severity of her condition with the dismal prognosis. She wants ongoing medical management including IV fluids, medications, analgesia,  possible paracentesis if needed for comfort but does not want escalation of care. No pressors, no BiPAP or intubation, no ICU transfer. Is not ready for comfort care nor hospice at this time. Her mother is driving from [place] and should be here in a few days to help assist in goals of care discussions.Ok to transfer patient out of ICU today. [facility] will sign off at transfer. Please call with questions.      Discussed patient condition and risk of morbidity and/or mortality with RN, RT, Therapies, Pharmacy, , Charge nurse / hot rounds, Patient and [facility] palliative care.

## 2017-09-01 NOTE — PALLIATIVE CARE
Palliative Care follow-up  PC RN, Dr. Murray, and Bedside RN met with pt at bedside. Pt is lethargic but more alert compared to last visit. PC RN and Dr. Murray discussed pt's understanding of clinical picture, pt verbalized that she is dying, her liver and and kidneys are not working. PC RN discussed pt's wishes, treatment versus comfort care. Pt verbalized that she wants to be made comfortable, she would like to wait till her mother comes. However her mother lives in [place] and could be 3-4 days travel. Pt stated that she does want to be made comfort care now.       Updated:   Dr. Gonda    Plan:   Dr. Gonda will reassess pt today regarding comfort care.     Thank you for allowing Palliative Care to participate in this patient's care. Please feel free to call [phone] with any questions or concerns.

## 2017-09-01 NOTE — CARE PLAN
Problem: Nutritional:  Goal: Achieve adequate nutritional intake  Patient will consume 50% of meals, snacks, and supplements.   Outcome: PROGRESSING AS EXPECTED  PO intake is variable. Pt is lethargic but will eat/drink when awake. Appetite is fair.   Various snacks and nourishments being provided; Nutrition Representative will F/u with pt today and adjust meal plan accordingly.    Hospice referral pending.  RD following.

## 2017-09-01 NOTE — DISCHARGE PLANNING
Still Lethargic.  Moves all extremities.  Palliative care following.  2L  NC.  Poor intake.  Gi following.      Comfort care vs  IP Hospice.  Pain is difficult to control.  Choice form sent to [facility].

## 2017-09-01 NOTE — DISCHARGE PLANNING
Received choice from [facility].  Referral. Sent to [facility] at 1101 on 09-01-17. Copy of choice form has been faxed to [facility] at [phone].

## 2017-09-01 NOTE — PROGRESS NOTES
[facility] ICU Progress Note      Admit Date: 8/28/2017  ICU Day: 3    Resident(s): Luis Murray   Attending: BERTA PITTMAN/ Dr. Lawrence    Date & Time:   8/31/2017   6:48 PM       Patient ID:    Name:             Nury Thornton     YOB: 1980  Age:                 36 y.o.  female   MRN:               [id number]    Diagnosis:  Spontaneous bacterial peritonitis  Alcoholic hepatitis  Cirrhosis with ascites  Acute kidney injury - Hepato-renal syndrome versus ATN   Anemia with possible gastrointestinal bleed  Alcohol withdrawal     HPI:  Ms. Thornton is a 36 year old female with PMHx significant for alcohol abuse, alcoholic cirrhosis, hypertension, GERD, chronic abdominal pain presented to the ED on 8/28/2017 complaining of abdominal pain. She was found to have spontaneous bacterial peritonitis on admission and a possible GI bleed and was admitted to the tele floor. She was transferred to the ICU for closer monitoring on the same day of admission for closer monitoring.         Consultants:  Gastroenterology: Dr. De Oliveira  Nephrology: Dr. Najjar  PMA: Dr. Bryan, Dr. Lawrence      Interval Events:  Hb remains stable.  Received 4mg of ativan overnight.  Reports abdominal pain to be about the same  Culture from ascitic fluid is growing E.coli.  Had a temperature spike of 100.6F  Discussed extensively with patient's aunt about condition, mother to come in two days.     ROS: Unable to perform due to mental acuity    Physical Exam:  GEN: ill appearing, lethargic  HEENT: NC/AT.   CV: S1, S2, NSR, No m/r/g  RESP: CTABL, no w/r/r, no basilar crackles   ABD: Tender to palpation, abdomen distended, bowel sounds present  EXT: No LE edema b/l. No signs of cyanosis  NEURO: Very lethargic    Respiratory:     Respiration: (!) 34, Pulse Oximetry: 97 %    Chest Tube Drains:          Invalid input(s): [id number]    HemoDynamics:  Pulse: (!) 110, Heart Rate (Monitored): (!) 109 NIBP: 111/67      Neuro:      Fluids:    Date 08/31/17 0700  - 09/01/17 0659   Shift [id number] 6255-1560 9947-7295 24 Hour Total   I  N  T  A  K  E   P.O. 120   120      P.O. 120   120    Shift Total 120   120   O  U  T  P  U  T   Urine 320 125  445      Indwelling Cathether 320 125  445    Stool          Number of Times Stooled 2 x 1 x  3 x    Shift Total 320 125  445   NET -200 -125  -325        Intake/Output Summary (Last 24 hours) at 08/31/17 1848  Last data filed at 08/31/17 1600   Gross per 24 hour   Intake              540 ml   Output              645 ml   Net             -105 ml          Body mass index is 28.45 kg/m².    Recent Labs      08/29/17   0140   08/29/17   1726  08/30/17 0737 08/31/17   0147   SODIUM  131*   < >  130*  128*  131*   POTASSIUM  3.2*   < >  3.7  4.4  4.8   CHLORIDE  99   < >  98  100  102   CO2  23   < >  21  20  21   BUN  23*   < >  31*  33*  33*   CREATININE  1.68*   < >  2.23*  1.98*  2.25*   MAGNESIUM  2.4   --    --   2.0  2.2   PHOSPHORUS   --    --    --   3.4  4.1   CALCIUM  7.9*   < >  8.8  8.6  8.6    < > = values in this interval not displayed.       GI/Nutrition:  Recent Labs      08/29/17   1726  08/30/17 0737 08/31/17   0147   ALTSGPT   --   12  12   ASTSGOT   --   29  40   ALKPHOSPHAT   --   71  59   TBILIRUBIN   --   28.5*  29.4*   GLUCOSE  106*  75  89       Heme:  Recent Labs      08/29/17   1139   08/30/17   0204   08/31/17   0147  08/31/17   0430  08/31/17   0737  08/31/17   1350   RBC   --    < >  1.69*   < >  2.12*   --   2.23*  2.24*   HEMOGLOBIN   --    < >  6.0*   < >  7.5*   --   7.5*  7.7*   HEMATOCRIT   --    < >  16.7*   < >  20.7*   --   21.9*  22.1*   PLATELETCT   --    < >  65*   < >  73*   --   64*  63*   PROTHROMBTM  25.8*   --    --    --    --   25.5*   --    --    INR  2.28*   --    --    --    --   2.24*   --    --    IRON   --    --   85   --    --    --    --    --    FERRITIN   --    --   383.0*   --    --    --    --    --    TOTIRONBC   --    --   126*   --    --    --    --    --     < > =  values in this interval not displayed.       Infectious Disease:  Temp  Av.8 °C (100.1 °F)  Min: 37.6 °C (99.7 °F)  Max: 38.1 °C (100.6 °F)  Recent Labs      17   0140   17   0737   17   0147  17   0737  17   1350   WBC  10.6   < >  13.9*   < >  11.3*  11.1*  10.7   NEUTSPOLYS  84.10*   --   89.10*   --    --    --    --    LYMPHOCYTES  7.10*   --   5.10*   --    --    --    --    MONOCYTES  1.70   --   4.20   --    --    --    --    EOSINOPHILS  0.00   --   0.80   --    --    --    --    BASOPHILS  0.00   --   0.80   --    --    --    --    ASTSGOT   --    --   29   --   40   --    --    ALTSGPT   --    --   12   --   12   --    --    ALKPHOSPHAT   --    --   71   --   59   --    --    TBILIRUBIN   --    --   28.5*   --   29.4*   --    --     < > = values in this interval not displayed.       Meds:  • NS   75 mL/hr at 17 0841   • rifaximin  550 mg     • lactulose  30 mL     • octreotide  100 mcg     • pentoxifylline CR  400 mg     • albumin human 25%  25 g 25 g (17 0844)   • midodrine  10 mg     • cefTRIAXone (ROCEPHIN) IVPB  2 g Stopped (17 0913)   • senna-docusate  2 Tab      And   • polyethylene glycol/lytes  1 Packet      And   • magnesium hydroxide  30 mL      And   • bisacodyl  10 mg     • Respiratory Care per Protocol       • ondansetron  4 mg     • ondansetron  4 mg     • promethazine  12.5-25 mg     • promethazine  12.5-25 mg     • prochlorperazine  5-10 mg     • glucose 4 g  16 g      And   • dextrose 50%  25 mL     • thiamine  100 mg      And   • folic acid  1 mg      And   • multivitamin  1 Tab     • pantoprazole  40 mg          Problem and Plan:      Anemia due to gastrointestinal blood loss  Hb on admission is 5.5. Could be a lower GI bleed based on history.  Continue with packed red blood cells to keep Hb >7.  Monitor with CBC q6hrs.  On Pantoprazole BID dosing.  Gastroenterology on board and following patient, appreciate  recommendations.  Endoscopy when stable.     Cirrhosis (CMS-HCC)  Secondary to alcohol use and confirmed with ultrasound of liver.  There may be a small possibility of autoimmune cause.  F-actin tested in December was weakly positive at 28.  DANYA, F-actin and celiac panel pending.  Hold diuretics for now.  AFP pending.  Her MELD score is high at 33 and she has 52% 3-month mortality rate.  Palliative care consult in place.     Abdominal pain  Likely secondary to SBP.  Other possible causes are alcoholic hepatitis, peptic ulcer or gastritis.  On PPI.  Will manage with morphine 2mg Q4hrs PRN and/or oxycodone 5mg Q4hrs PRN.     Coagulopathy (CMS-HCC)  Secondary to cirrhosis of liver.  INR is 2.4 and platelets are at 65.  TEG study is is showing decrease in clot strength but it was drawn after FFP was given.  Status post 2 units of FFP to control the possible GI bleed.     Alcoholic hepatitis with ascites  Maddrey's discriminant function is 84.  However given SBP, will hold steroids for now.  MELD score is 33 indicative of 52% 3-month mortality rate.  Similarly she is Child-Sanabria class C with a score of 13.  Prognosis is poor and guarded.     Spontaneous bacterial peritonitis (CMS-HCC)  Comfirmed with ascitic tap which is showing PMNs highly elevated even after correction for RBCs.  Ascitic fluid gram stain is showing no organisms so far and culture is growing rare gram negative lactose fermenting rods.  Albumin given on 8/28 to 8/30.  On ceftriaxone, will modify as susceptibility results become available.     Alcohol withdrawal (CMS-HCC)  History of significant alcohol abuse.  Placed patient on CIWA protocol.  Ativan IV or PO based on CIWA scores to be given.  Rally bag already given.  Multivitamins, thiamine and folate for 5 days to be given.     Acute kidney injury (CMS-HCC)  Patient's kidney function is slowly worsening despite IV fluid resuscitation and volume replacement.  Differential could be but not limited to  hepato-renal syndrome or ATN from episode of hypotension.  Urine output is in oliguric range.  Nephrology on board.  On octreotide, midodrine and albumin to improve perfusion to kidney.           DISPO: Remain in ICU     CODE STATUS: Full code     Quality Measures:  Bynum Catheter: none  DVT Prophylaxis: SCDs  Ulcer Prophylaxis: PPI for GI ulcer  Antibiotics: Ceftriaxone  Lines: PIV     Procedures:  Paracentesis: 8/28/17     Imaging:  CXR: No interval change

## 2017-09-01 NOTE — PROGRESS NOTES
Gastroenterology Progress Note     Author: Duarte Orin   Date & Time Created: 9/1/2017 8:17 AM    Interval History:  Spoke to nurse, as patient is obtunded. No overnight events. No N/V/D, fever, chills. Patient does ask for pain meds but they have been held because of patients mental status.     Review of Systems:  Review of Systems   Unable to perform ROS: Patient unresponsive       Physical Exam:  Physical Exam   Constitutional: She appears lethargic. She appears distressed.   HENT:   Head: Atraumatic.   Eyes: Scleral icterus is present.   Neck: Neck supple.   Cardiovascular: Regular rhythm.  Tachycardia present.    Pulmonary/Chest: Effort normal and breath sounds normal.   Abdominal:   +ascites, non tense; no guarding or rebound, no caput medusa.    Neurological: She appears lethargic.   Skin: Skin is warm and dry.   +jaundice       Labs:        Invalid input(s): [id number]      Recent Labs      08/30/17   0737  08/31/17   0147  09/01/17   0335   SODIUM  128*  131*  128*   POTASSIUM  4.4  4.8  4.2   CHLORIDE  100  102  102   CO2  20  21  18*   BUN  33*  33*  33*   CREATININE  1.98*  2.25*  1.96*   MAGNESIUM  2.0  2.2  2.0   PHOSPHORUS  3.4  4.1  4.2   CALCIUM  8.6  8.6  8.6     Recent Labs      08/30/17   0737  08/31/17   0147  09/01/17   0335   ALTSGPT  12  12  12   ASTSGOT  29  40  29   ALKPHOSPHAT  71  59  58   TBILIRUBIN  28.5*  29.4*  28.8*   GLUCOSE  75  89  95     Recent Labs      08/29/17   1139   08/30/17   0204   08/31/17   0430   08/31/17   1350  08/31/17   2200  09/01/17   0335   RBC   --    < >  1.69*   < >   --    < >  2.24*  2.33*  2.27*   HEMOGLOBIN   --    < >  6.0*   < >   --    < >  7.7*  8.1*  8.0*   HEMATOCRIT   --    < >  16.7*   < >   --    < >  22.1*  23.0*  22.4*   PLATELETCT   --    < >  65*   < >   --    < >  63*  67*  65*   PROTHROMBTM  25.8*   --    --    --   25.5*   --    --    --   26.8*   INR  2.28*   --    --    --   2.24*   --    --    --   2.39*   IRON   --    --   85    --    --    --    --    --    --    FERRITIN   --    --   383.0*   --    --    --    --    --    --    TOTIRONBC   --    --   126*   --    --    --    --    --    --     < > = values in this interval not displayed.     Recent Labs      17   0737   17   0147   17   1350  17   2200  17   0335   WBC  13.9*   < >  11.3*   < >  10.7  11.0*  10.5   NEUTSPOLYS  89.10*   --    --    --    --    --   80.10*   LYMPHOCYTES  5.10*   --    --    --    --    --   6.40*   MONOCYTES  4.20   --    --    --    --    --   11.20   EOSINOPHILS  0.80   --    --    --    --    --   0.90   BASOPHILS  0.80   --    --    --    --    --   0.20   ASTSGOT  29   --   40   --    --    --   29   ALTSGPT  12   --   12   --    --    --   12   ALKPHOSPHAT  71   --   59   --    --    --   58   TBILIRUBIN  28.5*   --   29.4*   --    --    --   28.8*    < > = values in this interval not displayed.     Hemodynamics:  Temp (24hrs), Av.2 °C (99 °F), Min:36.9 °C (98.4 °F), Max:37.8 °C (100 °F)  Temperature: 36.9 °C (98.4 °F)  Pulse  Av  Min: 102  Max: 155Heart Rate (Monitored): (!) 106  NIBP: 106/65     Respiratory:    Respiration: (!) 36, Pulse Oximetry: 100 %     Work Of Breathing / Effort: Mild;Tachypnea  RUL Breath Sounds: Clear, RML Breath Sounds: Clear, RLL Breath Sounds: Diminished, NEO Breath Sounds: Clear, LLL Breath Sounds: Diminished  Fluids:    Intake/Output Summary (Last 24 hours) at 17 0817  Last data filed at 17 0600   Gross per 24 hour   Intake             1190 ml   Output             1070 ml   Net              120 ml        GI/Nutrition:  Orders Placed This Encounter   Procedures   • DIET ORDER     Standing Status:   Standing     Number of Occurrences:   1     Order Specific Question:   Diet:     Answer:   Hepatic [9]     Medical Decision Making, by Problem:  Active Hospital Problems    Diagnosis   • Acute kidney injury (CMS-HCC) [N17.9]   • Spontaneous bacterial peritonitis (CMS-HCC)  [K65.2]   • Anemia due to gastrointestinal blood loss [D50.0]   • Alcoholic hepatitis with ascites [K70.11]   • Alcohol withdrawal (CMS-HCC) [F10.239]   • Cirrhosis (CMS-HCC) [K74.60]   • Abdominal pain [R10.9]   • Coagulopathy (CMS-HCC) [D68.9]       Plan:  1) SBP - on ABX and albumin. E.Coli in ascites  2) abdominal pain  3) acute blood loss anemia - no overt bleeding currently.   4) guiac positive stools  5) elevated liver tests -continue to remain elevated  6) ETOH hepatitis  7) coagulopathy  8) ETOH withdrawal  9) ALMA  10) ETOH cirrhosis - pt is not a transplant candidate due to her ETOH use. She would need 6 months at least of sobriety before this could even be considered     Plan:  1) continue Abx for SBP; pt will need SBP prophylaxis at discharge  2) treatment of possible hepatorenal per Nephrology, Cr did improve overnight  3) Pt will need EGD when afebrile and more alert and stable. If signs of active bleed, would proceed with EGD ASAP  4) on Lactulose, added Xifaxan  5) continue PPI  6) No plans for endoscopic evaluation currently, unless patient status improves or if she develops overt bleeding.   7) Will follow. MDF 92.3; MELD 37; 65% 90 day mortality rate.     Quality-Core Measures

## 2017-09-01 NOTE — CARE PLAN
Problem: Infection  Goal: Will remain free from infection  Outcome: PROGRESSING AS EXPECTED  Receiving Ceftriaxone    Problem: Bowel/Gastric:  Goal: Normal bowel function is maintained or improved  Outcome: PROGRESSING SLOWER THAN EXPECTED  Receiving Lactulose; frequent loose stools

## 2017-09-01 NOTE — PROGRESS NOTES
Gastroenterology Progress Note    Date of Service: 08/31/2017    Subjective: Pt does not respond to questions. Opens eyes to verbal stimulation      Objective:    Vitals -   Vitals:    08/31/17 1300 08/31/17 1400 08/31/17 1500 08/31/17 1600   BP:       Pulse: (!) 114 (!) 120 (!) 111 (!) 108   Resp: (!) 24 19 (!) 28 (!) 28   Temp:    37.8 °C (100 °F)   SpO2: 95% 96% 97% 97%   Weight:       Height:           Gen: somnolent  HEENT: +icteric, Nares patent  CVS: regular rhythm, tachycardic rate  Abd: soft distended, moans with palpation of abdomen, diminished bowel sounds  Ext: + edema    Labs:  Lab Results   Component Value Date/Time    SODIUM 131 (L) 08/31/2017 01:47 AM    POTASSIUM 4.8 08/31/2017 01:47 AM    CHLORIDE 102 08/31/2017 01:47 AM    CO2 21 08/31/2017 01:47 AM    GLUCOSE 89 08/31/2017 01:47 AM    BUN 33 (H) 08/31/2017 01:47 AM    CREATININE 2.25 (H) 08/31/2017 01:47 AM    CREATININE 0.7 06/16/2006 11:30 PM      Lab Results   Component Value Date/Time    WBC 10.7 08/31/2017 01:50 PM    RBC 2.24 (L) 08/31/2017 01:50 PM    HEMOGLOBIN 7.7 (L) 08/31/2017 01:50 PM    HEMATOCRIT 22.1 (L) 08/31/2017 01:50 PM    MCV 98.7 (H) 08/31/2017 01:50 PM    MCH 34.4 (H) 08/31/2017 01:50 PM    MCHC 34.8 08/31/2017 01:50 PM    MPV 9.4 08/31/2017 01:50 PM    NEUTSPOLYS 89.10 (H) 08/30/2017 07:37 AM    LYMPHOCYTES 5.10 (L) 08/30/2017 07:37 AM    MONOCYTES 4.20 08/30/2017 07:37 AM    EOSINOPHILS 0.80 08/30/2017 07:37 AM    BASOPHILS 0.80 08/30/2017 07:37 AM    HYPOCHROMIA 1+ 08/28/2017 10:23 AM    ANISOCYTOSIS 1+ 08/30/2017 01:43 PM      Lab Results   Component Value Date/Time    PROTHROMBTM 25.5 (H) 08/31/2017 04:30 AM    INR 2.24 (H) 08/31/2017 04:30 AM        Assessment:   1) SBP - on ABX and albumin. E.Coli in ascites  2) abdominal pain  3) acute blood loss anemia - no overt bleeding currently. This will need eval when patient is more stable   4) guiac positive stools  5) elevated liver tests  -continue to rise  6) ETOH hepatitis  7) coagulopathy  8) ETOH withdrawal  9) ALMA  10) ETOH cirrhosis - pt is not a transplant candidate due to her ETOH use. She would need 6 months at least of sobriety before this could even be considered    Plan:  1) continue Abx for SBP; pt will need SBP prophylaxis at discharge  2) treatment of possible hepatorenal per Nephrology  3) Pt will need EGD when afebrile and more alert and stable. If signs of active bleed, would proceed with EGD ASAP  4) on Lactulose, add Xifaxan today  5)  continue PPI    Pt has very poor prognosis and very high risk of mortality.

## 2017-09-02 NOTE — CARE PLAN
Problem: Communication  Goal: The ability to communicate needs accurately and effectively will improve  Outcome: PROGRESSING AS EXPECTED  Teach back method utilized in reinforcing teaching on proper use of call light when needing assistance.     Problem: Pain Management  Goal: Pain level will decrease to patient's comfort goal  Outcome: PROGRESSING AS EXPECTED  Assessed pain on patient specific scale and discussed pain interventions as needed.

## 2017-09-02 NOTE — PROGRESS NOTES
Received bedside report from day RN and assumed care of patient at 1900. Patient is alert and oriented x3 with no signs of labored breathing or distress. Safety precautions in place including patient call light within reach, bed alarm on, personal possessions nearby, bed in low position and locked, hourly rounding in practice, and non-skid socks in place. HOB at 30*. Added padding to bedrails.

## 2017-09-02 NOTE — PROGRESS NOTES
Rounding completed on pt. Report given at bedside between night shift RN and day shift RN. Assumed care of pt. Pt pulled out iv, 3rd time pt pulled out pine per report, pt hard stick, unable to place new iv at this time, will attempt to get u/s guided piv placed this am. Pt moaning, lethargic, not opening eyes but does confirm her name and acknowledge staff in room. Bynum in place, dark thick output. Will continue to monitor.

## 2017-09-02 NOTE — PROGRESS NOTES
Per report, pt waiting to have significant other at bedside before deciding on comfort care. PC and [facility] MD notified her significant other (Duarte) is at bedside now.

## 2017-09-02 NOTE — PROGRESS NOTES
Harmony Melissa Fall Risk Assessment:     Last Known Fall: No falls  Mobility: Use of assistive device/requires assist of two people  Medications: Cardiovascular or central nervous system meds  Mental Status/LOC/Awareness: Oriented to person and place  Toileting Needs: Use of catheters or diversion devices  Volume/Electrolyte Status: Use of IV fluids/tube feeds  Communication/Sensory: No deficits  Behavior: Ethanol/substance abuse  Harmony Melissa Fall Risk Total: 12  Fall Risk Level: MODERATE RISK     Universal Fall Precautions:  call light/belongings in reach, bed in low position and locked, siderails up x 2, use non-slip footwear, adequate lighting, clutter free and spill free environment, educate to call for assistance, educate on level of risk     Fall Risk Level Interventions:    TRIAL (TELE 8, NEURO, MED CHANDLER 5) Moderate Fall Risk Interventions  Place yellow fall risk ID band on patient: completed  Provide patient/family education based on risk assessment : completed  Educate patient/family to call staff for assistance when getting out of bed: completed  Place fall precaution signage outside patient door: completed  Utilize bed/chair fall alarm: completed      Patient Specific Interventions:      Medication: review medications with patient and family, assess for medications that can be discontinued or dosage decreased and provide patients who received diuretics/laxatives frequent toileting  Mental Status/LOC/Awareness: reorient patient, reinforce falls education, utilize bed/chair fall alarm and reinforce the use of call light  Toileting: bedpan offered  Volume/Electrolyte Status: ensure patient remains hydrated, monitor abnormal lab values and ensure IV fluids are appropriate  Communication/Sensory: update plan of care on whiteboard  Behavioral: encourage patient to voice feelings and instruct/reinforce fall program rationale  Mobility: utilize bed/chair fall alarm, ensure bed is locked and in lowest position  and collaborate with doctor for possible PT/OT consult

## 2017-09-02 NOTE — PROGRESS NOTES
[facility] ICU Progress Note      Admit Date: 8/28/2017  ICU Day: 4    Resident(s): Luis Murray   Attending: BERTA PITTMAN/ Dr. Gonda    Date & Time:   9/1/2017   5:06 PM       Patient ID:    Name:             Nury Thornton     YOB: 1980  Age:                 36 y.o.  female   MRN:               [id number]    Diagnosis:  Spontaneous bacterial peritonitis  Alcoholic hepatitis  Cirrhosis with ascites  Acute kidney injury - Hepato-renal syndrome versus ATN   Anemia with possible gastrointestinal bleed  Alcohol withdrawal    HPI:  Ms. Thornton is a 36 year old female with PMHx significant for alcohol abuse, alcoholic cirrhosis, hypertension, GERD, chronic abdominal pain presented to the ED on 8/28/2017 complaining of abdominal pain. She was found to have spontaneous bacterial peritonitis on admission and a possible GI bleed and was admitted to the tele floor. She was transferred to the ICU for closer monitoring on the same day of admission for closer monitoring.     Consultants:  Gastroenterology: Dr. De Oliveira  Nephrology: Dr. Najjar  PMA: Dr. Bryan, Dr. Lawrence, Dr. Gonda     Interval Events:  Hemoglobin continues to remain stable.  Continues to have fevers, although it could be due to alcoholic hepatitis.  Continues to have abdominal pain which is being controlled with morphine.  Had an extensive discussion with patient regarding her poor prognosis and discussed the options of hospice and comfort care. Patient wants some medical management for now but does not want aggressive measures including pressor support, intubation, defibrillation or chest compressions.    Attempted to do a therapeutic paracentesis on patient today to help her breath more comfortably, but unfortunately was not successful.    Physical Exam:  GEN: ill appearing, lethargic  HEENT: NC/AT.   CV: S1, S2, NSR, No m/r/g  RESP: CTABL, no w/r/r, no basilar crackles   ABD: Tender to palpation, abdomen distended, bowel sounds present  EXT: No LE edema  b/l. No signs of cyanosis  NEURO: Very lethargic     Respiratory:     Respiration: (!) 32, Pulse Oximetry: 98 %    Chest Tube Drains:          Invalid input(s): [id number]    HemoDynamics:  Pulse: (!) 106, Heart Rate (Monitored): (!) 106 NIBP: 117/68      Neuro:      Fluids:    Date 09/01/17 0700 - 09/02/17 0659   Shift 8220-7902 [id number] 24 Hour Total   I  N  T  A  K  E   Shift Total       O  U  T  P  U  T   Stool          Number of Times Stooled 1 x   1 x    Shift Total       NET            Intake/Output Summary (Last 24 hours) at 09/01/17 1706  Last data filed at 09/01/17 0600   Gross per 24 hour   Intake             1190 ml   Output              625 ml   Net              565 ml          Body mass index is 28.45 kg/m².    Recent Labs      08/30/17   0737  08/31/17   0147  09/01/17   0335   SODIUM  128*  131*  128*   POTASSIUM  4.4  4.8  4.2   CHLORIDE  100  102  102   CO2  20  21  18*   BUN  33*  33*  33*   CREATININE  1.98*  2.25*  1.96*   MAGNESIUM  2.0  2.2  2.0   PHOSPHORUS  3.4  4.1  4.2   CALCIUM  8.6  8.6  8.6       GI/Nutrition:  Recent Labs      08/30/17   0737  08/31/17   0147  09/01/17   0335   ALTSGPT  12  12  12   ASTSGOT  29  40  29   ALKPHOSPHAT  71  59  58   TBILIRUBIN  28.5*  29.4*  28.8*   GLUCOSE  75  89  95       Heme:  Recent Labs      08/30/17   0204   08/31/17   0430   08/31/17   1350  08/31/17   2200  09/01/17   0335   RBC  1.69*   < >   --    < >  2.24*  2.33*  2.27*   HEMOGLOBIN  6.0*   < >   --    < >  7.7*  8.1*  8.0*   HEMATOCRIT  16.7*   < >   --    < >  22.1*  23.0*  22.4*   PLATELETCT  65*   < >   --    < >  63*  67*  65*   PROTHROMBTM   --    --   25.5*   --    --    --   26.8*   INR   --    --   2.24*   --    --    --   2.39*   IRON  85   --    --    --    --    --    --    FERRITIN  383.0*   --    --    --    --    --    --    TOTIRONBC  126*   --    --    --    --    --    --     < > = values in this interval not displayed.       Infectious Disease:  Temp  Avg:  36.8 °C (98.2 °F)  Min: 36.4 °C (97.5 °F)  Max: 36.9 °C (98.4 °F)  Recent Labs      08/30/17   0737   08/31/17   0147   08/31/17   1350  08/31/17   2200  09/01/17   0335   WBC  13.9*   < >  11.3*   < >  10.7  11.0*  10.5   NEUTSPOLYS  89.10*   --    --    --    --    --   80.10*   LYMPHOCYTES  5.10*   --    --    --    --    --   6.40*   MONOCYTES  4.20   --    --    --    --    --   11.20   EOSINOPHILS  0.80   --    --    --    --    --   0.90   BASOPHILS  0.80   --    --    --    --    --   0.20   ASTSGOT  29   --   40   --    --    --   29   ALTSGPT  12   --   12   --    --    --   12   ALKPHOSPHAT  71   --   59   --    --    --   58   TBILIRUBIN  28.5*   --   29.4*   --    --    --   28.8*    < > = values in this interval not displayed.       Meds:  • morphine injection  3 mg     • NS   75 mL/hr at 09/01/17 1107   • rifaximin  550 mg     • lactulose  30 mL     • octreotide  100 mcg     • pentoxifylline CR  400 mg     • albumin human 25%  25 g 25 g (09/01/17 1018)   • midodrine  10 mg     • cefTRIAXone (ROCEPHIN) IVPB  2 g Stopped (09/01/17 1049)   • senna-docusate  2 Tab      And   • polyethylene glycol/lytes  1 Packet      And   • magnesium hydroxide  30 mL      And   • bisacodyl  10 mg     • Respiratory Care per Protocol       • ondansetron  4 mg     • ondansetron  4 mg     • promethazine  12.5-25 mg     • promethazine  12.5-25 mg     • prochlorperazine  5-10 mg     • glucose 4 g  16 g      And   • dextrose 50%  25 mL     • pantoprazole  40 mg          Problem and Plan:    Anemia due to gastrointestinal blood loss  Hb on admission is 5.5. Could be a lower GI bleed based on history.  Currently Hb has been stable for 2 days now.  Continue with packed red blood cells to keep Hb >7.  On Pantoprazole BID dosing.  Gastroenterology on board and following patient, appreciate recommendations.  Endoscopy when stable.    Cirrhosis (CMS-HCC)  Secondary to alcohol use and confirmed with ultrasound of liver.  There may be a  small possibility of autoimmune cause.  F-actin tested in December was weakly positive at 28 and repeat test was 24.   Celiac panel show elevated IgA curiously.  AFP was negative.  Her MELD score is high at 33 and she has 52% 3-month mortality rate.  Palliative care consult in place.  Hold diuretics for now.    Abdominal pain  Likely secondary to SBP.  Other possible causes are alcoholic hepatitis, peptic ulcer or gastritis.  On PPI.  Will manage with morphine 3mg Q3hrs PRN.    Coagulopathy (CMS-HCC)  Secondary to cirrhosis of liver.  INR is 2.4 and platelets are at 65.  Status post 2 units of FFP to control the possible GI bleed.  TEG study is is showing decrease in clot strength but it was also drawn after FFP was given so result might be skewed.  Administered 2 units of platelets to increase clot strength.    Alcoholic hepatitis with ascites  Maddrey's discriminant function is 92.  However given SBP, will hold steroids for now.  MELD score is 36 indicative of 52% 3-month mortality rate.  Similarly she is Child-Sanabria class C with a score of 13.  Prognosis is poor and guarded.    Spontaneous bacterial peritonitis (CMS-HCC)  Comfirmed with ascitic tap which is showing PMNs highly elevated even after correction for RBCs.  Ascitic fluid gram stain is showing no organisms so far and culture is growing rare gram negative lactose fermenting rods.  Albumin given on 8/28 to 8/30.  On ceftriaxone, as appropriate per culture and susceptibility.    Alcohol withdrawal (CMS-HCC)  History of significant alcohol abuse.  Placed patient on CIWA protocol initially.  Ativan discontinued as patient is lethargic.  Rally bag already given.  Multivitamins, thiamine and folate for 5 days to be given.  No longer actively withdrawing.    Acute kidney injury (CMS-HCC)  Patient's kidney function is slowly worsening despite IV fluid resuscitation and volume replacement.  Differential could be but not limited to hepato-renal syndrome or ATN from  episode of hypotension.  Urine output has now somewhat improved.  On octreotide, midodrine and albumin to improve perfusion to kidney. Also continue IV fluids.  Nephrology on board, appreciate recommendations.        DISPO: Transfer to medical floor     CODE STATUS: DNAR     Quality Measures:  Bynum Catheter: none  DVT Prophylaxis: SCDs  Ulcer Prophylaxis: PPI for GI ulcer  Antibiotics: Ceftriaxone  Lines: PIV     Procedures:  Paracentesis: 8/28/17     Imaging:  CXR: No interval change

## 2017-09-02 NOTE — PROGRESS NOTES
ICU Transfer out summary      This is a very unfortunate 36 year old female with PMHx significant for alcohol abuse, cirrhosis, hypertension, GERD, chronic abdominal pain who initially presented to the ER with vague symptoms of dizzines, worsening of abdominal pain and nausea. She underwent diagnostic paracentesis while on admission which grew E. Coli for which she was on ceftriaxone.    She was also suspected to have a GI bleed as evidenced by low Hb which kept dropping despite blood transfusions. Gastroenterology were consulted and EGD was planned once patient is more stable. She is on a PPI two times daily for it. She unfortunately also developed alcoholic hepatitis with Maddrey's score geena high at 84. Steroids could not be initiated due to the presence of SBP. She was however started on pentoxifylline and rifampin per gastroenterology recommendations.    In addition, her kidney function gradually worsened despite volume replacement, she was therefore thought to have hepatorenal syndrome and she was started on midodrine, octreotide and albumin as well. Nephrology is on board and following patient but concur that prognosis is very poor.    Had a lengthy discussion with patient who understands her life expectancy is limited to weeks if not days. Palliative care was consulted and recommended patient be on comfort care to get better pain control. Patient however wishes to be DNAR and wants some medical management. She is opposed to any heroic measures including intubation, pressor support, chest compressions, and defibrillation and stated that once she is dead she did not wish to be brought back.    Case discussed with Dr. Galeas on whose team the patient is being transferred to.

## 2017-09-02 NOTE — CARE PLAN
Problem: Safety  Goal: Will remain free from falls  Outcome: PROGRESSING AS EXPECTED  Bed alarm on. Bed in low and locked position w/ HOB at 30*. Call light w/in reach. Pt instructed on how to use call light. Educated on fall risk and to call for assistance.     Problem: Respiratory:  Goal: Respiratory status will improve  Outcome: PROGRESSING SLOWER THAN EXPECTED  Pt on 3L NC. Baseline is zero oxygen. Pt tachypnic.  in use to titrate oxygen.

## 2017-09-02 NOTE — PROGRESS NOTES
Hospital Medicine Progress Note, Adult, Complex               Author: Xavi Najjar Date & Time created: 9/2/2017  1:59 PM     Interval History:  Pt with alcoholic liver cirrhosis , presented with SBP.  Developed ALMA.  Events last 24 h noted.  Still lethargic  Now DNR  Review of Systems:  Review of Systems   Unable to perform ROS: Mental acuity       Physical Exam:  Physical Exam   Constitutional: She appears lethargic. She appears cachectic. She appears ill.   HENT:   Head: Normocephalic and atraumatic.   Nose: Nose normal.   Mouth/Throat: No oropharyngeal exudate.   Eyes: Conjunctivae are normal. Right eye exhibits no discharge. Left eye exhibits no discharge.   Neck: Normal range of motion. Neck supple. No JVD present. No tracheal deviation present. No thyromegaly present.   Cardiovascular: Normal rate, regular rhythm and normal heart sounds.  Exam reveals no friction rub.    No murmur heard.  Pulmonary/Chest: Effort normal and breath sounds normal. No respiratory distress. She has no wheezes. She has no rales.   Abdominal: She exhibits distension. There is tenderness. There is no rebound.   Musculoskeletal: She exhibits edema. She exhibits no tenderness.   Neurological: She appears lethargic.   Skin: Skin is warm and dry. No erythema.   Juandice    Nursing note and vitals reviewed.      Labs:        Invalid input(s): [id number]      Recent Labs      08/31/17 0147 09/01/17 0335 09/02/17   0520   SODIUM  131*  128*  130*   POTASSIUM  4.8  4.2  3.9   CHLORIDE  102  102  102   CO2  21  18*  18*   BUN  33*  33*  36*   CREATININE  2.25*  1.96*  1.84*   MAGNESIUM  2.2  2.0  2.0   PHOSPHORUS  4.1  4.2  4.0   CALCIUM  8.6  8.6  8.6     Recent Labs      08/31/17   0147 09/01/17   0335  09/02/17   0520   ALTSGPT  12  12  14   ASTSGOT  40  29  39   ALKPHOSPHAT  59  58  77   TBILIRUBIN  29.4*  28.8*  30.0*   GLUCOSE  89  95  90     Recent Labs      08/31/17   0430   08/31/17   2200  09/01/17   0335  09/02/17   0520    RBC   --    < >  2.33*  2.27*  2.22*   HEMOGLOBIN   --    < >  8.1*  8.0*  7.6*   HEMATOCRIT   --    < >  23.0*  22.4*  21.9*   PLATELETCT   --    < >  67*  65*  71*   PROTHROMBTM  25.5*   --    --   26.8*  24.8*   INR  2.24*   --    --   2.39*  2.16*    < > = values in this interval not displayed.     Recent Labs      17   0147   17   2200  17   0335  17   0520   WBC  11.3*   < >  11.0*  10.5  9.0   NEUTSPOLYS   --    --    --   80.10*  75.70*   LYMPHOCYTES   --    --    --   6.40*  6.60*   MONOCYTES   --    --    --   11.20  14.00*   EOSINOPHILS   --    --    --   0.90  1.40   BASOPHILS   --    --    --   0.20  0.30   ASTSGOT  40   --    --   29  39   ALTSGPT  12   --    --   12  14   ALKPHOSPHAT  59   --    --   58  77   TBILIRUBIN  29.4*   --    --   28.8*  30.0*    < > = values in this interval not displayed.           Hemodynamics:  Temp (24hrs), Av.7 °C (98.1 °F), Min:36.4 °C (97.5 °F), Max:36.9 °C (98.4 °F)  Temperature: 36.9 °C (98.4 °F)  Pulse  Av.5  Min: 102  Max: 155Heart Rate (Monitored): (!) 106  Blood Pressure: 128/65, NIBP: 117/68     Respiratory:    Respiration: 20, Pulse Oximetry: 98 %     Work Of Breathing / Effort: Tachypnea  RUL Breath Sounds: Clear, RML Breath Sounds: Clear, RLL Breath Sounds: Diminished, NEO Breath Sounds: Clear, LLL Breath Sounds: Diminished  Fluids:    Intake/Output Summary (Last 24 hours) at 17 1359  Last data filed at 17 2115   Gross per 24 hour   Intake              690 ml   Output              250 ml   Net              440 ml        GI/Nutrition:  Orders Placed This Encounter   Procedures   • DIET ORDER     Standing Status:   Standing     Number of Occurrences:   1     Order Specific Question:   Diet:     Answer:   Hepatic [9]     Medical Decision Making, by Problem:  Active Hospital Problems    Diagnosis   • Acute kidney injury (CMS-HCC) [N17.9]   • Spontaneous bacterial peritonitis (CMS-HCC) [K65.2]   • Anemia due to  gastrointestinal blood loss [D50.0]   • Alcoholic hepatitis with ascites [K70.11]   • Alcohol withdrawal (CMS-HCC) [F10.239]   • Cirrhosis (CMS-HCC) [K74.60]   • Abdominal pain [R10.9]   • Coagulopathy (CMS-HCC) [D68.9]         Reviewed items::  Labs reviewed, Radiology images reviewed and Medications reviewed  Bynum catheter::  Critically Ill - Requiring Accurate Measurement of Urinary Output    1 ALMA: prerenal vs hepatorenal syndrome, nonoliguric  Pt is not a candidate for HD because of liver disease/futile prognosis  Continue IVF  Renal dose all meds  Avoid nephrotoxins  Prognosis poor sec to liver dx.  Recommend CMO  We will sign off the case

## 2017-09-02 NOTE — PROGRESS NOTES
Pt arrived to unit via ICU bed.   Pt transferred to medical bed with slideboard.  Tolerated well.    Pt was oriented to room and unit, asked for pillow.  Pillow provided.   Pt moaning.  Pt call light and possessions within reach.  Pt communication board updated.  Mobility signs on door. Fall risk precautions in place.

## 2017-09-02 NOTE — PALLIATIVE CARE
"PALLIATIVE CARE FOLLOW-UP:  Discussed with pt RN Sherley.    Pt moaning and thrashing at times in bed - difficult for pt to focus, agreed that she was in pain and wanted additional pain relief. Pt's SO Duarte Longo at bedside.  Duarte stated that pt had expressed to him pt's understanding that she is dying -- this was also expressed by pt to ICU team and PC RN.  Duarte spoke with pt's mother this morning and she will not arrive here until Tuesday as she has to come from [place] by bus.     Dr Galeas and team arrived to bedside and presented grim prognosis of days and care options including paracentesis for comfort.      Phoned pt's mother Taisha Thornton ([phone]) and pt's brother Chico also on speaker.  Updated on pt's current condition and medical team's care options.  Chico asked if there was a way for his sister to have hospice.  Explained that comfort care is a hospital-based model of hospice with same philosophy of helping with pt's distress and suffering.  Chico and Taisha agreed that they wanted to lessen pt's suffering as much as was possible including paracentesis if helpful for comfort.  They acknowledged that pt may not live until Tuesday but that Taisha would still be traveling here so as to arrange for pt's body to be transported back to [place] for native grounds burial.  Offered contact info for local mortuaries but Taisha stated she wasn't ready at this moment.  Validated family's thoughts and feelings, and courage in expressing their love and support of pt's comfort.  All questions answered .  PC contact information provided.  Family expressed appreciation for call and care.    Pt's eldest sons' foster mother Brenda phoned.  She does not know where 19yo is (\"living on the streets or peoples' coaches\"), but 19yo, Lowell Thornton, is incarcerated at [facility] and Brenda stated that they may release him to come say good bye to his mother.  Brenda thought that this would be important/healthy for Lowell.  Brenda " given PC contact info.    Updated: Dr. Santamaria, pt RN Sherley, pt's JR Ramachandran    Plan:  Paracentesis then transition to comfort care.    Thank you for allowing Palliative Care to support this pt and her family/friend.  Contact [phone] for additional assistance, questions or concerns.

## 2017-09-02 NOTE — PROGRESS NOTES
Report received from Trudi BILLS-RN.  Awaiting arrival of pt on unit.   Trudi will confirm with MD whether amarilis will come up with pt or be D/C'd

## 2017-09-03 NOTE — CARE PLAN
Problem: Knowledge Deficit  Goal: Knowledge of the prescribed therapeutic regimen will improve  Pt and family educated by nurse, palliative care, and MD about DNR, comfort care, pt treatment.

## 2017-09-03 NOTE — PROGRESS NOTES
Internal Medicine Interval Note    Name Nury Thornton       1980   Age/Sex 36 y.o. female   MRN [id number]   Code Status DNR/ COMFORT CARE     After 5PM or if no immediate response to page, please call for cross-coverage  Attending/Team: Pham Jaramillo/ Hector See Patient List for primary contact information  Call [phone] to page    1st Call - Day Intern (R1):   Gordo Pelayo 2nd Call - Day Sr. Resident (R2/R3):   Adal         Reason for interval visit  (Principal Problem)   Alcoholic hepatitis with ascitis    Interval Problem Daily Status Update  (24 hours)   Active Problems:    Alcoholic hepatitis with ascites POA: Yes    Anemia due to gastrointestinal blood loss POA: Yes    Acute kidney injury (CMS-HCC) POA: Yes    Spontaneous bacterial peritonitis (CMS-HCC) POA: Yes    Coagulopathy (CMS-HCC) POA: Yes    Cirrhosis (CMS-HCC) POA: Yes    Abdominal pain POA: Yes    Alcohol withdrawal (CMS-HCC) POA: Yes    Review of Systems   Constitutional: Positive for malaise/fatigue. Negative for chills, diaphoresis and fever.   HENT: Negative for ear discharge, ear pain, hearing loss and tinnitus.    Eyes: Negative for blurred vision, double vision, discharge and redness.   Respiratory: Negative for cough, hemoptysis and sputum production.    Cardiovascular: Positive for palpitations and leg swelling. Negative for claudication.   Gastrointestinal: Positive for abdominal pain. Negative for blood in stool, constipation, diarrhea, heartburn, melena and vomiting.   Genitourinary: Negative for dysuria and urgency.   Musculoskeletal: Positive for myalgias. Negative for back pain and neck pain.   Skin: Negative for itching and rash.   Neurological: Positive for dizziness and weakness. Negative for tremors, sensory change, seizures and loss of consciousness.   Endo/Heme/Allergies: Negative for environmental allergies.   Psychiatric/Behavioral: Negative for depression.        Consultants/Specialty  Nephrologist  Gastroenterologist  pulmonologist    Disposition  Comfort care    Quality Measures    Reviewed items::  Medications reviewed and Labs reviewed  Bynum catheter::  Critically Ill - Requiring Accurate Measurement of Urinary Output          Physical Exam       Vitals:    09/01/17 1905 09/02/17 0000 09/02/17 0335 09/02/17 0700   BP:  129/70 122/66 128/65   Pulse: (!) 110 (!) 113 (!) 113 (!) 116   Resp: 18 20 18 20   Temp:  36.8 °C (98.2 °F) 36.7 °C (98 °F) 36.9 °C (98.4 °F)   SpO2: 96% 93% 95% 98%   Weight:       Height:         Body mass index is 28.45 kg/m².    Oxygen Therapy:       Physical Exam   Constitutional: She appears distressed.   HENT:   Head: Atraumatic.   Eyes: Pupils are equal, round, and reactive to light. Right eye exhibits no discharge. Left eye exhibits no discharge. Scleral icterus is present.   Neck: Normal range of motion. No JVD present. No tracheal deviation present. No thyromegaly present.   Cardiovascular:   No murmur heard.  Pulmonary/Chest: No stridor. No respiratory distress. She exhibits no tenderness.   Abdominal: She exhibits distension and mass. There is tenderness. There is rebound and guarding.   Genitourinary: No vaginal discharge found.   Musculoskeletal: She exhibits edema and tenderness.   Skin: No erythema.         Lab Data Review:         9/3/2017  11:57 AM    Recent Labs      09/01/17   0335 09/02/17   0520   SODIUM  128*  130*   POTASSIUM  4.2  3.9   CHLORIDE  102  102   CO2  18*  18*   BUN  33*  36*   CREATININE  1.96*  1.84*   MAGNESIUM  2.0  2.0   PHOSPHORUS  4.2  4.0   CALCIUM  8.6  8.6       Recent Labs      09/01/17   0335  09/02/17   0520   ALTSGPT  12  14   ASTSGOT  29  39   ALKPHOSPHAT  58  77   TBILIRUBIN  28.8*  30.0*   GLUCOSE  95  90       Recent Labs      08/31/17   2200  09/01/17   0335  09/02/17   0520   RBC  2.33*  2.27*  2.22*   HEMOGLOBIN  8.1*  8.0*  7.6*   HEMATOCRIT  23.0*  22.4*  21.9*   PLATELETCT  67*  65*  71*    PROTHROMBTM   --   26.8*  24.8*   INR   --   2.39*  2.16*       Recent Labs      08/31/17   2200  09/01/17   0335  09/02/17   0520   WBC  11.0*  10.5  9.0   NEUTSPOLYS   --   80.10*  75.70*   LYMPHOCYTES   --   6.40*  6.60*   MONOCYTES   --   11.20  14.00*   EOSINOPHILS   --   0.90  1.40   BASOPHILS   --   0.20  0.30   ASTSGOT   --   29  39   ALTSGPT   --   12  14   ALKPHOSPHAT   --   58  77   TBILIRUBIN   --   28.8*  30.0*           Assessment/Plan     Spontaneous bacterial peritonitis (CMS-HCC)- (present on admission)   Assessment & Plan    -Patient management now is comfort care after the comfort care specilaist speak with the patient's mother over the phone and confirm that decision with her (the patient is unable to give that decision by herself as she is very hard to arouse and not oriented x3).  - Today we repeat the paracentesis and drain 6 liters of clear bright yellow to brown fluid from the patient abdomen.  - Comfirmed with ascitic tap which is showing PMNs highly elevated even after correction for RBCs on 8/28/2017.  Ascitic fluid gram stain is showing no organisms so far and culture is growing rare gram negative lactose fermenting rods.  Albumin given on 8/28 to 8/30.        Acute kidney injury (CMS-HCC)- (present on admission)   Assessment & Plan    - Patient's kidney function is slowly worsening despite IV fluid resuscitation and volume replacement.  Differential could be but not limited to hepato-renal syndrome or ATN from episode of hypotension.  Nephrology on board, appreciate recommendations.  Patient current care plan is comfort care        Anemia due to gastrointestinal blood loss- (present on admission)   Assessment & Plan    - Hb on admission is 5.5. Could be a lower GI bleed based on history.  Hb is 7.6 on the 9/2/2017  Current patient management plan changed to comfort care.        Alcoholic hepatitis with ascites- (present on admission)   Assessment & Plan    -Patient care plan in  comfort care.  -  Cassandraey's discriminant function is 92.  MELD score is 36 indicative of 52% 3-month mortality rate.  Similarly she is Child-Sanabria class C with a score of 13.  Prognosis is poor           Alcohol withdrawal (CMS-HCC)- (present on admission)   Assessment & Plan    - Patient care plan in comfort care.  - History of significant alcohol abuse.  Rally bag already given.  No longer actively withdrawing.          Abdominal pain- (present on admission)   Assessment & Plan    - Likely secondary to SBP and ascitics.  Other possible causes are alcoholic hepatitis, peptic ulcer or gastritis.  Will manage with morphine, Dilaudid.  Patient current management plan is comfort care        Cirrhosis (CMS-HCC)- (present on admission)   Assessment & Plan    -Patient management plan changed to comfort care  - Cirrhosis secondary to alcohol use and confirmed with ultrasound of liver.   AFP was negative.  Her MELD score is high at 33 and she has 52% 3-month mortality rate.          Coagulopathy (CMS-HCC)- (present on admission)   Assessment & Plan    - Secondary to cirrhosis of liver.  No current active bleeding  Patient management plan changed to comfort care.

## 2017-09-03 NOTE — PALLIATIVE CARE
PALLIATIVE CARE FOLLOW-UP:    Unable to arouse pt, sonorous, wet sounding.  Pt's eldest sons' foster mother at bedside -- she was unable to arrange for son who is incarcerated to be brought by [facility] to say good bye.  Validated Brenda's thoughts and feelings on grief journey, and admired her fostering so many children throughout the years.       Scopolamine patch recommended to SANNA Lepe for [employer] (thank you).      Left message on pt's mother's phone with update.  Assume she is traveling.    Plan:  CC    Thank you for allowing Palliative Care to support this pt and their family.  Contact [phone] for additional assistance, questions or concerns.

## 2017-09-03 NOTE — CARE PLAN
Problem: Mobility  Goal: Risk for activity intolerance will decrease  Pt comfort care, pt instructed on repositioning for comfort.

## 2017-09-03 NOTE — PROGRESS NOTES
Received bedside report from day RN and assumed care of patient at 1900. Labs and orders noted. Assessment performed. Patient is lethargic with signs of labored breathing. Patient is on Comfort Care. Medicated per MAR for pain. Safety precautions in place including patient call light within reach, bed alarm on, personal possessions nearby, bed in low position and locked, hourly rounding in practice, and non-skid socks in place. SO at bedside providing support.

## 2017-09-03 NOTE — PROGRESS NOTES
Internal Medicine Interval Note    Name Nury Thornton       1980   Age/Sex 36 y.o. female   MRN [id number]   Code Status DNR/ COMFORT CARE     After 5PM or if no immediate response to page, please call for cross-coverage  Attending/Team: Pham ELIAS/ Hector See Patient List for primary contact information  Call [phone] to page    1st Call - Day Intern (R1):   Gordo Pelayo 2nd Call - Day Sr. Resident (R2/R3):   Adal         Reason for interval visit  (Principal Problem)   Alcoholic hepatitis with ascitis    Interval Problem Daily Status Update  (24 hours)   Active Problems:    Alcoholic hepatitis with ascites POA: Yes    Anemia due to gastrointestinal blood loss POA: Yes    Acute kidney injury (CMS-HCC) POA: Yes    Spontaneous bacterial peritonitis (CMS-HCC) POA: Yes    Coagulopathy (CMS-HCC) POA: Yes    Cirrhosis (CMS-HCC) POA: Yes    Abdominal pain POA: Yes    Alcohol withdrawal (CMS-HCC) POA: Yes        Review of Systems   Constitutional: Positive for diaphoresis. Negative for fever.   HENT: Negative for ear discharge, ear pain, hearing loss and tinnitus.    Eyes: Negative for discharge.   Respiratory: Negative for cough, wheezing and stridor.    Cardiovascular: Positive for leg swelling. Negative for chest pain and palpitations.   Gastrointestinal: Positive for abdominal pain and nausea. Negative for blood in stool, diarrhea, melena and vomiting.   Genitourinary: Negative for dysuria and urgency.   Musculoskeletal: Positive for back pain and myalgias.   Skin: Negative for itching and rash.   Neurological: Positive for dizziness and weakness. Negative for seizures and loss of consciousness.   Endo/Heme/Allergies: Does not bruise/bleed easily.   Psychiatric/Behavioral: Negative for depression and suicidal ideas.       Consultants/Specialty  Nephrologist  Gastroenterologist  pulmonologist      Disposition  Comfort care    Quality Measures    Reviewed items::  EKG reviewed, Medications reviewed,  Labs reviewed and Radiology images reviewed  Bynum catheter::  Critically Ill - Requiring Accurate Measurement of Urinary Output          Physical Exam       Vitals:    09/01/17 1905 09/02/17 0000 09/02/17 0335 09/02/17 0700   BP:  129/70 122/66 128/65   Pulse: (!) 110 (!) 113 (!) 113 (!) 116   Resp: 18 20 18 20   Temp: 36.4 °C (97.5 °F) 36.8 °C (98.2 °F) 36.7 °C (98 °F) 36.9 °C (98.4 °F)   SpO2: 96% 93% 95% 98%   Weight:       Height:         Body mass index is 28.45 kg/m².    Oxygen Therapy:  Pulse Oximetry: 98 %, O2 (LPM): 3, O2 Delivery: Silicone Nasal Cannula    Physical Exam   Constitutional: She appears distressed.   HENT:   Mouth/Throat: No oropharyngeal exudate.   Eyes: Right eye exhibits no discharge. Left eye exhibits no discharge. Scleral icterus is present.   Neck: No tracheal deviation present. No thyromegaly present.   Cardiovascular: Exam reveals no friction rub.    Pulmonary/Chest: She is in respiratory distress. She has no wheezes. She has no rales.   Abdominal: She exhibits distension and mass. There is tenderness. There is rebound and guarding.   Genitourinary: Rectal exam shows guaiac negative stool. No vaginal discharge found.   Musculoskeletal: She exhibits edema.   Neurological: No cranial nerve deficit.   Skin: No erythema.         Lab Data Review:         9/2/2017  9:00 PM    Recent Labs      08/31/17 0147 09/01/17 0335 09/02/17   0520   SODIUM  131*  128*  130*   POTASSIUM  4.8  4.2  3.9   CHLORIDE  102  102  102   CO2  21  18*  18*   BUN  33*  33*  36*   CREATININE  2.25*  1.96*  1.84*   MAGNESIUM  2.2  2.0  2.0   PHOSPHORUS  4.1  4.2  4.0   CALCIUM  8.6  8.6  8.6       Recent Labs      08/31/17 0147 09/01/17   0335  09/02/17   0520   ALTSGPT  12  12  14   ASTSGOT  40  29  39   ALKPHOSPHAT  59  58  77   TBILIRUBIN  29.4*  28.8*  30.0*   GLUCOSE  89  95  90       Recent Labs      08/31/17   0430   08/31/17   2200  09/01/17   0335  09/02/17   0520   RBC   --    < >  2.33*  2.27*   2.22*   HEMOGLOBIN   --    < >  8.1*  8.0*  7.6*   HEMATOCRIT   --    < >  23.0*  22.4*  21.9*   PLATELETCT   --    < >  67*  65*  71*   PROTHROMBTM  25.5*   --    --   26.8*  24.8*   INR  2.24*   --    --   2.39*  2.16*    < > = values in this interval not displayed.       Recent Labs      08/31/17   0147   08/31/17   2200  09/01/17   0335  09/02/17   0520   WBC  11.3*   < >  11.0*  10.5  9.0   NEUTSPOLYS   --    --    --   80.10*  75.70*   LYMPHOCYTES   --    --    --   6.40*  6.60*   MONOCYTES   --    --    --   11.20  14.00*   EOSINOPHILS   --    --    --   0.90  1.40   BASOPHILS   --    --    --   0.20  0.30   ASTSGOT  40   --    --   29  39   ALTSGPT  12   --    --   12  14   ALKPHOSPHAT  59   --    --   58  77   TBILIRUBIN  29.4*   --    --   28.8*  30.0*    < > = values in this interval not displayed.           Assessment/Plan     Spontaneous bacterial peritonitis (CMS-HCC)- (present on admission)   Assessment & Plan    - Patient management now is comfort care after the comfort care specilaist speak with the patient's mother over the phone and confirm that decision with her (the patient is unable to give that decision by herself as she is very hard to arouse and not oriented x3).  - Today we repeat the paracentesis and drain 6 liters of clear bright yellow to brown fluid from the patient abdomen.  - Comfirmed with ascitic tap which is showing PMNs highly elevated even after correction for RBCs on 8/28/2017.  Ascitic fluid gram stain is showing no organisms so far and culture is growing rare gram negative lactose fermenting rods.  Albumin given on 8/28 to 8/30.        Acute kidney injury (CMS-HCC)- (present on admission)   Assessment & Plan    Patient's kidney function is slowly worsening despite IV fluid resuscitation and volume replacement.  Differential could be but not limited to hepato-renal syndrome or ATN from episode of hypotension.  Nephrology on board, appreciate recommendations.  Patient current  care plan is comfort care        Anemia due to gastrointestinal blood loss- (present on admission)   Assessment & Plan    Hb on admission is 5.5. Could be a lower GI bleed based on history.  Hb now is 7.6  Current patient management plan changed to comfort care.        Alcoholic hepatitis with ascites- (present on admission)   Assessment & Plan    Laura's discriminant function is 92.  MELD score is 36 indicative of 52% 3-month mortality rate.  Similarly she is Child-Sanabria class C with a score of 13.  Prognosis is poor   Patient care plan in comfort care.        Alcohol withdrawal (CMS-HCC)- (present on admission)   Assessment & Plan    History of significant alcohol abuse.  Rally bag already given.  No longer actively withdrawing.          Abdominal pain- (present on admission)   Assessment & Plan    Likely secondary to SBP and ascitics.  Other possible causes are alcoholic hepatitis, peptic ulcer or gastritis.  On PPI.  Will manage with morphine, Dilaudid.  Patient current management plan is comfort care        Cirrhosis (CMS-HCC)- (present on admission)   Assessment & Plan    - Patient management plan changed to comfort care  - Cirrhosis secondary to alcohol use and confirmed with ultrasound of liver.   AFP was negative.  Her MELD score is high at 33 and she has 52% 3-month mortality rate.          Coagulopathy (CMS-HCC)- (present on admission)   Assessment & Plan    Secondary to cirrhosis of liver.  INR is 2.16 and platelets are at 71.  No current active bleeding  Patient management plan changed to comfort care.

## 2017-09-03 NOTE — PROGRESS NOTES
Harmony Melissa Fall Risk Assessment:     Last Known Fall: Within the last six months  Mobility: Use of assistive device/requires assist of two people  Medications: Cardiovascular or central nervous system meds  Mental Status/LOC/Awareness: Unresponsive/noncompliance with instruction  Toileting Needs: Use of catheters or diversion devices  Volume/Electrolyte Status: No problems  Communication/Sensory: Non-English patient/unable to speak/slurred speech  Behavior: Ethanol/substance abuse  Harmony Melissa Fall Risk Total: 17  Fall Risk Level: HIGH RISK    Universal Fall Precautions:  bed in low position and locked, call light/belongings in reach, wheelchairs and assistive devices out of sight, siderails up x 2, use non-slip footwear, adequate lighting, clutter free and spill free environment, educate on level of risk, educate to call for assistance    Fall Risk Level Interventions:    TRIAL (TELE 8, NEURO, MED CHANDLER 5) Moderate Fall Risk Interventions  Place yellow fall risk ID band on patient: verified  Provide patient/family education based on risk assessment : completed  Educate patient/family to call staff for assistance when getting out of bed: completed  Place fall precaution signage outside patient door: verified  Utilize bed/chair fall alarm: verifiedTRIAL ([employer] 8, NEURO, [employer] CHANDLER 5) High Fall Risk Interventions  Place yellow fall risk ID band on patient: verified  Provide patient/family education based on risk assessment: completed  Educate patient/family to call staff for assistance when getting out of bed: completed  Place fall precaution signage outside patient door: verified  Place patient in room close to nursing station: currently not available/charge notified  Utilize bed/chair fall alarm: refused (comfort care, not ambulatory, not appropriate)  Notify charge of high risk for huddle: completed    Patient Specific Interventions:     Medication: review medications with patient and family  Mental  Status/LOC/Awareness: reorient patient and check on patient hourly  Toileting: not applicable  Volume/Electrolyte Status: administer medications as ordered for nausea and vomiting  Communication/Sensory: update plan of care on whiteboard  Behavioral: encourage patient to voice feelings and administer medication as ordered  Mobility: provide comfort measures during transport and ensure bed is locked and in lowest position

## 2017-09-03 NOTE — CARE PLAN
Problem: Skin Integrity  Goal: Skin Integrity is maintained  Outcome: PROGRESSING AS EXPECTED  [id number] in place. Pillows used to alleviate pressure points. Oral care provide several times during the shift.     Problem: Pain  Goal: Alleviation of Pain or a reduction in pain to the patient's comfort goal  Outcome: PROGRESSING SLOWER THAN EXPECTED  Medicated per MAR for pain. Pt is noisy breathing, restless, and grimacing. Pt requires frequent pain medication interventions .

## 2017-09-03 NOTE — PROGRESS NOTES
Harmony Melissa Fall Risk Assessment:     Last Known Fall: No falls  Mobility: Use of assistive device/requires assist of two people  Medications: Cardiovascular or central nervous system meds  Mental Status/LOC/Awareness: Oriented to person and place  Toileting Needs: Use of catheters or diversion devices  Volume/Electrolyte Status: Use of IV fluids/tube feeds  Communication/Sensory: No deficits  Behavior: Ethanol/substance abuse  Harmony Melissa Fall Risk Total: 12  Fall Risk Level: MODERATE RISK     Universal Fall Precautions:  call light/belongings in reach, bed in low position and locked, siderails up x 2, use non-slip footwear, adequate lighting, clutter free and spill free environment, educate to call for assistance, educate on level of risk     Fall Risk Level Interventions:    TRIAL (TELE 8, NEURO, MED CHANDLER 5) Moderate Fall Risk Interventions  Place yellow fall risk ID band on patient: completed  Provide patient/family education based on risk assessment : completed  Educate patient/family to call staff for assistance when getting out of bed: completed  Place fall precaution signage outside patient door: completed  Utilize bed/chair fall alarm: completed      Patient Specific Interventions:      Medication: review medications with patient and family, assess for medications that can be discontinued or dosage decreased   Mental Status/LOC/Awareness: reorient patient, reinforce falls education, utilize bed/chair fall alarm and reinforce the use of call light  Toileting: bedpan offered  Volume/Electrolyte Status: ensure patient remains hydrated  Communication/Sensory: update plan of care on whiteboard  Behavioral: encourage patient to voice feelings and instruct/reinforce fall program rationale  Mobility: utilize bed/chair fall alarm, ensure bed is locked and in lowest position

## 2017-09-04 PROBLEM — R65.20 SEVERE SEPSIS(995.92): Status: ACTIVE | Noted: 2017-01-01

## 2017-09-04 NOTE — CARE PLAN
Problem: Nutritional:  Goal: Achieve adequate nutritional intake  Patient will consume 50% of meals, snacks, and supplements.   Outcome: No longer applicable   Patient now on comfort care measures.  Nutrition Representative will continue to see her for ongoing meal and snack preferences.  .  RD available PRN.

## 2017-09-04 NOTE — CARE PLAN
Problem: Skin Integrity  Goal: Skin Integrity is maintained  Kept skin dry ad clean from incontinence.    Problem: Self Care Deficit  Goal: Oral Hygiene    Intervention: Oral Hygiene given  Oral care given every shift and when needed.

## 2017-09-04 NOTE — PROGRESS NOTES
Internal Medicine Interval Note    Name Nury Thornton       1980   Age/Sex 36 y.o. female   MRN [id number]   Code Status Comfort Care/DNR     After 5PM or if no immediate response to page, please call for cross-coverage  Attending/Team: Dr. Winkler/Hector See Patient List for primary contact information  Call [phone] to page    1st Call - Day Intern (R1):   Dr. Rehman 2nd Call - Day Sr. Resident (R2/R3):   Dr. Galeas         Reason for interval visit  (Principal Problem)   Severe sepsis (CMS-HCC)    Interval Problem Daily Status Update  (24 hours)   Pt being kept comfortable in accordance with comfort care measures. Family intermittently at bedside.    Review of Systems   Unable to perform ROS: Patient unresponsive       Consultants/Specialty  Nephrology  Gastroenterology  Pulmonology  Palliative care    Disposition  Comfort Care    Quality Measures    [place] catheter::  Hospice / Comfort Care  Ulcer Prophylaxis::  Not indicated          Physical Exam       Vitals:    17 0700 17 1112 17 1600 17 1819   BP: 128/65      Pulse: (!) 116 (!) 132  (!) 126   Resp: 20 (!) 26  (!) 24   Temp: 36.9 °C (98.4 °F)      SpO2: 98% (!) 55% (!) 40% (!) 55%   Weight:       Height:         Body mass index is 28.45 kg/m².    Oxygen Therapy:  Pulse Oximetry: (!) 55 %, O2 (LPM): 0, O2 Delivery: None (Room Air)    Physical Exam   Constitutional: She appears toxic.   HENT:   Head: Normocephalic and atraumatic.   Neck: No tracheal deviation present.   Cardiovascular: Regular rhythm and intact distal pulses.  Tachycardia present.    Murmur heard.  Pulmonary/Chest: No stridor. Tachypnea noted.   Agonal breathing   Abdominal: She exhibits distension.   Neurological:   Unresponsive   Skin: Skin is warm, dry and intact.         Lab Data Review:         2017  6:49 AM    Recent Labs      17   0520   SODIUM  130*   POTASSIUM  3.9   CHLORIDE  102   CO2  18*   BUN  36*   CREATININE  1.84*    MAGNESIUM  2.0   PHOSPHORUS  4.0   CALCIUM  8.6       Recent Labs      09/02/17   0520   ALTSGPT  14   ASTSGOT  39   ALKPHOSPHAT  77   TBILIRUBIN  30.0*   GLUCOSE  90       Recent Labs      09/02/17   0520   RBC  2.22*   HEMOGLOBIN  7.6*   HEMATOCRIT  21.9*   PLATELETCT  71*   PROTHROMBTM  24.8*   INR  2.16*       Recent Labs      09/02/17   0520   WBC  9.0   NEUTSPOLYS  75.70*   LYMPHOCYTES  6.60*   MONOCYTES  14.00*   EOSINOPHILS  1.40   BASOPHILS  0.30   ASTSGOT  39   ALTSGPT  14   ALKPHOSPHAT  77   TBILIRUBIN  30.0*           Assessment/Plan     * Severe sepsis (CMS-[facility])- (present on admission)   Assessment & Plan    This is severe sepsis with the following associated acute organ dysfunction(s): acute kidney failure, metabolic/septic encephalopathy.   - 2/2 bacterial peritonitis  - Comfort care status        Spontaneous bacterial peritonitis (CMS-[facility])- (present on admission)   Assessment & Plan    - 8/28/17 Diagnostic paracentesis revealed highly elevated PMNs, gram stain w no organisms, Cx rare gram negative lactose fermenting rods  - 9/2/17 Therapeutic paracentesis yielded 6 liters of clear bright yellow to brown fluid  - Albumin given on 8/28 to 8/30  - Comfort care per specialist discussion with patient's mother, patient lacks capacity 2/2 mental status        Acute kidney injury (CMS-HCC)- (present on admission)   Assessment & Plan    - 2/2 Severe sepsis  - Nephrology consulted during this admission  - No interventions per comfort care        Anemia due to gastrointestinal blood loss- (present on admission)   Assessment & Plan    - Hb on admission is 5.5. May be 2/2 GI bleed based on history.  - Hb 7.6 on 9/2/2017  - no interventions per comfort care        Alcohol withdrawal (CMS-[facility])- (present on admission)   Assessment & Plan    - Resolved  - History of significant alcohol abuse        Abdominal pain- (present on admission)   Assessment & Plan    - Likely secondary to SBP and ascitics  - Will manage  with morphine, Dilaudid  - [place] analgesics as needed per comfort care status        Cirrhosis (CMS-HCC)- (present on admission)   Assessment & Plan    - Comfort care  - Cirrhosis 2/2 alcohol abuse, confirmed with ultrasound of liver   - Laura's discriminant function is 92  - MELD score is 36 indicative of 52% 3-month mortality rate  - Child-Sanabria class C with a score of 13  - AFP was negative        Coagulopathy (CMS-HCC)- (present on admission)   Assessment & Plan    - Secondary to cirrhosis of liver  - No active bleeding

## 2017-09-04 NOTE — PROGRESS NOTES
Received on comfort care lethargic, doesn't follow simple command, calm, deep shallow breathing, q 2 turn, oral care done, needs attended, will continue to monitor.

## 2017-09-04 NOTE — PROGRESS NOTES
Harmony Melissa Fall Risk Assessment:     Last Known Fall: Within the last six months  Mobility: Use of assistive device/requires assist of two people  Medications: Cardiovascular or central nervous system meds  Mental Status/LOC/Awareness: Unresponsive/noncompliance with instruction  Toileting Needs: Use of catheters or diversion devices  Volume/Electrolyte Status: No problems  Communication/Sensory: Non-English patient/unable to speak/slurred speech  Behavior: Ethanol/substance abuse  Harmony Melissa Fall Risk Total: 17  Fall Risk Level: HIGH RISK    Universal Fall Precautions:  bed in low position and locked, call light/belongings in reach, wheelchairs and assistive devices out of sight, siderails up x 2, use non-slip footwear, adequate lighting, clutter free and spill free environment, educate on level of risk, educate to call for assistance    Fall Risk Level Interventions:    TRIAL (TELE 8, NEURO, MED CHANDLER 5) Moderate Fall Risk Interventions  Place yellow fall risk ID band on patient: verified  Provide patient/family education based on risk assessment : completed  Educate patient/family to call staff for assistance when getting out of bed: completed  Place fall precaution signage outside patient door: verified  Utilize bed/chair fall alarm: verifiedTRIAL ([employer] 8, NEURO, [employer] CHANDLER 5) High Fall Risk Interventions  Place yellow fall risk ID band on patient: verified  Provide patient/family education based on risk assessment: completed  Educate patient/family to call staff for assistance when getting out of bed: completed  Place fall precaution signage outside patient door: verified  Place patient in room close to nursing station: currently not available/charge notified  Utilize bed/chair fall alarm: refused (comfort care, not ambulatory, not appropriate)  Notify charge of high risk for huddle: completed    Patient Specific Interventions:     Medication: review medications with patient and family  Mental  Status/LOC/Awareness: reorient patient and check on patient hourly  Toileting: not applicable  Volume/Electrolyte Status: administer medications as ordered for nausea and vomiting  Communication/Sensory: update plan of care on whiteboard  Behavioral: encourage patient to voice feelings and administer medication as ordered  Mobility: provide comfort measures during transport and ensure bed is locked and in lowest position

## 2017-09-04 NOTE — CARE PLAN
Problem: Psychosocial Needs:  Goal: Level of anxiety will decrease  Pt medicated to keep her comfortable, pt reinforced that family and friends at here to see her.

## 2017-09-04 NOTE — PROGRESS NOTES
Harmony Melissa Fall Risk Assessment:     Last Known Fall: Within the last six months  Mobility: Use of assistive device/requires assist of two people  Medications: Cardiovascular or central nervous system meds  Mental Status/LOC/Awareness: Unresponsive/noncompliance with instruction  Toileting Needs: Use of catheters or diversion devices  Volume/Electrolyte Status: No problems  Communication/Sensory: Non-English patient/unable to speak/slurred speech  Behavior: Ethanol/substance abuse  Harmony Melissa Fall Risk Total: 17  Fall Risk Level: HIGH RISK     Universal Fall Precautions:  bed in low position and locked, call light/belongings in reach, wheelchairs and assistive devices out of sight, siderails up x 2, use non-slip footwear, adequate lighting, clutter free and spill free environment, educate on level of risk, educate to call for assistance     Fall Risk Level Interventions:    TRIAL (TELE 8, NEURO, MED CHANDLER 5) Moderate Fall Risk Interventions  Place yellow fall risk ID band on patient: verified  Provide patient/family education based on risk assessment : completed  Educate patient/family to call staff for assistance when getting out of bed: completed  Place fall precaution signage outside patient door: verified  Utilize bed/chair fall alarm: verifiedTRIAL ([place] 8, NEURO, [employer] CHANDLER 5) High Fall Risk Interventions  Place yellow fall risk ID band on patient: verified  Provide patient/family education based on risk assessment: completed  Educate patient/family to call staff for assistance when getting out of bed: completed  Place fall precaution signage outside patient door: verified  Place patient in room close to nursing station: currently not available/charge notified  Utilize bed/chair fall alarm: refused (comfort care, not ambulatory, not appropriate)  Notify charge of high risk for huddle: completed     Patient Specific Interventions:      Medication: review medications with patient and family  Mental  Status/LOC/Awareness: reorient patient and check on patient hourly  Toileting: not applicable  Volume/Electrolyte Status: administer medications as ordered for nausea and vomiting  Communication/Sensory: update plan of care on whiteboard  Behavioral: encourage patient to voice feelings and administer medication as ordered  Mobility: provide comfort measures during transport and ensure bed is locked and in lowest position

## 2017-09-05 NOTE — CARE PLAN
Problem: Pain Management  Goal: Pain level will decrease to patient's comfort goal  Patient is comfort care, medicated per MAR for comfort.     Problem: Skin Integrity  Goal: Risk for impaired skin integrity will decrease  Q2T in place, olmos in place, incontinence care provided as needed.

## 2017-09-05 NOTE — PROGRESS NOTES
Pt on comfort care, turning for comfort, cleaning paritoneal area, providing oral care. Mother at bedside. POC provided. Pt placed on a waffle cushion mattress. Notified MD regarding in patient hospice. Per MD will notify RN. RN to follow up. Hourly rounding in place. Call light with in reach.

## 2017-09-05 NOTE — ASSESSMENT & PLAN NOTE
This is severe sepsis with the following associated acute organ dysfunction(s): acute kidney failure, metabolic/septic encephalopathy. Secondary to spontaneous bacterial peritonitis complicated by hepatorenal syndrome.  - Comfort care status

## 2017-09-05 NOTE — PROGRESS NOTES
Harmony Melissa Fall Risk Assessment:     Last Known Fall: Within the last six months  Mobility: Immobilized/requires assist of one person  Medications: Cardiovascular or central nervous system meds  Mental Status/LOC/Awareness: Unresponsive/noncompliance with instruction  Toileting Needs: Use of catheters or diversion devices  Volume/Electrolyte Status: NPO greater than 24 hours  Communication/Sensory: Non-English patient/unable to speak/slurred speech  Behavior: Ethanol/substance abuse  Harmony Melissa Fall Risk Total: 17  Fall Risk Level: HIGH RISK    Universal Fall Precautions:  call light/belongings in reach, bed in low position and locked, wheelchairs and assistive devices out of sight, siderails up x 2, use non-slip footwear, adequate lighting, clutter free and spill free environment    Fall Risk Level Interventions:    TRIAL ([place], NEURO, MED CHANDLER 5) Moderate Fall Risk Interventions  Place yellow fall risk ID band on patient: verified  Provide patient/family education based on risk assessment : completed  Educate patient/family to call staff for assistance when getting out of bed: completed  Place fall precaution signage outside patient door: verified  Utilize bed/chair fall alarm: verifiedTRIAL ([place] 8, NEURO, [employer] CHNADLER 5) High Fall Risk Interventions  Place yellow fall risk ID band on patient: verified  Provide patient/family education based on risk assessment: completed  Educate patient/family to call staff for assistance when getting out of bed: completed  Place fall precaution signage outside patient door: verified  Place patient in room close to nursing station: currently not available/charge notified  Utilize bed/chair fall alarm: refused (comfort care pt, not appropriate, not mobile)  Notify charge of high risk for huddle: completed    Patient Specific Interventions:     Medication: review medications with patient and family  Mental Status/LOC/Awareness: check on patient hourly  Toileting: monitor  intake and output/use of appropriate interventions  Volume/Electrolyte Status: not applicable  Communication/Sensory: update plan of care on whiteboard and provide communication alternatives/  Behavioral: encourage patient to voice feelings and administer medication as ordered  Mobility: provide comfort measures during transport and ensure bed is locked and in lowest position

## 2017-09-05 NOTE — HOSPICE
Spoke with patient's mother Taisha who is now at the patient's bedside, patient's mother is next of kin. Requested to discuss hospice services with pt's mother. Patient's mother stated she did not feel hospice was necessary, declines the need for bereavement services, and states she expects her daughter to pass soon. Will not pursue hospice services at this time. Dr. Mckinney updated.

## 2017-09-05 NOTE — PROGRESS NOTES
Internal Medicine Interval Note    Name Nury Thornton       1980   Age/Sex 36 y.o. female   MRN [id number]   Code Status Comfort Care/DNR     After 5PM or if no immediate response to page, please call for cross-coverage  Attending/Team: Dr. Winkler/Hector See Patient List for primary contact information  Call [phone] to page    1st Call - Day Intern (R1):   Dr. Rehman 2nd Call - Day Sr. Resident (R2/R3):   Dr. Galeas         Reason for interval visit  (Principal Problem)   Severe sepsis (CMS-HCC)    Interval Problem Daily Status Update  (24 hours)   Pt obtunded. She is being kept comfortable in accordance with comfort care measures.   Family at bedside.    Review of Systems   Unable to perform ROS: Patient unresponsive       Consultants/Specialty  Nephrology  Gastroenterology  Pulmonology  Palliative care    Disposition  Comfort Care    Quality Measures    Bynum catheter::  Hospice / Comfort Care  Ulcer Prophylaxis::  Not indicated          Physical Exam       Vitals:    17 1600 17 1819 17 1021 17 0800   BP:    111/41   Pulse:  (!) 126 (!) 120 (!) 119   Resp:  (!) 24 (!) 26 (!) 32   Temp:    (!) 38.1 °C (100.5 °F)   SpO2: (!) 40% (!) 55% (!) 87% (!) 86%   Weight:       Height:         Body mass index is 28.45 kg/m².    Oxygen Therapy:  Pulse Oximetry: (!) 86 %, O2 (LPM): 0, O2 Delivery: None (Room Air)    Physical Exam   Constitutional: She appears toxic.   HENT:   Head: Normocephalic and atraumatic.   Neck: No tracheal deviation present.   Cardiovascular: Regular rhythm and intact distal pulses.  Tachycardia present.    Murmur heard.  Pulmonary/Chest: No stridor. Tachypnea noted.   Agonal breathing   Abdominal: She exhibits distension.   Neurological:   Obtunded   Skin: Skin is warm, dry and intact.         Lab Data Review:       No results for input(s): SODIUM, POTASSIUM, CHLORIDE, CO2, BUN, CREATININE, MAGNESIUM, PHOSPHORUS, CALCIUM in the last 72 hours.    No results  for input(s): ALTSGPT, ASTSGOT, ALKPHOSPHAT, TBILIRUBIN, DBILIRUBIN, GAMMAGT, AMYLASE, LIPASE, ALB, PREALBUMIN, GLUCOSE in the last 72 hours.    No results for input(s): RBC, HEMOGLOBIN, HEMATOCRIT, PLATELETCT, PROTHROMBTM, APTT, INR, IRON, FERRITIN, TOTIRONBC in the last 72 hours.              Assessment/Plan     * Severe sepsis (CMS-HCC)- (present on admission)   Assessment & Plan    This is severe sepsis with the following associated acute organ dysfunction(s): acute kidney failure, metabolic/septic encephalopathy.   - 2/2 bacterial peritonitis  - Comfort care status        Spontaneous bacterial peritonitis (CMS-[facility])- (present on admission)   Assessment & Plan    - 8/28/17 Diagnostic paracentesis revealed highly elevated PMNs, gram stain w no organisms, Cx rare gram negative lactose fermenting rods  - 9/2/17 Therapeutic paracentesis yielded 6 liters of clear bright yellow to brown fluid  - Comfort care per specialist discussion with patient's mother, patient lacks capacity 2/2 mental status        Acute kidney injury (CMS-[facility])- (present on admission)   Assessment & Plan    - 2/2 Severe sepsis  - Nephrology consulted during this admission  - No interventions per comfort care        Anemia due to gastrointestinal blood loss- (present on admission)   Assessment & Plan    - Hb on admission is 5.5. May be 2/2 GI bleed based on history.  - Hb 7.6 on 9/2/2017  - no interventions per comfort care        Alcohol withdrawal (CMS-HCC)- (present on admission)   Assessment & Plan    - Resolved  - History of significant alcohol abuse        Abdominal pain- (present on admission)   Assessment & Plan    - Likely secondary to SBP and ascitics  - Will manage with morphine, Dilaudid  - [place] analgesics as needed per comfort care status        Cirrhosis (CMS-HCC)- (present on admission)   Assessment & Plan    - Cirrhosis 2/2 alcohol abuse, confirmed with ultrasound of liver   - Laura's discriminant function is 92  - MELD score  is 36 indicative of 52% 3-month mortality rate  - Child-Sanabria class C with a score of 13  - Comfort care        Coagulopathy (CMS-HCC)- (present on admission)   Assessment & Plan    - Secondary to cirrhosis of liver  - No active bleeding

## 2017-09-05 NOTE — PALLIATIVE CARE
Spiritual Care Note           Patient's Name: Nury Thornton   MRN: [id number]    Age and Gender: 36 y.o. female   YOB: 1980   Place of Residence: [place], [place]   Family/Friends/Others Present: Taisha hill   Unit: Medical/[place] [address]   Room (and Bed): [place] [address]   Bed 1   Ethnicity or Nationality:    Primary Language: English   Medical Diagnosis/Procedure: severe sepsis  spontaneous bacterial peritonitis   acute kidney injury   anemia due to gastrointestinal blood loss  alcohol withdrawal   cirrhosis   coagulopathy   Holiness Affiliation: Episcopal   Code Status: Comfort Care/DNR    Date of Admission: 8/28/2017    Length of Stay: 8 days   Date of Visit: 9/5/2017       Situation/Reason for Visit:  Patient followed by palliative care.  Also received  Services Request Order # [id number] because patient placed on comfort care..    Background:  (see above diagnoses)    Receptivity to Visit:  Patient obtunded.  Mother receptive to a spiritual care visit.    Summary of Interaction/Conversation with Patient and/or Family/Friends/Others:  Patient's mother asked for prayer.    Assessment of Cultural/Social/Emotional/Spiritual Issues:  Anticipatory grief, seeking spiritual support through prayer.    Interventions:  Compassionate presence, emotional support, prayer.    Outcomes:  Spiritual comfort, emotional release, progress with grief.    Consultations/Referrals:  none    Requests/Recommendations:  none    Continuing Care:  Will continue to follow.      Contact Information:  Chaplain MIRZA Awad  [phone]   [email or web address]

## 2017-09-05 NOTE — DISCHARGE PLANNING
Call from [facility] team stating they would be by sometime today 9/5 to evaluate pt for hospice services.

## 2017-09-05 NOTE — HOSPICE
Patient was evaluated for hospice on 9/1/17, however, patient declined hospice and wanted to pursue active treatment. Received call from Social Work requesting hospice evaluation, will re-evaluate today.

## 2017-09-05 NOTE — PROGRESS NOTES
Received report, assumed care of pt at 1900. Pt in bed, fall precautions in place, bed alarm on, call light within reach. Patient is on comfort care. Bynum in place, Q2T. Medicated for pain. Labored breathing, not responsive to voice. Hourly rounding in place.

## 2017-09-05 NOTE — PROGRESS NOTES
Harmony Melissa Fall Risk Assessment:     Last Known Fall: Within the last six months  Mobility: Immobilized/requires assist of one person  Medications: Cardiovascular or central nervous system meds  Mental Status/LOC/Awareness: Unresponsive/noncompliance with instruction  Toileting Needs: Use of catheters or diversion devices  Volume/Electrolyte Status: NPO greater than 24 hours  Communication/Sensory: Non-English patient/unable to speak/slurred speech  Behavior: Ethanol/substance abuse  Harmony Melissa Fall Risk Total: 17  Fall Risk Level: HIGH RISK     Universal Fall Precautions:  call light/belongings in reach, bed in low position and locked, wheelchairs and assistive devices out of sight, siderails up x 2, use non-slip footwear, adequate lighting, clutter free and spill free environment     Fall Risk Level Interventions:    TRIAL (advisorCONNECT, NEURO, MED CHANDLER 5) Moderate Fall Risk Interventions  Place yellow fall risk ID band on patient: verified  Provide patient/family education based on risk assessment : completed  Educate patient/family to call staff for assistance when getting out of bed: completed  Place fall precaution signage outside patient door: verified  Utilize bed/chair fall alarm: verifiedTRIAL ([employer] 8, NEURO, [employer] CHANDLER 5) High Fall Risk Interventions  Place yellow fall risk ID band on patient: verified  Provide patient/family education based on risk assessment: completed  Educate patient/family to call staff for assistance when getting out of bed: completed  Place fall precaution signage outside patient door: verified  Place patient in room close to nursing station: currently not available/charge notified  Utilize bed/chair fall alarm: refused (comfort care pt, not appropriate, not mobile)  Notify charge of high risk for huddle: completed     Patient Specific Interventions:      Medication: review medications with patient and family  Mental Status/LOC/Awareness: check on patient hourly  Toileting: monitor  intake and output/use of appropriate interventions  Volume/Electrolyte Status: not applicable  Communication/Sensory: update plan of care on whiteboard and provide communication alternatives/  Behavioral: encourage patient to voice feelings and administer medication as ordered  Mobility: provide comfort measures during transport and ensure bed is locked and in lowest position

## 2017-09-05 NOTE — CARE PLAN
Problem: Safety  Goal: Free from accidental injury  Pt's bed locked and in lowest position. Hourly rounding in place. Call light with in reach.     Problem: Skin Integrity  Goal: Skin Integrity is maintained  Pt placed on a waffle mattress.

## 2017-09-06 NOTE — PROGRESS NOTES
Harmony Melissa Fall Risk Assessment:     Last Known Fall: Within the last six months  Mobility: Use of assistive device/requires assist of two people  Medications: Cardiovascular or central nervous system meds  Mental Status/LOC/Awareness: Unresponsive/noncompliance with instruction  Toileting Needs: Use of catheters or diversion devices  Volume/Electrolyte Status: NPO greater than 24 hours  Communication/Sensory: Non-English patient/unable to speak/slurred speech  Behavior: Ethanol/substance abuse  Harmony Melissa Fall Risk Total: 18  Fall Risk Level: HIGH RISK    Universal Fall Precautions:  call light/belongings in reach, bed in low position and locked, wheelchairs and assistive devices out of sight, siderails up x 2, use non-slip footwear, adequate lighting, clutter free and spill free environment, educate on level of risk, educate to call for assistance    Fall Risk Level Interventions:    TRIAL (adjust 8, NEURO, MED CHANDLER 5) Moderate Fall Risk Interventions  Place yellow fall risk ID band on patient: verified  Provide patient/family education based on risk assessment : verified  Educate patient/family to call staff for assistance when getting out of bed: verified  Place fall precaution signage outside patient door: verified  Utilize bed/chair fall alarm: verifiedTRIAL (TELE 8, NEURO, [employer] CHANDLER 5) High Fall Risk Interventions  Place yellow fall risk ID band on patient: verified  Provide patient/family education based on risk assessment: completed  Educate patient/family to call staff for assistance when getting out of bed: completed  Place fall precaution signage outside patient door: verified  Place patient in room close to nursing station: currently not available/charge notified  Utilize bed/chair fall alarm: verified  Notify charge of high risk for huddle: verified    Patient Specific Interventions:     Medication: not applicable  Mental Status/LOC/Awareness: reinforce the use of call light  Toileting: not  applicable  Volume/Electrolyte Status: not applicable  Communication/Sensory: not applicable  Behavioral: not applicable  Mobility: utilize bed/chair fall alarm

## 2017-09-06 NOTE — PROGRESS NOTES
Pt on comfort care. Providing PRN medication as needed. Family at bedside. Linens changed. Pt on turning schedule according tjo comfort. On RA, shallow breathing noted. Pt febrile , providing ice packs and cold wash cloths. Hourly rounding in place.

## 2017-09-06 NOTE — DISCHARGE PLANNING
Care Transition Team Discharge Planning                   Discharge Plan: Called [facility] and discussed pt's case, apparently pt's mother is fine with pt being on comfort care measures and does not want hospice services at this time. Will discuss pt's case in MDT rounds this am.

## 2017-09-06 NOTE — CARE PLAN
Problem: Skin Integrity  Goal: Skin Integrity is maintained    Intervention: Turn every 2 hours  Pt turned q 2 hours to prevent pain from skin breakdown       Problem: Pain  Goal: Alleviation of Pain or a reduction in pain to the patient's comfort goal    Intervention: Pain Management--Medications  Pain managed with prn morphine

## 2017-09-06 NOTE — PROGRESS NOTES
Internal Medicine Interval Note    Name Nury Thornton       1980   Age/Sex 36 y.o. female   MRN [id number]   Code Status Comfort Care/DNR     After 5PM or if no immediate response to page, please call for cross-coverage  Attending/Team: Dr. Winkler/Hector See Patient List for primary contact information  Call [phone] to page    1st Call - Day Intern (R1):   Dr. Rehman 2nd Call - Day Sr. Resident (R2/R3):   Dr. Galeas         Reason for interval visit  (Principal Problem)   Severe sepsis (CMS-HCC)    Interval Problem Daily Status Update  (24 hours)   Critically ill and obtunded. Family at bedside. Evaluated by hospice, but mother prefers to continue comfort care. She is being kept comfortable in accordance with comfort care measures.     Review of Systems   Unable to perform ROS: Patient unresponsive       Consultants/Specialty  Nephrology  Gastroenterology  Pulmonology  Palliative care    Disposition  Comfort Care    Quality Measures    Bynum catheter::  Hospice / Comfort Care  Ulcer Prophylaxis::  Not indicated          Physical Exam       Vitals:    17 1819 17 1021 17 0800 17 0800   BP:   111/41 109/55   Pulse: (!) 126 (!) 120 (!) 119 (!) 110   Resp: (!) 24 (!) 26 (!) 32 (!) 24   Temp:   (!) 38.1 °C (100.5 °F) 37.7 °C (99.9 °F)   SpO2: (!) 55% (!) 87% (!) 86% 90%   Weight:       Height:         Body mass index is 28.45 kg/m².    Oxygen Therapy:  Pulse Oximetry: 90 %, O2 (LPM): 0, O2 Delivery: None (Room Air)    Physical Exam   Constitutional: She appears toxic.   HENT:   Head: Normocephalic and atraumatic.   Neck: No tracheal deviation present.   Cardiovascular: Regular rhythm and intact distal pulses.  Tachycardia present.    Murmur heard.  Pulmonary/Chest: No stridor. Tachypnea noted.   Agonal breathing   Abdominal: She exhibits distension.   Neurological:   Obtunded   Skin: Skin is warm, dry and intact.         Lab Data Review:       No results for input(s): SODIUM,  POTASSIUM, CHLORIDE, CO2, BUN, CREATININE, MAGNESIUM, PHOSPHORUS, CALCIUM in the last 72 hours.    No results for input(s): ALTSGPT, ASTSGOT, ALKPHOSPHAT, TBILIRUBIN, DBILIRUBIN, GAMMAGT, AMYLASE, LIPASE, ALB, PREALBUMIN, GLUCOSE in the last 72 hours.    No results for input(s): RBC, HEMOGLOBIN, HEMATOCRIT, PLATELETCT, PROTHROMBTM, APTT, INR, IRON, FERRITIN, TOTIRONBC in the last 72 hours.              Assessment/Plan     * Severe sepsis (CMS-HCC)- (present on admission)   Assessment & Plan    This is severe sepsis with the following associated acute organ dysfunction(s): acute kidney failure, metabolic/septic encephalopathy. Secondary to spontaneous bacterial peritonitis complicated by hepatorenal syndrome.  - Comfort care status        Spontaneous bacterial peritonitis (CMS-HCC)- (present on admission)   Assessment & Plan    Complicated by severe sepsis and hepatorenal syndrome. Very poor prognosis, despite patient's young age. Patient lacks capacity secondary to mental status.  - Comfort care        Acute kidney injury (CMS-HCC)- (present on admission)   Assessment & Plan    Hepatorenal syndrome secondary to alcohol-induced cirrhosis and spontaneous peritonitis with severe sepsis.  - No interventions per comfort care        Anemia due to gastrointestinal blood loss- (present on admission)   Assessment & Plan    - no interventions per comfort care        Alcohol withdrawal (CMS-[facility])- (present on admission)   Assessment & Plan    - Resolved  - History of significant alcohol abuse        Abdominal pain- (present on admission)   Assessment & Plan    Secondary to spontaneous bacterial peritonitis and ascites.  - Climax analgesics as needed per comfort care status        Cirrhosis (CMS-HCC)- (present on admission)   Assessment & Plan    - Comfort care        Coagulopathy (CMS-[facility])- (present on admission)   Assessment & Plan    - Secondary to cirrhosis of liver  - No active bleeding

## 2017-09-06 NOTE — PALLIATIVE CARE
Palliative Care follow-up  Visited pt, no family in room. Pt obtunded, does not appear to be in pain.            Thank you for allowing Palliative Care to participate in this patient's care. Please feel free to call [phone] with any questions or concerns.

## 2017-09-06 NOTE — CARE PLAN
Problem: Pain Management  Goal: Pain level will decrease to patient's comfort goal    Intervention: Follow pain managment plan developed in collaboration with patient and Interdisciplinary Team  PRN pain medication given per MAR as needed. Pt medicated for comfort non verbal       Problem: Skin Integrity  Goal: Risk for impaired skin integrity will decrease  Turning pt per comfort. Maintaining, q2 hr turning.

## 2017-09-06 NOTE — PROGRESS NOTES
Comfort care measures in place. Pt medicated for noisy breathing per MAR. Pts mother at bedside. Family declines any additional needs at this time.

## 2017-09-07 NOTE — PROGRESS NOTES
Harmony Melissa Fall Risk Assessment:     Last Known Fall: Within the last six months  Mobility: Use of assistive device/requires assist of two people  Medications: Cardiovascular and central nervous system meds  Mental Status/LOC/Awareness: Unresponsive/noncompliance with instruction  Toileting Needs: Use of catheters or diversion devices  Volume/Electrolyte Status: NPO greater than 24 hours  Communication/Sensory: Non-English patient/unable to speak/slurred speech  Behavior: Ethanol/substance abuse  Harmony Melissa Fall Risk Total: 19  Fall Risk Level: HIGH RISK    Universal Fall Precautions:  call light/belongings in reach, bed in low position and locked, siderails up x 2, use non-slip footwear    Fall Risk Level Interventions:    TRIAL (TELE 8, NEURO, MED CHANDLER 5) Moderate Fall Risk Interventions  Place yellow fall risk ID band on patient: verified  Provide patient/family education based on risk assessment : verified  Educate patient/family to call staff for assistance when getting out of bed: verified  Place fall precaution signage outside patient door: verified  Utilize bed/chair fall alarm: verifiedTRIAL (TELE 8, NEURO, [employer] CHANDLER 5) High Fall Risk Interventions  Place yellow fall risk ID band on patient: verified  Provide patient/family education based on risk assessment: completed  Educate patient/family to call staff for assistance when getting out of bed: completed  Place fall precaution signage outside patient door: verified  Place patient in room close to nursing station: currently not available/charge notified  Utilize bed/chair fall alarm: verified  Notify charge of high risk for huddle: verified    Patient Specific Interventions:     Medication: review medications with patient and family  Mental Status/LOC/Awareness: check on patient hourly and utilize bed/chair fall alarm  Toileting: not applicable  Volume/Electrolyte Status: not applicable  Communication/Sensory: update plan of care on  whiteboard  Behavioral: not applicable  Mobility: ensure bed is locked and in lowest position

## 2017-09-07 NOTE — DISCHARGE PLANNING
APRYL met with pt's mother, Taisha, to discuss lower level of care transitions. APRYL explained options of transferring to a SNF with comfort care or hospice. Taisha elected to send a blanket referral to SNF's to see if insurance will cover care costs. APRYL faxed choice to [facility].

## 2017-09-07 NOTE — PROGRESS NOTES
Harmony Melissa Fall Risk Assessment:     Last Known Fall: Within the last six months  Mobility: Immobilized/requires assist of one person  Medications: Cardiovascular or central nervous system meds  Mental Status/LOC/Awareness: Unresponsive/noncompliance with instruction  Toileting Needs: Use of catheters or diversion devices  Volume/Electrolyte Status: NPO greater than 24 hours  Communication/Sensory: Non-English patient/unable to speak/slurred speech  Behavior: Ethanol/substance abuse  Harmony Melissa Fall Risk Total: 17  Fall Risk Level: HIGH RISK     Universal Fall Precautions:  call light/belongings in reach, bed in low position and locked, wheelchairs and assistive devices out of sight, siderails up x 2, use non-slip footwear, adequate lighting, clutter free and spill free environment     Fall Risk Level Interventions:    TRIAL ([employer], NEURO, MED CHANDLER 5) Moderate Fall Risk Interventions  Place yellow fall risk ID band on patient: verified  Provide patient/family education based on risk assessment : completed  Educate patient/family to call staff for assistance when getting out of bed: completed  Place fall precaution signage outside patient door: verified  Utilize bed/chair fall alarm: verifiedTRIAL ([employer] 8, NEURO, [employer] CHANDLER 5) High Fall Risk Interventions  Place yellow fall risk ID band on patient: verified  Provide patient/family education based on risk assessment: completed  Educate patient/family to call staff for assistance when getting out of bed: completed  Place fall precaution signage outside patient door: verified  Place patient in room close to nursing station: currently not available/charge notified  Utilize bed/chair fall alarm: refused (comfort care pt, not appropriate, not mobile)  Notify charge of high risk for huddle: completed     Patient Specific Interventions:      Medication: review medications with patient and family  Mental Status/LOC/Awareness: check on patient hourly  Toileting: monitor  intake and output/use of appropriate interventions  Volume/Electrolyte Status: not applicable  Communication/Sensory: update plan of care on whiteboard and provide communication alternatives/  Behavioral: encourage patient to voice feelings and administer medication as ordered  Mobility: provide comfort measures during transport and ensure bed is locked and in lowest position

## 2017-09-07 NOTE — PALLIATIVE CARE
"PALLIATIVE CARE FOLLOW-UP:    Discussed with pt RN Sherley.  Met with pt's mother Taisha and cousin Lisbet.  Family asking about change of facility for hospice if pt is not imminent.  Informed that SW will assist with that.    Family asked about burial/cremation -- discussed embalming and air transport of body with estimated cost of $5k, mortuary list given.  Mother they would be choosing cremation d/t cost.     Family also inquired about \"Buddhist or \" for place that they might stay while they are in town.    Updated:  Dr. Galeas, left message for APRYL [facility] [phone] regarding hospice/placement and lodging assistance for family    Thank you for allowing Palliative Care to support this pt and her family.  Contact [phone] for additional assistance, questions or concerns.  "

## 2017-09-07 NOTE — DISCHARGE PLANNING
Received notice from [facility], patient has been denied for reason of; No open Medicaid beds available.     Denied from [facility] for reason of: No [facility].

## 2017-09-07 NOTE — PROGRESS NOTES
Assumed patient care at 1900. POC discussed w/ day nurse and pt, pt family educated on plan. Pt obtunded, comfort measures in place. PRN Morphine and Ativan given as needed for comfort. Q2 turns implemented, mepilex on sacrum. Bynum in place. Linens changed.

## 2017-09-07 NOTE — CARE PLAN
Problem: Pain Management  Goal: Pain level will decrease to patient's comfort goal  Patient given IV morphine as needed for pain management. Family members at bedside educated to let this RN know if patient begins groaning or appears to be in pain.     Problem: Mobility  Goal: Risk for activity intolerance will decrease  Q2 turns implemented. Pt repositioned for comfort per family request and when patient appears to be in pain.

## 2017-09-07 NOTE — PROGRESS NOTES
Internal Medicine Interval Note    Name Nury Thornton       1980   Age/Sex 36 y.o. female   MRN [id number]   Code Status Comfort measures only      After 5PM or if no immediate response to page, please call for cross-coverage  Attending/Team: Vitaliy See Patient List for primary contact information  Call [phone] to page    1st Call - Day Intern (R1):   Ori 2nd Call - Day Sr. Resident (R2/R3):   Adal         Reason for interval visit  (Principal Problem)   Principal Problem:    Severe sepsis (CMS-HCC) POA: Yes  Active Problems:    Alcoholic hepatitis with ascites POA: Yes    Anemia due to gastrointestinal blood loss POA: Yes    Acute kidney injury (CMS-HCC) POA: Yes    Spontaneous bacterial peritonitis (CMS-HCC) POA: Yes      Overview: - 17 Diagnostic paracentesis revealed highly elevated PMNs, gram       stain w no organisms, Cx rare gram negative lactose fermenting rods      - 17 Therapeutic paracentesis yielded 6 liters of clear bright yellow       to brown fluid    Coagulopathy (CMS-HCC) POA: Yes    Cirrhosis (CMS-HCC) POA: Yes      Overview: - Cirrhosis 2/2 alcohol abuse, confirmed with ultrasound of liver       - Cassandraey's discriminant function is 92      - MELD score is 36 indicative of 52% 3-month mortality rate      - Child-Sanabria class C with a score of 13    Abdominal pain POA: Yes    Alcohol withdrawal (CMS-HCC) POA: Yes  Resolved Problems:    * No resolved hospital problems. *      Interval Problem Daily Status Update  (24 hours)   Patient remains obtunded and sedated. Breathing is agonal. Patient is lying in bed with family at bedside.    Review of Systems   Unable to perform ROS: Patient unresponsive       Consultants/Specialty  Nephrology (Najjar)  Gastroenterology (Alvino)  PMA (Irvin)  Palliative    Disposition  Comfort measures only    Quality Measures    Reviewed items::  Labs reviewed, Medications reviewed, EKG reviewed and Radiology images reviewed  Misa  catheter::  Hospice / Comfort Care  Ulcer Prophylaxis::  Not indicated  DVT prophylaxis: Not indicated (Comfort Care)        Physical Exam       Vitals:    09/03/17 1819 09/04/17 1021 09/05/17 0800 09/06/17 0800   BP:   111/41 109/55   Pulse: (!) 126 (!) 120 (!) 119 (!) 110   Resp: (!) 24 (!) 26 (!) 32 (!) 24   Temp:   (!) 38.1 °C (100.5 °F) 37.7 °C (99.9 °F)   SpO2: (!) 55% (!) 87% (!) 86% 90%   Weight:       Height:         Body mass index is 28.45 kg/m².    Oxygen Therapy:       Physical Exam   Constitutional: She appears lethargic and jaundiced. She appears unhealthy. She has a sickly appearance.   HENT:   Head: Normocephalic and atraumatic.   Eyes:   Pupils equal, reactive to light and accommodation. Sclera icteric   Cardiovascular: Regular rhythm.  Tachycardia present.    Pulmonary/Chest: Tachypnea noted.   Agonal breathing noted   Neurological: She appears lethargic.   Patient obtunded         Lab Data Review:     No new labs          ASSESSMENT:  1. 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 Severe sepsis secondary to spontaneous bacterial peritonitis  3. Anemia due to gastrointestinal blood loss  4. Acute on chronic kidney injury, stage III  5. Alcoholic cirrhosis with associated coagulopathy  6. Alcohol use disorder  7. Hypertension  8. Macrocytic anemia      PLAN:  Patient has been transitioned to comfort measures only, day 3. She is currently breathing adequately and unresponsive with family at bedside. She has been evaluated by palliative and hospice, and appropriate comfort measures have been ordered. We are working in concert with case management for possible placement in the event her comfort course requires longer term care. Continue comfort measures only

## 2017-09-08 NOTE — PROGRESS NOTES
Pt on comfort care. Providing PRN medication as needed. Family at bedside. Linens changed. Pt on turning schedule according tjo comfort. On RA, shallow breathing noted. Pt febrile , providing ice packs and wash cloths. Pt having a lot of secretions, oral suction set up, suctioned pt.   Pt  at 0815. Notified mother at bedside. 2RN pronunciation. Allowed family to grief at bedside. Post mortuum care provided.

## 2017-09-08 NOTE — CARE PLAN
Problem: Psychosocial needs  Goal: Privacy for patient and family  Patient in private room, family at bedside at all times. Comfort measure in place.     Problem: Pain  Goal: Alleviation of Pain or a reduction in pain to the patient's comfort goal  Patient given IV dilaudid for pain management and comfort.

## 2017-09-08 NOTE — PROGRESS NOTES
Notified by nursing that patient  today at 8:15 am.    We appreciate SW assistance with providing bereavement resources to family.

## 2017-09-08 NOTE — PROGRESS NOTES
Assumed patient care at 1900. POC discussed w/ day nurse and patient's family, family aware of plan. Pt obtunded, comfort measures in place. Pt given IV dilaudid as needed for comfort. Repositioned when moaning. Bynum in place for comfort. Family at bedside.

## 2017-09-08 NOTE — DISCHARGE PLANNING
APRYL was notified that this pt passed this am. Family has requested resources for cremation and support. SW provided this information and suggested the family contact the [facility] for help facilitating these services as well as any financial and spiritual services. APRYL will remain available for support of this family.

## 2017-09-08 NOTE — PROGRESS NOTES
Harmony Melissa Fall Risk Assessment:     Last Known Fall: Within the last six months  Mobility: Hemiplegic, paraplegia, or quadriplegia (unable to mobilize)  Medications: Cardiovascular and central nervous system meds  Mental Status/LOC/Awareness: Unresponsive/noncompliance with instruction  Toileting Needs: Use of catheters or diversion devices  Volume/Electrolyte Status: NPO greater than 24 hours  Communication/Sensory: Non-English patient/unable to speak/slurred speech  Behavior: Ethanol/substance abuse  Harmony Melissa Fall Risk Total: 20  Fall Risk Level: HIGH RISK    Universal Fall Precautions:  call light/belongings in reach, bed in low position and locked, siderails up x 2, use non-slip footwear    Fall Risk Level Interventions:    TRIAL (Navigating Cancer 8, NEURO, MED CHANDLER 5) Moderate Fall Risk Interventions  Place yellow fall risk ID band on patient: verified  Provide patient/family education based on risk assessment : verified  Educate patient/family to call staff for assistance when getting out of bed: verified  Place fall precaution signage outside patient door: verified  Utilize bed/chair fall alarm: verifiedTRIAL (TELE 8, NEURO, [facility] CHANDLER 5) High Fall Risk Interventions  Place yellow fall risk ID band on patient: verified  Provide patient/family education based on risk assessment: completed  Educate patient/family to call staff for assistance when getting out of bed: completed  Place fall precaution signage outside patient door: verified  Place patient in room close to nursing station: currently not available/charge notified  Utilize bed/chair fall alarm: verified  Notify charge of high risk for huddle: verified    Patient Specific Interventions:     Medication: review medications with patient and family  Mental Status/LOC/Awareness: encourage family to stay with patient, check on patient hourly and utilize bed/chair fall alarm  Toileting: not applicable  Volume/Electrolyte Status: not  applicable  Communication/Sensory: update plan of care on whiteboard  Behavioral: not applicable  Mobility: ensure bed is locked and in lowest position

## 2017-09-09 NOTE — DISCHARGE SUMMARY
CHIEF COMPLAINT ON ADMISSION  Chief Complaint   Patient presents with   • Abdominal Swelling   • Abdominal Pain   • Cirrhosis       CODE STATUS  Comfort Care/DNR    HPI & HOSPITAL COURSE  Nury Thornton was a 37 yo female with medical history of cirrhosis, hepatitis C, HTN, polysubstance abuse who presented to the [facility] ED on 8/28/17 with SIRS and severe, diffuse abdominal pain, distension, nausea, and vomiting. Ultrasound of biliary tree revealed ascites and pulsatile antegrade portal vein flow. Diagnostic paracentesis revealed slightly turbid tan-brown fluid that cultured positive for E.coli. She was diagnosed with sepsis 2/2 spontaneous bacterial peritonitis and received IV fluids and ceftriaxone.    She was also found to be anemic, with Hb 5.5. She received pRBC transfusions to maintain Hb>7. Gastroenterology was consulted for suspected GI bleed, PPI was initiated, and EGD was planned for after she stabilized. She developed alcoholic hepatitis with Maddrey's score of 84 and she was treated with pentoxifylline and rifampin, as steroids were contraindicated due to presence of SBP.    Despite sepsis protocol, her renal function slowly declined, as evidenced by oliguria and worsening of Cr from a baseline of approximately 0.5 to 2.23 on 8/29/17. Nephrology was consulted on that date. Octreotide and midodrine were initiated for treatment of hepatorenal syndrome. It was communicated to the patient that prognosis was very poor.    On 8/30/17, Palliative care was consulted and recommended comfort care measures. A goals of care discussion was held with the patient, who requested medical management, but elected DNR code status. On 9/1/17, another goals of care discussion was had with the patient and she elected comfort care measures. Her mental status subsequently declined and she was obtunded beginning on 9/3/17. Her available family travelled to the area and her palliative needs were attended to in accordance with  comfort care measures.    Nury Thornton  on 17 at 08:15.      DISCHARGE PROBLEM LIST  Principal Problem:    Severe sepsis (CMS-HCC) POA: Yes  Active Problems:    Alcoholic hepatitis with ascites POA: Yes    Anemia due to gastrointestinal blood loss POA: Yes    Acute kidney injury (CMS-HCC) POA: Yes    Spontaneous bacterial peritonitis (CMS-HCC) POA: Yes      Overview: - 17 Diagnostic paracentesis revealed highly elevated PMNs, gram       stain w no organisms, Cx rare gram negative lactose fermenting rods      - 17 Therapeutic paracentesis yielded 6 liters of clear bright yellow       to brown fluid    Coagulopathy (CMS-HCC) POA: Yes    Cirrhosis (CMS-HCC) POA: Yes      Overview: - Cirrhosis 2/2 alcohol abuse, confirmed with ultrasound of liver       - Laura's discriminant function is 92      - MELD score is 36 indicative of 52% 3-month mortality rate      - Child-Sanabria class C with a score of 13    Abdominal pain POA: Yes    Alcohol withdrawal (CMS-HCC) POA: Yes  Resolved Problems:    * No resolved hospital problems. *      MEDICATIONS ON DISCHARGE  There are no discharge medications for this patient.       DIET  n/a    ACTIVITY  n/a    CONSULTATIONS  Nephrology  Gastroenterology  Pulmonology  Palliative care    PROCEDURES  17: Diagnostic paracentesis revealed highly elevated PMNs, gram stain w no organisms, Cx E.coli  17: Therapeutic paracentesis yielded 6 liters of clear bright yellow to brown fluid  Albumin was given subsequent to both procedures    LABORATORY  Lab Results   Component Value Date/Time    SODIUM 130 (L) 2017 05:20 AM    POTASSIUM 3.9 2017 05:20 AM    CHLORIDE 102 2017 05:20 AM    CO2 18 (L) 2017 05:20 AM    GLUCOSE 90 2017 05:20 AM    BUN 36 (H) 2017 05:20 AM    CREATININE 1.84 (H) 2017 05:20 AM    CREATININE 0.7 2006 11:30 PM        Lab Results   Component Value Date/Time    WBC 9.0 2017 05:20 AM    HEMOGLOBIN 7.6  (L) 09/02/2017 05:20 AM    HEMATOCRIT 21.9 (L) 09/02/2017 05:20 AM    PLATELETCT 71 (L) 09/02/2017 05:20 AM        Total time of the discharge process exceeds 45 minutes

## 2017-09-12 NOTE — DISCHARGE PLANNING
SW received a call from the  stating that pts SO is out front looking for information regarding the pt. SW called SS manager Sherley Carranza who stated that death is a matter of public record, and SO can be updated on death, no information can be provided on cause of death however. APRYL met with SO in the lobby and informed him of pts passing. SW provided emotional support. SW informed pt that this SW had called pts mother and left a message requesting a return call. SW informed SO that should the pts mother give consent, she would be happy to discuss to COD. Pts SO stated understanding and reported that he would come back down in a day or two to inquire about consent, as he does not currently have a phone. SW to remain available.

## 2017-09-15 NOTE — PROCEDURES
Paracentesis    Date of procedure:  9/1/2017    INDICATION: Therapeutic to make work of breathing easier.    Resident: Luis Murray    Attending physician:  Dr. Jeremy Gonda    CONSENT:   Consent was obtained from patient prior to the procedure. Indications, risks, and benefits were explained at length.      PROCEDURE SUMMARY:   A time-out was performed. Using an ultrasound, the site of procedure was marked in the left lower abdominal quadrant. My hands were washed immediately prior to the procedure. I wore a surgical cap, mask with protective eyewear, sterile gown and sterile gloves throughout the procedure. The area was cleansed and draped in usual sterile fashion using chlorhexidine scrub. Anesthesia was achieved with 1% lidocaine. The right lower quadrant of the abdomen was prepped and draped in a sterile fashion using chlorhexidine scrub. 1% lidocaine was used to numb the skin, soft tissue and peritoneum. The paracentesis catheter was inserted and advanced with negative pressure until yellowish colored fluid was aspirated. Approximately 10ml of fluid was drained and more drainage was unsuccessful. Further attempts have been made to drain out the fluid but were unsuccessfull. The procedure was then terminated at this time.    A bandaid was placed over the puncture wound.     The patient tolerated the procedure well without any immediate complications.   Dr. Gonda was supervised the entire procedure.    Estimated blood loss was 5ml.

## 2017-09-15 NOTE — DISCHARGE PLANNING
MSW received call from pt's mother Taisha Thornton ([phone]) seeing if the hospital can get her an airline ticket or transportation back to [place]. MSW informed her that the hospital does not have those services. No other needs at this time.

## 2020-10-14 NOTE — PROGRESS NOTES
Hospital Medicine Progress Note, Adult, Complex               Author: Luz KRISTINE Terry     Date & Time created: 3/22/2017  8:06 AM   ID/CC: 36 yr old F with decompensated alcoholic liver disease/ , hx of alcoholism in remission, hx of recent lap cholecystectomy by  on 3/10/17 presented with abdominal bloating and swelling. Pt was treated for possible gastroenteritis.    Interval History:  Pt appears better, very minimal ascitic fluid-unable to tap.  Advanced diet to GI soft/bland diet.  C diff pending, on IV C3/flagyl for now. Wbcs trending down. On iv albumin, BP low normal.  OOB in chair for all meals.  Counseled tobacco/THC cessation, encouraged to continue to abstain from alcohol.    Review of Systems:  Review of Systems   Constitutional: Negative for fever, chills and diaphoresis.   HENT: Negative for congestion, ear discharge and nosebleeds.    Eyes: Negative for blurred vision and double vision.   Respiratory: Negative for cough, hemoptysis and wheezing.    Cardiovascular: Negative for chest pain, palpitations and leg swelling.   Gastrointestinal: Positive for diarrhea. Negative for heartburn, nausea, vomiting, abdominal pain, constipation, blood in stool and melena.   Genitourinary: Negative for dysuria and urgency.   Musculoskeletal: Negative for myalgias and joint pain.   Skin: Negative for itching and rash.   Neurological: Positive for weakness. Negative for dizziness, focal weakness, seizures, loss of consciousness and headaches.   Endo/Heme/Allergies: Does not bruise/bleed easily.   Psychiatric/Behavioral: Negative for depression and substance abuse (in remission ). The patient is not nervous/anxious and does not have insomnia.        Physical Exam:  Physical Exam   Constitutional: She is oriented to person, place, and time.  Non-toxic appearance. No distress. Nasal cannula in place.   Appears better   HENT:   Head: Normocephalic and atraumatic.   Eyes: Conjunctivae and EOM are normal. Pupils are  equal, round, and reactive to light. Right eye exhibits no discharge. Left eye exhibits no discharge. No scleral icterus.   Neck: Normal range of motion. Neck supple. No JVD present. No thyromegaly present.   Cardiovascular: Regular rhythm.  Tachycardia present.    No murmur heard.  Pulmonary/Chest: Effort normal. No tachypnea. No respiratory distress. She has no wheezes. She has no rhonchi. She has no rales.   Abdominal: Soft. She exhibits distension. There is no tenderness. There is no rebound and no guarding.   Lap kaitlynn scar healed, no signs of infection   Musculoskeletal: Normal range of motion. She exhibits no edema or tenderness.   Neurological: She is alert and oriented to person, place, and time.   Skin: Skin is warm and dry. She is not diaphoretic. No erythema.   Psychiatric: She has a normal mood and affect. Her behavior is normal.   Nursing note and vitals reviewed.      Labs:        Invalid input(s): [id number]  Recent Labs      03/19/17   1400  03/19/17   1733   TROPONINI  <0.01  <0.01   BNPBTYPENAT  204*   --      Recent Labs      03/19/17   1400  03/20/17   0200  03/21/17   1054  03/22/17   0258   SODIUM  132*  133*  135  133*   POTASSIUM  3.3*  3.4*  3.4*  3.1*   CHLORIDE  103  108  107  107   CO2  21  18*  21  19*   BUN  3*  2*  2*  2*   CREATININE  0.62  0.52  0.49*  0.47*   MAGNESIUM  1.8   --   1.6  1.6   PHOSPHORUS   --    --    --   2.3*   CALCIUM  7.9*  7.3*  7.5*  7.6*     Recent Labs      03/19/17   1400  03/20/17   0200  03/21/17   1054  03/22/17   0258   ALTSGPT  17  13  16  13   ASTSGOT  38  31  52*  42   ALKPHOSPHAT  135*  117*  134*  118*   TBILIRUBIN  5.4*  3.8*  4.2*  3.9*   LIPASE  30   --    --    --    GLUCOSE  83  95  91  138*     Recent Labs      03/19/17   1400  03/20/17   0200  03/21/17   1054  03/22/17   0258   RBC  2.87*  2.41*  2.74*  2.40*   HEMOGLOBIN  9.5*  8.2*  9.3*  8.1*   HEMATOCRIT  29.7*  24.6*  28.6*  24.9*   PLATELETCT  87*  82*  109*  106*   PROTHROMBTM   23.0*  23.9*  22.1*  24.9*   APTT  44.7*  49.6*   --    --    INR  1.97*  2.07*  1.87*  2.18*     Recent Labs      17   0200  17   1054  17   0258   WBC  12.9*  12.6*  10.3   NEUTSPOLYS  87.70*  77.70*  88.50*   LYMPHOCYTES  5.20*  12.00*  6.20*   MONOCYTES  5.30  5.50  4.40   EOSINOPHILS  0.90  3.60  0.00   BASOPHILS  0.00  0.50  0.90   ASTSGOT  31  52*  42   ALTSGPT  13  16  13   ALKPHOSPHAT  117*  134*  118*   TBILIRUBIN  3.8*  4.2*  3.9*           Hemodynamics:  Temp (24hrs), Av.1 °C (98.8 °F), Min:36.9 °C (98.4 °F), Max:37.6 °C (99.7 °F)  Temperature: 37.1 °C (98.7 °F)  Pulse  Av.6  Min: 90  Max: 114   Blood Pressure: 116/68 mmHg     Respiratory:    Respiration: 20, Pulse Oximetry: 95 %           Fluids:    Intake/Output Summary (Last 24 hours) at 17 0806  Last data filed at 17 0230   Gross per 24 hour   Intake    100 ml   Output      0 ml   Net    100 ml        GI/Nutrition:  Orders Placed This Encounter   Procedures   • Diet Order     Standing Status: Standing      Number of Occurrences: 1      Standing Expiration Date:      Order Specific Question:  Diet:     Answer:  Full Liquid [11]      Comments:  bland diet      Medical Decision Making, by Problem:  Active Hospital Problems    Diagnosis   • Sepsis (CMS-HCC) [A41.9]POA  Treating for possible SBP vs gastroenteritis  On C3/flagyl  Checking C diff      • HTN (hypertension) [I10]BP lower side  Meds on hold     • Transaminitis due to alcohol liver disease [R74.0]  -Decompensated liver failure     • Hypokalemia [E87.6]  -replacing K/Mag     • Alcoholism (CMS-HCC) [F10.20]  -in remission since last 1 mo per pt     • Macrocytic anemia [D53.9]  -from liver disease     • Tobacco abuse [Z72.0]  -counseled cessation     • Marijuana abuse [F12.10]  -counseled     • GERD (gastroesophageal reflux disease) [K21.9]  -Better on ppi     • Leukocytosis [D72.829]  -from sepsis, trending down     • PTSD (post-traumatic stress disorder)  [F43.10]  -chronic     • Hypocalcemia [E83.51]  -correct calcium nl  Check Vit D and Mag     • Coagulopathy (CMS-HCC) [D68.9]  -related to liver dysfunction with her alcohol abuse     Dispo: Transfer to medical    EKG reviewed, Radiology images reviewed, Labs reviewed and Medications reviewed  Bynum catheter: No Bynum      DVT Prophylaxis: Contraindicated - High bleeding risk    Ulcer prophylaxis: Yes  Antibiotics: Treating active infection/contamination beyond 24 hours perioperative coverage  Assessed for rehab: Patient was assess for and/or received rehabilitation services during this hospitalization         Lower extremity edema

## 2020-10-26 NOTE — PROGRESS NOTES
Patient transferred from ED to [place] [id number] at 2250 with two RNs on monitor. Patient lethargic but A&Ox4, responding to verbal stimuli. Patient oriented to room, call light in reach, bed alarm on.    risk factors

## 2024-06-19 NOTE — DISCHARGE PLANNING
Received choice form from APRYL [facility] for SNF services, referral has been sent to [place]F per patient request.    Render Risk Assessment In Note?: no Detail Level: Simple Additional Notes: Refer to PSID
